# Patient Record
Sex: FEMALE | Race: WHITE | NOT HISPANIC OR LATINO | Employment: OTHER | URBAN - METROPOLITAN AREA
[De-identification: names, ages, dates, MRNs, and addresses within clinical notes are randomized per-mention and may not be internally consistent; named-entity substitution may affect disease eponyms.]

---

## 2017-02-17 ENCOUNTER — OFFICE VISIT (OUTPATIENT)
Dept: OBSTETRICS AND GYNECOLOGY | Facility: CLINIC | Age: 67
End: 2017-02-17
Payer: MEDICARE

## 2017-02-17 VITALS
SYSTOLIC BLOOD PRESSURE: 120 MMHG | BODY MASS INDEX: 23.2 KG/M2 | HEIGHT: 59 IN | DIASTOLIC BLOOD PRESSURE: 70 MMHG | WEIGHT: 115.06 LBS

## 2017-02-17 DIAGNOSIS — N76.0 RECURRENT VAGINITIS: ICD-10-CM

## 2017-02-17 DIAGNOSIS — N95.2 VAGINAL ATROPHY: ICD-10-CM

## 2017-02-17 DIAGNOSIS — Z12.39 BREAST CANCER SCREENING: ICD-10-CM

## 2017-02-17 DIAGNOSIS — Z01.419 ENCOUNTER FOR GYNECOLOGICAL EXAMINATION WITHOUT ABNORMAL FINDING: Primary | ICD-10-CM

## 2017-02-17 DIAGNOSIS — N39.0 RECURRENT URINARY TRACT INFECTION: ICD-10-CM

## 2017-02-17 LAB
BACTERIA #/AREA URNS AUTO: ABNORMAL /HPF
BILIRUB UR QL STRIP: NEGATIVE
CLARITY UR REFRACT.AUTO: CLEAR
COLOR UR AUTO: YELLOW
GLUCOSE UR QL STRIP: NEGATIVE
HGB UR QL STRIP: NEGATIVE
KETONES UR QL STRIP: NEGATIVE
LEUKOCYTE ESTERASE UR QL STRIP: ABNORMAL
MICROSCOPIC COMMENT: ABNORMAL
NITRITE UR QL STRIP: POSITIVE
PH UR STRIP: 6 [PH] (ref 5–8)
PROT UR QL STRIP: NEGATIVE
RBC #/AREA URNS AUTO: 1 /HPF (ref 0–4)
SP GR UR STRIP: 1.01 (ref 1–1.03)
SQUAMOUS #/AREA URNS AUTO: 1 /HPF
URN SPEC COLLECT METH UR: ABNORMAL
UROBILINOGEN UR STRIP-ACNC: NEGATIVE EU/DL
WBC #/AREA URNS AUTO: 6 /HPF (ref 0–5)

## 2017-02-17 PROCEDURE — 99397 PER PM REEVAL EST PAT 65+ YR: CPT | Mod: S$GLB,,, | Performed by: OBSTETRICS & GYNECOLOGY

## 2017-02-17 PROCEDURE — 99999 PR PBB SHADOW E&M-EST. PATIENT-LVL III: CPT | Mod: PBBFAC,,, | Performed by: OBSTETRICS & GYNECOLOGY

## 2017-02-17 PROCEDURE — 81001 URINALYSIS AUTO W/SCOPE: CPT

## 2017-02-17 PROCEDURE — 87086 URINE CULTURE/COLONY COUNT: CPT

## 2017-02-17 NOTE — MR AVS SNAPSHOT
[facility] - OB/GYN 5th Floor  [address]  [facility] [address]  Phone: [phone]                  Azucena Luciano   2017 10:45 AM   Office Visit    Description:  Female : 1950   Provider:  Tequila Galloway MD   Department:  [facility] - OB/GYN 5th Floor           Reason for Visit     Well Woman                To Do List           Goals (5 Years of Data)     None      [facility]     [facility] On Call Nurse Care Line -  Assistance  Registered nurses in the [facility] On Call Center provide clinical advisement, health education, appointment booking, and other advisory services.  Call for this free service at [phone].             Medications           Message regarding Medications     Verify the changes and/or additions to your medication regime listed below are the same as discussed with your clinician today.  If any of these changes or additions are incorrect, please notify your healthcare provider.        STOP taking these medications     methylPREDNISolone (MEDROL DOSEPACK) 4 mg tablet use as directed           Verify that the below list of medications is an accurate representation of the medications you are currently taking.  If none reported, the list may be blank. If incorrect, please contact your healthcare provider. Carry this list with you in case of emergency.           Current Medications     alendronate (FOSAMAX) 70 MG tablet 1 tab q weekly in the am with a full glass of water on an empty stomach. Do not consume food or lie down for at least 30 minutes afterwards.    calcium-vitamin D3 500 mg(1,250mg) -200 unit per tablet Take 1 tablet by mouth 2 (two) times daily with meals.    fluticasone (FLONASE) 50 mcg/actuation nasal spray 1 spray by Each Nare route once daily.    multivitamin with minerals tablet Take 1 tablet by mouth once daily.    oxymetazoline (AFRIN) 0.05 % nasal spray 1 spray by Nasal route every evening.           Clinical Reference Information     "       Your Vitals Were     BP Height Weight BMI       120/70 4' 11" (1.499 m) 52.2 kg (115 lb 1.3 oz) 23.24 kg/m2       Blood Pressure          Most Recent Value    BP  120/70      Allergies as of 2/17/2017     Cayenne Pepper      Immunizations Administered on Date of Encounter - 2/17/2017     None      Language Assistance Services     ATTENTION: Language assistance services are available, free of charge. Please call [phone].      ATENCIÓN: Si habla español, tiene a marti disposición servicios gratuitos de asistencia lingüística. Llame al [phone].     VELMA Ý: N?u b?n nói Ti?ng Vi?t, có các d?ch v? h? tr? ngôn ng? mi?n phí dành cho b?n. G?i s? [phone].         [facility] - OB/GYN 5th Floor complies with applicable Federal civil rights laws and does not discriminate on the basis of race, color, national origin, age, disability, or sex.        "

## 2017-02-17 NOTE — PROGRESS NOTES
"CC: Well woman exam    Azucena Luciano is a 66 y.o. female  presents for a well woman exam.  LMP: No LMP recorded. Patient is postmenopausal..   Vaginal dryness is problematic.  Using OTC lubricants. Does not want any vaginal estrogen or osphena.      Past Medical History   Diagnosis Date    Asthma     Carpal tunnel syndrome      Past Surgical History   Procedure Laterality Date    Ovarian cyst removal      Facial cosmetic surgery      Tummy tuck      Nasal septum surgery       Social History     Social History    Marital status:      Spouse name: N/A    Number of children: 3    Years of education: N/A     Occupational History    Retired insurance        Social History Main Topics    Smoking status: Never Smoker    Smokeless tobacco: None    Alcohol use 0.0 oz/week     0 Standard drinks or equivalent per week      Comment: rare    Drug use: No    Sexual activity: Yes     Partners: Male     Other Topics Concern    None     Social History Narrative     Family History   Problem Relation Age of Onset    Osteoporosis Mother     Cancer Father 66     Colon    Colon cancer Father     Cancer Brother      Brain tumor    Diabetes Maternal Grandmother     Diabetes Paternal Grandmother     Heart disease Paternal Grandmother     Hypertension Neg Hx     Stroke Neg Hx     Breast cancer Neg Hx     Ovarian cancer Neg Hx      OB History      Para Term  AB TAB SAB Ectopic Multiple Living    5 3                  Visit Vitals    /70    Ht 4' 11" (1.499 m)    Wt 52.2 kg (115 lb 1.3 oz)    BMI 23.24 kg/m2         ROS:    ROS:  GENERAL: Denies weight gain or weight loss. Feeling well overall.   SKIN: Denies rash or lesions.   HEAD: Denies head injury or headache.   NODES: Denies enlarged lymph nodes.   CHEST: Denies chest pain or shortness of breath.   CARDIOVASCULAR: Denies palpitations or left sided chest pain.   ABDOMEN: No abdominal pain, constipation, diarrhea, " nausea, vomiting or rectal bleeding.   URINARY: No frequency, dysuria, hematuria, or burning on urination.  REPRODUCTIVE: See HPI.   BREASTS: The patient performs breast self-examination and denies pain, lumps, or nipple discharge.   HEMATOLOGIC: No easy bruisability or excessive bleeding.   MUSCULOSKELETAL: Denies joint pain or swelling.   NEUROLOGIC: Denies syncope or weakness.   PSYCHIATRIC: Denies depression, anxiety or mood swings.    PHYSICAL EXAM:    APPEARANCE: Well nourished, well developed, in no acute distress.  AFFECT: WNL, alert and oriented x 3  SKIN: No acne or hirsutism  NECK: Neck symmetric without masses or thyromegaly  NODES: No inguinal, cervical, axillary, or femoral lymph node enlargement  CHEST: Good respiratory effect  ABDOMEN: Soft.  No tenderness or masses.  No hepatosplenomegaly.  No hernias.  BREASTS: Symmetrical, no skin changes or visible lesions.  No palpable masses, nipple discharge bilaterally.  PELVIC: atrophic  external genitalia without lesions.  Normal hair distribution.  Adequate perineal body, normal urethral meatus.  Vagina DRY  and well rugated without lesions or discharge.  Cervix PALE pink and atrophic, without lesions, discharge or tenderness.  No significant cystocele or rectocele.  Bimanual exam shows uterus to be normal size, regular, mobile and nontender.  Adnexa without masses or tenderness.    RECTAL: Rectovaginal exam confirms above with normal sphincter tone, no masses.  EXTREMITIES: No edema.      ICD-10-CM ICD-9-CM    1. Encounter for gynecological examination without abnormal finding Z01.419 V72.31    2. Vaginal atrophy N95.2 627.3    3. Recurrent vaginitis N76.0 616.10    4. Breast cancer screening Z12.39 V76.10 Mammo Digital Screening Bilat With CAD   5. Recurrent urinary tract infection N39.0 599.0 Urinalysis      Urine culture       replens OTC  Lubrication OTC  URINE CULTURE    Patient was counseled today on A.C.S. Pap guidelines and recommendations for  yearly pelvic exams, mammograms and monthly self breast exams; to see her PCP for other health maintenance.     F/U PRN

## 2017-02-18 LAB
BACTERIA UR CULT: NORMAL
BACTERIA UR CULT: NORMAL

## 2017-05-23 ENCOUNTER — OFFICE VISIT (OUTPATIENT)
Dept: INTERNAL MEDICINE | Facility: CLINIC | Age: 67
End: 2017-05-23
Payer: MEDICARE

## 2017-05-23 ENCOUNTER — INITIAL CONSULT (OUTPATIENT)
Dept: CARDIOLOGY | Facility: CLINIC | Age: 67
End: 2017-05-23
Payer: MEDICARE

## 2017-05-23 ENCOUNTER — CLINICAL SUPPORT (OUTPATIENT)
Dept: CARDIOLOGY | Facility: CLINIC | Age: 67
End: 2017-05-23
Payer: MEDICARE

## 2017-05-23 ENCOUNTER — HOSPITAL ENCOUNTER (OUTPATIENT)
Dept: RADIOLOGY | Facility: HOSPITAL | Age: 67
Discharge: HOME | End: 2017-05-23
Attending: INTERNAL MEDICINE
Payer: MEDICARE

## 2017-05-23 VITALS
SYSTOLIC BLOOD PRESSURE: 139 MMHG | HEART RATE: 67 BPM | HEIGHT: 59 IN | DIASTOLIC BLOOD PRESSURE: 77 MMHG | WEIGHT: 114.88 LBS | BODY MASS INDEX: 23.16 KG/M2 | TEMPERATURE: 99 F | RESPIRATION RATE: 16 BRPM

## 2017-05-23 VITALS
SYSTOLIC BLOOD PRESSURE: 134 MMHG | HEART RATE: 64 BPM | DIASTOLIC BLOOD PRESSURE: 80 MMHG | WEIGHT: 114 LBS | BODY MASS INDEX: 22.98 KG/M2 | HEIGHT: 59 IN

## 2017-05-23 DIAGNOSIS — G89.29 CHRONIC HIP PAIN, LEFT: Primary | ICD-10-CM

## 2017-05-23 DIAGNOSIS — M25.512 CHRONIC LEFT SHOULDER PAIN: ICD-10-CM

## 2017-05-23 DIAGNOSIS — M79.605 PAIN OF LEFT LOWER EXTREMITY: Primary | ICD-10-CM

## 2017-05-23 DIAGNOSIS — I82.4Y2 DVT, LOWER EXTREMITY, PROXIMAL, ACUTE, LEFT: ICD-10-CM

## 2017-05-23 DIAGNOSIS — G89.29 CHRONIC LEFT SHOULDER PAIN: ICD-10-CM

## 2017-05-23 DIAGNOSIS — M25.552 CHRONIC HIP PAIN, LEFT: ICD-10-CM

## 2017-05-23 DIAGNOSIS — M25.552 CHRONIC HIP PAIN, LEFT: Primary | ICD-10-CM

## 2017-05-23 DIAGNOSIS — M79.662 PAIN OF LEFT CALF: ICD-10-CM

## 2017-05-23 DIAGNOSIS — M54.12 CERVICAL RADICULOPATHY: ICD-10-CM

## 2017-05-23 DIAGNOSIS — G89.29 CHRONIC HIP PAIN, LEFT: ICD-10-CM

## 2017-05-23 PROCEDURE — 1160F RVW MEDS BY RX/DR IN RCRD: CPT | Mod: S$GLB,,, | Performed by: INTERNAL MEDICINE

## 2017-05-23 PROCEDURE — 73502 X-RAY EXAM HIP UNI 2-3 VIEWS: CPT | Mod: 26,LT,, | Performed by: RADIOLOGY

## 2017-05-23 PROCEDURE — 99999 PR PBB SHADOW E&M-EST. PATIENT-LVL III: CPT | Mod: PBBFAC,,, | Performed by: INTERNAL MEDICINE

## 2017-05-23 PROCEDURE — 99203 OFFICE O/P NEW LOW 30 MIN: CPT | Mod: S$GLB,,, | Performed by: INTERNAL MEDICINE

## 2017-05-23 PROCEDURE — 93971 EXTREMITY STUDY: CPT | Mod: S$GLB,,, | Performed by: INTERNAL MEDICINE

## 2017-05-23 PROCEDURE — 1159F MED LIST DOCD IN RCRD: CPT | Mod: S$GLB,,, | Performed by: INTERNAL MEDICINE

## 2017-05-23 PROCEDURE — 72050 X-RAY EXAM NECK SPINE 4/5VWS: CPT | Mod: 26,,, | Performed by: RADIOLOGY

## 2017-05-23 PROCEDURE — 1125F AMNT PAIN NOTED PAIN PRSNT: CPT | Mod: S$GLB,,, | Performed by: INTERNAL MEDICINE

## 2017-05-23 PROCEDURE — 99999 PR PBB SHADOW E&M-EST. PATIENT-LVL IV: CPT | Mod: PBBFAC,,, | Performed by: INTERNAL MEDICINE

## 2017-05-23 PROCEDURE — 99499 UNLISTED E&M SERVICE: CPT | Mod: S$GLB,,, | Performed by: INTERNAL MEDICINE

## 2017-05-23 PROCEDURE — 73030 X-RAY EXAM OF SHOULDER: CPT | Mod: 26,LT,, | Performed by: RADIOLOGY

## 2017-05-23 PROCEDURE — 99214 OFFICE O/P EST MOD 30 MIN: CPT | Mod: S$GLB,,, | Performed by: INTERNAL MEDICINE

## 2017-05-23 RX ORDER — MELOXICAM 15 MG/1
15 TABLET ORAL DAILY
Qty: 30 TABLET | Refills: 3 | Status: SHIPPED | OUTPATIENT
Start: 2017-05-23 | End: 2017-06-22

## 2017-05-23 NOTE — PROGRESS NOTES
Subjective:       Patient ID: Azucena Luciano is a 66 y.o. female.    Chief Complaint: Other (joint pain)    HPI   Pt with T12 compression fracture who is very active is here c/o 1 year of slowly worsening left anterior hip pain with is radiating to the back and down the leg. It is described as a dull ache which can be sharp with activity. Some relief with Advil. Pt is also c/o left shoulder pain which is radiating to the wrist described again as a dull ache. Improvement with NSAIDs. No neck pain.     She would like an US done of her left LE 2/2 mild swelling/pain. No hx of DVT.   Review of Systems   Constitutional: Negative for activity change, appetite change, chills, diaphoresis, fatigue, fever and unexpected weight change.   HENT: Negative for postnasal drip, rhinorrhea, sinus pressure, sneezing, sore throat, trouble swallowing and voice change.    Respiratory: Negative for cough, shortness of breath and wheezing.    Cardiovascular: Negative for chest pain, palpitations and leg swelling.   Gastrointestinal: Negative for abdominal pain, blood in stool, constipation, diarrhea, nausea and vomiting.   Genitourinary: Negative for dysuria.   Musculoskeletal: Positive for arthralgias and myalgias.   Skin: Negative for rash and wound.   Allergic/Immunologic: Negative for environmental allergies and food allergies.   Hematological: Negative for adenopathy. Does not bruise/bleed easily.       Objective:      Physical Exam   Constitutional: She is oriented to person, place, and time. She appears well-developed and well-nourished. No distress.   HENT:   Head: Normocephalic and atraumatic.   Eyes: Conjunctivae and EOM are normal. Pupils are equal, round, and reactive to light. Right eye exhibits no discharge. Left eye exhibits no discharge. No scleral icterus.   Neck: Neck supple. No JVD present.   Cardiovascular: Normal rate, regular rhythm, normal heart sounds and intact distal pulses.    Pulmonary/Chest: Effort normal  and breath sounds normal. No respiratory distress. She has no wheezes. She has no rales.   Abdominal: Soft. Bowel sounds are normal. There is no tenderness.   Musculoskeletal: She exhibits no edema.        Left shoulder: She exhibits tenderness and pain.        Left hip: She exhibits tenderness and bony tenderness.        Left lower leg: She exhibits tenderness.   Lymphadenopathy:     She has no cervical adenopathy.   Neurological: She is alert and oriented to person, place, and time.   Skin: Skin is warm and dry. No rash noted. She is not diaphoretic. No pallor.       Assessment:       1. Chronic hip pain, left    2. Cervical radiculopathy    3. Chronic left shoulder pain    4. Pain of left calf        Plan:    1/3. Check X-rays of cervical spine/hip/shoulder          Rx Mobic 15 mg daily    4. Check US to r/o DVT

## 2017-05-23 NOTE — LETTER
May 23, 2017      Navarro Limon DO  [address] [facility] [place] [address]           [place] - Vascular Diseases  [address] [facility] [address]  Phone: [phone]          Patient: Azucena Luciano   MR Number: [id number]   YOB: 1950   Date of Visit: 5/23/2017       Dear Dr. Navarro Limon:    Thank you for referring Azucena Luciano to me for evaluation. Attached you will find relevant portions of my assessment and plan of care.    If you have questions, please do not hesitate to call me. I look forward to following Azucena Luciano along with you.    Sincerely,    Abraham Alarcon MD    Enclosure  CC:  No Recipients    If you would like to receive this communication electronically, please contact [email or web address] or [phone] to request more information on [employer] Link access.    For providers and/or their staff who would like to refer a patient to [facility], please contact us through our one-stop-shop provider referral line, [facility] [place], at [phone].    If you feel you have received this communication in error or would no longer like to receive these types of communications, please e-mail [email or web address]

## 2017-05-23 NOTE — PROGRESS NOTES
Subjective:    Patient ID:  Azucena Luciano is a 66 y.o. Y.o.female who presents for evaluation of left groin pain    HPI:  66 year old female with PMH of recurrent DVT's. First DVT  (left leg) she was on OCP for a year ()  She was not started on any blood thinner. Second DVT was  when she was pregnant not sure if she was no any blood thinner. Today she reports chronic left groin and thigh pain that is 5/10 at rest and it get worse when she walk. No leg swelling. She had a venous doppler this morning shows evidence of age indeterminate left proximal DVT. No family history of DVT. She denies any chest pain or SOB. She doesn't have any venous ultrasound done in the past few years.         Past Medical History:   Diagnosis Date    Asthma     Carpal tunnel syndrome        indicated that her mother is alive. She indicated that her father is . She indicated that the status of her brother is unknown. She indicated that the status of her maternal grandmother is unknown. She indicated that the status of her paternal grandmother is unknown. She indicated that the status of her neg hx is unknown.       Social History     Social History    Marital status:      Spouse name: N/A    Number of children: 3    Years of education: N/A     Occupational History    Retired insurance        Social History Main Topics    Smoking status: Never Smoker    Smokeless tobacco: Not on file    Alcohol use 0.0 oz/week      Comment: rare    Drug use: No    Sexual activity: Yes     Partners: Male     Other Topics Concern    Not on file     Social History Narrative    No narrative on file       Current Outpatient Prescriptions   Medication Sig    alendronate (FOSAMAX) 70 MG tablet 1 tab q weekly in the am with a full glass of water on an empty stomach. Do not consume food or lie down for at least 30 minutes afterwards.    calcium-vitamin D3 500 mg(1,250mg) -200 unit per tablet Take 1 tablet by mouth 2  (two) times daily with meals.    fluticasone (FLONASE) 50 mcg/actuation nasal spray 1 spray by Each Nare route once daily.    multivitamin with minerals tablet Take 1 tablet by mouth once daily.    oxymetazoline (AFRIN) 0.05 % nasal spray 1 spray by Nasal route every evening.    apixaban 5 mg Tab Take 2 tablets (10 mg total) by mouth 2 (two) times daily.    apixaban 5 mg Tab Take 1 tablet (5 mg total) by mouth 2 (two) times daily.    meloxicam (MOBIC) 15 MG tablet Take 1 tablet (15 mg total) by mouth once daily.     No current facility-administered medications for this visit.        CMP  Sodium   Date Value Ref Range Status   11/02/2015 144 136 - 145 mmol/L Final     Potassium   Date Value Ref Range Status   11/02/2015 4.3 3.5 - 5.1 mmol/L Final     Chloride   Date Value Ref Range Status   11/02/2015 108 95 - 110 mmol/L Final     CO2   Date Value Ref Range Status   11/02/2015 28 23 - 29 mmol/L Final     Glucose   Date Value Ref Range Status   11/02/2015 90 70 - 110 mg/dL Final     BUN, Bld   Date Value Ref Range Status   11/02/2015 17 8 - 23 mg/dL Final     Creatinine   Date Value Ref Range Status   11/02/2015 0.8 0.5 - 1.4 mg/dL Final     Calcium   Date Value Ref Range Status   11/02/2015 9.6 8.7 - 10.5 mg/dL Final     Total Protein   Date Value Ref Range Status   11/02/2015 6.6 6.0 - 8.4 g/dL Final     Albumin   Date Value Ref Range Status   11/02/2015 3.8 3.5 - 5.2 g/dL Final     Total Bilirubin   Date Value Ref Range Status   11/02/2015 0.4 0.1 - 1.0 mg/dL Final     Comment:     For infants and newborns, interpretation of results should be based  on gestational age, weight and in agreement with clinical  observations.  Premature Infant recommended reference ranges:  Up to 24 hours.............<8.0 mg/dL  Up to 48 hours............<12.0 mg/dL  3-5 days..................<15.0 mg/dL  6-29 days.................<15.0 mg/dL       Alkaline Phosphatase   Date Value Ref Range Status   11/02/2015 132 55 - 135 U/L  "Final     AST   Date Value Ref Range Status   11/02/2015 23 10 - 40 U/L Final     ALT   Date Value Ref Range Status   11/02/2015 20 10 - 44 U/L Final     Anion Gap   Date Value Ref Range Status   11/02/2015 8 8 - 16 mmol/L Final     eGFR if    Date Value Ref Range Status   11/02/2015 >60.0 >60 mL/min/1.73 m^2 Final     eGFR if non    Date Value Ref Range Status   11/02/2015 >60.0 >60 mL/min/1.73 m^2 Final     Comment:     Calculation used to obtain the estimated glomerular filtration  rate (eGFR) is the CKD-EPI equation. Since race is unknown   in our information system, the eGFR values for   -American and Non--American patients are given   for each creatinine result.         Lab Results   Component Value Date    WBC 5.11 11/02/2015    HGB 13.7 11/02/2015    HCT 43.3 11/02/2015    MCV 84 11/02/2015     11/02/2015       Review of Systems   Constitution: Negative for decreased appetite, fever and weight gain.   HENT: Negative.    Cardiovascular: Negative for chest pain, claudication, cyanosis and leg swelling.   Respiratory: Negative for cough, shortness of breath and wheezing.    Skin: Negative for color change, dry skin, itching, rash and suspicious lesions.   Musculoskeletal: Positive for muscle cramps and myalgias. Negative for arthritis, back pain, joint swelling and muscle weakness.   Gastrointestinal: Negative.    Genitourinary: Negative.    Neurological: Negative.  Negative for loss of balance, numbness and paresthesias.        Objective:   /80 (BP Location: Right arm, Patient Position: Sitting, BP Method: Manual)   Pulse 64   Ht 4' 11" (1.499 m)   Wt 51.7 kg (114 lb)   BMI 23.03 kg/m²   Physical Exam   Constitutional: She is oriented to person, place, and time. She appears well-developed and well-nourished. No distress.   HENT:   Head: Normocephalic and atraumatic.   Eyes: Conjunctivae and EOM are normal. Pupils are equal, round, and reactive to " light.   Neck: Normal range of motion. Neck supple. No JVD present.   Cardiovascular: Normal rate, regular rhythm, normal heart sounds and intact distal pulses.  Exam reveals no gallop and no friction rub.    No murmur heard.  Pulmonary/Chest: Effort normal and breath sounds normal. No respiratory distress. She has no wheezes.   Abdominal: Soft. She exhibits no distension. There is no tenderness.   Musculoskeletal: Normal range of motion. She exhibits tenderness. She exhibits no edema.   Neurological: She is alert and oriented to person, place, and time.   Skin: Skin is warm. No rash noted. She is not diaphoretic. No erythema. No pallor.           Assessment:       1. Pain of left lower extremity  Cardiology Lab NOA Resting, Lower Extremities    apixaban 5 mg Tab    apixaban 5 mg Tab    CAR Ultrasound doppler venous leg left   2. DVT, lower extremity, proximal, acute, left  apixaban 5 mg Tab    apixaban 5 mg Tab    CAR Ultrasound doppler venous leg left        Plan:       Azucena was seen today for deep vein thrombosis.    Diagnoses and all orders for this visit:    Pain of left lower extremity  -     Cardiology Lab NOA Resting, Lower Extremities; Future  -     apixaban 5 mg Tab; Take 2 tablets (10 mg total) by mouth 2 (two) times daily.  -     apixaban 5 mg Tab; Take 1 tablet (5 mg total) by mouth 2 (two) times daily.  -     CAR Ultrasound doppler venous leg left; Future; Expected date: 08/23/2017    DVT, lower extremity, proximal, acute, left  -     apixaban 5 mg Tab; Take 2 tablets (10 mg total) by mouth 2 (two) times daily.  -     apixaban 5 mg Tab; Take 1 tablet (5 mg total) by mouth 2 (two) times daily.  -     CAR Ultrasound doppler venous leg left; Future; Expected date: 08/23/2017       Not sure how old this DVT, she has a  history of remote DVT of the left leg.  I will start her on apixaban for 3 months only. and we will repeat the venous doppler in 3 months.  I don't believe that her leg pain is vascular  in nature. She will stop fosamax and see. If the pain persists then CT abdomin and pelvis may be warranted.  Follow up in 3 months.    Abraham Alarcon      5/26/2017.  The patient reports a 5 hours flight 2 few weeks ago after which she developed the leg pain. So most probably this is either acute or subacute DVT. I advised the patient  To continue Blood thinner and have another venous doppler in 3 months. I defer the decision for continuation of treatment to the next provider.    Abraham Alarcon

## 2017-05-26 ENCOUNTER — CLINICAL SUPPORT (OUTPATIENT)
Dept: CARDIOLOGY | Facility: CLINIC | Age: 67
End: 2017-05-26
Payer: MEDICARE

## 2017-05-26 ENCOUNTER — TELEPHONE (OUTPATIENT)
Dept: CARDIOLOGY | Facility: CLINIC | Age: 67
End: 2017-05-26

## 2017-05-26 DIAGNOSIS — M79.605 PAIN OF LEFT LOWER EXTREMITY: ICD-10-CM

## 2017-05-26 LAB — VASCULAR ANKLE BRACHIAL INDEX (ABI) LEFT: 1.06 (ref 0.9–1.2)

## 2017-05-26 PROCEDURE — 93922 UPR/L XTREMITY ART 2 LEVELS: CPT | Mod: S$GLB,,, | Performed by: INTERNAL MEDICINE

## 2017-05-26 NOTE — TELEPHONE ENCOUNTER
----- Message from Abraham Alarcon MD sent at 5/26/2017  3:44 PM CDT -----  NOA is normal  Start compression stocking and continue the Blood thinner.

## 2017-07-05 ENCOUNTER — LAB VISIT (OUTPATIENT)
Dept: LAB | Facility: HOSPITAL | Age: 67
End: 2017-07-05
Attending: INTERNAL MEDICINE
Payer: MEDICARE

## 2017-07-05 ENCOUNTER — TELEPHONE (OUTPATIENT)
Dept: INTERNAL MEDICINE | Facility: CLINIC | Age: 67
End: 2017-07-05

## 2017-07-05 DIAGNOSIS — Z00.00 ANNUAL PHYSICAL EXAM: ICD-10-CM

## 2017-07-05 DIAGNOSIS — Z11.59 NEED FOR HEPATITIS C SCREENING TEST: ICD-10-CM

## 2017-07-05 DIAGNOSIS — Z00.00 ANNUAL PHYSICAL EXAM: Primary | ICD-10-CM

## 2017-07-05 LAB
ALBUMIN SERPL BCP-MCNC: 3.7 G/DL
ALP SERPL-CCNC: 102 U/L
ALT SERPL W/O P-5'-P-CCNC: 21 U/L
ANION GAP SERPL CALC-SCNC: 9 MMOL/L
AST SERPL-CCNC: 21 U/L
BASOPHILS # BLD AUTO: 0.04 K/UL
BASOPHILS NFR BLD: 0.7 %
BILIRUB SERPL-MCNC: 0.3 MG/DL
BUN SERPL-MCNC: 20 MG/DL
CALCIUM SERPL-MCNC: 9.1 MG/DL
CHLORIDE SERPL-SCNC: 108 MMOL/L
CHOLEST/HDLC SERPL: 5.2 {RATIO}
CO2 SERPL-SCNC: 24 MMOL/L
CREAT SERPL-MCNC: 0.8 MG/DL
DIFFERENTIAL METHOD: ABNORMAL
EOSINOPHIL # BLD AUTO: 0.1 K/UL
EOSINOPHIL NFR BLD: 1.7 %
ERYTHROCYTE [DISTWIDTH] IN BLOOD BY AUTOMATED COUNT: 15 %
EST. GFR  (AFRICAN AMERICAN): >60 ML/MIN/1.73 M^2
EST. GFR  (NON AFRICAN AMERICAN): >60 ML/MIN/1.73 M^2
GLUCOSE SERPL-MCNC: 88 MG/DL
HCT VFR BLD AUTO: 42.6 %
HDL/CHOLESTEROL RATIO: 19.4 %
HDLC SERPL-MCNC: 222 MG/DL
HDLC SERPL-MCNC: 43 MG/DL
HGB BLD-MCNC: 13.6 G/DL
LDLC SERPL CALC-MCNC: 146.4 MG/DL
LYMPHOCYTES # BLD AUTO: 3.4 K/UL
LYMPHOCYTES NFR BLD: 56.2 %
MCH RBC QN AUTO: 26.7 PG
MCHC RBC AUTO-ENTMCNC: 31.9 %
MCV RBC AUTO: 84 FL
MONOCYTES # BLD AUTO: 0.5 K/UL
MONOCYTES NFR BLD: 7.5 %
NEUTROPHILS # BLD AUTO: 2 K/UL
NEUTROPHILS NFR BLD: 33.7 %
NONHDLC SERPL-MCNC: 179 MG/DL
PLATELET # BLD AUTO: 233 K/UL
PMV BLD AUTO: 11.1 FL
POTASSIUM SERPL-SCNC: 4 MMOL/L
PROT SERPL-MCNC: 6.6 G/DL
RBC # BLD AUTO: 5.1 M/UL
SODIUM SERPL-SCNC: 141 MMOL/L
TRIGL SERPL-MCNC: 163 MG/DL
TSH SERPL DL<=0.005 MIU/L-ACNC: 3.09 UIU/ML
WBC # BLD AUTO: 5.98 K/UL

## 2017-07-05 PROCEDURE — 85025 COMPLETE CBC W/AUTO DIFF WBC: CPT

## 2017-07-05 PROCEDURE — 84443 ASSAY THYROID STIM HORMONE: CPT

## 2017-07-05 PROCEDURE — 86803 HEPATITIS C AB TEST: CPT

## 2017-07-05 PROCEDURE — 80061 LIPID PANEL: CPT

## 2017-07-05 PROCEDURE — 80053 COMPREHEN METABOLIC PANEL: CPT

## 2017-07-05 PROCEDURE — 36415 COLL VENOUS BLD VENIPUNCTURE: CPT | Mod: PO

## 2017-07-06 LAB — HCV AB SERPL QL IA: ABNORMAL

## 2017-07-12 ENCOUNTER — LAB VISIT (OUTPATIENT)
Dept: LAB | Facility: HOSPITAL | Age: 67
End: 2017-07-12
Attending: INTERNAL MEDICINE
Payer: MEDICARE

## 2017-07-12 ENCOUNTER — OFFICE VISIT (OUTPATIENT)
Dept: INTERNAL MEDICINE | Facility: CLINIC | Age: 67
End: 2017-07-12
Payer: MEDICARE

## 2017-07-12 VITALS
BODY MASS INDEX: 23.11 KG/M2 | TEMPERATURE: 98 F | DIASTOLIC BLOOD PRESSURE: 74 MMHG | SYSTOLIC BLOOD PRESSURE: 124 MMHG | HEART RATE: 64 BPM | WEIGHT: 114.63 LBS | HEIGHT: 59 IN | RESPIRATION RATE: 12 BRPM

## 2017-07-12 DIAGNOSIS — Z00.00 ANNUAL PHYSICAL EXAM: Primary | ICD-10-CM

## 2017-07-12 DIAGNOSIS — R76.8 HEPATITIS C ANTIBODY TEST POSITIVE: ICD-10-CM

## 2017-07-12 DIAGNOSIS — I82.4Y2 ACUTE DEEP VEIN THROMBOSIS (DVT) OF PROXIMAL END OF LEFT LOWER EXTREMITY: ICD-10-CM

## 2017-07-12 PROCEDURE — 87522 HEPATITIS C REVRS TRNSCRPJ: CPT

## 2017-07-12 PROCEDURE — 36415 COLL VENOUS BLD VENIPUNCTURE: CPT | Mod: PO

## 2017-07-12 PROCEDURE — 99397 PER PM REEVAL EST PAT 65+ YR: CPT | Mod: S$GLB,,, | Performed by: INTERNAL MEDICINE

## 2017-07-12 PROCEDURE — 99499 UNLISTED E&M SERVICE: CPT | Mod: S$GLB,,, | Performed by: INTERNAL MEDICINE

## 2017-07-12 PROCEDURE — 86803 HEPATITIS C AB TEST: CPT

## 2017-07-12 PROCEDURE — 99999 PR PBB SHADOW E&M-EST. PATIENT-LVL III: CPT | Mod: PBBFAC,,, | Performed by: INTERNAL MEDICINE

## 2017-07-12 NOTE — PROGRESS NOTES
Subjective:       Patient ID: Azucena Luciano is a 66 y.o. female.    Chief Complaint: Annual Exam    HPI   66 y.o. Female here for annual exam.      Cholesterol: (normal)  Vaccines: Influenza (deferred); Tetanus (2015); Pneumovax (2015); Zoster (2015)  Eye exam: 11/15  Mammogram: 12/15  Gyn exam: 1/16  Colonoscopy: needs  DEXA: 11/15    Mobility: normal  Falls: no     Exercise: cardio/stretching 3x/wk  Diet: regular     Past Medical History:    Carpal tunnel syndrome                                        Asthma               DVT                                           Past Surgical History:    OVARIAN CYST REMOVAL                                           FACIAL COSMETIC SURGERY                                        tummy tuck                                                   Social History    Marital Status:              Spouse Name:                       Years of Education:                 Number of children: 3              Occupational History  Occupation          Employer            Comment               Retired insurance *                          Social History Main Topics    Smoking Status: Never Smoker                      Smokeless Status: Not on file                       Alcohol Use: Yes           0.0 oz/week       0 Not specified per week       Comment: rare    Drug Use: No              Sexual Activity: Yes               Partners with: Male     No Known Allergies  Ms. Luciano does not currently have medications on file.  Review of Systems   Constitutional: Negative for activity change, appetite change, chills, diaphoresis, fatigue, fever and unexpected weight change.   HENT: Negative for congestion, mouth sores, postnasal drip, rhinorrhea, sinus pressure, sneezing, sore throat, trouble swallowing and voice change.    Eyes: Negative for pain, discharge and visual disturbance.   Respiratory: Negative for cough, shortness of breath and wheezing.    Cardiovascular: Negative for chest pain,  palpitations and leg swelling.   Gastrointestinal: Negative for abdominal pain, blood in stool, constipation, diarrhea, nausea and vomiting.   Endocrine: Negative for cold intolerance and heat intolerance.   Genitourinary: Negative for difficulty urinating, dysuria, frequency, hematuria and urgency.   Musculoskeletal: Negative for arthralgias and myalgias.   Skin: Negative for rash and wound.   Allergic/Immunologic: Negative for environmental allergies and food allergies.   Neurological: Negative for dizziness, tremors, seizures, syncope, weakness, light-headedness and headaches.   Hematological: Negative for adenopathy. Does not bruise/bleed easily.   Psychiatric/Behavioral: Negative for confusion and sleep disturbance. The patient is not nervous/anxious.        Objective:      Physical Exam   Constitutional: She is oriented to person, place, and time. She appears well-developed and well-nourished. No distress.   HENT:   Head: Normocephalic and atraumatic.   Right Ear: External ear normal.   Left Ear: External ear normal.   Nose: Nose normal.   Mouth/Throat: Oropharynx is clear and moist. No oropharyngeal exudate.   Eyes: Conjunctivae and EOM are normal. Pupils are equal, round, and reactive to light. Right eye exhibits no discharge. Left eye exhibits no discharge. No scleral icterus.   Neck: Neck supple. No JVD present. No thyromegaly present.   Cardiovascular: Normal rate, regular rhythm, normal heart sounds and intact distal pulses.    No murmur heard.  Pulmonary/Chest: Effort normal and breath sounds normal. No respiratory distress. She has no wheezes. She has no rales. She exhibits no tenderness.   Abdominal: Soft. Bowel sounds are normal. She exhibits no distension. There is no tenderness. There is no guarding.   Musculoskeletal: She exhibits no edema.   Lymphadenopathy:     She has no cervical adenopathy.   Neurological: She is alert and oriented to person, place, and time. She has normal reflexes. No  cranial nerve deficit.   Skin: Skin is warm and dry. No rash noted. She is not diaphoretic. No pallor.   Psychiatric: She has a normal mood and affect. Judgment normal.   Nursing note and vitals reviewed.      Assessment:       1. Annual physical exam    2. Acute deep vein thrombosis (DVT) of proximal end of left lower extremity    3. Hepatitis C antibody test positive        Plan:    Blood work reviewed with pt   Vaccines: Influenza (deferred); Tetanus (2015); Pneumovax (2015); Zoster (2015)   Eye exam: 11/15   Mammogram: 12/15   Gyn exam: 1/16   Colonoscopy: needs   DEXA: 11/15      1. LLE DVT- stable on Apixaban       Followed by Vascular Medicine    2. Osteoporosis- stable on Fosamax       Last DEXA(11/15)   3. Repeat Hep C Ab with RNA   4. F/u in 1 yr

## 2017-07-13 ENCOUNTER — TELEPHONE (OUTPATIENT)
Dept: INTERNAL MEDICINE | Facility: CLINIC | Age: 67
End: 2017-07-13

## 2017-07-13 NOTE — TELEPHONE ENCOUNTER
Spoke to patient, has appointment with vascular doctor in August to evaluate if she needs to continue Eliquis .    Instructed patient to wait for the verdict from vascular doctor, then wait a 1-2 weeks to proceed with the  mammogram .    See prior information

## 2017-07-13 NOTE — TELEPHONE ENCOUNTER
----- Message from Chelita Downs sent at 7/13/2017  9:16 AM CDT -----  Contact: [phone] or [phone] Azucena Galloway entered a referral for patient to have mammo. Patient states that she is taking Eliquis for a blood clot in left leg. Patient has been on med for 3 months.   Patient was told by her Vascular doctor not to do anything that would cause a bruise.  If she got a bruise go straight to ER.  Patient is past due for her mammo but has a concern about getting one.  The last time she had a mammo it bruised the side of her breast (patient has implants).  Patient is wanting to know if she should have a mammo or if she still needs them.  She's concerned she will bruise again.  Please advise patient and message me back on how to move forward.    Thanks, Stefani

## 2017-07-14 LAB — HCV AB SERPL QL IA: ABNORMAL

## 2017-07-17 LAB
HCV LOG: <1.08 LOG (10) IU/ML
HCV RNA QUANT PCR: <12 IU/ML
HCV, QUALITATIVE: NOT DETECTED IU/ML

## 2017-07-21 ENCOUNTER — TELEPHONE (OUTPATIENT)
Dept: ENDOSCOPY | Facility: HOSPITAL | Age: 67
End: 2017-07-21

## 2017-07-21 NOTE — TELEPHONE ENCOUNTER
Pt on Eliquis, should be stopping in August and wants to wait until then to schedule colonoscopy, # given to pt to call when ready to book.

## 2017-07-24 DIAGNOSIS — M81.0 OSTEOPOROSIS, UNSPECIFIED OSTEOPOROSIS TYPE, UNSPECIFIED PATHOLOGICAL FRACTURE PRESENCE: ICD-10-CM

## 2017-07-25 RX ORDER — ALENDRONATE SODIUM 70 MG/1
TABLET ORAL
Qty: 12 TABLET | Refills: 3 | Status: SHIPPED | OUTPATIENT
Start: 2017-07-25 | End: 2018-10-09 | Stop reason: SDUPTHER

## 2017-08-23 ENCOUNTER — CLINICAL SUPPORT (OUTPATIENT)
Dept: CARDIOLOGY | Facility: CLINIC | Age: 67
End: 2017-08-23
Payer: MEDICARE

## 2017-08-23 DIAGNOSIS — M79.605 PAIN OF LEFT LOWER EXTREMITY: ICD-10-CM

## 2017-08-23 DIAGNOSIS — I82.4Y2 DVT, LOWER EXTREMITY, PROXIMAL, ACUTE, LEFT: ICD-10-CM

## 2017-08-23 PROCEDURE — 93971 EXTREMITY STUDY: CPT | Mod: S$GLB,,, | Performed by: INTERNAL MEDICINE

## 2017-11-13 ENCOUNTER — OFFICE VISIT (OUTPATIENT)
Dept: CARDIOLOGY | Facility: CLINIC | Age: 67
End: 2017-11-13
Payer: MEDICARE

## 2017-11-13 VITALS
HEART RATE: 70 BPM | WEIGHT: 113 LBS | HEIGHT: 59 IN | DIASTOLIC BLOOD PRESSURE: 70 MMHG | SYSTOLIC BLOOD PRESSURE: 113 MMHG | BODY MASS INDEX: 22.78 KG/M2

## 2017-11-13 DIAGNOSIS — I82.4Y2 DVT, LOWER EXTREMITY, PROXIMAL, ACUTE, LEFT: ICD-10-CM

## 2017-11-13 DIAGNOSIS — I83.92 VARICOSE VEINS OF LEFT LOWER EXTREMITY: ICD-10-CM

## 2017-11-13 DIAGNOSIS — M79.605 PAIN OF LEFT LOWER EXTREMITY: ICD-10-CM

## 2017-11-13 DIAGNOSIS — I82.4Y2 ACUTE DEEP VEIN THROMBOSIS (DVT) OF PROXIMAL END OF LEFT LOWER EXTREMITY: Primary | ICD-10-CM

## 2017-11-13 DIAGNOSIS — I87.002 POST-THROMBOTIC SYNDROME OF LEFT LOWER EXTREMITY: ICD-10-CM

## 2017-11-13 PROCEDURE — 99499 UNLISTED E&M SERVICE: CPT | Mod: S$GLB,,, | Performed by: INTERNAL MEDICINE

## 2017-11-13 PROCEDURE — 99214 OFFICE O/P EST MOD 30 MIN: CPT | Mod: S$GLB,,, | Performed by: INTERNAL MEDICINE

## 2017-11-13 PROCEDURE — 99999 PR PBB SHADOW E&M-EST. PATIENT-LVL III: CPT | Mod: PBBFAC,,, | Performed by: INTERNAL MEDICINE

## 2017-11-13 NOTE — PROGRESS NOTES
Subjective:   Patient ID:  Azucena Luciano is a 67 y.o. 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 female is here follow up and feeling well without any new complaints.  She has a history of recurrent DVT's. First DVT  (left leg) she was on OCP for a year ()  She was not started on any blood thinner. Second DVT was  when she was pregnant and was not treated with blood thinners. In 2017 after a long trip she returned with worse L leg pain. A venous doppler revealed a L CFV DVT; partially compressible vein with age indeterminate DVT. She was started on Eliquis. Her symptoms improved. She stopped taking Eliquis on her own because the initial doctor Dr. Abraham Alarcon moved. Ultrasound in 2017 revealed a partially compressible L CFV DVT. She has varicose veins on the L calf. She has intermittent venous claudication that is worse after a busy long standing day or when she is constipated. She is very active. She does not wear stockings.         Patient Active Problem List    Diagnosis Date Noted    Post-thrombotic syndrome of left lower extremity 2017    Varicose veins of left lower extremity 2017    Acute deep vein thrombosis (DVT) of proximal end of left lower extremity 2017    Thoracic back pain 2016    Carpal tunnel syndrome, bilateral 10/30/2015           Right Arm BP - Sittin/70  Left Arm BP - Sittin/70        LABS    LAST HbA1c  No results found for: HGBA1C    Lipid panel  Lab Results   Component Value Date    CHOL 222 (H) 2017    CHOL 222 (H) 2015     Lab Results   Component Value Date    HDL 43 2017    HDL 40 2015     Lab Results   Component Value Date    LDLCALC 146.4 2017    LDLCALC 151.4 2015     Lab Results   Component Value Date    TRIG 163 (H) 2017    TRIG 153 (H) 2015     Lab  Results   Component Value Date    CHOLHDL 19.4 (L) 07/05/2017    CHOLHDL 18.0 (L) 11/02/2015            ROS    Objective:   Physical Exam    Assessment:     1. Acute deep vein thrombosis (DVT) of proximal end of left lower extremity    2. Pain of left lower extremity    3. DVT, lower extremity, proximal, acute, left    4. Post-thrombotic syndrome of left lower extremity    5. Varicose veins of left lower extremity        I suspect she has a chronically thrombosed L CFV/EIV/CIV with a large pelic vein collateral bed. In 5/2014 she probably developed a DVT in one the collaterals which improved with Eliquis. She is high at high risk of recurrent events.     Plan:         Resume Eliquis  Repeat venous ultrasound    If clinically stable she will continue with current plan   If venous insufficiency is worse she will consider recanalization of L CFV/EIV/CIV      Compression stockings 20-30 mmHg  Continue with exercise        She mentioned to me that she might have a UTI and asked for antibiotics. I advised her to call her PCP ASAP for a visit and further care       Continue with current medical plan and lifestyle changes.  Return sooner for concerns or questions. If symptoms persist go to the ED  I have reviewed all pertinent data on this patient       I have reviewed the patient's medical history in detail and updated the computerized patient record.    Orders Placed This Encounter   Procedures    COMPRESSION STOCKINGS     Knee high     Order Specific Question:   Pressure amount:     Answer:   20-30 mmHg    US Lower Extremity Veins Bilateral Insuf     Standing Status:   Future     Standing Expiration Date:   11/13/2018     Order Specific Question:   May the Radiologist modify the order per protocol to meet the clinical needs of the patient?     Answer:   Yes       Follow up as scheduled. Return sooner for concerns or questions  Follow up in 6 months            She expressed verbal understanding and agreed with the  plan        Greater than 50% of the visit of 45 minutes was spent counseling, educating, and coordinating the care of the patient.    -In today's visit, at least 4 established conditions that pose a risk to life or bodily function have been addressed and the conditions are severe.    -In today's visit, monitoring for drug toxicity was accomplished.            Patient's Medications   New Prescriptions    No medications on file   Previous Medications    ALENDRONATE (FOSAMAX) 70 MG TABLET    1 tab q weekly in the am with a full glass of water on an empty stomach. Do not consume food or lie down for at least 30 minutes afterwards.    CALCIUM-VITAMIN D3 500 MG(1,250MG) -200 UNIT PER TABLET    Take 1 tablet by mouth 2 (two) times daily with meals.    FLUTICASONE (FLONASE) 50 MCG/ACTUATION NASAL SPRAY    1 spray by Each Nare route once daily.    MULTIVITAMIN WITH MINERALS TABLET    Take 1 tablet by mouth once daily.    OXYMETAZOLINE (AFRIN) 0.05 % NASAL SPRAY    1 spray by Nasal route every evening.   Modified Medications    Modified Medication Previous Medication    APIXABAN 5 MG TAB apixaban 5 mg Tab       Take 1 tablet (5 mg total) by mouth 2 (two) times daily.    Take 1 tablet (5 mg total) by mouth 2 (two) times daily.   Discontinued Medications

## 2017-11-13 NOTE — PATIENT INSTRUCTIONS
Deep Vein Thrombosis (DVT)    Deep vein thrombosis (DVT) occurs when a blood clot (thrombus) forms in a deep vein. This happens most often in the leg. It can also happen in the arms or other parts of the body. A part of the clot called an embolus can break off and travel to the lungs. When this happens, its called a pulmonary embolism (PE). PE is a medical emergency. It can cut off blood flow and lead to death. Both DVT and PE are closely related. Together, they are often referred to by the term venous thromboembolism (VTE).  Risk factors for DVT  Anything that slows blood flow, injures the lining of a vein, or increases blood clotting can make you more prone to having DVT. This includes the following:  · Long periods without movement (such as when sitting for many hours at a time or when recovering from major surgery or illness)  · Estrogen (female hormone) therapy, such as hormone replacement therapy (HRT) or oral contraceptives  · Fractured hip or leg  · Major surgery or joint replacement  · Major trauma or spinal cord injury  · Cancer  · Family history  · Excess weight or obesity  · Smoking  · Older age  Symptoms  DVT does not always cause symptoms. When symptoms do occur, they may appear around the site of the DVT, such as in the leg. Possible symptoms include:  · Swelling  · Pain  · Warmth  · Redness  · Tenderness  Home care  · You were likely prescribed blood thinners (anticoagulants). They may be given as pills (oral) or shots (injections). Follow all instructions when using these medicines. Note: Do not take blood thinners with other medicines, herbal remedies, or supplements without talking to your provider first. Certain medicines or products can affect how blood thinners work.  · Follow your providers instructions about activity and rest.  · If support or compression stockings are prescribed, wear them as directed. These may help improve blood flow in the legs.  · When sitting or lying down, move  your ankles, toes and knees often. This may also help improve blood flow in the legs.  Follow-up care  Follow up with your healthcare provider, or as advised. If imaging tests were done, they may need further review by a doctor. You will be told of any new findings that may affect your care.  When to seek medical advice  Call your healthcare provider right away if any of these occur:  · New or increased swelling, pain, tenderness, warmth, or redness, in the leg, arm, or other area  · Blood in the urine  · Bleeding with bowel movements  Call 911  Call 911 right away if any of these occur:  · Bleeding from the nose, gums, a cut, or vagina  · Heavy or uncontrolled bleeding  · Trouble breathing  · Chest pain or discomfort that worsens with deep breathing or coughing  · Coughing (may cough up blood)  · Fast heartbeat  · Sweating  · Anxiety  · Lightheadedness, dizziness, or fainting  Date Last Reviewed: 9/21/2015  © 7840-6526 [employer]. [address] [place] [address], [place] [address]. All rights reserved. This information is not intended as a substitute for professional medical care. Always follow your healthcare professional's instructions.

## 2017-11-14 ENCOUNTER — OFFICE VISIT (OUTPATIENT)
Dept: UROLOGY | Facility: CLINIC | Age: 67
End: 2017-11-14
Payer: MEDICARE

## 2017-11-14 ENCOUNTER — LAB VISIT (OUTPATIENT)
Dept: LAB | Facility: HOSPITAL | Age: 67
End: 2017-11-14
Attending: UROLOGY
Payer: MEDICARE

## 2017-11-14 VITALS
BODY MASS INDEX: 22.78 KG/M2 | HEIGHT: 59 IN | SYSTOLIC BLOOD PRESSURE: 134 MMHG | DIASTOLIC BLOOD PRESSURE: 73 MMHG | WEIGHT: 113 LBS | HEART RATE: 78 BPM

## 2017-11-14 DIAGNOSIS — R31.0 GROSS HEMATURIA: ICD-10-CM

## 2017-11-14 DIAGNOSIS — R31.0 GROSS HEMATURIA: Primary | ICD-10-CM

## 2017-11-14 DIAGNOSIS — R30.0 DYSURIA: ICD-10-CM

## 2017-11-14 DIAGNOSIS — N81.11 MIDLINE CYSTOCELE: ICD-10-CM

## 2017-11-14 DIAGNOSIS — Z87.440 HISTORY OF UTI: ICD-10-CM

## 2017-11-14 LAB
ANION GAP SERPL CALC-SCNC: 5 MMOL/L
BUN SERPL-MCNC: 16 MG/DL
CALCIUM SERPL-MCNC: 9.6 MG/DL
CHLORIDE SERPL-SCNC: 110 MMOL/L
CO2 SERPL-SCNC: 29 MMOL/L
CREAT SERPL-MCNC: 0.8 MG/DL
EST. GFR  (AFRICAN AMERICAN): >60 ML/MIN/1.73 M^2
EST. GFR  (NON AFRICAN AMERICAN): >60 ML/MIN/1.73 M^2
GLUCOSE SERPL-MCNC: 84 MG/DL
POTASSIUM SERPL-SCNC: 4.3 MMOL/L
SODIUM SERPL-SCNC: 144 MMOL/L

## 2017-11-14 PROCEDURE — 87088 URINE BACTERIA CULTURE: CPT

## 2017-11-14 PROCEDURE — 80048 BASIC METABOLIC PNL TOTAL CA: CPT

## 2017-11-14 PROCEDURE — 36415 COLL VENOUS BLD VENIPUNCTURE: CPT

## 2017-11-14 PROCEDURE — 99204 OFFICE O/P NEW MOD 45 MIN: CPT | Mod: 25,S$GLB,, | Performed by: UROLOGY

## 2017-11-14 PROCEDURE — 87086 URINE CULTURE/COLONY COUNT: CPT

## 2017-11-14 PROCEDURE — 99999 PR PBB SHADOW E&M-EST. PATIENT-LVL III: CPT | Mod: PBBFAC,,, | Performed by: UROLOGY

## 2017-11-14 PROCEDURE — 81001 URINALYSIS AUTO W/SCOPE: CPT | Mod: S$GLB,,, | Performed by: UROLOGY

## 2017-11-14 NOTE — PROGRESS NOTES
"HPI:  Azucena Luciano is a 67 y.o. year old female that  presents with   Chief Complaint   Patient presents with    Urinary Tract Infection   .  This patient refers himself with a recent episode of gross hematuria ×1.  This was not associated with flank pain nausea vomiting and she gives no history kidney stones and she does not smoke cigarettes graft patient states that she gets urinary tract infections "all the time".  She currently has dysuria which she states is a chronic problem for which she uses over-the-counter operations.  She also complains of urinary frequency.  No fever associated.  Review the chart shows negative urine culture in February of this year and Escherichia coli infection in June of last year.  No other cultures are present    Patient is never had urologic surgery and there is no family history of kidney or bladder cancer    Review shows no from cardiology yesterday.  This shows follow-up for history of DVT.  Patient was restarted on Eliquis.  Patient states that she had been off it for couple months.  Note is made in the note that patient is high risk for recurrence of DVT.  The note for PCP from July of this year is a yearly exam for which follow-up is recommended 1 year.  This also shows history of DVT.  In February of this year GYN shows vaginal dryness did not want prescription for Premarin.  5 this year GFR is greater than 60.  There are no urological related x-rays      Past Medical History:   Diagnosis Date    Asthma     Carpal tunnel syndrome     DVT (deep venous thrombosis)     Hypertension     Urinary tract infection      Social History     Social History    Marital status:      Spouse name: N/A    Number of children: 3    Years of education: N/A     Occupational History    Retired insurance        Social History Main Topics    Smoking status: Never Smoker    Smokeless tobacco: Never Used    Alcohol use 0.0 oz/week      Comment: rare    Drug use: No " "   Sexual activity: Yes     Partners: Male     Other Topics Concern    Not on file     Social History Narrative    No narrative on file     Past Surgical History:   Procedure Laterality Date    FACIAL COSMETIC SURGERY      NASAL SEPTUM SURGERY      OVARIAN CYST REMOVAL      tummy tuck       Family History   Problem Relation Age of Onset    Osteoporosis Mother     Cancer Father 66     Colon    Colon cancer Father     Cancer Brother      Brain tumor    Diabetes Maternal Grandmother     Diabetes Paternal Grandmother     Heart disease Paternal Grandmother     Hypertension Neg Hx     Stroke Neg Hx     Breast cancer Neg Hx     Ovarian cancer Neg Hx     Prostate cancer Neg Hx     Kidney disease Neg Hx            Review of Systems  The patient has no chest pains.  The patient has no shortness of breath  Patient wears        glasses.  All other review of systems are negative.      Physical Exam:  /73   Pulse 78   Ht 4' 11" (1.499 m)   Wt 51.3 kg (113 lb)   BMI 22.82 kg/m²   General appearance: alert, cooperative, no distress  Constitutional:Oriented to person, place, and time.appears well-developed and well-nourished.   HEENT: Normocephalic, atraumatic, neck symmetrical, no nasal discharge   Eyes: conjunctivae/corneas clear, PERRL, EOM's intact  Lungs: clear to auscultation bilaterally, no dullness to percussion bilaterally  Heart: regular rate and rhythm without rub; no displacement of the PMI   Abdomen: soft, non-tender; bowel sounds normoactive; no organomegaly  :Urethral meatus without lesion, no urethral masses noted, bladder nontender /nondistended, vagina normal appearing,      mild      cystocele, anus and perineum without lesions, no adnexal or uterine masses noted.  Extremities: extremities symmetric; no clubbing, cyanosis, or edema  Integument: Skin color, texture, turgor normal; no rashes; hair distrubution normal  Neurologic: Alert and oriented X 3, normal strength, normal " coordination and gait  Psychiatric: no pressured speech; normal affect; no evidence of impaired cognition     LABS:    Complete Blood Count  Lab Results   Component Value Date    RBC 5.10 07/05/2017    HGB 13.6 07/05/2017    HCT 42.6 07/05/2017    MCV 84 07/05/2017    MCH 26.7 (L) 07/05/2017    MCHC 31.9 (L) 07/05/2017    RDW 15.0 (H) 07/05/2017     07/05/2017    MPV 11.1 07/05/2017    GRAN 2.0 07/05/2017    GRAN 33.7 (L) 07/05/2017    LYMPH 3.4 07/05/2017    LYMPH 56.2 (H) 07/05/2017    MONO 0.5 07/05/2017    MONO 7.5 07/05/2017    EOS 0.1 07/05/2017    BASO 0.04 07/05/2017    EOSINOPHIL 1.7 07/05/2017    BASOPHIL 0.7 07/05/2017    DIFFMETHOD Automated 07/05/2017       Comprehensive Metabolic Panel  Lab Results   Component Value Date    GLU 88 07/05/2017    BUN 20 07/05/2017    CREATININE 0.8 07/05/2017     07/05/2017    K 4.0 07/05/2017     07/05/2017    PROT 6.6 07/05/2017    ALBUMIN 3.7 07/05/2017    BILITOT 0.3 07/05/2017    AST 21 07/05/2017    ALKPHOS 102 07/05/2017    CO2 24 07/05/2017    ALT 21 07/05/2017    ANIONGAP 9 07/05/2017    EGFRNONAA >60.0 07/05/2017    ESTGFRAFRICA >60.0 07/05/2017       PSA  No results found for: PSA      Assessment:    ICD-10-CM ICD-9-CM    1. Gross hematuria R31.0 599.71 Urine culture      POCT urinalysis, dipstick or tablet reag      CT Urogram Abd Pelvis W WO      Cystoscopy      Basic metabolic panel   2. History of UTI Z87.440 V13.02    3. Dysuria R30.0 788.1 Urine culture   4. Midline cystocele N81.11 618.01      The primary encounter diagnosis was Gross hematuria. Diagnoses of History of UTI, Dysuria, and Midline cystocele were also pertinent to this visit.      Plan: 1.  Gross hematuria.Plan.  I discussed at length in detail with the patient the need to evaluate blood in the urine.  I discussed that this is being done to rule out the presence of urothelial cancer.  I discussed the need for both imaging of the upper tracts and also cystoscopy.  Patient  voices understanding and agrees.  We'll obtain CT urogram and subsequent cystoscopy    Numbers 2 and 3 history of urinary tract infection and chronic dysuria.  Plan.  .Patient's urine will be cultured and once results are available ,we will contact the patient if antibiotics are indicated.  Continue to use over-the-counter preparations such as Urispas    #4.  Cystocele.  Minimal and no therapy needed  Orders Placed This Encounter   Procedures    Cystoscopy    Urine culture    Urine culture    CT Urogram Abd Pelvis W WO    Basic metabolic panel    POCT urinalysis, dipstick or tablet reag           Joon Mariscal MD    PLEASE NOTE:  Please be advised that portions of this note were dictated using voice recognition software and may contain dictation related errors in spelling/grammar/appropriate pronouns/syntax or other errors that might have not been found and or corrected on text review.

## 2017-11-15 LAB
BILIRUB SERPL-MCNC: NORMAL MG/DL
BLOOD URINE, POC: NORMAL
COLOR, POC UA: YELLOW
GLUCOSE UR QL STRIP: NORMAL
KETONES UR QL STRIP: NORMAL
LEUKOCYTE ESTERASE URINE, POC: NORMAL
NITRITE, POC UA: NORMAL
PH, POC UA: 5
PROTEIN, POC: NORMAL
SPECIFIC GRAVITY, POC UA: 1.02
UROBILINOGEN, POC UA: NORMAL

## 2017-11-16 LAB — BACTERIA UR CULT: NORMAL

## 2017-11-17 ENCOUNTER — TELEPHONE (OUTPATIENT)
Dept: UROLOGY | Facility: CLINIC | Age: 67
End: 2017-11-17

## 2017-11-17 RX ORDER — CEPHALEXIN 500 MG/1
500 CAPSULE ORAL EVERY 8 HOURS
Qty: 30 CAPSULE | Refills: 0 | Status: SHIPPED | OUTPATIENT
Start: 2017-11-17 | End: 2017-11-27

## 2017-11-17 NOTE — TELEPHONE ENCOUNTER
----- Message from Joon Mariscal MD sent at 11/17/2017  2:13 PM CST -----  Please contact the patient and let her know urine culture showed growth of organisms which might explain her symptoms.  Prescription for antibiotics sent.  Keep scheduled follow-up appointment.

## 2018-04-18 ENCOUNTER — TELEPHONE (OUTPATIENT)
Dept: INTERNAL MEDICINE | Facility: CLINIC | Age: 68
End: 2018-04-18

## 2018-04-18 DIAGNOSIS — Z00.00 ANNUAL PHYSICAL EXAM: Primary | ICD-10-CM

## 2018-04-18 NOTE — TELEPHONE ENCOUNTER
----- Message from Lyn Lara sent at 4/18/2018 11:28 AM CDT -----  Doctor appointment and lab have been scheduled.  Please link lab orders to the lab appointment.  Date of doctor appointment: 7/13   Physical or EP:  Physical  Date of lab appointment:  7/6  Comments:

## 2018-05-11 DIAGNOSIS — Z12.39 BREAST CANCER SCREENING: ICD-10-CM

## 2018-07-06 ENCOUNTER — LAB VISIT (OUTPATIENT)
Dept: LAB | Facility: HOSPITAL | Age: 68
End: 2018-07-06
Attending: INTERNAL MEDICINE
Payer: MEDICARE

## 2018-07-06 DIAGNOSIS — Z00.00 ANNUAL PHYSICAL EXAM: ICD-10-CM

## 2018-07-06 LAB
ALBUMIN SERPL BCP-MCNC: 3.9 G/DL
ALP SERPL-CCNC: 127 U/L
ALT SERPL W/O P-5'-P-CCNC: 23 U/L
ANION GAP SERPL CALC-SCNC: 8 MMOL/L
AST SERPL-CCNC: 27 U/L
BASOPHILS # BLD AUTO: 0.06 K/UL
BASOPHILS NFR BLD: 0.9 %
BILIRUB SERPL-MCNC: 0.5 MG/DL
BUN SERPL-MCNC: 18 MG/DL
CALCIUM SERPL-MCNC: 9.4 MG/DL
CHLORIDE SERPL-SCNC: 108 MMOL/L
CHOLEST SERPL-MCNC: 246 MG/DL
CHOLEST/HDLC SERPL: 6.2 {RATIO}
CO2 SERPL-SCNC: 26 MMOL/L
CREAT SERPL-MCNC: 0.8 MG/DL
DIFFERENTIAL METHOD: ABNORMAL
EOSINOPHIL # BLD AUTO: 0.1 K/UL
EOSINOPHIL NFR BLD: 1.4 %
ERYTHROCYTE [DISTWIDTH] IN BLOOD BY AUTOMATED COUNT: 14.3 %
EST. GFR  (AFRICAN AMERICAN): >60 ML/MIN/1.73 M^2
EST. GFR  (NON AFRICAN AMERICAN): >60 ML/MIN/1.73 M^2
GLUCOSE SERPL-MCNC: 94 MG/DL
HCT VFR BLD AUTO: 44 %
HDLC SERPL-MCNC: 40 MG/DL
HDLC SERPL: 16.3 %
HGB BLD-MCNC: 13.8 G/DL
IMM GRANULOCYTES # BLD AUTO: 0.01 K/UL
IMM GRANULOCYTES NFR BLD AUTO: 0.2 %
LDLC SERPL CALC-MCNC: 158.6 MG/DL
LYMPHOCYTES # BLD AUTO: 3 K/UL
LYMPHOCYTES NFR BLD: 46 %
MCH RBC QN AUTO: 26.5 PG
MCHC RBC AUTO-ENTMCNC: 31.4 G/DL
MCV RBC AUTO: 85 FL
MONOCYTES # BLD AUTO: 0.5 K/UL
MONOCYTES NFR BLD: 7.8 %
NEUTROPHILS # BLD AUTO: 2.8 K/UL
NEUTROPHILS NFR BLD: 43.7 %
NONHDLC SERPL-MCNC: 206 MG/DL
NRBC BLD-RTO: 0 /100 WBC
PLATELET # BLD AUTO: 216 K/UL
PMV BLD AUTO: 11.4 FL
POTASSIUM SERPL-SCNC: 4.3 MMOL/L
PROT SERPL-MCNC: 6.9 G/DL
RBC # BLD AUTO: 5.2 M/UL
SODIUM SERPL-SCNC: 142 MMOL/L
TRIGL SERPL-MCNC: 237 MG/DL
TSH SERPL DL<=0.005 MIU/L-ACNC: 1.71 UIU/ML
WBC # BLD AUTO: 6.5 K/UL

## 2018-07-06 PROCEDURE — 80053 COMPREHEN METABOLIC PANEL: CPT

## 2018-07-06 PROCEDURE — 84443 ASSAY THYROID STIM HORMONE: CPT

## 2018-07-06 PROCEDURE — 80061 LIPID PANEL: CPT

## 2018-07-06 PROCEDURE — 36415 COLL VENOUS BLD VENIPUNCTURE: CPT | Mod: PO

## 2018-07-06 PROCEDURE — 85025 COMPLETE CBC W/AUTO DIFF WBC: CPT

## 2018-07-13 ENCOUNTER — OFFICE VISIT (OUTPATIENT)
Dept: INTERNAL MEDICINE | Facility: CLINIC | Age: 68
End: 2018-07-13
Payer: MEDICARE

## 2018-07-13 ENCOUNTER — LAB VISIT (OUTPATIENT)
Dept: LAB | Facility: HOSPITAL | Age: 68
End: 2018-07-13
Attending: INTERNAL MEDICINE
Payer: MEDICARE

## 2018-07-13 VITALS
BODY MASS INDEX: 23.51 KG/M2 | WEIGHT: 116.63 LBS | RESPIRATION RATE: 18 BRPM | DIASTOLIC BLOOD PRESSURE: 73 MMHG | SYSTOLIC BLOOD PRESSURE: 138 MMHG | OXYGEN SATURATION: 98 % | HEART RATE: 68 BPM | HEIGHT: 59 IN | TEMPERATURE: 99 F

## 2018-07-13 DIAGNOSIS — Z00.00 ANNUAL PHYSICAL EXAM: Primary | ICD-10-CM

## 2018-07-13 DIAGNOSIS — E78.00 HYPERCHOLESTEREMIA: ICD-10-CM

## 2018-07-13 DIAGNOSIS — I82.4Y2 ACUTE DEEP VEIN THROMBOSIS (DVT) OF PROXIMAL END OF LEFT LOWER EXTREMITY: ICD-10-CM

## 2018-07-13 DIAGNOSIS — R76.8 HEPATITIS C ANTIBODY POSITIVE IN BLOOD: ICD-10-CM

## 2018-07-13 DIAGNOSIS — Z12.31 ENCOUNTER FOR SCREENING MAMMOGRAM FOR BREAST CANCER: ICD-10-CM

## 2018-07-13 DIAGNOSIS — M81.0 OSTEOPOROSIS, UNSPECIFIED OSTEOPOROSIS TYPE, UNSPECIFIED PATHOLOGICAL FRACTURE PRESENCE: ICD-10-CM

## 2018-07-13 PROCEDURE — 99499 UNLISTED E&M SERVICE: CPT | Mod: S$GLB,,, | Performed by: INTERNAL MEDICINE

## 2018-07-13 PROCEDURE — 99214 OFFICE O/P EST MOD 30 MIN: CPT | Mod: S$GLB,,, | Performed by: INTERNAL MEDICINE

## 2018-07-13 PROCEDURE — 99999 PR PBB SHADOW E&M-EST. PATIENT-LVL III: CPT | Mod: PBBFAC,,, | Performed by: INTERNAL MEDICINE

## 2018-07-13 PROCEDURE — 36415 COLL VENOUS BLD VENIPUNCTURE: CPT | Mod: PO

## 2018-07-13 PROCEDURE — 87522 HEPATITIS C REVRS TRNSCRPJ: CPT

## 2018-07-13 RX ORDER — ATORVASTATIN CALCIUM 20 MG/1
20 TABLET, FILM COATED ORAL DAILY
Qty: 90 TABLET | Refills: 3 | Status: SHIPPED | OUTPATIENT
Start: 2018-07-13 | End: 2019-09-05 | Stop reason: SDUPTHER

## 2018-07-13 NOTE — PROGRESS NOTES
Subjective:       Patient ID: Azucena Luciano is a 67 y.o. female.    Chief Complaint: Annual Exam    HPI   67 y.o. Female here for annual exam.      Cholesterol: (elevated)  Vaccines: Influenza (deferred); Tetanus (2015); Pneumovax (2015); Zoster (2015)  Eye exam: 11/15  Mammogram: 12/15  Gyn exam: 1/16  Colonoscopy: needs  DEXA: 11/15     Mobility: normal  Falls: no     Exercise: cardio/stretching 3x/wk  Diet: regular     Past Medical History:    Carpal tunnel syndrome                                        Asthma               DVT                            HLD    Osteoporosis                    Past Surgical History:    OVARIAN CYST REMOVAL                                           FACIAL COSMETIC SURGERY                                        tummy tuck                                                   Social History    Marital Status:              Spouse Name:                       Years of Education:                 Number of children: 3              Occupational History  Occupation          Employer            Comment               Retired insurance *                          Social History Main Topics    Smoking Status: Never Smoker                      Smokeless Status: Not on file                       Alcohol Use: Yes           0.0 oz/week       0 Not specified per week       Comment: rare    Drug Use: No              Sexual Activity: Yes               Partners with: Male     No Known Allergies  Ms. Luciano does not currently have medications on file.  Review of Systems   Constitutional: Negative for activity change, appetite change, chills, diaphoresis, fatigue, fever and unexpected weight change.   HENT: Negative for congestion, mouth sores, postnasal drip, rhinorrhea, sinus pressure, sneezing, sore throat, trouble swallowing and voice change.    Eyes: Negative for pain, discharge and visual disturbance.   Respiratory: Negative for cough, shortness of breath and wheezing.    Cardiovascular:  Negative for chest pain, palpitations and leg swelling.   Gastrointestinal: Negative for abdominal pain, blood in stool, constipation, diarrhea, nausea and vomiting.   Endocrine: Negative for cold intolerance and heat intolerance.   Genitourinary: Negative for difficulty urinating, dysuria, frequency, hematuria and urgency.   Musculoskeletal: Negative for arthralgias and myalgias.   Skin: Negative for rash and wound.   Allergic/Immunologic: Negative for environmental allergies and food allergies.   Neurological: Negative for dizziness, tremors, seizures, syncope, weakness, light-headedness and headaches.   Hematological: Negative for adenopathy. Does not bruise/bleed easily.   Psychiatric/Behavioral: Negative for confusion and sleep disturbance. The patient is not nervous/anxious.        Objective:      Physical Exam   Constitutional: She is oriented to person, place, and time. She appears well-developed and well-nourished. No distress.   HENT:   Head: Normocephalic and atraumatic.   Right Ear: External ear normal.   Left Ear: External ear normal.   Nose: Nose normal.   Mouth/Throat: Oropharynx is clear and moist. No oropharyngeal exudate.   Eyes: Conjunctivae and EOM are normal. Pupils are equal, round, and reactive to light. Right eye exhibits no discharge. Left eye exhibits no discharge. No scleral icterus.   Neck: Neck supple. No JVD present. No thyromegaly present.   Cardiovascular: Normal rate, regular rhythm, normal heart sounds and intact distal pulses.    No murmur heard.  Pulmonary/Chest: Effort normal and breath sounds normal. No respiratory distress. She has no wheezes. She has no rales. She exhibits no tenderness.   Abdominal: Soft. Bowel sounds are normal. She exhibits no distension. There is no tenderness. There is no guarding.   Musculoskeletal: She exhibits no edema.   Lymphadenopathy:     She has no cervical adenopathy.   Neurological: She is alert and oriented to person, place, and time. No  cranial nerve deficit. Coordination normal.   Skin: Skin is warm and dry. No rash noted. She is not diaphoretic. No pallor.   Psychiatric: She has a normal mood and affect. Judgment normal.   Nursing note and vitals reviewed.      Assessment:       1. Annual physical exam    2. Acute deep vein thrombosis (DVT) of proximal end of left lower extremity    3. Osteoporosis, unspecified osteoporosis type, unspecified pathological fracture presence    4. Hypercholesteremia    5. Hepatitis C antibody positive in blood    6. Encounter for screening mammogram for breast cancer        Plan:    Blood work reviewed with pt   Vaccines: Influenza (deferred); Tetanus (2015); Pneumovax (2015); Zoster (2015)   Eye exam: 11/15   Mammogram: 12/15   Gyn exam: 1/16   Colonoscopy: needs   DEXA: 11/15      1. LLE DVT- stable on Apixaban       Followed by Vascular Medicine    2. Osteoporosis- stable on Fosamax       Last DEXA(11/15)   3. HLD- Rx Lipitor 20 mg daily       Lipids/AST/ALT in 3 months    4. Hep C Ab +- Repeat Hep C RNA   5. Mammo ordered    6. F/u in 1 yr

## 2018-07-16 LAB
HCV LOG: <1.08 LOG (10) IU/ML
HCV RNA QUANT PCR: <12 IU/ML
HCV, QUALITATIVE: NOT DETECTED IU/ML

## 2018-07-19 ENCOUNTER — TELEPHONE (OUTPATIENT)
Dept: ENDOSCOPY | Facility: HOSPITAL | Age: 68
End: 2018-07-19

## 2018-07-19 DIAGNOSIS — Z12.11 SPECIAL SCREENING FOR MALIGNANT NEOPLASMS, COLON: Primary | ICD-10-CM

## 2018-07-19 RX ORDER — POLYETHYLENE GLYCOL 3350, SODIUM SULFATE ANHYDROUS, SODIUM BICARBONATE, SODIUM CHLORIDE, POTASSIUM CHLORIDE 236; 22.74; 6.74; 5.86; 2.97 G/4L; G/4L; G/4L; G/4L; G/4L
4 POWDER, FOR SOLUTION ORAL ONCE
Qty: 4000 ML | Refills: 0 | Status: SHIPPED | OUTPATIENT
Start: 2018-07-19 | End: 2018-07-19

## 2018-07-19 NOTE — TELEPHONE ENCOUNTER
Ceasar Gee MD   to Me           7/19/18 10:25 AM    Yes it is ok     ZN              7/19/18 10:19 AM   You routed this conversation to Ceasar Gee MD    Me          7/19/18 10:18 AM   Note      Pt needs to be scheduled for a colonoscopy.   Pt is currently taking Eliquis. Per endoscopy protocol it should be held x 2 days prior.  Ok for pt to hold?  Please advise.  Thanks

## 2018-07-19 NOTE — TELEPHONE ENCOUNTER
Pt needs to be scheduled for a colonoscopy.   Pt is currently taking Eliquis. Per endoscopy protocol it should be held x 2 days prior.  Ok for pt to hold?  Please advise.  Thanks

## 2018-07-20 ENCOUNTER — HOSPITAL ENCOUNTER (OUTPATIENT)
Dept: RADIOLOGY | Facility: HOSPITAL | Age: 68
Discharge: HOME | End: 2018-07-20
Attending: INTERNAL MEDICINE
Payer: MEDICARE

## 2018-07-20 DIAGNOSIS — Z12.31 ENCOUNTER FOR SCREENING MAMMOGRAM FOR BREAST CANCER: ICD-10-CM

## 2018-07-20 PROCEDURE — 77067 SCR MAMMO BI INCL CAD: CPT | Mod: 26,,, | Performed by: RADIOLOGY

## 2018-07-20 PROCEDURE — 77067 SCR MAMMO BI INCL CAD: CPT | Mod: TC,PO

## 2018-07-20 PROCEDURE — 77063 BREAST TOMOSYNTHESIS BI: CPT | Mod: 26,,, | Performed by: RADIOLOGY

## 2018-07-27 ENCOUNTER — HOSPITAL ENCOUNTER (OUTPATIENT)
Dept: RADIOLOGY | Facility: HOSPITAL | Age: 68
Discharge: HOME | End: 2018-07-27
Attending: INTERNAL MEDICINE
Payer: MEDICARE

## 2018-07-27 VITALS — BODY MASS INDEX: 23.39 KG/M2 | WEIGHT: 116 LBS | HEIGHT: 59 IN

## 2018-07-27 DIAGNOSIS — R92.8 ABNORMAL MAMMOGRAM: ICD-10-CM

## 2018-07-27 PROCEDURE — 76642 ULTRASOUND BREAST LIMITED: CPT | Mod: 26,RT,, | Performed by: RADIOLOGY

## 2018-07-27 PROCEDURE — 76642 ULTRASOUND BREAST LIMITED: CPT | Mod: TC,PO,RT

## 2018-08-07 ENCOUNTER — HOSPITAL ENCOUNTER (OUTPATIENT)
Facility: HOSPITAL | Age: 68
Discharge: HOME | End: 2018-08-07
Attending: COLON & RECTAL SURGERY | Admitting: COLON & RECTAL SURGERY
Payer: MEDICARE

## 2018-08-07 ENCOUNTER — SURGERY (OUTPATIENT)
Age: 68
End: 2018-08-07

## 2018-08-07 ENCOUNTER — ANESTHESIA EVENT (OUTPATIENT)
Dept: ENDOSCOPY | Facility: HOSPITAL | Age: 68
End: 2018-08-07
Payer: MEDICARE

## 2018-08-07 ENCOUNTER — ANESTHESIA (OUTPATIENT)
Dept: ENDOSCOPY | Facility: HOSPITAL | Age: 68
End: 2018-08-07
Payer: MEDICARE

## 2018-08-07 VITALS
HEART RATE: 64 BPM | WEIGHT: 115 LBS | SYSTOLIC BLOOD PRESSURE: 144 MMHG | DIASTOLIC BLOOD PRESSURE: 70 MMHG | TEMPERATURE: 98 F | BODY MASS INDEX: 23.18 KG/M2 | OXYGEN SATURATION: 98 % | HEIGHT: 59 IN | RESPIRATION RATE: 16 BRPM

## 2018-08-07 DIAGNOSIS — Z12.11 SCREENING FOR COLON CANCER: Primary | ICD-10-CM

## 2018-08-07 PROCEDURE — 63600175 PHARM REV CODE 636 W HCPCS: Performed by: NURSE ANESTHETIST, CERTIFIED REGISTERED

## 2018-08-07 PROCEDURE — E9220 PRA ENDO ANESTHESIA: HCPCS | Mod: ,,, | Performed by: NURSE ANESTHETIST, CERTIFIED REGISTERED

## 2018-08-07 PROCEDURE — 37000009 HC ANESTHESIA EA ADD 15 MINS: Performed by: COLON & RECTAL SURGERY

## 2018-08-07 PROCEDURE — G0105 COLORECTAL SCRN; HI RISK IND: HCPCS | Mod: ,,, | Performed by: COLON & RECTAL SURGERY

## 2018-08-07 PROCEDURE — 37000008 HC ANESTHESIA 1ST 15 MINUTES: Performed by: COLON & RECTAL SURGERY

## 2018-08-07 PROCEDURE — G0105 COLORECTAL SCRN; HI RISK IND: HCPCS | Performed by: COLON & RECTAL SURGERY

## 2018-08-07 PROCEDURE — 25000003 PHARM REV CODE 250: Performed by: NURSE PRACTITIONER

## 2018-08-07 RX ORDER — LIDOCAINE HCL/PF 100 MG/5ML
SYRINGE (ML) INTRAVENOUS
Status: DISCONTINUED | OUTPATIENT
Start: 2018-08-07 | End: 2018-08-07

## 2018-08-07 RX ORDER — PROPOFOL 10 MG/ML
VIAL (ML) INTRAVENOUS CONTINUOUS PRN
Status: DISCONTINUED | OUTPATIENT
Start: 2018-08-07 | End: 2018-08-07

## 2018-08-07 RX ORDER — SODIUM CHLORIDE 9 MG/ML
INJECTION, SOLUTION INTRAVENOUS CONTINUOUS
Status: DISCONTINUED | OUTPATIENT
Start: 2018-08-07 | End: 2018-08-07 | Stop reason: HOSPADM

## 2018-08-07 RX ORDER — PROPOFOL 10 MG/ML
VIAL (ML) INTRAVENOUS
Status: DISCONTINUED | OUTPATIENT
Start: 2018-08-07 | End: 2018-08-07

## 2018-08-07 RX ADMIN — LIDOCAINE HYDROCHLORIDE 50 MG: 20 INJECTION, SOLUTION INTRAVENOUS at 12:08

## 2018-08-07 RX ADMIN — PROPOFOL 150 MCG/KG/MIN: 10 INJECTION, EMULSION INTRAVENOUS at 12:08

## 2018-08-07 RX ADMIN — PROPOFOL 70 MG: 10 INJECTION, EMULSION INTRAVENOUS at 12:08

## 2018-08-07 RX ADMIN — SODIUM CHLORIDE: 0.9 INJECTION, SOLUTION INTRAVENOUS at 10:08

## 2018-08-07 NOTE — TRANSFER OF CARE
"Anesthesia Transfer of Care Note    Patient: Azucena Luciano    Procedure(s) Performed: Procedure(s) (LRB):  COLONOSCOPY (N/A)    Patient location: PACU    Anesthesia Type: general    Transport from OR: Transported from OR on 6-10 L/min O2 by face mask with adequate spontaneous ventilation    Post pain: adequate analgesia    Post assessment: no apparent anesthetic complications and tolerated procedure well    Post vital signs: stable    Level of consciousness: sedated    Nausea/Vomiting: no nausea/vomiting    Complications: none    Transfer of care protocol was followed      Last vitals:   Visit Vitals  BP (!) 161/69 (BP Location: Left arm, Patient Position: Lying)   Pulse 70   Temp 36.7 °C (98 °F) (Temporal)   Resp 18   Ht 4' 11" (1.499 m)   Wt 52.2 kg (115 lb)   SpO2 96%   Breastfeeding? No   BMI 23.23 kg/m²     "

## 2018-08-07 NOTE — H&P
"  Colonoscopy History and Physical      Procedure : Colonoscopy    Indications:  indications:5117::"asymptomatic screening exam"    Family Hx of CRC: father with colon cancer     Last Colonoscopy:     Hx of sedation problems: none  FHX of sedation problems: none    Past Medical History:   Diagnosis Date    Asthma     Carpal tunnel syndrome     DVT (deep venous thrombosis)     Hypertension     Osteoporosis     Urinary tract infection        Past Surgical History:   Procedure Laterality Date    BREAST BIOPSY      BREAST CYST ASPIRATION      BREAST SURGERY      FACIAL COSMETIC SURGERY      NASAL SEPTUM SURGERY      OVARIAN CYST REMOVAL      tummy tuck         Review of patient's allergies indicates:   Allergen Reactions    Cayenne pepper Anaphylaxis       No current facility-administered medications on file prior to encounter.      Current Outpatient Prescriptions on File Prior to Encounter   Medication Sig Dispense Refill    alendronate (FOSAMAX) 70 MG tablet 1 tab q weekly in the am with a full glass of water on an empty stomach. Do not consume food or lie down for at least 30 minutes afterwards. 12 tablet 3    atorvastatin (LIPITOR) 20 MG tablet Take 1 tablet (20 mg total) by mouth once daily. 90 tablet 3    calcium-vitamin D3 500 mg(1,250mg) -200 unit per tablet Take 1 tablet by mouth 2 (two) times daily with meals.      fluticasone (FLONASE) 50 mcg/actuation nasal spray 1 spray by Each Nare route once daily.      multivitamin with minerals tablet Take 1 tablet by mouth once daily.      oxymetazoline (AFRIN) 0.05 % nasal spray 1 spray by Nasal route every evening.      apixaban 5 mg Tab Take 1 tablet (5 mg total) by mouth 2 (two) times daily. 60 tablet 11       Family History   Problem Relation Age of Onset    Osteoporosis Mother     Cancer Father 66        Colon    Colon cancer Father     Cancer Brother         Brain tumor    Diabetes Maternal Grandmother     Cancer Maternal Grandmother " 75        Colon    Diabetes Paternal Grandmother     Heart disease Paternal Grandmother     Hypertension Neg Hx     Stroke Neg Hx     Breast cancer Neg Hx     Ovarian cancer Neg Hx     Prostate cancer Neg Hx     Kidney disease Neg Hx        Social History     Social History    Marital status:      Spouse name: N/A    Number of children: 3    Years of education: N/A     Occupational History    Retired insurance        Social History Main Topics    Smoking status: Never Smoker    Smokeless tobacco: Never Used    Alcohol use 0.0 oz/week      Comment: rare    Drug use: No    Sexual activity: Yes     Partners: Male     Other Topics Concern    Not on file     Social History Narrative    No narrative on file       Review of Systems -   Respiratory ROS: negative  Cardiovascular ROS: negative  Gastrointestinal ROS: negative  Musculoskeletal ROS: negative  Neurological ROS: negative    Physical Exam:  General: no distress  Head: normocephalic  Oropharynx clear, Mallampati   Lungs:  normal respiratory effort  Heart: regular rate  Abdomen: soft,  Non-tender  Extremities: warm and well perfused  Neuro awake and alert    ASA: II    Patient cleared for Anesthesia:  MAC    Anesthesia/Surgery risks, benefits, and alternative options discussed and understood by patient/family.

## 2018-08-07 NOTE — ANESTHESIA POSTPROCEDURE EVALUATION
"Anesthesia Post Evaluation    Patient: Azucena Luciano    Procedure(s) Performed: Procedure(s) (LRB):  COLONOSCOPY (N/A)    Final Anesthesia Type: general  Patient location during evaluation: OPS  Patient participation: Yes- Able to Participate  Level of consciousness: awake and alert  Post-procedure vital signs: reviewed and stable  Pain management: adequate  Airway patency: patent  PONV status at discharge: No PONV  Anesthetic complications: no      Cardiovascular status: blood pressure returned to baseline  Respiratory status: unassisted  Hydration status: euvolemic  Follow-up not needed.        Visit Vitals  BP (!) 144/70   Pulse 64   Temp 36.5 °C (97.7 °F) (Temporal)   Resp 16   Ht 4' 11" (1.499 m)   Wt 52.2 kg (115 lb)   SpO2 98%   Breastfeeding? No   BMI 23.23 kg/m²       Pain/Speedy Score: Pain Assessment Performed: Yes (8/7/2018  1:16 PM)  Presence of Pain: denies (8/7/2018  1:16 PM)  Speedy Score: 9 (8/7/2018  1:00 PM)      "

## 2018-08-07 NOTE — DISCHARGE INSTRUCTIONS
Colonoscopy     A camera attached to a flexible tube with a viewing lens is used to take video pictures.     Colonoscopy is a test to view the inside of your lower digestive tract (colon and rectum). Sometimes it can show the last part of the small intestine (ileum). During the test, small pieces of tissue may be removed for testing. This is called a biopsy. Small growths, such as polyps, may also be removed.   Why is colonoscopy done?  The test is done to help look for colon cancer. And it can help find the source of abdominal pain, bleeding, and changes in bowel habits. It may be needed once a year, depending on factors such as your:  · Age  · Health history  · Family health history  · Symptoms  · Results from any prior colonoscopy  Risks and possible complications  These include:  · Bleeding               · A puncture or tear in the colon   · Risks of anesthesia  · A cancer lesion not being seen  Getting ready   To prepare for the test:  · Talk with your healthcare provider about the risks of the test (see below). Also ask your healthcare provider about alternatives to the test.  · Tell your healthcare provider about any medicines you take. Also tell him or her about any health conditions you may have.  · Make sure your rectum and colon are empty for the test. Follow the diet and bowel prep instructions exactly. If you dont, the test may need to be rescheduled.  · Plan for a friend or family member to drive you home after the test.     Colonoscopy provides an inside view of the entire colon.     You may discuss the results with your doctor right away or at a future visit.  During the test   The test is usually done in the hospital on an outpatient basis. This means you go home the same day. The procedure takes about 30 minutes. During that time:  · You are given relaxing (sedating) medicine through an IV line. You may be drowsy, or fully asleep.  · The healthcare provider will first give you a physical exam to  check for anal and rectal problems.  · Then the anus is lubricated and the scope inserted.  · If you are awake, you may have a feeling similar to needing to have a bowel movement. You may also feel pressure as air is pumped into the colon. Its OK to pass gas during the procedure.  · Biopsy, polyp removal, or other treatments may be done during the test.  After the test   You may have gas right after the test. It can help to try to pass it to help prevent later bloating. Your healthcare provider may discuss the results with you right away. Or you may need to schedule a follow-up visit to talk about the results. After the test, you can go back to your normal eating and other activities. You may be tired from the sedation and need to rest for a few hours.  Date Last Reviewed: 11/1/2016 © 2000-2017 [employer]. [address] [place] [address], [place] [address]. All rights reserved. This information is not intended as a substitute for professional medical care. Always follow your healthcare professional's instructions.

## 2018-08-07 NOTE — ANESTHESIA PREPROCEDURE EVALUATION
Past Medical History:   Diagnosis Date    Asthma     Carpal tunnel syndrome     DVT (deep venous thrombosis)     Hypertension     Osteoporosis     Urinary tract infection      Past Surgical History:   Procedure Laterality Date    BREAST BIOPSY      BREAST CYST ASPIRATION      BREAST SURGERY      FACIAL COSMETIC SURGERY      NASAL SEPTUM SURGERY      OVARIAN CYST REMOVAL      tummy tuck       Patient Active Problem List   Diagnosis    Carpal tunnel syndrome, bilateral    Thoracic back pain    Acute deep vein thrombosis (DVT) of proximal end of left lower extremity    Post-thrombotic syndrome of left lower extremity    Varicose veins of left lower extremity    Osteoporosis    Hypercholesteremia     There is no height or weight on file to calculate BMI.  2D Echo:  No results found for this or any previous visit.    Please See ROS/PMH and Active Problem List above                                                                                                              08/07/2018  Azucena Luciano is a 67 y.o., female.    Anesthesia Evaluation    I have reviewed the Patient Summary Reports.    I have reviewed the Nursing Notes.   I have reviewed the Medications.     Review of Systems  Cardiovascular:   Exercise tolerance: good Hypertension DVT lf leg sicne 70's   Neurological:   Neuromuscular Disease,        Physical Exam  General:  Well nourished    Airway/Jaw/Neck:  Airway Findings: Mouth Opening: Normal Tongue: Normal  General Airway Assessment: Adult  Mallampati: I  TM Distance: 4 - 6 cm  Jaw/Neck Findings:  Neck ROM: Normal ROM     Eyes/Ears/Nose:  Eyes/Ears/Nose Findings:    Dental:  Dental Findings: In tact   Chest/Lungs:  Chest/Lungs Findings: Clear to auscultation, Normal Respiratory Rate     Heart/Vascular:  Heart Findings: Rate: Normal  Rhythm: Regular Rhythm        Mental Status:  Mental Status Findings:  Cooperative, Alert and Oriented         Anesthesia Plan  Type of Anesthesia,  risks & benefits discussed:  Anesthesia Type:  general  Patient's Preference: gen  Intra-op Monitoring Plan: standard ASA monitors  Intra-op Monitoring Plan Comments:   Post Op Pain Control Plan:   Post Op Pain Control Plan Comments: Iv>po  Induction:   IV  Beta Blocker:  Patient is not currently on a Beta-Blocker (No further documentation required).       Informed Consent: Patient understands risks and agrees with Anesthesia plan.  Questions answered. Anesthesia consent signed with patient.  ASA Score: 2     Day of Surgery Review of History & Physical:    H&P update referred to the surgeon.     Anesthesia Plan Notes: Pt had small amt sweet tea at 930. Between 1 -2 tablespoons to wet dry mouth. Nothing else. Will delay her by one case.        Ready For Surgery From Anesthesia Perspective.

## 2018-08-14 ENCOUNTER — TELEPHONE (OUTPATIENT)
Dept: ENDOSCOPY | Facility: HOSPITAL | Age: 68
End: 2018-08-14

## 2018-09-07 ENCOUNTER — OFFICE VISIT (OUTPATIENT)
Dept: INTERNAL MEDICINE | Facility: CLINIC | Age: 68
End: 2018-09-07
Payer: MEDICARE

## 2018-09-07 ENCOUNTER — TELEPHONE (OUTPATIENT)
Dept: INTERNAL MEDICINE | Facility: CLINIC | Age: 68
End: 2018-09-07

## 2018-09-07 ENCOUNTER — LAB VISIT (OUTPATIENT)
Dept: LAB | Facility: HOSPITAL | Age: 68
End: 2018-09-07
Attending: INTERNAL MEDICINE
Payer: MEDICARE

## 2018-09-07 VITALS
HEIGHT: 59 IN | BODY MASS INDEX: 23.42 KG/M2 | RESPIRATION RATE: 18 BRPM | HEART RATE: 72 BPM | SYSTOLIC BLOOD PRESSURE: 138 MMHG | TEMPERATURE: 98 F | WEIGHT: 116.19 LBS | DIASTOLIC BLOOD PRESSURE: 85 MMHG

## 2018-09-07 DIAGNOSIS — Z01.818 PRE-OP EVALUATION: ICD-10-CM

## 2018-09-07 DIAGNOSIS — R30.0 DYSURIA: ICD-10-CM

## 2018-09-07 DIAGNOSIS — N30.00 ACUTE CYSTITIS WITHOUT HEMATURIA: ICD-10-CM

## 2018-09-07 DIAGNOSIS — N30.00 ACUTE CYSTITIS WITHOUT HEMATURIA: Primary | ICD-10-CM

## 2018-09-07 PROBLEM — Z12.11 SCREENING FOR COLON CANCER: Status: RESOLVED | Noted: 2018-08-07 | Resolved: 2018-09-07

## 2018-09-07 LAB
BACTERIA #/AREA URNS AUTO: ABNORMAL /HPF
BILIRUB UR QL STRIP: NEGATIVE
CLARITY UR REFRACT.AUTO: ABNORMAL
COLOR UR AUTO: ABNORMAL
GLUCOSE UR QL STRIP: NEGATIVE
HGB UR QL STRIP: NEGATIVE
KETONES UR QL STRIP: NEGATIVE
LEUKOCYTE ESTERASE UR QL STRIP: ABNORMAL
MICROSCOPIC COMMENT: ABNORMAL
NITRITE UR QL STRIP: POSITIVE
PH UR STRIP: 5 [PH] (ref 5–8)
PROT UR QL STRIP: NEGATIVE
RBC #/AREA URNS AUTO: 1 /HPF (ref 0–4)
SP GR UR STRIP: 1.02 (ref 1–1.03)
SQUAMOUS #/AREA URNS AUTO: 2 /HPF
URN SPEC COLLECT METH UR: ABNORMAL
UROBILINOGEN UR STRIP-ACNC: 4 EU/DL
WBC #/AREA URNS AUTO: >100 /HPF (ref 0–5)
WBC CLUMPS UR QL AUTO: ABNORMAL

## 2018-09-07 PROCEDURE — 87077 CULTURE AEROBIC IDENTIFY: CPT

## 2018-09-07 PROCEDURE — 87086 URINE CULTURE/COLONY COUNT: CPT

## 2018-09-07 PROCEDURE — 81001 URINALYSIS AUTO W/SCOPE: CPT

## 2018-09-07 PROCEDURE — 99213 OFFICE O/P EST LOW 20 MIN: CPT | Mod: PBBFAC,PO | Performed by: INTERNAL MEDICINE

## 2018-09-07 PROCEDURE — 87088 URINE BACTERIA CULTURE: CPT

## 2018-09-07 PROCEDURE — 99214 OFFICE O/P EST MOD 30 MIN: CPT | Mod: S$PBB,,, | Performed by: INTERNAL MEDICINE

## 2018-09-07 PROCEDURE — 87186 SC STD MICRODIL/AGAR DIL: CPT

## 2018-09-07 PROCEDURE — 1101F PT FALLS ASSESS-DOCD LE1/YR: CPT | Mod: CPTII,,, | Performed by: INTERNAL MEDICINE

## 2018-09-07 PROCEDURE — 99999 PR PBB SHADOW E&M-EST. PATIENT-LVL III: CPT | Mod: PBBFAC,,, | Performed by: INTERNAL MEDICINE

## 2018-09-07 RX ORDER — SULFAMETHOXAZOLE AND TRIMETHOPRIM 800; 160 MG/1; MG/1
1 TABLET ORAL 2 TIMES DAILY
Qty: 10 TABLET | Refills: 0 | Status: SHIPPED | OUTPATIENT
Start: 2018-09-07 | End: 2018-09-12

## 2018-09-07 NOTE — TELEPHONE ENCOUNTER
"----- Message from Elif Pierre sent at 9/7/2018  9:36 AM CDT -----  Contact: Self, Call her at [phone]  Patient would like to get medical advice.    Symptoms (please be specific):  Burning with urination, Possible UTI    How long has patient had these symptoms:  X 24 hrs    Pharmacy name and phone # (DON'T enter "on file" or "in chart"):  [facility] [phone]    Any drug allergies:  NKDA    Would you prefer a response via [employer]?:  Phone     Comments:    "

## 2018-09-07 NOTE — PROGRESS NOTES
Subjective:       Patient ID: Azucena Luciano is a 68 y.o. female.    Chief Complaint: Urinary Tract Infection (burning & pressure)    HPI   Pt here c/o 24 hrs of persistent dysuria, increased frequency/urgency. No fevers/chills, back pain, N/V. Some relief with OTC Azo.   Review of Systems   Constitutional: Negative for activity change, appetite change, chills, diaphoresis, fatigue, fever and unexpected weight change.   HENT: Negative for postnasal drip, rhinorrhea, sinus pressure, sneezing, sore throat, trouble swallowing and voice change.    Respiratory: Negative for cough, shortness of breath and wheezing.    Cardiovascular: Negative for chest pain, palpitations and leg swelling.   Gastrointestinal: Negative for abdominal pain, blood in stool, constipation, diarrhea, nausea and vomiting.   Genitourinary: Positive for dysuria, frequency and urgency. Negative for flank pain and hematuria.   Musculoskeletal: Negative for arthralgias and myalgias.   Skin: Negative for rash and wound.   Allergic/Immunologic: Negative for environmental allergies and food allergies.   Hematological: Negative for adenopathy. Does not bruise/bleed easily.       Objective:      Physical Exam   Constitutional: She is oriented to person, place, and time. She appears well-developed and well-nourished. No distress.   HENT:   Head: Normocephalic and atraumatic.   Right Ear: External ear normal.   Eyes: Conjunctivae and EOM are normal. Pupils are equal, round, and reactive to light. Right eye exhibits no discharge. Left eye exhibits no discharge. No scleral icterus.   Neck: Neck supple. No JVD present.   Cardiovascular: Normal rate, regular rhythm, normal heart sounds and intact distal pulses.   Pulmonary/Chest: Effort normal and breath sounds normal. No respiratory distress. She has no wheezes. She has no rales.   Musculoskeletal: She exhibits no edema.   Lymphadenopathy:     She has no cervical adenopathy.   Neurological: She is alert and  oriented to person, place, and time.   Skin: Skin is warm and dry. No rash noted. She is not diaphoretic. No pallor.       Assessment:       1. Acute cystitis without hematuria    2. Dysuria        Plan:    1/2. UA/Urine Cx          Rx Bactrim DS BID x 5 days           Push fluids

## 2018-09-10 LAB — BACTERIA UR CULT: NORMAL

## 2018-10-09 DIAGNOSIS — M81.0 OSTEOPOROSIS, UNSPECIFIED OSTEOPOROSIS TYPE, UNSPECIFIED PATHOLOGICAL FRACTURE PRESENCE: ICD-10-CM

## 2018-10-09 RX ORDER — ALENDRONATE SODIUM 70 MG/1
TABLET ORAL
Qty: 12 TABLET | Refills: 3 | Status: SHIPPED | OUTPATIENT
Start: 2018-10-09 | End: 2020-02-17

## 2018-10-15 ENCOUNTER — LAB VISIT (OUTPATIENT)
Dept: LAB | Facility: HOSPITAL | Age: 68
End: 2018-10-15
Attending: INTERNAL MEDICINE
Payer: MEDICARE

## 2018-10-15 DIAGNOSIS — E78.00 HYPERCHOLESTEREMIA: ICD-10-CM

## 2018-10-15 LAB
ALT SERPL W/O P-5'-P-CCNC: 25 U/L
AST SERPL-CCNC: 24 U/L
CHOLEST SERPL-MCNC: 158 MG/DL
CHOLEST/HDLC SERPL: 3.8 {RATIO}
HDLC SERPL-MCNC: 42 MG/DL
HDLC SERPL: 26.6 %
LDLC SERPL CALC-MCNC: 87 MG/DL
NONHDLC SERPL-MCNC: 116 MG/DL
TRIGL SERPL-MCNC: 145 MG/DL

## 2018-10-15 PROCEDURE — 84450 TRANSFERASE (AST) (SGOT): CPT

## 2018-10-15 PROCEDURE — 80061 LIPID PANEL: CPT

## 2018-10-15 PROCEDURE — 84460 ALANINE AMINO (ALT) (SGPT): CPT

## 2018-10-15 PROCEDURE — 36415 COLL VENOUS BLD VENIPUNCTURE: CPT | Mod: PO

## 2018-11-07 ENCOUNTER — LAB VISIT (OUTPATIENT)
Dept: LAB | Facility: HOSPITAL | Age: 68
End: 2018-11-07
Attending: INTERNAL MEDICINE
Payer: MEDICARE

## 2018-11-07 ENCOUNTER — OFFICE VISIT (OUTPATIENT)
Dept: INTERNAL MEDICINE | Facility: CLINIC | Age: 68
End: 2018-11-07
Payer: MEDICARE

## 2018-11-07 VITALS
RESPIRATION RATE: 18 BRPM | SYSTOLIC BLOOD PRESSURE: 110 MMHG | BODY MASS INDEX: 23.74 KG/M2 | WEIGHT: 117.75 LBS | DIASTOLIC BLOOD PRESSURE: 78 MMHG | TEMPERATURE: 98 F | HEIGHT: 59 IN | HEART RATE: 61 BPM

## 2018-11-07 DIAGNOSIS — M81.0 OSTEOPOROSIS, UNSPECIFIED OSTEOPOROSIS TYPE, UNSPECIFIED PATHOLOGICAL FRACTURE PRESENCE: ICD-10-CM

## 2018-11-07 DIAGNOSIS — H26.9 CATARACT OF LEFT EYE, UNSPECIFIED CATARACT TYPE: ICD-10-CM

## 2018-11-07 DIAGNOSIS — E78.00 HYPERCHOLESTEREMIA: ICD-10-CM

## 2018-11-07 DIAGNOSIS — Z01.818 PRE-OP EVALUATION: Primary | ICD-10-CM

## 2018-11-07 DIAGNOSIS — I82.4Y2 ACUTE DEEP VEIN THROMBOSIS (DVT) OF PROXIMAL END OF LEFT LOWER EXTREMITY: ICD-10-CM

## 2018-11-07 DIAGNOSIS — Z01.818 PRE-OP EVALUATION: ICD-10-CM

## 2018-11-07 LAB
ANION GAP SERPL CALC-SCNC: 6 MMOL/L
BASOPHILS # BLD AUTO: 0.06 K/UL
BASOPHILS NFR BLD: 0.8 %
BUN SERPL-MCNC: 19 MG/DL
CALCIUM SERPL-MCNC: 9.7 MG/DL
CHLORIDE SERPL-SCNC: 106 MMOL/L
CO2 SERPL-SCNC: 31 MMOL/L
CREAT SERPL-MCNC: 0.8 MG/DL
DIFFERENTIAL METHOD: ABNORMAL
EOSINOPHIL # BLD AUTO: 0.1 K/UL
EOSINOPHIL NFR BLD: 1.2 %
ERYTHROCYTE [DISTWIDTH] IN BLOOD BY AUTOMATED COUNT: 14.8 %
EST. GFR  (AFRICAN AMERICAN): >60 ML/MIN/1.73 M^2
EST. GFR  (NON AFRICAN AMERICAN): >60 ML/MIN/1.73 M^2
GLUCOSE SERPL-MCNC: 91 MG/DL
HCT VFR BLD AUTO: 42.9 %
HGB BLD-MCNC: 13.7 G/DL
IMM GRANULOCYTES # BLD AUTO: 0.01 K/UL
IMM GRANULOCYTES NFR BLD AUTO: 0.1 %
LYMPHOCYTES # BLD AUTO: 3.6 K/UL
LYMPHOCYTES NFR BLD: 46.9 %
MCH RBC QN AUTO: 26.9 PG
MCHC RBC AUTO-ENTMCNC: 31.9 G/DL
MCV RBC AUTO: 84 FL
MONOCYTES # BLD AUTO: 0.5 K/UL
MONOCYTES NFR BLD: 6.9 %
NEUTROPHILS # BLD AUTO: 3.3 K/UL
NEUTROPHILS NFR BLD: 44.1 %
NRBC BLD-RTO: 0 /100 WBC
PLATELET # BLD AUTO: 232 K/UL
PMV BLD AUTO: 11.5 FL
POTASSIUM SERPL-SCNC: 4.1 MMOL/L
RBC # BLD AUTO: 5.09 M/UL
SODIUM SERPL-SCNC: 143 MMOL/L
WBC # BLD AUTO: 7.57 K/UL

## 2018-11-07 PROCEDURE — 93010 ELECTROCARDIOGRAM REPORT: CPT | Mod: S$GLB,,, | Performed by: INTERNAL MEDICINE

## 2018-11-07 PROCEDURE — 85025 COMPLETE CBC W/AUTO DIFF WBC: CPT

## 2018-11-07 PROCEDURE — 99999 PR PBB SHADOW E&M-EST. PATIENT-LVL III: CPT | Mod: PBBFAC,,, | Performed by: INTERNAL MEDICINE

## 2018-11-07 PROCEDURE — 80048 BASIC METABOLIC PNL TOTAL CA: CPT

## 2018-11-07 PROCEDURE — 93005 ELECTROCARDIOGRAM TRACING: CPT | Mod: S$GLB,,, | Performed by: INTERNAL MEDICINE

## 2018-11-07 PROCEDURE — 1101F PT FALLS ASSESS-DOCD LE1/YR: CPT | Mod: CPTII,S$GLB,, | Performed by: INTERNAL MEDICINE

## 2018-11-07 PROCEDURE — 36415 COLL VENOUS BLD VENIPUNCTURE: CPT | Mod: PO

## 2018-11-07 PROCEDURE — 99214 OFFICE O/P EST MOD 30 MIN: CPT | Mod: S$GLB,,, | Performed by: INTERNAL MEDICINE

## 2018-11-07 NOTE — PROGRESS NOTES
Gent 180 mg IM given and UA with cath urine showed no bacteria or blood.    Subjective:       Patient ID: Azucena Luciano is a 68 y.o. female.    Chief Complaint: Pre-op Exam (cataract- left eye)    HPI   Pt with LLE DVT on Apixaban, Osteoporosis, HLD is here for pre-op evaluation for left sided cataract surgery which has not been scheduled yet by Dr. Carl.   Review of Systems   Constitutional: Negative for activity change, appetite change, chills, diaphoresis, fatigue, fever and unexpected weight change.   HENT: Negative for congestion, mouth sores, postnasal drip, rhinorrhea, sinus pressure, sneezing, sore throat, trouble swallowing and voice change.    Eyes: Negative for pain, discharge and visual disturbance.   Respiratory: Negative for cough, shortness of breath and wheezing.    Cardiovascular: Negative for chest pain, palpitations and leg swelling.   Gastrointestinal: Negative for abdominal pain, blood in stool, constipation, diarrhea, nausea and vomiting.   Endocrine: Negative for cold intolerance and heat intolerance.   Genitourinary: Negative for difficulty urinating, dysuria, frequency, hematuria and urgency.   Musculoskeletal: Negative for arthralgias and myalgias.   Skin: Negative for rash and wound.   Allergic/Immunologic: Negative for environmental allergies and food allergies.   Neurological: Negative for dizziness, tremors, seizures, syncope, weakness, light-headedness and headaches.   Hematological: Negative for adenopathy. Does not bruise/bleed easily.   Psychiatric/Behavioral: Negative for confusion and sleep disturbance. The patient is not nervous/anxious.        Objective:      Physical Exam   Constitutional: She is oriented to person, place, and time. She appears well-developed and well-nourished. No distress.   HENT:   Head: Normocephalic and atraumatic.   Right Ear: External ear normal.   Left Ear: External ear normal.   Nose: Nose normal.   Mouth/Throat: Oropharynx is clear and moist. No oropharyngeal exudate.   Eyes: Conjunctivae and EOM are normal. Pupils are  equal, round, and reactive to light. Right eye exhibits no discharge. Left eye exhibits no discharge. No scleral icterus.   Neck: Neck supple. No JVD present. No thyromegaly present.   Cardiovascular: Normal rate, regular rhythm, normal heart sounds and intact distal pulses.   No murmur heard.  Pulmonary/Chest: Effort normal and breath sounds normal. No respiratory distress. She has no wheezes. She has no rales. She exhibits no tenderness.   Abdominal: Soft. Bowel sounds are normal. She exhibits no distension. There is no tenderness. There is no guarding.   Musculoskeletal: She exhibits no edema.   Lymphadenopathy:     She has no cervical adenopathy.   Neurological: She is alert and oriented to person, place, and time. No cranial nerve deficit. Coordination normal.   Skin: Skin is warm and dry. No rash noted. She is not diaphoretic. No pallor.   Psychiatric: She has a normal mood and affect. Judgment normal.   Nursing note and vitals reviewed.      Assessment:       1. Pre-op evaluation    2. Cataract of left eye, unspecified cataract type    3. Acute deep vein thrombosis (DVT) of proximal end of left lower extremity    4. Osteoporosis, unspecified osteoporosis type, unspecified pathological fracture presence    5. Hypercholesteremia        Plan:    1. Check CBC/BMP/EKG   2. Hold Eliquis for 2 days before cataract surgery   3. LLE DVT- stable on Apixaban       Followed by Vascular Medicine    4. Osteoporosis- stable on Fosamax       Last DEXA(11/15)   5. HLD- stable on Lipitor 20 mg daily      Pt has no clinical predictors. She is going for a low risk procedure. Has good functional Capacity at >4 mets. At this time there are no contraindications to cataract surgery. May hold Eliquis for 2 days before procedure and then resume after.

## 2018-12-12 DIAGNOSIS — I82.4Y2 DVT, LOWER EXTREMITY, PROXIMAL, ACUTE, LEFT: ICD-10-CM

## 2018-12-12 DIAGNOSIS — M79.605 PAIN OF LEFT LOWER EXTREMITY: ICD-10-CM

## 2019-04-10 ENCOUNTER — TELEPHONE (OUTPATIENT)
Dept: INTERNAL MEDICINE | Facility: CLINIC | Age: 69
End: 2019-04-10

## 2019-04-10 DIAGNOSIS — Z00.00 ANNUAL PHYSICAL EXAM: Primary | ICD-10-CM

## 2019-04-10 DIAGNOSIS — R53.83 FATIGUE, UNSPECIFIED TYPE: ICD-10-CM

## 2019-04-10 DIAGNOSIS — E78.5 HYPERLIPIDEMIA, UNSPECIFIED HYPERLIPIDEMIA TYPE: ICD-10-CM

## 2019-04-10 NOTE — TELEPHONE ENCOUNTER
----- Message from Elif Pierre sent at 4/9/2019 10:42 AM CDT -----  Contact: Self   Doctor appointment and lab have been scheduled.  Please link lab orders to the lab appointment.  Date of doctor appointment:  7/15  Date of lab appointment:  7/8  Physical or EP: Epp  Comments:

## 2019-07-08 ENCOUNTER — LAB VISIT (OUTPATIENT)
Dept: LAB | Facility: HOSPITAL | Age: 69
End: 2019-07-08
Attending: INTERNAL MEDICINE
Payer: MEDICARE

## 2019-07-08 ENCOUNTER — PATIENT MESSAGE (OUTPATIENT)
Dept: INTERNAL MEDICINE | Facility: CLINIC | Age: 69
End: 2019-07-08

## 2019-07-08 DIAGNOSIS — E78.5 HYPERLIPIDEMIA, UNSPECIFIED HYPERLIPIDEMIA TYPE: ICD-10-CM

## 2019-07-08 DIAGNOSIS — R53.83 FATIGUE, UNSPECIFIED TYPE: ICD-10-CM

## 2019-07-08 LAB
ALBUMIN SERPL BCP-MCNC: 3.9 G/DL (ref 3.5–5.2)
ALP SERPL-CCNC: 135 U/L (ref 55–135)
ALT SERPL W/O P-5'-P-CCNC: 21 U/L (ref 10–44)
ANION GAP SERPL CALC-SCNC: 8 MMOL/L (ref 8–16)
AST SERPL-CCNC: 23 U/L (ref 10–40)
BASOPHILS # BLD AUTO: 0.06 K/UL (ref 0–0.2)
BASOPHILS NFR BLD: 0.8 % (ref 0–1.9)
BILIRUB SERPL-MCNC: 0.4 MG/DL (ref 0.1–1)
BUN SERPL-MCNC: 17 MG/DL (ref 8–23)
CALCIUM SERPL-MCNC: 9.2 MG/DL (ref 8.7–10.5)
CHLORIDE SERPL-SCNC: 108 MMOL/L (ref 95–110)
CHOLEST SERPL-MCNC: 152 MG/DL (ref 120–199)
CHOLEST/HDLC SERPL: 3.4 {RATIO} (ref 2–5)
CO2 SERPL-SCNC: 25 MMOL/L (ref 23–29)
CREAT SERPL-MCNC: 0.8 MG/DL (ref 0.5–1.4)
DIFFERENTIAL METHOD: ABNORMAL
EOSINOPHIL # BLD AUTO: 0.2 K/UL (ref 0–0.5)
EOSINOPHIL NFR BLD: 2.6 % (ref 0–8)
ERYTHROCYTE [DISTWIDTH] IN BLOOD BY AUTOMATED COUNT: 14.5 % (ref 11.5–14.5)
EST. GFR  (AFRICAN AMERICAN): >60 ML/MIN/1.73 M^2
EST. GFR  (NON AFRICAN AMERICAN): >60 ML/MIN/1.73 M^2
GLUCOSE SERPL-MCNC: 95 MG/DL (ref 70–110)
HCT VFR BLD AUTO: 43.5 % (ref 37–48.5)
HDLC SERPL-MCNC: 45 MG/DL (ref 40–75)
HDLC SERPL: 29.6 % (ref 20–50)
HGB BLD-MCNC: 13.5 G/DL (ref 12–16)
IMM GRANULOCYTES # BLD AUTO: 0.02 K/UL (ref 0–0.04)
IMM GRANULOCYTES NFR BLD AUTO: 0.3 % (ref 0–0.5)
LDLC SERPL CALC-MCNC: 78.8 MG/DL (ref 63–159)
LYMPHOCYTES # BLD AUTO: 3 K/UL (ref 1–4.8)
LYMPHOCYTES NFR BLD: 38.5 % (ref 18–48)
MCH RBC QN AUTO: 26.8 PG (ref 27–31)
MCHC RBC AUTO-ENTMCNC: 31 G/DL (ref 32–36)
MCV RBC AUTO: 87 FL (ref 82–98)
MONOCYTES # BLD AUTO: 0.6 K/UL (ref 0.3–1)
MONOCYTES NFR BLD: 8.1 % (ref 4–15)
NEUTROPHILS # BLD AUTO: 3.8 K/UL (ref 1.8–7.7)
NEUTROPHILS NFR BLD: 49.7 % (ref 38–73)
NONHDLC SERPL-MCNC: 107 MG/DL
NRBC BLD-RTO: 0 /100 WBC
PLATELET # BLD AUTO: 258 K/UL (ref 150–350)
PMV BLD AUTO: 11.3 FL (ref 9.2–12.9)
POTASSIUM SERPL-SCNC: 3.7 MMOL/L (ref 3.5–5.1)
PROT SERPL-MCNC: 6.7 G/DL (ref 6–8.4)
RBC # BLD AUTO: 5.03 M/UL (ref 4–5.4)
SODIUM SERPL-SCNC: 141 MMOL/L (ref 136–145)
TRIGL SERPL-MCNC: 141 MG/DL (ref 30–150)
TSH SERPL DL<=0.005 MIU/L-ACNC: 1.26 UIU/ML (ref 0.4–4)
WBC # BLD AUTO: 7.67 K/UL (ref 3.9–12.7)

## 2019-07-08 PROCEDURE — 84443 ASSAY THYROID STIM HORMONE: CPT

## 2019-07-08 PROCEDURE — 36415 COLL VENOUS BLD VENIPUNCTURE: CPT | Mod: PO

## 2019-07-08 PROCEDURE — 85025 COMPLETE CBC W/AUTO DIFF WBC: CPT

## 2019-07-08 PROCEDURE — 80053 COMPREHEN METABOLIC PANEL: CPT

## 2019-07-08 PROCEDURE — 80061 LIPID PANEL: CPT

## 2019-07-15 ENCOUNTER — OFFICE VISIT (OUTPATIENT)
Dept: INTERNAL MEDICINE | Facility: CLINIC | Age: 69
End: 2019-07-15
Payer: MEDICARE

## 2019-07-15 VITALS
RESPIRATION RATE: 18 BRPM | BODY MASS INDEX: 23.51 KG/M2 | HEART RATE: 65 BPM | HEIGHT: 59 IN | TEMPERATURE: 98 F | SYSTOLIC BLOOD PRESSURE: 136 MMHG | WEIGHT: 116.63 LBS | DIASTOLIC BLOOD PRESSURE: 70 MMHG

## 2019-07-15 DIAGNOSIS — Z12.31 ENCOUNTER FOR SCREENING MAMMOGRAM FOR BREAST CANCER: ICD-10-CM

## 2019-07-15 DIAGNOSIS — G47.00 INSOMNIA, UNSPECIFIED TYPE: ICD-10-CM

## 2019-07-15 DIAGNOSIS — I82.4Y2 ACUTE DEEP VEIN THROMBOSIS (DVT) OF PROXIMAL END OF LEFT LOWER EXTREMITY: Primary | ICD-10-CM

## 2019-07-15 DIAGNOSIS — E78.49 OTHER HYPERLIPIDEMIA: ICD-10-CM

## 2019-07-15 DIAGNOSIS — M81.0 OSTEOPOROSIS, UNSPECIFIED OSTEOPOROSIS TYPE, UNSPECIFIED PATHOLOGICAL FRACTURE PRESENCE: ICD-10-CM

## 2019-07-15 PROCEDURE — 99213 OFFICE O/P EST LOW 20 MIN: CPT | Mod: S$GLB,,, | Performed by: INTERNAL MEDICINE

## 2019-07-15 PROCEDURE — 99499 UNLISTED E&M SERVICE: CPT | Mod: S$GLB,,, | Performed by: INTERNAL MEDICINE

## 2019-07-15 PROCEDURE — 99999 PR PBB SHADOW E&M-EST. PATIENT-LVL III: ICD-10-PCS | Mod: PBBFAC,,, | Performed by: INTERNAL MEDICINE

## 2019-07-15 PROCEDURE — 99213 PR OFFICE/OUTPT VISIT, EST, LEVL III, 20-29 MIN: ICD-10-PCS | Mod: S$GLB,,, | Performed by: INTERNAL MEDICINE

## 2019-07-15 PROCEDURE — 99999 PR PBB SHADOW E&M-EST. PATIENT-LVL III: CPT | Mod: PBBFAC,,, | Performed by: INTERNAL MEDICINE

## 2019-07-15 PROCEDURE — 99499 RISK ADDL DX/OHS AUDIT: ICD-10-PCS | Mod: S$GLB,,, | Performed by: INTERNAL MEDICINE

## 2019-07-15 RX ORDER — ZOLPIDEM TARTRATE 5 MG/1
5 TABLET ORAL NIGHTLY PRN
Qty: 30 TABLET | Refills: 1 | Status: SHIPPED | OUTPATIENT
Start: 2019-07-15 | End: 2020-01-10

## 2019-07-15 NOTE — PROGRESS NOTES
Subjective:       Patient ID: Azucena Luciano is a 69 y.o. female.    Chief Complaint: Annual Exam    HPI   68 y.o. Female here for annual exam.      Vaccines: Influenza (deferred); Tetanus (2015); Pneumovax (2015); Shingrix (will get)  Eye exam: 11/15  Mammogram: 7/18  Gyn exam: 1/16  Colonoscopy: 8/18  DEXA: 7/18     Mobility: normal  Falls: no     Exercise: cardio/stretching 3x/wk  Diet: regular     Past Medical History:    Carpal tunnel syndrome                                        Asthma               DVT                            HLD    Osteoporosis                    Past Surgical History:    OVARIAN CYST REMOVAL                                           FACIAL COSMETIC SURGERY                                        tummy tuck                                                   Social History    Marital Status:              Spouse Name:                       Years of Education:                 Number of children: 3              Occupational History  Occupation          Employer            Comment               Retired insurance *                          Social History Main Topics    Smoking Status: Never Smoker                      Smokeless Status: Not on file                       Alcohol Use: Yes           0.0 oz/week       0 Not specified per week       Comment: rare    Drug Use: No              Sexual Activity: Yes               Partners with: Male     No Known Allergies  Review of Systems   Constitutional: Negative for activity change, appetite change, chills, diaphoresis, fatigue, fever and unexpected weight change.   HENT: Positive for rhinorrhea. Negative for congestion, hearing loss, mouth sores, postnasal drip, sinus pressure, sneezing, sore throat, trouble swallowing and voice change.    Eyes: Negative for pain, discharge and visual disturbance.   Respiratory: Positive for wheezing. Negative for cough, chest tightness and shortness of breath.    Cardiovascular: Negative for chest pain,  palpitations and leg swelling.   Gastrointestinal: Negative for abdominal pain, blood in stool, constipation, diarrhea, nausea and vomiting.   Endocrine: Negative for cold intolerance, heat intolerance, polydipsia and polyuria.   Genitourinary: Negative for difficulty urinating, dysuria, frequency, hematuria, menstrual problem and urgency.   Musculoskeletal: Negative for arthralgias, joint swelling, myalgias and neck pain.   Skin: Negative for rash and wound.   Allergic/Immunologic: Negative for environmental allergies and food allergies.   Neurological: Positive for weakness. Negative for dizziness, tremors, seizures, syncope, light-headedness and headaches.   Hematological: Negative for adenopathy. Does not bruise/bleed easily.   Psychiatric/Behavioral: Positive for sleep disturbance. Negative for confusion, dysphoric mood, self-injury and suicidal ideas. The patient is not nervous/anxious.        Objective:      Physical Exam   Constitutional: She is oriented to person, place, and time. She appears well-developed and well-nourished. No distress.   HENT:   Head: Normocephalic and atraumatic.   Right Ear: External ear normal.   Left Ear: External ear normal.   Nose: Nose normal.   Mouth/Throat: Oropharynx is clear and moist. No oropharyngeal exudate.   Eyes: Pupils are equal, round, and reactive to light. Conjunctivae and EOM are normal. Right eye exhibits no discharge. Left eye exhibits no discharge. No scleral icterus.   Neck: Neck supple. No JVD present. No thyromegaly present.   Cardiovascular: Normal rate, regular rhythm, normal heart sounds and intact distal pulses.   No murmur heard.  Pulmonary/Chest: Effort normal and breath sounds normal. No respiratory distress. She has no wheezes. She has no rales. She exhibits no tenderness.   Abdominal: Soft. Bowel sounds are normal. She exhibits no distension. There is no tenderness. There is no guarding.   Musculoskeletal: She exhibits no edema.   Lymphadenopathy:      She has no cervical adenopathy.   Neurological: She is alert and oriented to person, place, and time. No cranial nerve deficit. Coordination normal.   Skin: Skin is warm and dry. No rash noted. She is not diaphoretic. No pallor.   Psychiatric: She has a normal mood and affect. Judgment normal.   Nursing note and vitals reviewed.      Assessment:       1. Acute deep vein thrombosis (DVT) of proximal end of left lower extremity    2. Osteoporosis, unspecified osteoporosis type, unspecified pathological fracture presence    3. Insomnia, unspecified type    4. Encounter for screening mammogram for breast cancer    5. Other hyperlipidemia        Plan:    Blood work reviewed with pt   Vaccines: Influenza (deferred); Tetanus (2015); Pneumovax (2015); Shingrix (will get)   Eye exam: 11/15   Mammogram: 7/18   Gyn exam: 1/16   Colonoscopy: 8/18   DEXA: 7/18     1. LLE DVT- stable on Apixaban       Followed by Vascular Medicine    2. Osteoporosis- stable on Fosamax       Last DEXA(7/18)   3. HLD- controlled on Lipitor 20 mg daily   4. Insomnia- Rx Ambien 5 mg qHS PRN   5. F/u in 1 yr

## 2019-07-22 ENCOUNTER — HOSPITAL ENCOUNTER (OUTPATIENT)
Dept: RADIOLOGY | Facility: HOSPITAL | Age: 69
Discharge: HOME | End: 2019-07-22
Attending: INTERNAL MEDICINE
Payer: MEDICARE

## 2019-07-22 DIAGNOSIS — Z12.31 ENCOUNTER FOR SCREENING MAMMOGRAM FOR BREAST CANCER: ICD-10-CM

## 2019-07-22 PROCEDURE — 77067 SCR MAMMO BI INCL CAD: CPT | Mod: 26,,, | Performed by: RADIOLOGY

## 2019-07-22 PROCEDURE — 77063 MAMMO DIGITAL SCREENING BILAT WITH TOMOSYNTHESIS_CAD: ICD-10-PCS | Mod: 26,,, | Performed by: RADIOLOGY

## 2019-07-22 PROCEDURE — 77067 MAMMO DIGITAL SCREENING BILAT WITH TOMOSYNTHESIS_CAD: ICD-10-PCS | Mod: 26,,, | Performed by: RADIOLOGY

## 2019-07-22 PROCEDURE — 77063 BREAST TOMOSYNTHESIS BI: CPT | Mod: 26,,, | Performed by: RADIOLOGY

## 2019-07-22 PROCEDURE — 77067 SCR MAMMO BI INCL CAD: CPT | Mod: TC,PO

## 2019-09-03 PROBLEM — E78.49 OTHER HYPERLIPIDEMIA: Status: ACTIVE | Noted: 2019-09-03

## 2019-09-03 PROBLEM — E78.00 HYPERCHOLESTEREMIA: Status: RESOLVED | Noted: 2018-07-13 | Resolved: 2019-09-03

## 2019-09-05 DIAGNOSIS — E78.00 HYPERCHOLESTEREMIA: ICD-10-CM

## 2019-09-05 RX ORDER — ATORVASTATIN CALCIUM 20 MG/1
TABLET, FILM COATED ORAL
Qty: 90 TABLET | Refills: 3 | Status: SHIPPED | OUTPATIENT
Start: 2019-09-05 | End: 2020-10-24

## 2019-09-24 ENCOUNTER — PATIENT OUTREACH (OUTPATIENT)
Dept: ADMINISTRATIVE | Facility: OTHER | Age: 69
End: 2019-09-24

## 2019-09-26 ENCOUNTER — OFFICE VISIT (OUTPATIENT)
Dept: PODIATRY | Facility: CLINIC | Age: 69
End: 2019-09-26
Payer: MEDICARE

## 2019-09-26 VITALS
BODY MASS INDEX: 26.85 KG/M2 | HEART RATE: 58 BPM | DIASTOLIC BLOOD PRESSURE: 75 MMHG | HEIGHT: 55 IN | WEIGHT: 116 LBS | SYSTOLIC BLOOD PRESSURE: 150 MMHG

## 2019-09-26 DIAGNOSIS — M79.671 RIGHT FOOT PAIN: Primary | ICD-10-CM

## 2019-09-26 DIAGNOSIS — M20.42 HAMMER TOES OF BOTH FEET: ICD-10-CM

## 2019-09-26 DIAGNOSIS — M20.41 HAMMER TOES OF BOTH FEET: ICD-10-CM

## 2019-09-26 DIAGNOSIS — M79.672 LEFT FOOT PAIN: ICD-10-CM

## 2019-09-26 PROCEDURE — 1101F PT FALLS ASSESS-DOCD LE1/YR: CPT | Mod: CPTII,S$GLB,, | Performed by: PODIATRIST

## 2019-09-26 PROCEDURE — 1101F PR PT FALLS ASSESS DOC 0-1 FALLS W/OUT INJ PAST YR: ICD-10-PCS | Mod: CPTII,S$GLB,, | Performed by: PODIATRIST

## 2019-09-26 PROCEDURE — 99203 OFFICE O/P NEW LOW 30 MIN: CPT | Mod: S$GLB,,, | Performed by: PODIATRIST

## 2019-09-26 PROCEDURE — 99999 PR PBB SHADOW E&M-EST. PATIENT-LVL III: ICD-10-PCS | Mod: PBBFAC,,, | Performed by: PODIATRIST

## 2019-09-26 PROCEDURE — 99203 PR OFFICE/OUTPT VISIT, NEW, LEVL III, 30-44 MIN: ICD-10-PCS | Mod: S$GLB,,, | Performed by: PODIATRIST

## 2019-09-26 PROCEDURE — 99999 PR PBB SHADOW E&M-EST. PATIENT-LVL III: CPT | Mod: PBBFAC,,, | Performed by: PODIATRIST

## 2019-10-01 NOTE — PROGRESS NOTES
Subjective:      Patient ID: Azucena Luciano is a 69 y.o. female.    Chief Complaint: Foot Pain (rt ft/hurts to walk)    Azucena is a 69 y.o. female who presents to the podiatry clinic  with complaint of  right foot pain. Onset of the symptoms was several weeks ago. Precipitating event: none known. Current symptoms include: ability to bear weight, but with some pain. Aggravating factors: any weight bearing. Symptoms have been intermittent. Patient has had prior foot problems. Evaluation to date: none. Treatment to date: none. Patients rates pain 4/10 on pain scale.        Review of Systems   Constitution: Negative for chills, fever and malaise/fatigue.   HENT: Negative for hearing loss.    Cardiovascular: Negative for claudication.   Respiratory: Negative for shortness of breath.    Skin: Negative for flushing and rash.   Musculoskeletal: Negative for joint pain and myalgias.   Neurological: Negative for loss of balance, numbness, paresthesias and sensory change.   Psychiatric/Behavioral: Negative for altered mental status.           Objective:      Physical Exam   Cardiovascular:   Pulses:       Dorsalis pedis pulses are 2+ on the right side, and 2+ on the left side.        Posterior tibial pulses are 2+ on the right side, and 2+ on the left side.   No edema noted to b/L LEs   Musculoskeletal:   Hammertoes noted, digits 2-5 b/L    with adductovarus rotation of b/L fifth digit.    POP to plantar met heads 1-5, right   Feet:   Right Foot:   Protective Sensation: 5 sites tested. 5 sites sensed.   Left Foot:   Protective Sensation: 5 sites tested. 5 sites sensed.   Neurological:   Intact gross sensation noted to b/L LEs   Skin:   Normal skin tugor noted.   No open lesion noted b/L  Skin temp is warm to warm from proximal to distal b/L.  Webspaces clean, dry, and intact  Nails x10 short             Assessment:       Encounter Diagnoses   Name Primary?    Right foot pain Yes    Hammer toes of both feet     Left  foot pain          Plan:       Azucena was seen today for foot pain.    Diagnoses and all orders for this visit:    Right foot pain  -     ORTHOTIC DEVICE (DME)    Hammer toes of both feet  -     ORTHOTIC DEVICE (DME)    Left foot pain  -     ORTHOTIC DEVICE (DME)      I counseled the patient on her conditions, their implications and medical management.    Shoe modification advised for the pt. Pt was advised to obtain shoes will accommodate foot deformities.     Pt advised on RICE and OTC NSAIDs for associated pain.   Metatarsal pads dispensed to be worn in shoes.   Custom orthotics advised for foot pain.   Call or return to clinic prn if these symptoms worsen or fail to improve as anticipated.  met.

## 2019-10-21 ENCOUNTER — TELEPHONE (OUTPATIENT)
Dept: PODIATRY | Facility: CLINIC | Age: 69
End: 2019-10-21

## 2019-10-21 NOTE — TELEPHONE ENCOUNTER
----- Message from Alesia Davalos sent at 10/21/2019  2:10 PM CDT -----  Contact: [facility] called on above patient stating a request from the patient needing a orthotic shoe insert,they will need clinical notes faxed over also letter of necessity form fax information to [phone] if the office needs to speak with someone,call [phone] thanks.

## 2019-10-21 NOTE — TELEPHONE ENCOUNTER
Inserts orders and clinical note and necessity forms have all been faxed to [facility]

## 2019-11-01 ENCOUNTER — TELEPHONE (OUTPATIENT)
Dept: PODIATRY | Facility: CLINIC | Age: 69
End: 2019-11-01

## 2019-11-01 NOTE — TELEPHONE ENCOUNTER
----- Message from Coy Duncan sent at 10/31/2019 11:00 AM CDT -----  Contact: [employer] [employer]  Please call [employer]isi at [phone]    Fax# [phone]    Regarding the pending custom orthotics    Please send diagnosis codes, office notes and any other medical information needed for insurance approval    Thank you

## 2020-01-10 ENCOUNTER — TELEPHONE (OUTPATIENT)
Dept: INTERNAL MEDICINE | Facility: CLINIC | Age: 70
End: 2020-01-10

## 2020-01-10 ENCOUNTER — OFFICE VISIT (OUTPATIENT)
Dept: INTERNAL MEDICINE | Facility: CLINIC | Age: 70
End: 2020-01-10
Payer: MEDICARE

## 2020-01-10 VITALS
SYSTOLIC BLOOD PRESSURE: 130 MMHG | RESPIRATION RATE: 18 BRPM | WEIGHT: 118.19 LBS | TEMPERATURE: 98 F | DIASTOLIC BLOOD PRESSURE: 80 MMHG | HEART RATE: 61 BPM | HEIGHT: 59 IN | BODY MASS INDEX: 23.83 KG/M2

## 2020-01-10 DIAGNOSIS — N64.4 BREAST PAIN, LEFT: Primary | ICD-10-CM

## 2020-01-10 DIAGNOSIS — Z98.82 H/O BREAST AUGMENTATION: ICD-10-CM

## 2020-01-10 DIAGNOSIS — G47.00 INSOMNIA, UNSPECIFIED TYPE: ICD-10-CM

## 2020-01-10 PROCEDURE — 1125F AMNT PAIN NOTED PAIN PRSNT: CPT | Mod: S$GLB,,, | Performed by: INTERNAL MEDICINE

## 2020-01-10 PROCEDURE — 1101F PT FALLS ASSESS-DOCD LE1/YR: CPT | Mod: CPTII,S$GLB,, | Performed by: INTERNAL MEDICINE

## 2020-01-10 PROCEDURE — 99214 PR OFFICE/OUTPT VISIT, EST, LEVL IV, 30-39 MIN: ICD-10-PCS | Mod: S$GLB,,, | Performed by: INTERNAL MEDICINE

## 2020-01-10 PROCEDURE — 99214 OFFICE O/P EST MOD 30 MIN: CPT | Mod: S$GLB,,, | Performed by: INTERNAL MEDICINE

## 2020-01-10 PROCEDURE — 1159F PR MEDICATION LIST DOCUMENTED IN MEDICAL RECORD: ICD-10-PCS | Mod: S$GLB,,, | Performed by: INTERNAL MEDICINE

## 2020-01-10 PROCEDURE — 99999 PR PBB SHADOW E&M-EST. PATIENT-LVL III: CPT | Mod: PBBFAC,,, | Performed by: INTERNAL MEDICINE

## 2020-01-10 PROCEDURE — 1101F PR PT FALLS ASSESS DOC 0-1 FALLS W/OUT INJ PAST YR: ICD-10-PCS | Mod: CPTII,S$GLB,, | Performed by: INTERNAL MEDICINE

## 2020-01-10 PROCEDURE — 99999 PR PBB SHADOW E&M-EST. PATIENT-LVL III: ICD-10-PCS | Mod: PBBFAC,,, | Performed by: INTERNAL MEDICINE

## 2020-01-10 PROCEDURE — 1125F PR PAIN SEVERITY QUANTIFIED, PAIN PRESENT: ICD-10-PCS | Mod: S$GLB,,, | Performed by: INTERNAL MEDICINE

## 2020-01-10 PROCEDURE — 1159F MED LIST DOCD IN RCRD: CPT | Mod: S$GLB,,, | Performed by: INTERNAL MEDICINE

## 2020-01-10 RX ORDER — ZOLPIDEM TARTRATE 5 MG/1
TABLET ORAL
Qty: 30 TABLET | Refills: 2 | Status: SHIPPED | OUTPATIENT
Start: 2020-01-10 | End: 2020-07-15 | Stop reason: SDUPTHER

## 2020-01-10 RX ORDER — ADHESIVE BANDAGE
5 BANDAGE TOPICAL DAILY
COMMUNITY

## 2020-01-10 NOTE — PROGRESS NOTES
Subjective:       Patient ID: Azucena Luciano is a 69 y.o. female.    Chief Complaint: Breast Pain (burning sensation in breast)    HPI     69-year-old female here for a burning sensation left breast.  Four days ago, she had pain in her left breast.  She thinks this is a milk duct.  It feels like a sore milk duct.  It hurts to touch.  It is not swollen.  This is close to the nipple.  She has no pain behind her back.  She has no discharge from her breast.  She has had fluid filled cysts removed, but never near the nipple.    Review of Systems    Objective:      Physical Exam   Constitutional: She is oriented to person, place, and time. She appears well-developed and well-nourished.   HENT:   Head: Normocephalic and atraumatic.   Mouth/Throat: No oropharyngeal exudate.   Eyes: Pupils are equal, round, and reactive to light. EOM are normal. Right eye exhibits no discharge. Left eye exhibits no discharge. No scleral icterus.   Neck: Normal range of motion. Neck supple. No tracheal deviation present. No thyromegaly present.   Cardiovascular: Normal rate, regular rhythm and normal heart sounds. Exam reveals no gallop and no friction rub.   No murmur heard.  Pulmonary/Chest: Effort normal and breath sounds normal. No respiratory distress. She has no wheezes. She has no rales. She exhibits no tenderness.   Abdominal: Soft. Bowel sounds are normal. She exhibits no distension and no mass. There is no tenderness. There is no rebound and no guarding.   Musculoskeletal: Normal range of motion. She exhibits no edema or tenderness.   Neurological: She is alert and oriented to person, place, and time.   Skin: Skin is warm and dry. No rash noted. No erythema. No pallor.   Psychiatric: She has a normal mood and affect. Her behavior is normal.   Vitals reviewed.      Assessment:       1. Breast pain, left    2. H/O breast augmentation        Plan:       1.  Diagnostic mammogram ordered.  2.  Refer to breast surgery, because breast  implants have been in place for 35 years.

## 2020-01-10 NOTE — TELEPHONE ENCOUNTER
----- Message from Lety Spain sent at 1/10/2020  9:36 AM CST -----  Contact: self  (h) [phone] (c)  Patient state she have a burning feeling in her left breast. Patient is asking for an order for an  X ray. Patient would like a call back. Please advise.

## 2020-01-13 ENCOUNTER — HOSPITAL ENCOUNTER (OUTPATIENT)
Dept: RADIOLOGY | Facility: HOSPITAL | Age: 70
Discharge: HOME | End: 2020-01-13
Attending: INTERNAL MEDICINE
Payer: MEDICARE

## 2020-01-13 DIAGNOSIS — N64.4 BREAST PAIN, LEFT: ICD-10-CM

## 2020-01-13 PROCEDURE — 77061 BREAST TOMOSYNTHESIS UNI: CPT | Mod: TC,PO,LT

## 2020-01-13 PROCEDURE — 77065 DX MAMMO INCL CAD UNI: CPT | Mod: 26,LT,, | Performed by: RADIOLOGY

## 2020-01-13 PROCEDURE — 77065 MAMMO DIGITAL DIAGNOSTIC LEFT WITH TOMOSYNTHESIS_CAD: ICD-10-PCS | Mod: 26,LT,, | Performed by: RADIOLOGY

## 2020-01-13 PROCEDURE — 77065 DX MAMMO INCL CAD UNI: CPT | Mod: TC,PO,LT

## 2020-01-13 PROCEDURE — 77061 MAMMO DIGITAL DIAGNOSTIC LEFT WITH TOMOSYNTHESIS_CAD: ICD-10-PCS | Mod: 26,LT,, | Performed by: RADIOLOGY

## 2020-01-13 PROCEDURE — 77061 BREAST TOMOSYNTHESIS UNI: CPT | Mod: 26,LT,, | Performed by: RADIOLOGY

## 2020-02-17 DIAGNOSIS — M81.0 OSTEOPOROSIS, UNSPECIFIED OSTEOPOROSIS TYPE, UNSPECIFIED PATHOLOGICAL FRACTURE PRESENCE: ICD-10-CM

## 2020-02-17 RX ORDER — ALENDRONATE SODIUM 70 MG/1
TABLET ORAL
Qty: 12 TABLET | Refills: 3 | Status: SHIPPED | OUTPATIENT
Start: 2020-02-17 | End: 2021-04-08

## 2020-02-19 DIAGNOSIS — I82.4Y2 DVT, LOWER EXTREMITY, PROXIMAL, ACUTE, LEFT: ICD-10-CM

## 2020-02-19 DIAGNOSIS — M79.605 PAIN OF LEFT LOWER EXTREMITY: ICD-10-CM

## 2020-05-11 ENCOUNTER — TELEPHONE (OUTPATIENT)
Dept: INTERNAL MEDICINE | Facility: CLINIC | Age: 70
End: 2020-05-11

## 2020-05-11 DIAGNOSIS — E78.49 OTHER HYPERLIPIDEMIA: Primary | ICD-10-CM

## 2020-05-11 NOTE — TELEPHONE ENCOUNTER
----- Message from Olga Harry sent at 5/11/2020  2:43 PM CDT -----  Patient Requesting Order    Order Needed:--Labs--    Communication Preference:--Azucena--[phone]--    Additional Information:Please place orders for annual labs on 07/06/20. Please attach to appointment on the chart.

## 2020-07-06 ENCOUNTER — LAB VISIT (OUTPATIENT)
Dept: LAB | Facility: HOSPITAL | Age: 70
End: 2020-07-06
Attending: INTERNAL MEDICINE
Payer: MEDICARE

## 2020-07-06 DIAGNOSIS — E78.49 OTHER HYPERLIPIDEMIA: ICD-10-CM

## 2020-07-06 LAB
ALBUMIN SERPL BCP-MCNC: 3.9 G/DL (ref 3.5–5.2)
ALP SERPL-CCNC: 117 U/L (ref 55–135)
ALT SERPL W/O P-5'-P-CCNC: 31 U/L (ref 10–44)
ANION GAP SERPL CALC-SCNC: 8 MMOL/L (ref 8–16)
AST SERPL-CCNC: 25 U/L (ref 10–40)
BASOPHILS # BLD AUTO: 0.05 K/UL (ref 0–0.2)
BASOPHILS NFR BLD: 0.8 % (ref 0–1.9)
BILIRUB SERPL-MCNC: 0.5 MG/DL (ref 0.1–1)
BUN SERPL-MCNC: 21 MG/DL (ref 8–23)
CALCIUM SERPL-MCNC: 9.1 MG/DL (ref 8.7–10.5)
CHLORIDE SERPL-SCNC: 108 MMOL/L (ref 95–110)
CHOLEST SERPL-MCNC: 170 MG/DL (ref 120–199)
CHOLEST/HDLC SERPL: 3.9 {RATIO} (ref 2–5)
CO2 SERPL-SCNC: 28 MMOL/L (ref 23–29)
CREAT SERPL-MCNC: 0.8 MG/DL (ref 0.5–1.4)
DIFFERENTIAL METHOD: ABNORMAL
EOSINOPHIL # BLD AUTO: 0.2 K/UL (ref 0–0.5)
EOSINOPHIL NFR BLD: 3.5 % (ref 0–8)
ERYTHROCYTE [DISTWIDTH] IN BLOOD BY AUTOMATED COUNT: 14.7 % (ref 11.5–14.5)
EST. GFR  (AFRICAN AMERICAN): >60 ML/MIN/1.73 M^2
EST. GFR  (NON AFRICAN AMERICAN): >60 ML/MIN/1.73 M^2
GLUCOSE SERPL-MCNC: 92 MG/DL (ref 70–110)
HCT VFR BLD AUTO: 45.8 % (ref 37–48.5)
HDLC SERPL-MCNC: 44 MG/DL (ref 40–75)
HDLC SERPL: 25.9 % (ref 20–50)
HGB BLD-MCNC: 13.7 G/DL (ref 12–16)
IMM GRANULOCYTES # BLD AUTO: 0.01 K/UL (ref 0–0.04)
IMM GRANULOCYTES NFR BLD AUTO: 0.2 % (ref 0–0.5)
LDLC SERPL CALC-MCNC: 103.2 MG/DL (ref 63–159)
LYMPHOCYTES # BLD AUTO: 2.9 K/UL (ref 1–4.8)
LYMPHOCYTES NFR BLD: 47.2 % (ref 18–48)
MCH RBC QN AUTO: 27 PG (ref 27–31)
MCHC RBC AUTO-ENTMCNC: 29.9 G/DL (ref 32–36)
MCV RBC AUTO: 90 FL (ref 82–98)
MONOCYTES # BLD AUTO: 0.6 K/UL (ref 0.3–1)
MONOCYTES NFR BLD: 9.4 % (ref 4–15)
NEUTROPHILS # BLD AUTO: 2.4 K/UL (ref 1.8–7.7)
NEUTROPHILS NFR BLD: 38.9 % (ref 38–73)
NONHDLC SERPL-MCNC: 126 MG/DL
NRBC BLD-RTO: 0 /100 WBC
PLATELET # BLD AUTO: 231 K/UL (ref 150–350)
PMV BLD AUTO: 11.7 FL (ref 9.2–12.9)
POTASSIUM SERPL-SCNC: 4 MMOL/L (ref 3.5–5.1)
PROT SERPL-MCNC: 6.7 G/DL (ref 6–8.4)
RBC # BLD AUTO: 5.07 M/UL (ref 4–5.4)
SODIUM SERPL-SCNC: 144 MMOL/L (ref 136–145)
TRIGL SERPL-MCNC: 114 MG/DL (ref 30–150)
TSH SERPL DL<=0.005 MIU/L-ACNC: 2.5 UIU/ML (ref 0.4–4)
WBC # BLD AUTO: 6.04 K/UL (ref 3.9–12.7)

## 2020-07-06 PROCEDURE — 80061 LIPID PANEL: CPT

## 2020-07-06 PROCEDURE — 84443 ASSAY THYROID STIM HORMONE: CPT

## 2020-07-06 PROCEDURE — 85025 COMPLETE CBC W/AUTO DIFF WBC: CPT

## 2020-07-06 PROCEDURE — 36415 COLL VENOUS BLD VENIPUNCTURE: CPT | Mod: PO

## 2020-07-06 PROCEDURE — 80053 COMPREHEN METABOLIC PANEL: CPT

## 2020-07-07 ENCOUNTER — TELEPHONE (OUTPATIENT)
Dept: INTERNAL MEDICINE | Facility: CLINIC | Age: 70
End: 2020-07-07

## 2020-07-07 NOTE — TELEPHONE ENCOUNTER
----- Message from Liya Mcmanus MD sent at 7/6/2020  8:34 PM CDT -----  Please review your lab work and Dr. Limon will discuss at your pending office visit.

## 2020-07-15 ENCOUNTER — OFFICE VISIT (OUTPATIENT)
Dept: INTERNAL MEDICINE | Facility: CLINIC | Age: 70
End: 2020-07-15
Payer: MEDICARE

## 2020-07-15 VITALS
WEIGHT: 112.44 LBS | HEIGHT: 59 IN | BODY MASS INDEX: 22.67 KG/M2 | DIASTOLIC BLOOD PRESSURE: 79 MMHG | RESPIRATION RATE: 16 BRPM | SYSTOLIC BLOOD PRESSURE: 122 MMHG | HEART RATE: 97 BPM

## 2020-07-15 DIAGNOSIS — M81.0 OSTEOPOROSIS, UNSPECIFIED OSTEOPOROSIS TYPE, UNSPECIFIED PATHOLOGICAL FRACTURE PRESENCE: ICD-10-CM

## 2020-07-15 DIAGNOSIS — Z00.00 ANNUAL PHYSICAL EXAM: Primary | ICD-10-CM

## 2020-07-15 DIAGNOSIS — G47.00 INSOMNIA, UNSPECIFIED TYPE: ICD-10-CM

## 2020-07-15 DIAGNOSIS — M79.671 PAIN IN BOTH FEET: ICD-10-CM

## 2020-07-15 DIAGNOSIS — E78.49 OTHER HYPERLIPIDEMIA: ICD-10-CM

## 2020-07-15 DIAGNOSIS — I82.4Y2 ACUTE DEEP VEIN THROMBOSIS (DVT) OF PROXIMAL END OF LEFT LOWER EXTREMITY: ICD-10-CM

## 2020-07-15 DIAGNOSIS — M79.672 PAIN IN BOTH FEET: ICD-10-CM

## 2020-07-15 PROCEDURE — 99999 PR PBB SHADOW E&M-EST. PATIENT-LVL III: ICD-10-PCS | Mod: PBBFAC,,, | Performed by: INTERNAL MEDICINE

## 2020-07-15 PROCEDURE — 99999 PR PBB SHADOW E&M-EST. PATIENT-LVL III: CPT | Mod: PBBFAC,,, | Performed by: INTERNAL MEDICINE

## 2020-07-15 PROCEDURE — 99214 OFFICE O/P EST MOD 30 MIN: CPT | Mod: S$GLB,,, | Performed by: INTERNAL MEDICINE

## 2020-07-15 PROCEDURE — 99499 UNLISTED E&M SERVICE: CPT | Mod: S$GLB,,, | Performed by: INTERNAL MEDICINE

## 2020-07-15 PROCEDURE — 99499 RISK ADDL DX/OHS AUDIT: ICD-10-PCS | Mod: S$GLB,,, | Performed by: INTERNAL MEDICINE

## 2020-07-15 PROCEDURE — 99214 PR OFFICE/OUTPT VISIT, EST, LEVL IV, 30-39 MIN: ICD-10-PCS | Mod: S$GLB,,, | Performed by: INTERNAL MEDICINE

## 2020-07-15 RX ORDER — ZOLPIDEM TARTRATE 5 MG/1
5 TABLET ORAL NIGHTLY
Qty: 30 TABLET | Refills: 3 | Status: SHIPPED | OUTPATIENT
Start: 2020-07-15 | End: 2021-01-19

## 2020-07-15 NOTE — PROGRESS NOTES
Subjective:       Patient ID: Azucena Luciano is a 69 y.o. female.    Chief Complaint: No chief complaint on file.    HPI   67 y.o. Female here for annual exam.      Vaccines: Influenza (deferred); Tetanus (2015); Pneumovax (2015); Zoster (2015)  Eye exam: 11/15  Mammogram: 1/20  Gyn exam: 1/16  Colonoscopy: 8/18  DEXA: 7/18     Mobility: normal  Falls: no     Exercise: cardio/stretching 3x/wk  Diet: regular     Past Medical History:    Carpal tunnel syndrome                                        Asthma               DVT                            HLD    Osteoporosis                    Past Surgical History:    Cataract surgery     OVARIAN CYST REMOVAL                                           FACIAL COSMETIC SURGERY                                        tummy tuck                                                   Social History    Marital Status:              Spouse Name:                       Years of Education:                 Number of children: 3              Occupational History  Occupation          Employer            Comment               Retired insurance *                          Social History Main Topics    Smoking Status: Never Smoker                      Smokeless Status: Not on file                       Alcohol Use: Yes           0.0 oz/week       0 Not specified per week       Comment: rare    Drug Use: No              Sexual Activity: Yes               Partners with: Male     No Known Allergies  Review of Systems   Constitutional: Negative for activity change, appetite change, chills, diaphoresis, fatigue, fever and unexpected weight change.   HENT: Negative for nasal congestion, mouth sores, postnasal drip, rhinorrhea, sinus pressure/congestion, sneezing, sore throat, trouble swallowing and voice change.    Eyes: Negative for pain, discharge and visual disturbance.   Respiratory: Negative for cough, shortness of breath and wheezing.    Cardiovascular: Negative for chest pain,  palpitations and leg swelling.   Gastrointestinal: Negative for abdominal pain, blood in stool, constipation, diarrhea, nausea and vomiting.   Endocrine: Negative for cold intolerance and heat intolerance.   Genitourinary: Negative for difficulty urinating, dysuria, frequency, hematuria and urgency.   Musculoskeletal: Negative for arthralgias and myalgias.   Integumentary:  Negative for rash and wound.   Allergic/Immunologic: Negative for environmental allergies and food allergies.   Neurological: Negative for dizziness, tremors, seizures, syncope, weakness, light-headedness and headaches.   Hematological: Negative for adenopathy. Does not bruise/bleed easily.   Psychiatric/Behavioral: Positive for sleep disturbance. Negative for confusion, self-injury and suicidal ideas. The patient is not nervous/anxious.          Objective:      Physical Exam  Vitals signs and nursing note reviewed.   Constitutional:       General: She is not in acute distress.     Appearance: She is well-developed. She is not diaphoretic.   HENT:      Head: Normocephalic and atraumatic.      Right Ear: External ear normal.      Left Ear: External ear normal.      Nose: Nose normal.      Mouth/Throat:      Pharynx: No oropharyngeal exudate.   Eyes:      General: No scleral icterus.        Right eye: No discharge.         Left eye: No discharge.      Conjunctiva/sclera: Conjunctivae normal.      Pupils: Pupils are equal, round, and reactive to light.   Neck:      Musculoskeletal: Neck supple.      Thyroid: No thyromegaly.      Vascular: No JVD.   Cardiovascular:      Rate and Rhythm: Normal rate and regular rhythm.      Heart sounds: Normal heart sounds. No murmur.   Pulmonary:      Effort: Pulmonary effort is normal. No respiratory distress.      Breath sounds: Normal breath sounds. No wheezing or rales.   Chest:      Chest wall: No tenderness.   Abdominal:      General: Bowel sounds are normal. There is no distension.      Palpations: Abdomen is  soft.      Tenderness: There is no abdominal tenderness. There is no guarding.   Lymphadenopathy:      Cervical: No cervical adenopathy.   Skin:     General: Skin is warm and dry.      Coloration: Skin is not pale.      Findings: No rash.   Neurological:      Mental Status: She is alert and oriented to person, place, and time.   Psychiatric:         Judgment: Judgment normal.         Assessment:       1. Annual physical exam    2. Acute deep vein thrombosis (DVT) of proximal end of left lower extremity    3. Osteoporosis, unspecified osteoporosis type, unspecified pathological fracture presence    4. Other hyperlipidemia    5. Insomnia, unspecified type    6. Pain in both feet        Plan:    1. Blood work reviewed with pt       Vaccines: Influenza (deferred); Tetanus (2015); Pneumovax (2015); Zoster (2015)       Eye exam: 11/15       Mammogram: 1/20       Gyn exam: 1/16       Colonoscopy: 8/18        DEXA: 7/18   2. LLE DVT- stable on Apixaban       Followed by Vascular Medicine    3. Osteoporosis- stable on Fosamax/Calcium/Vitamin D       Last DEXA(7/18)   4. HLD- controlled on Lipitor 20 mg daily   5. Insomnia- stable on Ambien 5 mg qHS PRN   6. Foot pain- referral back to Podiatry    7. F/u in 1 yr

## 2020-07-16 ENCOUNTER — HOSPITAL ENCOUNTER (OUTPATIENT)
Dept: RADIOLOGY | Facility: HOSPITAL | Age: 70
Discharge: HOME | End: 2020-07-16
Attending: PODIATRIST
Payer: MEDICARE

## 2020-07-16 ENCOUNTER — OFFICE VISIT (OUTPATIENT)
Dept: PODIATRY | Facility: CLINIC | Age: 70
End: 2020-07-16
Payer: MEDICARE

## 2020-07-16 ENCOUNTER — PATIENT OUTREACH (OUTPATIENT)
Dept: ADMINISTRATIVE | Facility: OTHER | Age: 70
End: 2020-07-16

## 2020-07-16 VITALS
SYSTOLIC BLOOD PRESSURE: 149 MMHG | BODY MASS INDEX: 22.71 KG/M2 | HEART RATE: 60 BPM | HEIGHT: 59 IN | DIASTOLIC BLOOD PRESSURE: 78 MMHG

## 2020-07-16 DIAGNOSIS — M76.821 POSTERIOR TIBIAL TENDON DYSFUNCTION (PTTD) OF RIGHT LOWER EXTREMITY: ICD-10-CM

## 2020-07-16 DIAGNOSIS — I82.502 CHRONIC DEEP VEIN THROMBOSIS (DVT) OF LEFT LOWER EXTREMITY, UNSPECIFIED VEIN: Primary | ICD-10-CM

## 2020-07-16 DIAGNOSIS — M79.671 PAIN IN BOTH FEET: ICD-10-CM

## 2020-07-16 DIAGNOSIS — I87.2 VENOUS INSUFFICIENCY OF BOTH LOWER EXTREMITIES: Primary | ICD-10-CM

## 2020-07-16 DIAGNOSIS — M21.41 PES PLANUS OF BOTH FEET: ICD-10-CM

## 2020-07-16 DIAGNOSIS — M79.672 PAIN IN BOTH FEET: ICD-10-CM

## 2020-07-16 DIAGNOSIS — M24.20 LIGAMENT LAXITY: ICD-10-CM

## 2020-07-16 DIAGNOSIS — M21.42 PES PLANUS OF BOTH FEET: ICD-10-CM

## 2020-07-16 PROCEDURE — 3008F BODY MASS INDEX DOCD: CPT | Mod: CPTII,S$GLB,, | Performed by: PODIATRIST

## 2020-07-16 PROCEDURE — 73630 XR FOOT COMPLETE 3 VIEW BILATERAL: ICD-10-PCS | Mod: 26,50,, | Performed by: RADIOLOGY

## 2020-07-16 PROCEDURE — 99213 PR OFFICE/OUTPT VISIT, EST, LEVL III, 20-29 MIN: ICD-10-PCS | Mod: S$GLB,,, | Performed by: PODIATRIST

## 2020-07-16 PROCEDURE — 1101F PT FALLS ASSESS-DOCD LE1/YR: CPT | Mod: CPTII,S$GLB,, | Performed by: PODIATRIST

## 2020-07-16 PROCEDURE — 1159F MED LIST DOCD IN RCRD: CPT | Mod: S$GLB,,, | Performed by: PODIATRIST

## 2020-07-16 PROCEDURE — 3008F PR BODY MASS INDEX (BMI) DOCUMENTED: ICD-10-PCS | Mod: CPTII,S$GLB,, | Performed by: PODIATRIST

## 2020-07-16 PROCEDURE — 73630 X-RAY EXAM OF FOOT: CPT | Mod: 26,50,, | Performed by: RADIOLOGY

## 2020-07-16 PROCEDURE — 99213 OFFICE O/P EST LOW 20 MIN: CPT | Mod: S$GLB,,, | Performed by: PODIATRIST

## 2020-07-16 PROCEDURE — 1159F PR MEDICATION LIST DOCUMENTED IN MEDICAL RECORD: ICD-10-PCS | Mod: S$GLB,,, | Performed by: PODIATRIST

## 2020-07-16 PROCEDURE — 1101F PR PT FALLS ASSESS DOC 0-1 FALLS W/OUT INJ PAST YR: ICD-10-PCS | Mod: CPTII,S$GLB,, | Performed by: PODIATRIST

## 2020-07-16 PROCEDURE — 73630 X-RAY EXAM OF FOOT: CPT | Mod: TC,50,PO

## 2020-07-16 PROCEDURE — 99999 PR PBB SHADOW E&M-EST. PATIENT-LVL V: CPT | Mod: PBBFAC,,, | Performed by: PODIATRIST

## 2020-07-16 PROCEDURE — 1125F PR PAIN SEVERITY QUANTIFIED, PAIN PRESENT: ICD-10-PCS | Mod: S$GLB,,, | Performed by: PODIATRIST

## 2020-07-16 PROCEDURE — 1125F AMNT PAIN NOTED PAIN PRSNT: CPT | Mod: S$GLB,,, | Performed by: PODIATRIST

## 2020-07-16 PROCEDURE — 99999 PR PBB SHADOW E&M-EST. PATIENT-LVL V: ICD-10-PCS | Mod: PBBFAC,,, | Performed by: PODIATRIST

## 2020-07-16 NOTE — LETTER
July 16, 2020      Navarro Limon, DO  [address] [facility]  [place] [address]           [facility]  [address] [facility] [address]  [facility] [address]  Phone: [phone]  Fax: [phone]          Patient: Azucena Luciano   MR Number: [id number]   YOB: 1950   Date of Visit: 7/16/2020       Dear Dr. Navarro Limon:    Thank you for referring Azucena Luciano to me for evaluation. Attached you will find relevant portions of my assessment and plan of care.    If you have questions, please do not hesitate to call me. I look forward to following Azucena Luciano along with you.    Sincerely,    Lena Brown, PK    Enclosure  CC:  No Recipients    If you would like to receive this communication electronically, please contact [email or web address] or [phone] to request more information on [employer] access.    For providers and/or their staff who would like to refer a patient to [facility], please contact us through our one-stop-shop provider referral line, [facility], at [phone].    If you feel you have received this communication in error or would no longer like to receive these types of communications, please e-mail [email or web address]

## 2020-07-16 NOTE — PATIENT INSTRUCTIONS
Posterior Tibial Tendon Dysfunction  Posterior tibial tendon dysfunction is one of the most common problems of the foot and ankle. It occurs when the posterior tibial tendon becomes inflamed or torn. As a result, the tendon may not be able to provide stability and support for the arch of the foot, resulting in flatfoot.  Most patients can be treated without surgery, using orthotics and braces. If orthotics and braces do not provide relief, surgery can be an effective way to help with the pain. Surgery might be as simple as removing the inflamed tissue or repairing a simple tear. However, more often than not, surgery is very involved, and many patients will notice some limitation in activity after surgery.  Anatomy  The posterior tibial tendon is one of the most important tendons of the leg. A tendon attaches muscles to bones, and the posterior tibial tendon attaches the calf muscle to the bones on the inside of the foot. The main function of the tendon is to hold up the arch and support the foot when walking.    The posterior tibial tendon attaches the calf muscle to the bones on the inside of the foot.  Top of page  Cause  An acute injury, such as from a fall, can tear the posterior tibial tendon or cause it to become inflamed. The tendon can also tear due to overuse. For example, people who do high-impact sports, such as basketball, tennis, or soccer, may have tears of the tendon from repetitive use. Once the tendon becomes inflamed or torn, the arch will slowly fall (collapse) over time.  Posterior tibial tendon dysfunction is more common in women and in people older than 40 years of age. Additional risk factors include obesity, diabetes, and hypertension.  Top of page  Symptoms   Pain along the inside of the foot and ankle, where the tendon lies. This may or may not be associated with swelling in the area.   Pain that is worse with activity. High-intensity or high-impact activities, such as running, can be  "very difficult. Some patients can have trouble walking or standing for a long time.   Pain on the outside of the ankle. When the foot collapses, the heel bone may shift to a new position outwards. This can put pressure on the outside ankle bone. The same type of pain is found in arthritis in the back of the foot.    The most common location of pain is along the course of the posterior tibial tendon (yellow line), which travels along the back and inside of the foot and ankle.  Top of page  Doctor Examination  Medical History and Physical Examination    This patient has posterior tibial tendon dysfunction with a flatfoot deformity.(Left) The front of her foot points outward. (Right) The "too many toes" sign. Even the big toe can be seen from the back of this patient's foot.  Your doctor will take a complete medical history and ask about your symptoms. During the foot and ankle examination, your doctor will check whether these signs are present.   Swelling along the posterior tibial tendon. This swelling is from the lower leg to the inside of the foot and ankle.   A change in the shape of the foot. The heel may be tilted outward and the arch will have collapsed.   "Too many toes" sign. When looking at the heel from the back of the patient, usually only the fifth toe and half of the fourth toe are seen. In a flatfoot deformity, more of the little toe can be seen.    This patient is able to perform a single limb heel rise on the right leg.   "Single limb heel rise" test. Being able to stand on one leg and come up on "tiptoes" requires a healthy posterior tibial tendon. When a patient cannot stand on one leg and raise the heel, it suggests a problem with the posterior tibial tendon.   Limited flexibility. The doctor may try to move the foot from side to side. The treatment plan for posterior tibial tendon tears varies depending on the flexibility of the foot. If there is no motion or if it is limited, there will " need to be a different treatment than with a flexible foot.   The range of motion of the ankle is affected. Upward motion of the ankle (dorsiflexion) can be limited in flatfoot. The limited motion is tied to tightness of the calf muscles.  Imaging Tests  Other tests which may help your doctor confirm your diagnosis include:  X-rays. These imaging tests provide detailed pictures of dense structures, like bone. They are useful to detect arthritis. If surgery is needed, they help the doctor make measurements to determine what surgery would most helpful.    (Top) An x-ray of a normal foot. Note that the lines are parallel, indicating a normal arch.(Bottom) In this x-ray the lines diverge, which is consistent with flatfoot deformity.  Magnetic resonance imaging (MRI). These studies can create images of soft tissues like the tendons and muscles. An MRI may be ordered if the diagnosis is in doubt.  Computerized tomography scan (CT Scan). These scans are more detailed than x-rays. They create cross-section images of the foot and ankle. Because arthritis of the back of the foot has similar symptoms to posterior tibial tendon dysfunction, a CT scan may be ordered to look for arthritis.  Ultrasound. An ultrasound uses high-frequency sound waves that echo off the body. This creates a picture of the bone and tissue. Sometimes more information is needed to make a diagnosis. An ultrasound can be ordered to show the posterior tibial tendon.  Top of page  Nonsurgical Treatment  Symptoms will be relieved in most patients with appropriate nonsurgical treatment. Pain may last longer than 3 months even with early treatment. For patients who have had pain for many months, it is not uncommon for the pain to last another 6 months after treatment starts.  Rest  Decreasing or even stopping activities that worsen the pain is the first step. Switching to low-impact exercise is helpful. Biking, elliptical machines, or swimming do not put a  large impact load on the foot, and are generally tolerated by most patients.  Ice  Apply cold packs on the most painful area of the posterior tibial tendon for 20 minutes at a time, 3 or 4 times a day to keep down swelling. Do not apply ice directly to the skin. Placing ice over the tendon immediately after completing an exercise helps to decrease the inflammation around the tendon.  Nonsteroidal Anti-inflammatory Medication  Drugs, such as ibuprofen or naproxen, reduce pain and inflammation. Taking such medications about a half of an hour before an exercise activity helps to limit inflammation around the tendon. The thickening of the tendon that is present is degenerated tendon. It will not go away with medication. Talk with your primary care doctor if the medication is used for more than 1 month.  Immobilization  A short leg cast or walking boot may be used for 6 to 8 weeks. This allows the tendon to rest and the swelling to go down. However, a cast causes the other muscles of the leg to atrophy (decrease in strength) and thus is only used if no other conservative treatment works.  Orthotics  Most people can be helped with orthotics and braces. An orthotic is a shoe insert. It is the most common nonsurgical treatment for a flatfoot. An over-the-counter orthotic may be enough for patients with a mild change in the shape of the foot. A custom orthotic is required in patients who have moderate to severe changes in the shape of the foot. The custom orthotic is more costly, but it allows the doctor to better control the position the foot.  Braces  A lace-up ankle brace may help mild to moderate flatfoot. The brace would support the joints of the back of the foot and take tension off of the tendon. A custom-molded leather brace is needed in severe flatfoot that is stiff or arthritic. The brace can help some patients avoid surgery.  Physical Therapy  Physical therapy that strengthens the tendon can help patients with  mild to moderate disease of the posterior tibial tendon.  Steroid Injection  Cortisone is a very powerful anti-inflammatory medicine that your doctor may consider injecting around the tendon. A cortisone injection into the posterior tibial tendon is not normally done. It carries a risk of tendon rupture. Discuss this risk with your doctor before getting an injection.  Top of page  Surgical Treatment  Surgery should only be done if the pain does not get better after 6 months of appropriate treatment. The type of surgery depends on where tendonitis is located and how much the tendon is damaged. Surgical reconstruction can be extremely complex. The following is a list of the more commonly used operations. Additional procedures may also be required.  Gastrocnemius Recession or Lengthening of the Achilles Tendon  This is a surgical lengthening of the calf muscles. It is useful in patients who have limited ability to move the ankle up. This surgery can help prevent flatfoot from returning, but does create some weakness with pushing off and climbing stairs. Complication rates are low but can include nerve damage and weakness. This surgery is typically performed together with other techniques for treating flatfoot.  Tenosynovectomy (Cleaning the Tendon)  This surgery is used when there is very mild disease, the shape of the foot has not changed, and there is pain and swelling over the tendon. The surgeon will clean away and remove the inflamed tissue (synovium) surrounding the tendon. This can be performed alone or in addition to other procedures. The main risk of this surgery is that the tendon may continue to degenerate and the pain may return.  Tendon Transfer  Tendon transfer can be done in flexible flatfoot to recreate the function of the damaged posterior tibial tendon. In this procedure, the diseased posterior tibial tendon is removed and replaced with another tendon from the foot, or, if the disease is not too  "significant in the posterior tibial tendon, the transferred tendon is attached to the preserved (not removed) posterior tibial tendon.  One of two possible tendons are commonly used to replace the posterior tibial tendon. One tendon helps the big toe point down and the other one helps the little toes move down. After the transfer, the toes will still be able to move and most patients will not notice a change in how they walk.  Although the transferred tendon can substitute for the posterior tibial tendon, the foot still is not normal. Some people may not be able to run or return to competitive sports after surgery. Patients who need tendon transfer surgery are typically not able to participate in many sports activities before surgery because of pain and tendon disease.  Osteotomy (Cutting and Shifting Bones)  An osteotomy can change the shape of a flexible flatfoot to recreate a more "normal" arch shape. One or two bone cuts may be required, typically of the heel bone (calcaneus).  If flatfoot is severe, a bone graft may be needed. The bone graft will lengthen the outside of the foot. Other bones in the middle of the foot also may be involved. They may be cut or fused to help support the arch and prevent the flatfoot from returning. Screws or plates hold the bones in places while they heal.    X-ray of a foot as viewed from the side in a patient with a more severe deformity. This patient required fusion of the middle of the foot in addition to a tendon transfer and cut in the heel bone.  Fusion  Sometimes flatfoot is stiff or there is also arthritis in the back of the foot. In these cases, the foot will not be flexible enough to be treated successfully with bone cuts and tendon transfers. Fusion (arthrodesis) of a joint or joints in the back of the foot is used to realign the foot and make it more "normal" shaped and remove any arthritis. Fusion involves removing any remaining cartilage in the joint. Over time, this " "lets the body "glue" the joints together so that they become one large bone without a joint, which eliminates joint pain. Screws or plates hold the bones in places while they heal.    This x-ray shows a very stiff flatfoot deformity. A fusion of the three joints in the back of the foot is required and can successfully recreate the arch and allow restoration of function.  Side-to-side motion is lost after this operation. Patients who typically need this surgery do not have a lot of motion and will see an improvement in the way they walk. The pain they may experience on the outside of the ankle joint will be gone due to permanent realignment of the foot. The up and down motion of the ankle is not greatly affected. With any fusion, the body may fail to "glue" the bones together. This may require another operation.  Complications  The most common complication is that pain is not completely relieved. Nonunion (failure of the body to "glue" the bones together) can be a complication with both osteotomies and fusions. Wound infection is a possible complication, as well.  Surgical Outcome  Most patients have good results from surgery. The main factors that determine surgical outcome are the amount of motion possible before surgery and the severity of the flatfoot. The more severe the problem, the longer the recovery time and the less likely a patient will be able to return to sports. In many patients, it may be 12 months before there is any great improvement in pain.    "

## 2020-07-16 NOTE — PROGRESS NOTES
Subjective:      Patient ID: Azucena Luciano is a 69 y.o. female.    Chief Complaint:   Foot Problem (plantar fascitis bilateral/mostly right ft.) and Foot Pain    Azucena is a 69 y.o. female who presents to the podiatry clinic  with complaint of  bilateral foot pain. Onset of the symptoms was several years ago. Precipitating event: none known. Current symptoms include: ability to bear weight, but with some pain, stiffness, swelling and worse with first step . Aggravating factors: any weight bearing, rising after sitting, standing and walking. Symptoms have waxed and waned but are worse overall. Patient has had prior foot problems. Evaluation to date: podiatry visit last year. . Treatment to date: avoidance of offending activity. Patients rates pain 6/10 on pain scale.    Pt relates she came today for a 2nd opnion of her heel pain. She is inquiring about a possible injection to help with the pain. She was dxed with caridad last year by Dr. Zuleta... she relates that she was rx custom inserts but they were too expensive to purchase. She did go and get otc inserts and supportive shoes. She relates she stopped her aerobic exercises and walking/runing for exercise because of the pain. She has been doing bike riding instead.    She is busy taking care of her grandkids. She would like to stay active. Denies any back hip or knee pain.    She is being managed by her pcp for her dvt that is chronic. Pt relates she last saw a cardioloigst for it in 2017 and tried to make a f/u but he had moved. She relates she has to be on blood thinners for life.    Pt does not wear compression stockings.      Past Medical History:   Diagnosis Date    Asthma     Carpal tunnel syndrome     DVT (deep venous thrombosis)     Hypertension     Osteoporosis     Urinary tract infection      Past Surgical History:   Procedure Laterality Date    AUGMENTATION OF BREAST      BREAST BIOPSY      BREAST CYST ASPIRATION      BREAST SURGERY       CATARACT EXTRACTION      COLONOSCOPY N/A 8/7/2018    Procedure: COLONOSCOPY;  Surgeon: KATE Vernon MD;  Location: [facility] ([address] [place] [address]);  Service: Endoscopy;  Laterality: N/A;  Ok to hold Eliquis x 2 days per Dr. Gee    FACIAL COSMETIC SURGERY      NASAL SEPTUM SURGERY      OVARIAN CYST REMOVAL      tummy tuck       Current Outpatient Medications on File Prior to Visit   Medication Sig Dispense Refill    alendronate (FOSAMAX) 70 MG tablet TAKE 1 TABLET BY MOUTH WEEKLY 12 tablet 3    apixaban (ELIQUIS) 5 mg Tab Take 1 tablet (5 mg total) by mouth 2 (two) times daily. 60 tablet 11    atorvastatin (LIPITOR) 20 MG tablet TAKE ONE TABLET BY MOUTH EVERY DAY 90 tablet 3    calcium-vitamin D3 500 mg(1,250mg) -200 unit per tablet Take 1 tablet by mouth 2 (two) times daily with meals.      fluticasone (FLONASE) 50 mcg/actuation nasal spray 1 spray by Each Nare route once daily.      magnesium hydroxide 400 mg/5 ml (MILK OF MAGNESIA) 400 mg/5 mL Susp Take 30 mLs by mouth once daily.      multivitamin with minerals tablet Take 1 tablet by mouth once daily.      oxymetazoline (AFRIN) 0.05 % nasal spray 1 spray by Nasal route every evening.      zolpidem (AMBIEN) 5 MG Tab Take 1 tablet (5 mg total) by mouth nightly. 30 tablet 3     No current facility-administered medications on file prior to visit.      Review of patient's allergies indicates:   Allergen Reactions    Cayenne pepper Anaphylaxis       Review of Systems   Constitution: Negative for chills, decreased appetite, fever, malaise/fatigue, night sweats, weight gain and weight loss.   Cardiovascular: Positive for leg swelling. Negative for chest pain, claudication, dyspnea on exertion, palpitations and syncope.   Respiratory: Negative for cough and shortness of breath.    Endocrine: Negative for cold intolerance and heat intolerance.   Hematologic/Lymphatic: Negative for bleeding problem. Bruises/bleeds easily.   Skin: Negative for color change,  "dry skin, flushing, itching, nail changes, poor wound healing, rash, skin cancer, suspicious lesions and unusual hair distribution.   Musculoskeletal: Positive for stiffness. Negative for arthritis, back pain, falls, gout, joint pain, joint swelling, muscle cramps, muscle weakness, myalgias and neck pain.   Gastrointestinal: Negative for diarrhea, nausea and vomiting.   Neurological: Negative for dizziness, focal weakness, light-headedness, numbness, paresthesias, tremors, vertigo and weakness.   Psychiatric/Behavioral: Negative for altered mental status and depression. The patient does not have insomnia.    Allergic/Immunologic: Negative.            Objective:       Vitals:    20 1115   BP: (!) 149/78   Pulse: 60   Height: 4' 11" (1.499 m)   PainSc:   6   PainLoc: Foot         Physical Exam  Constitutional:       General: She is not in acute distress.     Appearance: She is well-developed. She is not ill-appearing, toxic-appearing or diaphoretic.      Comments: Proper sandels   Cardiovascular:      Pulses:           Dorsalis pedis pulses are 2+ on the right side and 2+ on the left side.        Posterior tibial pulses are 2+ on the right side and 2+ on the left side.   Musculoskeletal: Normal range of motion.         General: No tenderness.      Right lower le+ Edema present.      Left lower le+ Edema present.      Right foot: Normal range of motion. Deformity, bunion and prominent metatarsal heads present.      Left foot: Normal range of motion. Deformity, bunion and prominent metatarsal heads present.      Comments: Increased laxity/flexibility b/l feet and ankles    Fat pad atrophy    rcsp everted bilateral, single leg toe raise b/l painful with mild weakness right.    Pop  To medial arch and proximal fascia right > left  No pop to posterior achilles tendons or with side to side compression of calcaneaus.    Flexible pes planus foot type w/ medial arch collapse and mild gastroc equinus Reducible " extensor and flexor contractures at the MTPJ and PIPJ of toes 2-5, bilat.     No calf pain          Feet:      Right foot:      Protective Sensation: 10 sites tested. 10 sites sensed.      Skin integrity: No ulcer, blister, skin breakdown, erythema, warmth, callus or dry skin.      Toenail Condition: Right toenails are normal.      Left foot:      Protective Sensation: 10 sites tested. 10 sites sensed.      Skin integrity: No ulcer, blister, skin breakdown, erythema, warmth, callus or dry skin.      Toenail Condition: Left toenails are normal.   Skin:     General: Skin is warm.      Capillary Refill: Capillary refill takes 2 to 3 seconds.      Coloration: Skin is not pale.      Findings: No erythema or rash.      Nails: There is no clubbing.        Comments: arophy   Neurological:      Mental Status: She is alert and oriented to person, place, and time.      Sensory: Sensation is intact.      Motor: Motor function is intact.      Gait: Gait abnormal.   Psychiatric:         Behavior: Behavior normal.         Thought Content: Thought content normal.         Judgment: Judgment normal.               Assessment:       Encounter Diagnoses   Name Primary?    Pain in both feet     Chronic deep vein thrombosis (DVT) of left lower extremity, unspecified vein Yes    Posterior tibial tendon dysfunction (PTTD) of right lower extremity     Pes planus of both feet     Ligament laxity          Plan:       Azucena was seen today for foot problem and foot pain.    Diagnoses and all orders for this visit:    Chronic deep vein thrombosis (DVT) of left lower extremity, unspecified vein  -     Ambulatory referral/consult to [facility]; Future    Pain in both feet  -     Ambulatory referral/consult to Podiatry    Posterior tibial tendon dysfunction (PTTD) of right lower extremity  -     X-Ray Foot Complete Right; Future  -     X-Ray Foot Complete Left; Future  -     Ambulatory referral/consult to Physical/Occupational  Therapy; Future    Pes planus of both feet    Ligament laxity      I counseled the patient on her conditions, their implications and medical management.    - recommend p.therapy to see if pain decreases. If not consider MRI/Immoblization to r/o chronic tear    - d/w pt referral to [facility] vein clinic. Pt appears lost to f/u from cardiology request for a 6 month f/u in 2017,    - continue proper shoes. Pt relates custom inserts were too expensive for purchase.    - get xrays before next visit    - f/u 5 weeks after p.therapy        .

## 2020-07-16 NOTE — PROGRESS NOTES
Care Everywhere:   Immunization: updated  Health Maintenance: updated  Media Review:   Legacy Review:   Order placed:   Upcoming appts:

## 2020-07-18 ENCOUNTER — HOSPITAL ENCOUNTER (OUTPATIENT)
Dept: RADIOLOGY | Facility: OTHER | Age: 70
Discharge: HOME | End: 2020-07-18
Attending: SURGERY
Payer: MEDICARE

## 2020-07-18 DIAGNOSIS — I87.2 VENOUS INSUFFICIENCY OF BOTH LOWER EXTREMITIES: ICD-10-CM

## 2020-07-18 PROCEDURE — 93970 US LOWER EXTREMITY VEINS BILATERAL INSUFFICIENCY: ICD-10-PCS | Mod: 26,,, | Performed by: RADIOLOGY

## 2020-07-18 PROCEDURE — 93970 EXTREMITY STUDY: CPT | Mod: TC

## 2020-07-18 PROCEDURE — 93970 EXTREMITY STUDY: CPT | Mod: 26,,, | Performed by: RADIOLOGY

## 2020-07-22 ENCOUNTER — CLINICAL SUPPORT (OUTPATIENT)
Dept: REHABILITATION | Facility: HOSPITAL | Age: 70
End: 2020-07-22
Attending: PODIATRIST
Payer: MEDICARE

## 2020-07-22 DIAGNOSIS — M72.2 PLANTAR FASCIITIS: ICD-10-CM

## 2020-07-22 DIAGNOSIS — E65 FAT PAD SYNDROME: Primary | ICD-10-CM

## 2020-07-22 DIAGNOSIS — M76.821 POSTERIOR TIBIAL TENDON DYSFUNCTION (PTTD) OF RIGHT LOWER EXTREMITY: ICD-10-CM

## 2020-07-22 DIAGNOSIS — M24.273 ANKLE LIGAMENT LAXITY, UNSPECIFIED LATERALITY: ICD-10-CM

## 2020-07-22 PROCEDURE — 97161 PT EVAL LOW COMPLEX 20 MIN: CPT | Mod: PO

## 2020-07-22 PROCEDURE — 97110 THERAPEUTIC EXERCISES: CPT | Mod: PO

## 2020-07-22 NOTE — PROGRESS NOTES
"[facility]  Physical Therapy Initial Evaluation    Date: 7/22/2020   Name: Azucena Luciano  Clinic Number: [id number]    Therapy Diagnosis: No diagnosis found.  Physician: Lena Brown DPM    Physician Orders: PT Eval and Treat   Medical Diagnosis from Referral: M76.821 (ICD-10-CM) - Posterior tibial tendon dysfunction (PTTD) of right lower extremity  Evaluation Date: 7/22/2020  Authorization Period Expiration: 07/22/2021  Plan of Care Expiration: 10/22/20  Visit # / Visits authorized: 1/1    Time In: 4:26pm  Time Out: 5:15pm  Total Appointment Time (timed & untimed codes): 49 minutes    Precautions: Standard    Subjective        Medical History:   Past Medical History:   Diagnosis Date    Asthma     Carpal tunnel syndrome     DVT (deep venous thrombosis)     Hypertension     Osteoporosis     Urinary tract infection        Surgical History:   Azucena Luciano  has a past surgical history that includes Ovarian cyst removal; Facial cosmetic surgery; tummy tuck; Nasal septum surgery; Breast biopsy; Breast surgery; Breast cyst aspiration; Colonoscopy (N/A, 8/7/2018); Augmentation of breast; and Cataract extraction.    Medications:   Azucena has a current medication list which includes the following prescription(s): alendronate, apixaban, atorvastatin, calcium-vitamin d3, fluticasone propionate, magnesium hydroxide 400 mg/5 ml, multivitamin with minerals, oxymetazoline, and zolpidem.    Allergies:   Review of patient's allergies indicates:   Allergen Reactions    Cayenne pepper Anaphylaxis        Imaging, x-ray and ultrasound- no blood clot or fractures    Date of onset: one year, mostly R foot but left foot as well.  History of current condition - Azucena reports: Bothersome pain in both feet. 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  Prior Level of Function: Independent. Works out " frequently.  Current Level of Function: [facility] to [facility], can't walk. Does bicycle. Does not want to go to [facility] because of the walking. Has vein problems in the L leg. Wakes up in the morning and it's worst.  Has to work out feet prior to getting day started.    Pain:  Current 4/10, worst 9/10, best 4/10   Location: right feet   Description: Aching and Deep- heel but sometimes whole foot  Aggravating Factors: Standing, Walking and Morning  Easing Factors: rest- has tried overworking the feet, massage. Has not tried ice.    Patients goals: Pain-free.    Objective     Observation: (standing) Small medial arch bilaterally    Posture: WNL     Gait: Slightly antalgic gait with decreased swing-phase and/or foot clearance due to pain in heels upon initial contact (wearing slide-on shoes), no assistive device needed.    Fat pad test:   Positive on R foot.  Negative on L foot.        Ankle Active/Passive ROM: Grossly hypermobile into all directions (dorsiflexion, plantarflexion, eversion, inversion) bilaterally.      Strength: (graded 1-5 out of 5)    RLE LLE   Hip flexion:  5/5 5/5   Hip ER: 5/5 5/5   Hip IR: 5/5 5/5   Knee extension: 5/5 5/5   Ankle DF: 5/5 5/5   Great Toe extension: 4/5 5/5   Posterior fibers of Gluteus medius: 5/5 5/5   Ankle IV: 5/5 5/5   Ankle EV: 5/5 5/5   Knee flexion:  5/5 5/5   Ankle PF 5/5 5/5   Hip extension 5/5 5/5         Test Sensitivity/specificity (%) Right Left   Bump test (stress fx, distal calaneus)  + with pain + with pain     Metatarsal load test, longitudinal load)  - -   Anterior drawer, ATF laxity, translate calcaneus/talus anterior on tibia/fib 86/88 - -   Talar tilt- laxity of ATF (PF), CF (neutral), PTF (DF)- varus stress  + ATF -   Syndesmotic squeeze test (prox to dist)  - -   Cotton (shunk) test- Lat/med movement of calc.-- (High ankle sprain)  - -   ER Stress test (ER in PF, deltoid + ER in DF, syndesmosis) Spec: 95 - -   Impingement sign (talocrural, pressure  into DF from tibia as foot is brought into DF) 95/88 - -   Cooper test- Achilles (passively flex knee, squeeze middle 1/3 of calf 96/93 - -   Matles test- prone, flexed 90, angle of dangle 88/85 - -   Peroneal Tendon dislocation- prone, DF/PF against resist, look for dislocation behind lat mall.  - -   Windlass test- plantar fasciitis, NWB- DF big toe, WB- DF big toe (pain along medial long. Arch)    Spec: 100 + -   Parker Block test- stand on edge of block, assess posterior tib tightness  - -   Evans test- squeeze tranverse arch  - -   Tinel test- Tap over posterior tibial nerve (inf/post to med.mall) 58 - -   Homans sign (thrombophlebiti)s 48/41 - -     Sensation: Intact    Joint Mobility: hypermobile    Palpation: as above, fat pad.      Edema: none noted      TREATMENT   Treatment Time In: 5:05pm  Treatment Time Out: 5:15pm  Total Treatment time (time-based codes) separate from Evaluation: 10 minutes    Azucena received therapeutic exercises to develop strength, ROM and flexibility for 10 minutes including:  Toe curls x 20 reps  Arch lifts in standing (10 sec hold) x3  Eversion (open chain) x 20 reps  Heel raises x 10 reps    Kinesiotape applied to heel in order to encapsulate/contain fat pad (due to positive response to fat pad testing; I-strip cut; 100% tension. Over kinesiotape, rock tape applied for stability.    Next session:  Use of balance bad to train stability around ankle  Hip abduction/adduction  Hip extension      Home Exercises and Patient Education Provided    Education provided:   - Responsibility of posterior tibialis muscle (plantarflexion, inversion, arch support)  -Purpose of each exercise given via home exercise program.  -Plan of care (1x/wk)  - Purpose of heel cups (to keep fat pad in R foot contained for pain relief), purpose of night splint for plantar fasciitis  - relief of pain/small adhesions via rolling frozen water bottle under foot    Written Home Exercises Provided: Patient  instructed to cont prior HEP.  Exercises were reviewed and Azucena was able to demonstrate them prior to the end of the session.  Azucena demonstrated good  understanding of the education provided.     See EMR under Patient Instructions for exercises provided prior visit.    Assessment   Azucena is a 69 y.o. female referred to outpatient Physical Therapy with a medical diagnosis of Posterior tibial tendon dysfunction (PTTD) of right lower extremity. Patient presents with laxity in the ligaments of her lateral right ankle (namely anterior talofibular ligament upon testing), plantar fasciitis and fat pad syndrome in her right foot. She will benefit from skilled PT services to address the following: dynamic stability in standing, strengthening of the muscles surrounding her ankle, plantar foot intrinsics to support her arch, strengthening of her core and gluteal muscles and manual therapy to alleviate symptoms of plantar fasciitis.     Patient prognosis is Good.   Patientt will benefit from skilled outpatient Physical Therapy to address the deficits stated above and in the chart below, provide patient /family education, and to maximize patientt's level of independence.     Plan of care discussed with patient: Yes  Patient's spiritual, cultural and educational needs considered and patient is agreeable to the plan of care and goals as stated below:     Anticipated Barriers for therapy: none    Medical Necessity is demonstrated by the following  History  Co-morbidities and personal factors that may impact the plan of care Co-morbidities:   advanced age    Personal Factors:   age     low   Examination  Body Structures and Functions, activity limitations and participation restrictions that may impact the plan of care Body Regions:   lower extremities    Body Systems:    strength    Participation Restrictions:   Limited walking due to pain in feet    Activity limitations:   Learning and applying knowledge  no  deficits    General Tasks and Commands  no deficits    Communication  no deficits    Mobility  no deficits    Self care  no deficits    Domestic Life  no deficits    Interactions/Relationships  no deficits    Life Areas  no deficits    Community and Social Life  limited ambulation in community due to pain         moderate   Clinical Presentation stable and uncomplicated low   Decision Making/ Complexity Score: low     Goals:  Short Term Goals: 4 weeks  8/19  · Pt will denote a 2/10 pain level at its worse in her heel to allow her to ambulate on the track at her gym.  · Pt will demonstrate 100% independence with her home exercise program, no verbal cues needed, allowing for independence in her self-care.  · Pt will report 100% in wearing her heel cups to allow for containment of her R foot's fat pad, promoting better gait mechanics and pain relief.      Long Term Goals: 8 weeks 9/16  · Pt will denote no pain, allowing her to return to working out at her full capacity.  · Pt will increase her right great toe muscle test to 5/5, denoting full recovery of her plantar fasciitis.    Plan   Plan of care Certification: 7/22/2020 to 10/22/20.    Outpatient Physical Therapy 1 times weekly for 4 weeks to include the following interventions: Gait Training, Manual Therapy, Moist Heat/ Ice, Neuromuscular Re-ed, Patient Education, Therapeutic Activites and Therapeutic Exercise.     Ailyn Virgen, PT

## 2020-07-27 NOTE — PROGRESS NOTES
"  Physical Therapy Treatment Note     Name: Azucena Luciano  Clinic Number: [id number]    Therapy Diagnosis:   Encounter Diagnoses   Name Primary?    Fat pad syndrome     Ankle ligament laxity, unspecified laterality      Physician: Lena Brown DPM    Visit Date: 7/29/2020  Physician Orders: PT Eval and Treat   Medical Diagnosis from Referral: M76.821 (ICD-10-CM) - Posterior tibial tendon dysfunction (PTTD) of right lower extremity  Evaluation Date: 7/22/2020  Authorization Period Expiration: 07/22/2021  Plan of Care Expiration: 10/22/20  Visit # / Visits authorized: 1/1      Time In: 12:45pm  Time Out: 1:30pm  Total Billable Time: 45 minutes    Precautions: Standard    Subjective     Pt reports: "I took a lap in the mall and went to the gym yesterday. It got flared up."  She was compliant with home exercise program.  Response to previous treatment: "I thought it was all better, but it got worse yesterday."  Functional change: As above, pt reports less pain until more activity yesterday.    Pain: 4/10 (before and after session)  Location: right feet      Objective     Azucena received therapeutic exercises to develop strength, ROM and flexibility for 30 minutes including:  Bolded for new or progressions:    Seated:   Toe curls x 20 reps bilaterally  Hip external rotation with orange band: 20 reps bilaterally  Ankle Dorsiflexion/plantarflexion with orange band: 20 reps bilaterally  Inversion/Eversion with orange band: 20 reps bilaterally    Supine/Side-lying:  Hip abduction in side-lying with orange band (progression: side-steps with orange band): 20 reps bilaterally  Hip flexor stretch in supine: 3 trials, 30 second hold bilaterally    Standing:  Tibialis posterior strengthening via standing plantarflexion, performed slowly: 20 reps  Sit<>stand exercise to improve quadriceps eccentric strength: 10 trials, slowly  Gastroc/soleus stretch in standing via lunge: 2 trials, 30 sec hold       Azucena received the " following manual therapy techniques: Joint mobilizations, Myofacial release and Soft tissue Mobilization were applied to both feet, gastrocnemius muscles for 15 minutes, including:  Joint mobilization to improve talocrural dorsi?exion bilaterally  Soft tissue mobilization of the plantar fascia, break up adhesions  Soft tissue mobilization of gastrocnemius and soleus myofascia, speci?aryan targeting trigger points and areas of soft tissue restriction (had trigger point, muscle knot along medial gastroc- was able to successfully loosen but did not completely dissipate knot).  Taping- Application of antipronation taping, alleviation of tightness in gastroc via taping.    · Kinesiotape applied to R gastrocnemius muscle/Achilles to promote stretch/relaxation, plantarfascia for support, antipronation taping for medial transverse arch support in order to support and alleviate pain; istrip cut; 25% tension (at gastroc), 75% for medial longitudinal arch, 0% tension on plantar fascia.  · Can leave on for five days, can shower with tape. Remove with cold water and/or lotion/oil (8/3).      Home Exercises Provided and Patient Education Provided     Education provided:   Arch support- support the medial arch and/or provide cushion to the heel region (pt to purchase this week- reminded today)  Night splints - use for 1-3 months      Written Home Exercises Provided: Patient instructed to cont prior HEP. Currently Toe curls,  Arch lifts in standing, Eversion (open chain), Heel raises.    Exercises were reviewed and Azucena was able to demonstrate them prior to the end of the session.  Azucena demonstrated good  understanding of the education provided.     See EMR under Patient Instructions for exercises provided prior visit.    Assessment     Azucena is progressing well towards her goals.   Pt prognosis is Good.     Pt will continue to benefit from skilled outpatient physical therapy to address the deficits listed in the  problem list box on initial evaluation, provide pt/family education and to maximize pt's level of independence in the home and community environment.     Pt's spiritual, cultural and educational needs considered and pt agreeable to plan of care and goals.     Anticipated barriers to physical therapy: none    Goals:  Short Term Goals: 4 weeks  8/19  · Pt will denote a 2/10 pain level at its worse in her heel to allow her to ambulate on the track at her gym.  · Pt will demonstrate 100% independence with her home exercise program, no verbal cues needed, allowing for independence in her self-care. Met (7/29/2020)  · Pt will report 100% in wearing her heel cups to allow for containment of her R foot's fat pad, promoting better gait mechanics and pain relief.        Long Term Goals: 8 weeks 9/16  · Pt will denote no pain, allowing her to return to working out at her full capacity.  · Pt will increase her right great toe muscle test to 5/5, denoting full recovery of her plantar fasciitis.      Plan     Outpatient Physical Therapy 1 times weekly for 4 weeks to include the following interventions: Gait Training, Manual Therapy, Moist Heat/ Ice, Neuromuscular Re-ed, Patient Education, Therapeutic Activites and Therapeutic Exercise.        Ailyn Virgen, PT

## 2020-07-29 ENCOUNTER — CLINICAL SUPPORT (OUTPATIENT)
Dept: REHABILITATION | Facility: HOSPITAL | Age: 70
End: 2020-07-29
Attending: PODIATRIST
Payer: MEDICARE

## 2020-07-29 DIAGNOSIS — E65 FAT PAD SYNDROME: ICD-10-CM

## 2020-07-29 DIAGNOSIS — M24.273 ANKLE LIGAMENT LAXITY, UNSPECIFIED LATERALITY: ICD-10-CM

## 2020-07-29 PROCEDURE — 97110 THERAPEUTIC EXERCISES: CPT | Mod: PO

## 2020-07-29 PROCEDURE — 97140 MANUAL THERAPY 1/> REGIONS: CPT | Mod: PO

## 2020-07-30 NOTE — PROGRESS NOTES
"  Physical Therapy Treatment Note     Name: Azucena Luciano  Clinic Number: [id number]    Therapy Diagnosis:   Encounter Diagnoses   Name Primary?    Fat pad syndrome     Ankle ligament laxity, unspecified laterality      Physician: Lena Brown DPM    Visit Date: 8/4/2020  Physician Orders: PT Eval and Treat   Medical Diagnosis from Referral: M76.821 (ICD-10-CM) - Posterior tibial tendon dysfunction (PTTD) of right lower extremity  Evaluation Date: 7/22/2020  Authorization Period Expiration: 07/22/2021  Plan of Care Expiration: 10/22/20  Visit # / Visits authorized: 3/12      Time In: 12:18pm  Time Out: 1:00 pm  Total Billable Time: 43 minutes    Precautions: Standard    Subjective     Pt reports: "Last time I left, it went up to a 7/10. It's just not getting better!" Pt appeared flustered at start of session due to trouble finding a parking spot. " I went walking around BIG LOTS after our last session".   She was compliant with home exercise program.  Response to previous treatment: 4/10 pain at end of session to 7/10 pain in evening. Likely overworking it.  Functional change: None thus far.    Pain: 5/10 , after tx: 2/10 pain.  Location: right feet      Objective     Azucena received therapeutic exercises to develop strength, ROM and flexibility for 45 minutes including:  Bolded for new or progressions:    Seated:   Hip external and internal rotation with orange band: 30 reps bilaterally  Ankle Dorsiflexion/plantarflexion with orange band: 30 reps bilaterally  Inversion/Eversion with orange band: 30 reps bilaterally    Supine/Side-lying:  Hip abduction in side-lying with green band (progression: side-steps with orange band): 20 reps bilaterally  Hip flexor stretch in supine: 3 trials, 30 second hold bilaterally    Standing:  Tibialis posterior strengthening via standing plantarflexion on balance pad, performed slowly: 30 reps  Sit<>stand exercise to improve quadriceps eccentric strength: 20 trials, " slowly  Gastroc/soleus stretch in standing via lunge: 2 trials, 30 sec hold       Home Exercises Provided and Patient Education Provided     Education provided:   -To give her R foot a break more often, as pt seems to be overworking her foot. Rest is essential for healing.      Written Home Exercises Provided: Patient instructed to cont prior HEP.  Add: hip abduction with green band, hip flexor stretch in supine, gastroc/soleus stretch in standing, heel raises (to replace old HEP), ankle 4-ways with band.    Exercises were reviewed and Azucena was able to demonstrate them prior to the end of the session.  Azucena demonstrated good  understanding of the education provided.     See EMR under Patient Instructions for exercises provided prior visit.    Assessment   Ms. Zeng will benefit from continued PT services, addressing various strength deficits with the exercises listed above as well as resting in between sessions to promote healing of her plantarfasciitis, fat pad syndrome and posterior tibial inflammation.     Azucena is progressing well towards her goals.   Pt prognosis is Good.     Pt will continue to benefit from skilled outpatient physical therapy to address the deficits listed in the problem list box on initial evaluation, provide pt/family education and to maximize pt's level of independence in the home and community environment.     Pt's spiritual, cultural and educational needs considered and pt agreeable to plan of care and goals.     Anticipated barriers to physical therapy: none    Goals:  Short Term Goals: 4 weeks  8/19  · Pt will denote a 2/10 pain level at its worse in her heel to allow her to ambulate on the track at her gym.  · Pt will demonstrate 100% independence with her home exercise program, no verbal cues needed, allowing for independence in her self-care. Met (8/4/2020)  · Pt will report 100% in wearing her heel cups to allow for containment of her R foot's fat pad, promoting  better gait mechanics and pain relief.        Long Term Goals: 8 weeks 9/16  · Pt will denote no pain, allowing her to return to working out at her full capacity.  · Pt will increase her right great toe muscle test to 5/5, denoting full recovery of her plantar fasciitis.      Plan     Outpatient Physical Therapy 1 times weekly for 4 weeks to include the following interventions: Gait Training, Manual Therapy, Moist Heat/ Ice, Neuromuscular Re-ed, Patient Education, Therapeutic Activites and Therapeutic Exercise.        Ailyn Virgen, PT

## 2020-08-04 ENCOUNTER — PATIENT OUTREACH (OUTPATIENT)
Dept: ADMINISTRATIVE | Facility: OTHER | Age: 70
End: 2020-08-04

## 2020-08-04 ENCOUNTER — CLINICAL SUPPORT (OUTPATIENT)
Dept: REHABILITATION | Facility: HOSPITAL | Age: 70
End: 2020-08-04
Attending: PODIATRIST
Payer: MEDICARE

## 2020-08-04 DIAGNOSIS — M24.273 ANKLE LIGAMENT LAXITY, UNSPECIFIED LATERALITY: ICD-10-CM

## 2020-08-04 DIAGNOSIS — E65 FAT PAD SYNDROME: ICD-10-CM

## 2020-08-04 PROCEDURE — 97110 THERAPEUTIC EXERCISES: CPT | Mod: PO

## 2020-08-04 NOTE — PROGRESS NOTES
Chart reviewed.   Immunizations: Triggered Imm Registry     Orders placed: n/a  Upcoming appts to satisfy ALICE topics: n/a

## 2020-08-05 ENCOUNTER — INITIAL CONSULT (OUTPATIENT)
Dept: VASCULAR SURGERY | Facility: CLINIC | Age: 70
End: 2020-08-05
Payer: MEDICARE

## 2020-08-05 VITALS
SYSTOLIC BLOOD PRESSURE: 128 MMHG | WEIGHT: 112.44 LBS | HEART RATE: 62 BPM | HEIGHT: 59 IN | OXYGEN SATURATION: 100 % | TEMPERATURE: 97 F | DIASTOLIC BLOOD PRESSURE: 74 MMHG | BODY MASS INDEX: 22.67 KG/M2 | RESPIRATION RATE: 16 BRPM

## 2020-08-05 DIAGNOSIS — I82.502 CHRONIC DEEP VEIN THROMBOSIS (DVT) OF LEFT LOWER EXTREMITY, UNSPECIFIED VEIN: Primary | ICD-10-CM

## 2020-08-05 DIAGNOSIS — I87.2 VENOUS INSUFFICIENCY OF LEFT LEG: ICD-10-CM

## 2020-08-05 PROCEDURE — 3008F BODY MASS INDEX DOCD: CPT | Mod: CPTII,S$GLB,, | Performed by: SURGERY

## 2020-08-05 PROCEDURE — 1101F PR PT FALLS ASSESS DOC 0-1 FALLS W/OUT INJ PAST YR: ICD-10-PCS | Mod: CPTII,S$GLB,, | Performed by: SURGERY

## 2020-08-05 PROCEDURE — 1125F AMNT PAIN NOTED PAIN PRSNT: CPT | Mod: S$GLB,,, | Performed by: SURGERY

## 2020-08-05 PROCEDURE — 1159F MED LIST DOCD IN RCRD: CPT | Mod: S$GLB,,, | Performed by: SURGERY

## 2020-08-05 PROCEDURE — 99202 PR OFFICE/OUTPT VISIT, NEW, LEVL II, 15-29 MIN: ICD-10-PCS | Mod: S$GLB,,, | Performed by: SURGERY

## 2020-08-05 PROCEDURE — 1125F PR PAIN SEVERITY QUANTIFIED, PAIN PRESENT: ICD-10-PCS | Mod: S$GLB,,, | Performed by: SURGERY

## 2020-08-05 PROCEDURE — 1101F PT FALLS ASSESS-DOCD LE1/YR: CPT | Mod: CPTII,S$GLB,, | Performed by: SURGERY

## 2020-08-05 PROCEDURE — 1159F PR MEDICATION LIST DOCUMENTED IN MEDICAL RECORD: ICD-10-PCS | Mod: S$GLB,,, | Performed by: SURGERY

## 2020-08-05 PROCEDURE — 3008F PR BODY MASS INDEX (BMI) DOCUMENTED: ICD-10-PCS | Mod: CPTII,S$GLB,, | Performed by: SURGERY

## 2020-08-05 PROCEDURE — 99202 OFFICE O/P NEW SF 15 MIN: CPT | Mod: S$GLB,,, | Performed by: SURGERY

## 2020-08-05 NOTE — LETTER
August 6, 2020      Lena Brown DPM  [id number]  [facility] [address]           [facility] [address]  [address] [address]  Phone: [phone]  Fax: [phone]          Patient: Azucena Luciano   MR Number: [id number]   YOB: 1950   Date of Visit: 8/5/2020       Dear Dr. Lena Brown:    Thank you for referring Azucena Luciano to me for evaluation. Attached you will find relevant portions of my assessment and plan of care.    If you have questions, please do not hesitate to call me. I look forward to following Azucena Luciano along with you.    Sincerely,    CINDY Diaz II, MD    Enclosure  CC:  No Recipients    If you would like to receive this communication electronically, please contact [email or web address] or [phone] to request more information on [email or web address] Link access.    For providers and/or their staff who would like to refer a patient to [facility], please contact us through our one-stop-shop provider referral line, [facility] , at [phone].    If you feel you have received this communication in error or would no longer like to receive these types of communications, please e-mail [email or web address]

## 2020-08-07 NOTE — PROGRESS NOTES
"  Physical Therapy Treatment Note     Name: Azucena Luciano  Clinic Number: [id number]    Therapy Diagnosis:   Encounter Diagnoses   Name Primary?    Fat pad syndrome     Ankle ligament laxity, unspecified laterality      Physician: Lena Brown DPM    Visit Date: 8/11/2020  Physician Orders: PT Eval and Treat   Medical Diagnosis from Referral: M76.821 (ICD-10-CM) - Posterior tibial tendon dysfunction (PTTD) of right lower extremity  Evaluation Date: 7/22/2020  Authorization Period Expiration: 07/22/2021  Plan of Care Expiration: 10/22/20  Visit # / Visits authorized: 5/12      Time In: 12:15pmTime Out: 1pm  Total Billable Time: 45 minutes  Charges: 2 TE, 1 MT      Precautions: Standard    Subjective     Pt reports: "I'm a little better. Thank you. It doesn't hurt as much when I get up in the morning. The heel cups help and wearing my tennis shoes helps."  She was compliant with home exercise program.  Response to previous treatment: Good- less pain  Functional change: Ability to wake up with less pain.    Pain: 5/10- "I've been walking. When I don't walk, it doesn't hurt as much."  After therapy: 4/10  Location: R foot    Objective     Azucena received therapeutic exercises to develop strength, ROM and flexibility for 45 minutes including:  Bolded for new or progressions:    Seated:   Hip external and internal rotation with green band: 15 reps bilaterally  Ankle Dorsiflexion/plantarflexion, Inv/Eversion with green band: 15 reps bilaterally    Supine/Side-lying:  Hip abduction in side-lying with green band (progression: side-steps with orange band): 30 reps bilaterally  Hip flexor stretch in supine: 3 trials, 30 second hold bilaterally    Standing:  Tibialis posterior strengthening- standing plantarflexion, performed slowly: 10 reps at edge of step with gastroc stretch, bilaterally  Standing plantarflexion holding two 3# weights x20 reps  Gastroc/soleus stretch in standing via lunge: 2 trials, 30 sec " hold  Single leg stance x 10 seconds x 2 trials bilaterally (promote proprioception)      Manual therapy x 10 minutes for the R foot:   STM to gastroc, Achilles (mobilization), plantar fascia with stretching into dorsiflexion. Trigger point release to R gastroc, everters.       Home Exercises Provided and Patient Education Provided     Education provided:   -To give her R foot a break more often, as pt seems to be overworking her foot. Rest is essential for healing.      Written Home Exercises Provided: Patient instructed to cont prior HEP.  Add: hip abduction with green band, hip flexor stretch in supine, gastroc/soleus stretch in standing, heel raises (to replace old HEP), ankle 4-ways with band.    Exercises were reviewed and Azucena was able to demonstrate them prior to the end of the session.  Azucena demonstrated good  understanding of the education provided.     See EMR under Patient Instructions for exercises provided prior visit.    Assessment   Ms. Zeng is improving steadily in that she reports less pain when waking up in the morning and is tolerating progressions well with no c/o pain or adverse effects. Will benefit from continued PT services to heal her fat pad syndrome, plantar fasciitis and any other weakness or laxity in the R foot/ankle joint.    Azucena is progressing well towards her goals.   Pt prognosis is Good.     Pt will continue to benefit from skilled outpatient physical therapy to address the deficits listed in the problem list box on initial evaluation, provide pt/family education and to maximize pt's level of independence in the home and community environment.     Pt's spiritual, cultural and educational needs considered and pt agreeable to plan of care and goals.     Anticipated barriers to physical therapy: none    Goals:  Short Term Goals: 4 weeks  8/19  · Pt will denote a 2/10 pain level at its worse in her heel to allow her to ambulate on the track at her gym.  · Pt will  demonstrate 100% independence with her home exercise program, no verbal cues needed, allowing for independence in her self-care. Met (8/11/2020)  · Pt will report 100% in wearing her heel cups to allow for containment of her R foot's fat pad, promoting better gait mechanics and pain relief. Met (8/11/2020)        Long Term Goals: 8 weeks 9/16  · Pt will denote no pain, allowing her to return to working out at her full capacity. Progressing, not met  · Pt will increase her right great toe muscle test to 5/5, denoting full recovery of her plantar fasciitis. Progressing, not met  · Pt will demonstrate a negative Windlass test to denote healing of R plantar fasciitis. Not met      Plan     Outpatient Physical Therapy 1 times weekly for 4 weeks to include the following interventions: Gait Training, Manual Therapy, Moist Heat/ Ice, Neuromuscular Re-ed, Patient Education, Therapeutic Activites and Therapeutic Exercise.        Ailyn Virgen, PT

## 2020-08-07 NOTE — PROGRESS NOTES
"VASCULAR SURGERY CLINIC NOTE    Patient ID: Azucena Luciano is a 69 y.o. female.    I. HISTORY     Chief Complaint: previous DVT    HPI: Azucena Luciano is a 69 y.o. female who is referred here today for evaluation of previous LLE DVT. She was initially diagnosed with DVT based on physical exam many decades ago and was treated for s short period of time with anticoagulation. She was traveling in [place] in 2017 when she experienced pain in her L groin with no swelling or edema. She saw a doctor about it when she returned home and had LLE ultrasound which revealed "DVT of undetermined chronicity". She was placed on Eliquis with plan to continue for 3 months in 2017 by the cardiologist who evaluated her and has been taking it ever since. She has not followed up with that cardiologist because he left the practice. She had ultrasound recently on 7/18/20 which showed no evidence of bilateral lower ext. DVT. She denies any clotting disorder. She had not taken any form of anticoagulation for the decades between her initial diagnosis of DVT and her diagnosis in 2017. She denies any leg swelling bilaterally. No leg pain with walking. She denies any known hypercoagulable disorder or hypercoagulable state. She wants to know if she can stop taking her Eliquis.      Past Medical History:   Diagnosis Date    Asthma     Carpal tunnel syndrome     DVT (deep venous thrombosis)     Hypertension     Osteoporosis     Urinary tract infection         Past Surgical History:   Procedure Laterality Date    AUGMENTATION OF BREAST      BREAST BIOPSY      BREAST CYST ASPIRATION      BREAST SURGERY      CATARACT EXTRACTION      COLONOSCOPY N/A 8/7/2018    Procedure: COLONOSCOPY;  Surgeon: KATE Vernon MD;  Location: [facility] ([address] [place] [address]);  Service: Endoscopy;  Laterality: N/A;  Ok to hold Eliquis x 2 days per Dr. Gee    FACIAL COSMETIC SURGERY      NASAL SEPTUM SURGERY      OVARIAN CYST REMOVAL      tummy tuck   "       Social History     Tobacco Use   Smoking Status Never Smoker   Smokeless Tobacco Never Used         Current Outpatient Medications:     alendronate (FOSAMAX) 70 MG tablet, TAKE 1 TABLET BY MOUTH WEEKLY, Disp: 12 tablet, Rfl: 3    apixaban (ELIQUIS) 5 mg Tab, Take 1 tablet (5 mg total) by mouth 2 (two) times daily., Disp: 60 tablet, Rfl: 11    atorvastatin (LIPITOR) 20 MG tablet, TAKE ONE TABLET BY MOUTH EVERY DAY, Disp: 90 tablet, Rfl: 3    calcium-vitamin D3 500 mg(1,250mg) -200 unit per tablet, Take 1 tablet by mouth 2 (two) times daily with meals., Disp: , Rfl:     magnesium hydroxide 400 mg/5 ml (MILK OF MAGNESIA) 400 mg/5 mL Susp, Take 30 mLs by mouth once daily., Disp: , Rfl:     multivitamin with minerals tablet, Take 1 tablet by mouth once daily., Disp: , Rfl:     oxymetazoline (AFRIN) 0.05 % nasal spray, 1 spray by Nasal route every evening., Disp: , Rfl:     zolpidem (AMBIEN) 5 MG Tab, Take 1 tablet (5 mg total) by mouth nightly., Disp: 30 tablet, Rfl: 3    fluticasone (FLONASE) 50 mcg/actuation nasal spray, 1 spray by Each Nare route once daily., Disp: , Rfl:     Review of Systems   Constitution: Negative for chills and fever.   HENT: Negative for ear pain and hoarse voice.    Eyes: Negative for discharge and pain.   Cardiovascular: Negative for chest pain and palpitations.   Respiratory: Negative for cough, hemoptysis and shortness of breath.    Endocrine: Negative for polydipsia and polyuria.   Skin: Negative for dry skin and rash.   Musculoskeletal: Negative for back pain and neck pain.   Gastrointestinal: Negative for abdominal pain, diarrhea, nausea and vomiting.   Genitourinary: Negative for dysuria and hematuria.   Neurological: Negative for dizziness and paresthesias.         II. PHYSICAL EXAM     Physical Exam  GEN: Alert/oriented, normal affect, normal speech  HEENT: atraumatic, normocephalic  CHEST: normal respiratory effort, symmetrical chest rise  CARD: Regular rate, regular  rhythm, 2+ radial pulses bilaterally  SKIN: no rashes, no lesions, no skin discoloration  EXT: Warm, no cyanosis/edema, no venous stasis pigmentation      Imaging Results:  BLE Venous Duplex ultrasound 7/18/20 Impression:   Right Leg: Deep Venous system: No DVT, No reflux. GSV: No reflux. LSV: No reflux  Left Leg: Deep Venous system:  No DVT, No reflux. GSV: + reflux. LSV: + reflux    III. ASSESSMENT & PLAN (MEDICAL DECISION MAKING)     1. Venous insufficiency of left leg    2. Chronic deep vein thrombosis (DVT) of left lower extremity, unspecified vein        Assessment/Diagnosis and Plan:  69 y.o. female referred for evaluation of LLE DVT. Her last DVT was likely chronic although images are not available. Recent ultrasound of lower extremities reveals no evidence of DVT. She has mild venous reflux in the left GSV and LSV but is asymptomatic.  Plan for conservative medical treatment at this time. No need to continue eliquis (or ever start it) for chronic DVT.    -Recommend compression with 20-30mmHg Rx stockings, elevation to slow progression of venous insufficiency  -D/c Eliquis  -RTC JODY Diaz II, MD, RPVI  Vascular Surgeon

## 2020-08-11 ENCOUNTER — CLINICAL SUPPORT (OUTPATIENT)
Dept: REHABILITATION | Facility: HOSPITAL | Age: 70
End: 2020-08-11
Attending: PODIATRIST
Payer: MEDICARE

## 2020-08-11 DIAGNOSIS — E65 FAT PAD SYNDROME: ICD-10-CM

## 2020-08-11 DIAGNOSIS — M24.273 ANKLE LIGAMENT LAXITY, UNSPECIFIED LATERALITY: ICD-10-CM

## 2020-08-11 PROCEDURE — 97140 MANUAL THERAPY 1/> REGIONS: CPT | Mod: PO

## 2020-08-11 PROCEDURE — 97110 THERAPEUTIC EXERCISES: CPT | Mod: PO

## 2020-08-14 NOTE — PROGRESS NOTES
"  Physical Therapy Treatment/PROGRESS Note     Name: Azucena Luciano  Clinic Number: [id number]    Therapy Diagnosis:   Encounter Diagnoses   Name Primary?    Fat pad syndrome     Ankle ligament laxity, unspecified laterality      Physician: Lena Brown DPM    Visit Date: 8/18/2020  Physician Orders: PT Eval and Treat   Medical Diagnosis from Referral: M76.821 (ICD-10-CM) - Posterior tibial tendon dysfunction (PTTD) of right lower extremity  Evaluation Date: 7/22/2020  Authorization Period Expiration: 07/22/2021  Plan of Care Expiration: 10/22/20  Visit # / Visits authorized: 6/12      Time In: 12:05pm Time Out: 12:50 pm  Total Billable Time: 45 minutes  Charges: 2 TE, 1 MT      Precautions: Standard    Subjective     Pt reports: "I am experiencing great progress. It's so wonderful!"  She was compliant with home exercise program.  Response to previous treatment: Good- less pain  Functional change: Ability to wake up with less pain.    Pain:3/10  After therapy: 2/10  Location: R foot    Objective     Azucena received therapeutic exercises to develop strength, ROM and flexibility for 45 minutes including:  Bolded for new or progressions:    Recumbent bike for 5 mins, L2 to promote blood flow, joint lubrication.    Seated:   Hip external and internal rotation with green band: 15 reps bilaterally  Ankle Dorsiflexion/plantarflexion, Inv/Eversion with green band: 15 reps bilaterally    Supine/Side-lying:  Hip flexor stretch in supine: 3 trials, 30 second hold bilaterally    Standing:  Standing plantarflexion holding two 5# weights x20 reps  Gastroc/soleus stretch on incline: 2 trials, 30 sec hold  Single leg stance x 15, 30 seconds x 2 trials bilaterally (promote proprioception).  Side-steps with green band: 30 reps bilaterally    Testing:  -R big toe strength test: 4+/5  +Bump test in RLE (not as great of pain)  +ATF laxity in RLE    Manual therapy x 10 minutes for the R foot:   STM to gastroc, Achilles " (mobilization), plantar fascia with stretching into dorsiflexion. Trigger point release to R gastroc.     Home Exercises Provided and Patient Education Provided     Education provided:   -To give her R foot a break more often, as pt seems to be overworking her foot. Rest is essential for healing.      Written Home Exercises Provided: Patient instructed to cont prior HEP.  Add: hip abduction with green band, hip flexor stretch in supine, gastroc/soleus stretch in standing, heel raises (to replace old HEP), ankle 4-ways with band.    Exercises were reviewed and Azucena was able to demonstrate them prior to the end of the session.  Azucena demonstrated good  understanding of the education provided.     See EMR under Patient Instructions for exercises provided prior visit.    Assessment   Ms. Zeng 2 out of 3 goals set for 8/19 and was able to increase her time in single leg stance bilaterally (from 10 to 30 seconds from last visit). She also improved her strength in the R big toe from 4/5 to 4+/5, denoting improved strength.  She is showing great progress and will benefit from continued PT services to heal her fat pad syndrome, plantar fasciitis and any other weakness or laxity in the R foot/ankle joint.    Azucena is progressing well towards her goals.   Pt prognosis is Good.     Pt will continue to benefit from skilled outpatient physical therapy to address the deficits listed in the problem list box on initial evaluation, provide pt/family education and to maximize pt's level of independence in the home and community environment.     Pt's spiritual, cultural and educational needs considered and pt agreeable to plan of care and goals.     Anticipated barriers to physical therapy: none    Goals:  Short Term Goals: 4 weeks  8/19  · Pt will denote a 2/10 pain level at its worse in her heel to allow her to ambulate on the track at her gym. Progressing, not met  · Pt will demonstrate 100% independence with her  home exercise program, no verbal cues needed, allowing for independence in her self-care. Met (8/18/2020)  · Pt will report 100% in wearing her heel cups to allow for containment of her R foot's fat pad, promoting better gait mechanics and pain relief. Met (8/18/2020)      Long Term Goals: 8 weeks 9/16  · Pt will denote no pain, allowing her to return to working out at her full capacity. Progressing, not met  · Pt will increase her right great toe muscle test to 5/5, denoting full recovery of her plantar fasciitis. Progressing, not met  · Pt will demonstrate a negative Windlass test to denote healing of R plantar fasciitis. Not met      Plan     Outpatient Physical Therapy 1 times weekly for 4 weeks to include the following interventions: Gait Training, Manual Therapy, Moist Heat/ Ice, Neuromuscular Re-ed, Patient Education, Therapeutic Activites and Therapeutic Exercise.        Ailyn Virgen, PT

## 2020-08-18 ENCOUNTER — CLINICAL SUPPORT (OUTPATIENT)
Dept: REHABILITATION | Facility: HOSPITAL | Age: 70
End: 2020-08-18
Attending: PODIATRIST
Payer: MEDICARE

## 2020-08-18 DIAGNOSIS — E65 FAT PAD SYNDROME: ICD-10-CM

## 2020-08-18 DIAGNOSIS — M24.273 ANKLE LIGAMENT LAXITY, UNSPECIFIED LATERALITY: ICD-10-CM

## 2020-08-18 PROCEDURE — 97140 MANUAL THERAPY 1/> REGIONS: CPT | Mod: PO

## 2020-08-18 PROCEDURE — 97110 THERAPEUTIC EXERCISES: CPT | Mod: PO

## 2020-08-20 ENCOUNTER — OFFICE VISIT (OUTPATIENT)
Dept: PODIATRY | Facility: CLINIC | Age: 70
End: 2020-08-20
Payer: MEDICARE

## 2020-08-20 VITALS
HEIGHT: 59 IN | HEART RATE: 61 BPM | BODY MASS INDEX: 22.71 KG/M2 | DIASTOLIC BLOOD PRESSURE: 83 MMHG | SYSTOLIC BLOOD PRESSURE: 139 MMHG

## 2020-08-20 DIAGNOSIS — M76.821 POSTERIOR TIBIAL TENDON DYSFUNCTION (PTTD) OF RIGHT LOWER EXTREMITY: Primary | ICD-10-CM

## 2020-08-20 PROCEDURE — 99212 OFFICE O/P EST SF 10 MIN: CPT | Mod: S$GLB,,, | Performed by: PODIATRIST

## 2020-08-20 PROCEDURE — 1159F MED LIST DOCD IN RCRD: CPT | Mod: S$GLB,,, | Performed by: PODIATRIST

## 2020-08-20 PROCEDURE — 99999 PR PBB SHADOW E&M-EST. PATIENT-LVL III: ICD-10-PCS | Mod: PBBFAC,,, | Performed by: PODIATRIST

## 2020-08-20 PROCEDURE — 1125F PR PAIN SEVERITY QUANTIFIED, PAIN PRESENT: ICD-10-PCS | Mod: S$GLB,,, | Performed by: PODIATRIST

## 2020-08-20 PROCEDURE — 3008F BODY MASS INDEX DOCD: CPT | Mod: CPTII,S$GLB,, | Performed by: PODIATRIST

## 2020-08-20 PROCEDURE — 3008F PR BODY MASS INDEX (BMI) DOCUMENTED: ICD-10-PCS | Mod: CPTII,S$GLB,, | Performed by: PODIATRIST

## 2020-08-20 PROCEDURE — 99212 PR OFFICE/OUTPT VISIT, EST, LEVL II, 10-19 MIN: ICD-10-PCS | Mod: S$GLB,,, | Performed by: PODIATRIST

## 2020-08-20 PROCEDURE — 1101F PT FALLS ASSESS-DOCD LE1/YR: CPT | Mod: CPTII,S$GLB,, | Performed by: PODIATRIST

## 2020-08-20 PROCEDURE — 1159F PR MEDICATION LIST DOCUMENTED IN MEDICAL RECORD: ICD-10-PCS | Mod: S$GLB,,, | Performed by: PODIATRIST

## 2020-08-20 PROCEDURE — 1125F AMNT PAIN NOTED PAIN PRSNT: CPT | Mod: S$GLB,,, | Performed by: PODIATRIST

## 2020-08-20 PROCEDURE — 99999 PR PBB SHADOW E&M-EST. PATIENT-LVL III: CPT | Mod: PBBFAC,,, | Performed by: PODIATRIST

## 2020-08-20 PROCEDURE — 1101F PR PT FALLS ASSESS DOC 0-1 FALLS W/OUT INJ PAST YR: ICD-10-PCS | Mod: CPTII,S$GLB,, | Performed by: PODIATRIST

## 2020-08-20 NOTE — PROGRESS NOTES
Subjective:      Patient ID: Azucena Luciano is a 69 y.o. female.    Chief Complaint:   Foot Problem (bilateral,mostly the right, PT helps a lot), Foot Pain, and Follow-up    Azucena is a 69 y.o. female who rtc f/u foot pain . Pt relates overall she is so much better. She really worked hard at p.therapy. She is 30-50%% better. Now the pain is only a 2-4 out of 10.    Pt is happy that she was referred to vascular and there was no dvt and she was able to stop the eliquis. Pt relates that could also be why her feet feel better.     Pt relates she is going to [place] for her bday to her daughters house who has twins.      Past Medical History:   Diagnosis Date    Asthma     Carpal tunnel syndrome     DVT (deep venous thrombosis)     Hypertension     Osteoporosis     Urinary tract infection      Past Surgical History:   Procedure Laterality Date    AUGMENTATION OF BREAST      BREAST BIOPSY      BREAST CYST ASPIRATION      BREAST SURGERY      CATARACT EXTRACTION      COLONOSCOPY N/A 8/7/2018    Procedure: COLONOSCOPY;  Surgeon: KATE Vernon MD;  Location: [address]);  Service: Endoscopy;  Laterality: N/A;  Ok to hold Eliquis x 2 days per Dr. Gee    FACIAL COSMETIC SURGERY      NASAL SEPTUM SURGERY      OVARIAN CYST REMOVAL      tummy tuck       Current Outpatient Medications on File Prior to Visit   Medication Sig Dispense Refill    alendronate (FOSAMAX) 70 MG tablet TAKE 1 TABLET BY MOUTH WEEKLY 12 tablet 3    atorvastatin (LIPITOR) 20 MG tablet TAKE ONE TABLET BY MOUTH EVERY DAY 90 tablet 3    calcium-vitamin D3 500 mg(1,250mg) -200 unit per tablet Take 1 tablet by mouth 2 (two) times daily with meals.      fluticasone (FLONASE) 50 mcg/actuation nasal spray 1 spray by Each Nare route once daily.      magnesium hydroxide 400 mg/5 ml (MILK OF MAGNESIA) 400 mg/5 mL Susp Take 30 mLs by mouth once daily.      multivitamin with minerals tablet Take 1 tablet by mouth once daily.   "    oxymetazoline (AFRIN) 0.05 % nasal spray 1 spray by Nasal route every evening.      zolpidem (AMBIEN) 5 MG Tab Take 1 tablet (5 mg total) by mouth nightly. 30 tablet 3     No current facility-administered medications on file prior to visit.      Review of patient's allergies indicates:   Allergen Reactions    Cayenne pepper Anaphylaxis       Review of Systems   Constitution: Negative for chills, decreased appetite, fever, malaise/fatigue, night sweats, weight gain and weight loss.   Cardiovascular: Positive for leg swelling. Negative for chest pain, claudication, dyspnea on exertion, palpitations and syncope.   Respiratory: Negative for cough and shortness of breath.    Endocrine: Negative for cold intolerance and heat intolerance.   Hematologic/Lymphatic: Negative for bleeding problem. Bruises/bleeds easily.   Skin: Negative for color change, dry skin, flushing, itching, nail changes, poor wound healing, rash, skin cancer, suspicious lesions and unusual hair distribution.   Musculoskeletal: Positive for stiffness. Negative for arthritis, back pain, falls, gout, joint pain, joint swelling, muscle cramps, muscle weakness, myalgias and neck pain.   Gastrointestinal: Negative for diarrhea, nausea and vomiting.   Neurological: Negative for dizziness, focal weakness, light-headedness, numbness, paresthesias, tremors, vertigo and weakness.   Psychiatric/Behavioral: Negative for altered mental status and depression. The patient does not have insomnia.    Allergic/Immunologic: Negative.            Objective:       Vitals:    08/20/20 1107   BP: 139/83   Pulse: 61   Height: 4' 11" (1.499 m)   PainSc:   4   PainLoc: Foot         Physical Exam  Constitutional:       General: She is not in acute distress.     Appearance: She is well-developed. She is not ill-appearing, toxic-appearing or diaphoretic.      Comments: Proper sandels   Cardiovascular:      Pulses:           Dorsalis pedis pulses are 2+ on the right side and " 2+ on the left side.        Posterior tibial pulses are 2+ on the right side and 2+ on the left side.   Musculoskeletal: Normal range of motion.         General: No tenderness.      Right lower le+ Edema present.      Left lower le+ Edema present.      Right foot: Normal range of motion. Deformity, bunion and prominent metatarsal heads present.      Left foot: Normal range of motion. Deformity, bunion and prominent metatarsal heads present.      Comments: Increased laxity/flexibility b/l feet and ankles    Fat pad atrophy      Mild Pop to medial arch and proximal fascia right. none left    Flexible pes planus foot type w/ medial arch collapse and mild gastroc equinus Reducible extensor and flexor contractures at the MTPJ and PIPJ of toes 2-5, bilat.     No calf pain          Feet:      Right foot:      Protective Sensation: 10 sites tested. 10 sites sensed.      Skin integrity: No ulcer, blister, skin breakdown, erythema, warmth, callus or dry skin.      Toenail Condition: Right toenails are normal.      Left foot:      Protective Sensation: 10 sites tested. 10 sites sensed.      Skin integrity: No ulcer, blister, skin breakdown, erythema, warmth, callus or dry skin.      Toenail Condition: Left toenails are normal.   Skin:     General: Skin is warm.      Capillary Refill: Capillary refill takes 2 to 3 seconds.      Coloration: Skin is not pale.      Findings: No erythema or rash.      Nails: There is no clubbing.        Comments: arophy   Neurological:      Mental Status: She is alert and oriented to person, place, and time.      Sensory: Sensation is intact.      Motor: Motor function is intact.      Gait: Gait abnormal.   Psychiatric:         Attention and Perception: Attention normal.         Mood and Affect: Mood normal.         Speech: Speech normal.         Behavior: Behavior normal.         Thought Content: Thought content normal.         Judgment: Judgment normal.               Assessment:        Encounter Diagnosis   Name Primary?    Posterior tibial tendon dysfunction (PTTD) of right lower extremity Yes         Plan:       Azucena was seen today for foot problem, foot pain and follow-up.    Diagnoses and all orders for this visit:    Posterior tibial tendon dysfunction (PTTD) of right lower extremity      I counseled the patient on her conditions, their implications and medical management.    - overall pt is improved. No indication for MRI at this time    - pt relates she finished p.therapy and wearing good shoes     - recommend mobic to reduce inflammation now that off eliquis... pt relates not interested in medication at this time    - pt has a night splint at home from years ago    - return to clinic as desired... consider cortisone injections/immobliization/etc      .

## 2020-11-05 ENCOUNTER — OFFICE VISIT (OUTPATIENT)
Dept: INTERNAL MEDICINE | Facility: CLINIC | Age: 70
End: 2020-11-05
Payer: MEDICARE

## 2020-11-05 VITALS
TEMPERATURE: 97 F | DIASTOLIC BLOOD PRESSURE: 80 MMHG | WEIGHT: 119.5 LBS | SYSTOLIC BLOOD PRESSURE: 124 MMHG | HEART RATE: 64 BPM | HEIGHT: 59 IN | RESPIRATION RATE: 15 BRPM | OXYGEN SATURATION: 98 % | BODY MASS INDEX: 24.09 KG/M2

## 2020-11-05 DIAGNOSIS — M62.830 BACK SPASM: ICD-10-CM

## 2020-11-05 DIAGNOSIS — M54.6 ACUTE LEFT-SIDED THORACIC BACK PAIN: Primary | ICD-10-CM

## 2020-11-05 PROCEDURE — 1159F MED LIST DOCD IN RCRD: CPT | Mod: S$GLB,,, | Performed by: INTERNAL MEDICINE

## 2020-11-05 PROCEDURE — 1101F PT FALLS ASSESS-DOCD LE1/YR: CPT | Mod: CPTII,S$GLB,, | Performed by: INTERNAL MEDICINE

## 2020-11-05 PROCEDURE — 1125F PR PAIN SEVERITY QUANTIFIED, PAIN PRESENT: ICD-10-PCS | Mod: S$GLB,,, | Performed by: INTERNAL MEDICINE

## 2020-11-05 PROCEDURE — 99999 PR PBB SHADOW E&M-EST. PATIENT-LVL III: ICD-10-PCS | Mod: PBBFAC,,, | Performed by: INTERNAL MEDICINE

## 2020-11-05 PROCEDURE — 99214 PR OFFICE/OUTPT VISIT, EST, LEVL IV, 30-39 MIN: ICD-10-PCS | Mod: S$GLB,,, | Performed by: INTERNAL MEDICINE

## 2020-11-05 PROCEDURE — 3008F PR BODY MASS INDEX (BMI) DOCUMENTED: ICD-10-PCS | Mod: CPTII,S$GLB,, | Performed by: INTERNAL MEDICINE

## 2020-11-05 PROCEDURE — 99999 PR PBB SHADOW E&M-EST. PATIENT-LVL III: CPT | Mod: PBBFAC,,, | Performed by: INTERNAL MEDICINE

## 2020-11-05 PROCEDURE — 3008F BODY MASS INDEX DOCD: CPT | Mod: CPTII,S$GLB,, | Performed by: INTERNAL MEDICINE

## 2020-11-05 PROCEDURE — 1125F AMNT PAIN NOTED PAIN PRSNT: CPT | Mod: S$GLB,,, | Performed by: INTERNAL MEDICINE

## 2020-11-05 PROCEDURE — 1159F PR MEDICATION LIST DOCUMENTED IN MEDICAL RECORD: ICD-10-PCS | Mod: S$GLB,,, | Performed by: INTERNAL MEDICINE

## 2020-11-05 PROCEDURE — 1101F PR PT FALLS ASSESS DOC 0-1 FALLS W/OUT INJ PAST YR: ICD-10-PCS | Mod: CPTII,S$GLB,, | Performed by: INTERNAL MEDICINE

## 2020-11-05 PROCEDURE — 99214 OFFICE O/P EST MOD 30 MIN: CPT | Mod: S$GLB,,, | Performed by: INTERNAL MEDICINE

## 2020-11-05 RX ORDER — CYCLOBENZAPRINE HCL 10 MG
10 TABLET ORAL 3 TIMES DAILY PRN
Qty: 60 TABLET | Refills: 1 | Status: SHIPPED | OUTPATIENT
Start: 2020-11-05 | End: 2020-12-05

## 2020-11-05 RX ORDER — METHYLPREDNISOLONE 4 MG/1
TABLET ORAL
Qty: 1 PACKAGE | Refills: 0 | Status: SHIPPED | OUTPATIENT
Start: 2020-11-05 | End: 2021-04-23

## 2020-11-05 NOTE — PROGRESS NOTES
Subjective:       Patient ID: Azucena Luciano is a 70 y.o. female.    Chief Complaint: Back Pain    HPI   Pt c/o 2 wks of intermittent left sided thoracic back pain which can radiate to her flank area and under her breast. It is described as sharp/stabbing in nature. She does admit to moderate sneezing and some coughing 2 wks ago before onset of symptoms.   Review of Systems   Constitutional: Negative for activity change, appetite change, chills, diaphoresis, fatigue, fever and unexpected weight change.   HENT: Negative for postnasal drip, rhinorrhea, sinus pressure/congestion, sneezing, sore throat, trouble swallowing and voice change.    Respiratory: Negative for cough, shortness of breath and wheezing.    Cardiovascular: Negative for chest pain, palpitations and leg swelling.   Gastrointestinal: Negative for abdominal pain, blood in stool, constipation, diarrhea, nausea and vomiting.   Genitourinary: Negative for dysuria.   Musculoskeletal: Positive for back pain. Negative for arthralgias and myalgias.   Integumentary:  Negative for rash and wound.   Allergic/Immunologic: Negative for environmental allergies and food allergies.   Hematological: Negative for adenopathy. Does not bruise/bleed easily.         Objective:      Physical Exam  Constitutional:       General: She is not in acute distress.     Appearance: She is well-developed. She is not diaphoretic.   HENT:      Head: Normocephalic and atraumatic.      Right Ear: External ear normal.      Left Ear: External ear normal.      Nose: Nose normal.      Mouth/Throat:      Pharynx: No oropharyngeal exudate.   Eyes:      General: No scleral icterus.        Right eye: No discharge.         Left eye: No discharge.      Conjunctiva/sclera: Conjunctivae normal.      Pupils: Pupils are equal, round, and reactive to light.   Neck:      Musculoskeletal: Neck supple.      Vascular: No JVD.   Cardiovascular:      Rate and Rhythm: Normal rate and regular rhythm.       Heart sounds: Normal heart sounds.   Pulmonary:      Effort: Pulmonary effort is normal. No respiratory distress.      Breath sounds: Normal breath sounds. No wheezing or rales.   Musculoskeletal:      Thoracic back: She exhibits tenderness, pain and spasm.        Back:    Lymphadenopathy:      Cervical: No cervical adenopathy.   Skin:     General: Skin is warm and dry.      Coloration: Skin is not pale.      Findings: No rash.   Neurological:      Mental Status: She is alert and oriented to person, place, and time.         Assessment:       1. Acute left-sided thoracic back pain    2. Back spasm        Plan:    1. Rx Medrol pack and Flexeril PRN       Heat PRN

## 2021-01-17 DIAGNOSIS — G47.00 INSOMNIA, UNSPECIFIED TYPE: ICD-10-CM

## 2021-01-19 RX ORDER — ZOLPIDEM TARTRATE 5 MG/1
TABLET ORAL
Qty: 30 TABLET | Refills: 3 | Status: SHIPPED | OUTPATIENT
Start: 2021-01-19 | End: 2022-07-25

## 2021-04-07 DIAGNOSIS — M81.0 OSTEOPOROSIS, UNSPECIFIED OSTEOPOROSIS TYPE, UNSPECIFIED PATHOLOGICAL FRACTURE PRESENCE: ICD-10-CM

## 2021-04-08 RX ORDER — ALENDRONATE SODIUM 70 MG/1
TABLET ORAL
Qty: 12 TABLET | Refills: 3 | Status: SHIPPED | OUTPATIENT
Start: 2021-04-08 | End: 2022-05-12

## 2021-04-15 DIAGNOSIS — E78.00 HYPERCHOLESTEREMIA: ICD-10-CM

## 2021-04-19 RX ORDER — ATORVASTATIN CALCIUM 20 MG/1
TABLET, FILM COATED ORAL
Qty: 90 TABLET | Refills: 0 | Status: SHIPPED | OUTPATIENT
Start: 2021-04-19 | End: 2021-11-03

## 2021-04-23 ENCOUNTER — OFFICE VISIT (OUTPATIENT)
Dept: INTERNAL MEDICINE | Facility: CLINIC | Age: 71
End: 2021-04-23
Payer: MEDICARE

## 2021-04-23 VITALS
HEIGHT: 59 IN | DIASTOLIC BLOOD PRESSURE: 68 MMHG | WEIGHT: 118.63 LBS | BODY MASS INDEX: 23.92 KG/M2 | TEMPERATURE: 97 F | SYSTOLIC BLOOD PRESSURE: 128 MMHG | RESPIRATION RATE: 16 BRPM | HEART RATE: 68 BPM

## 2021-04-23 DIAGNOSIS — S83.242A ACUTE MEDIAL MENISCUS TEAR OF LEFT KNEE, INITIAL ENCOUNTER: Primary | ICD-10-CM

## 2021-04-23 PROCEDURE — 1159F MED LIST DOCD IN RCRD: CPT | Mod: S$GLB,,, | Performed by: INTERNAL MEDICINE

## 2021-04-23 PROCEDURE — 1125F AMNT PAIN NOTED PAIN PRSNT: CPT | Mod: S$GLB,,, | Performed by: INTERNAL MEDICINE

## 2021-04-23 PROCEDURE — 3008F PR BODY MASS INDEX (BMI) DOCUMENTED: ICD-10-PCS | Mod: CPTII,S$GLB,, | Performed by: INTERNAL MEDICINE

## 2021-04-23 PROCEDURE — 1101F PR PT FALLS ASSESS DOC 0-1 FALLS W/OUT INJ PAST YR: ICD-10-PCS | Mod: CPTII,S$GLB,, | Performed by: INTERNAL MEDICINE

## 2021-04-23 PROCEDURE — 1125F PR PAIN SEVERITY QUANTIFIED, PAIN PRESENT: ICD-10-PCS | Mod: S$GLB,,, | Performed by: INTERNAL MEDICINE

## 2021-04-23 PROCEDURE — 99999 PR PBB SHADOW E&M-EST. PATIENT-LVL IV: ICD-10-PCS | Mod: PBBFAC,,, | Performed by: INTERNAL MEDICINE

## 2021-04-23 PROCEDURE — 99214 PR OFFICE/OUTPT VISIT, EST, LEVL IV, 30-39 MIN: ICD-10-PCS | Mod: S$GLB,,, | Performed by: INTERNAL MEDICINE

## 2021-04-23 PROCEDURE — 99999 PR PBB SHADOW E&M-EST. PATIENT-LVL IV: CPT | Mod: PBBFAC,,, | Performed by: INTERNAL MEDICINE

## 2021-04-23 PROCEDURE — 3288F PR FALLS RISK ASSESSMENT DOCUMENTED: ICD-10-PCS | Mod: CPTII,S$GLB,, | Performed by: INTERNAL MEDICINE

## 2021-04-23 PROCEDURE — 3008F BODY MASS INDEX DOCD: CPT | Mod: CPTII,S$GLB,, | Performed by: INTERNAL MEDICINE

## 2021-04-23 PROCEDURE — 99214 OFFICE O/P EST MOD 30 MIN: CPT | Mod: S$GLB,,, | Performed by: INTERNAL MEDICINE

## 2021-04-23 PROCEDURE — 3288F FALL RISK ASSESSMENT DOCD: CPT | Mod: CPTII,S$GLB,, | Performed by: INTERNAL MEDICINE

## 2021-04-23 PROCEDURE — 1159F PR MEDICATION LIST DOCUMENTED IN MEDICAL RECORD: ICD-10-PCS | Mod: S$GLB,,, | Performed by: INTERNAL MEDICINE

## 2021-04-23 PROCEDURE — 1101F PT FALLS ASSESS-DOCD LE1/YR: CPT | Mod: CPTII,S$GLB,, | Performed by: INTERNAL MEDICINE

## 2021-04-27 ENCOUNTER — PATIENT OUTREACH (OUTPATIENT)
Dept: ADMINISTRATIVE | Facility: OTHER | Age: 71
End: 2021-04-27

## 2021-04-27 ENCOUNTER — OFFICE VISIT (OUTPATIENT)
Dept: INTERNAL MEDICINE | Facility: CLINIC | Age: 71
End: 2021-04-27
Payer: MEDICARE

## 2021-04-27 VITALS
TEMPERATURE: 98 F | OXYGEN SATURATION: 93 % | HEIGHT: 59 IN | DIASTOLIC BLOOD PRESSURE: 80 MMHG | HEART RATE: 63 BPM | SYSTOLIC BLOOD PRESSURE: 148 MMHG | WEIGHT: 120.38 LBS | BODY MASS INDEX: 24.27 KG/M2

## 2021-04-27 DIAGNOSIS — Z12.31 ENCOUNTER FOR SCREENING MAMMOGRAM FOR MALIGNANT NEOPLASM OF BREAST: Primary | ICD-10-CM

## 2021-04-27 DIAGNOSIS — N30.00 ACUTE CYSTITIS WITHOUT HEMATURIA: Primary | ICD-10-CM

## 2021-04-27 PROCEDURE — 99999 PR PBB SHADOW E&M-EST. PATIENT-LVL III: ICD-10-PCS | Mod: PBBFAC,,, | Performed by: INTERNAL MEDICINE

## 2021-04-27 PROCEDURE — 99214 OFFICE O/P EST MOD 30 MIN: CPT | Mod: S$GLB,,, | Performed by: INTERNAL MEDICINE

## 2021-04-27 PROCEDURE — 1101F PR PT FALLS ASSESS DOC 0-1 FALLS W/OUT INJ PAST YR: ICD-10-PCS | Mod: CPTII,S$GLB,, | Performed by: INTERNAL MEDICINE

## 2021-04-27 PROCEDURE — 1159F MED LIST DOCD IN RCRD: CPT | Mod: S$GLB,,, | Performed by: INTERNAL MEDICINE

## 2021-04-27 PROCEDURE — 1159F PR MEDICATION LIST DOCUMENTED IN MEDICAL RECORD: ICD-10-PCS | Mod: S$GLB,,, | Performed by: INTERNAL MEDICINE

## 2021-04-27 PROCEDURE — 1125F AMNT PAIN NOTED PAIN PRSNT: CPT | Mod: S$GLB,,, | Performed by: INTERNAL MEDICINE

## 2021-04-27 PROCEDURE — 99214 PR OFFICE/OUTPT VISIT, EST, LEVL IV, 30-39 MIN: ICD-10-PCS | Mod: S$GLB,,, | Performed by: INTERNAL MEDICINE

## 2021-04-27 PROCEDURE — 3288F FALL RISK ASSESSMENT DOCD: CPT | Mod: CPTII,S$GLB,, | Performed by: INTERNAL MEDICINE

## 2021-04-27 PROCEDURE — 3288F PR FALLS RISK ASSESSMENT DOCUMENTED: ICD-10-PCS | Mod: CPTII,S$GLB,, | Performed by: INTERNAL MEDICINE

## 2021-04-27 PROCEDURE — 3008F BODY MASS INDEX DOCD: CPT | Mod: CPTII,S$GLB,, | Performed by: INTERNAL MEDICINE

## 2021-04-27 PROCEDURE — 3008F PR BODY MASS INDEX (BMI) DOCUMENTED: ICD-10-PCS | Mod: CPTII,S$GLB,, | Performed by: INTERNAL MEDICINE

## 2021-04-27 PROCEDURE — 1101F PT FALLS ASSESS-DOCD LE1/YR: CPT | Mod: CPTII,S$GLB,, | Performed by: INTERNAL MEDICINE

## 2021-04-27 PROCEDURE — 1125F PR PAIN SEVERITY QUANTIFIED, PAIN PRESENT: ICD-10-PCS | Mod: S$GLB,,, | Performed by: INTERNAL MEDICINE

## 2021-04-27 PROCEDURE — 99999 PR PBB SHADOW E&M-EST. PATIENT-LVL III: CPT | Mod: PBBFAC,,, | Performed by: INTERNAL MEDICINE

## 2021-04-27 RX ORDER — SULFAMETHOXAZOLE AND TRIMETHOPRIM 800; 160 MG/1; MG/1
1 TABLET ORAL 2 TIMES DAILY
Qty: 14 TABLET | Refills: 0 | Status: SHIPPED | OUTPATIENT
Start: 2021-04-27 | End: 2021-05-04

## 2021-04-29 ENCOUNTER — TELEPHONE (OUTPATIENT)
Dept: INTERNAL MEDICINE | Facility: CLINIC | Age: 71
End: 2021-04-29

## 2021-04-29 ENCOUNTER — HOSPITAL ENCOUNTER (OUTPATIENT)
Dept: RADIOLOGY | Facility: HOSPITAL | Age: 71
Discharge: HOME | End: 2021-04-29
Attending: ORTHOPAEDIC SURGERY
Payer: MEDICARE

## 2021-04-29 ENCOUNTER — OFFICE VISIT (OUTPATIENT)
Dept: SPORTS MEDICINE | Facility: CLINIC | Age: 71
End: 2021-04-29
Payer: MEDICARE

## 2021-04-29 VITALS
BODY MASS INDEX: 23.99 KG/M2 | HEART RATE: 81 BPM | WEIGHT: 119 LBS | HEIGHT: 59 IN | DIASTOLIC BLOOD PRESSURE: 73 MMHG | SYSTOLIC BLOOD PRESSURE: 130 MMHG

## 2021-04-29 DIAGNOSIS — M25.562 LEFT KNEE PAIN, UNSPECIFIED CHRONICITY: ICD-10-CM

## 2021-04-29 DIAGNOSIS — M25.562 CHRONIC PAIN OF LEFT KNEE: Primary | ICD-10-CM

## 2021-04-29 DIAGNOSIS — G89.29 CHRONIC PAIN OF LEFT KNEE: Primary | ICD-10-CM

## 2021-04-29 PROCEDURE — 73564 X-RAY EXAM KNEE 4 OR MORE: CPT | Mod: TC,LT

## 2021-04-29 PROCEDURE — 20610 LARGE JOINT ASPIRATION/INJECTION: L KNEE: ICD-10-PCS | Mod: LT,S$GLB,, | Performed by: ORTHOPAEDIC SURGERY

## 2021-04-29 PROCEDURE — 1125F PR PAIN SEVERITY QUANTIFIED, PAIN PRESENT: ICD-10-PCS | Mod: S$GLB,,, | Performed by: ORTHOPAEDIC SURGERY

## 2021-04-29 PROCEDURE — 1100F PTFALLS ASSESS-DOCD GE2>/YR: CPT | Mod: CPTII,S$GLB,, | Performed by: ORTHOPAEDIC SURGERY

## 2021-04-29 PROCEDURE — 99999 PR PBB SHADOW E&M-EST. PATIENT-LVL III: CPT | Mod: PBBFAC,,, | Performed by: ORTHOPAEDIC SURGERY

## 2021-04-29 PROCEDURE — 99204 OFFICE O/P NEW MOD 45 MIN: CPT | Mod: 25,S$GLB,, | Performed by: ORTHOPAEDIC SURGERY

## 2021-04-29 PROCEDURE — 73562 XR KNEE ORTHO LEFT WITH FLEXION: ICD-10-PCS | Mod: 26,RT,, | Performed by: RADIOLOGY

## 2021-04-29 PROCEDURE — 1100F PR PT FALLS ASSESS DOC 2+ FALLS/FALL W/INJURY/YR: ICD-10-PCS | Mod: CPTII,S$GLB,, | Performed by: ORTHOPAEDIC SURGERY

## 2021-04-29 PROCEDURE — 3008F PR BODY MASS INDEX (BMI) DOCUMENTED: ICD-10-PCS | Mod: CPTII,S$GLB,, | Performed by: ORTHOPAEDIC SURGERY

## 2021-04-29 PROCEDURE — 73564 XR KNEE ORTHO LEFT WITH FLEXION: ICD-10-PCS | Mod: 26,LT,, | Performed by: RADIOLOGY

## 2021-04-29 PROCEDURE — 3008F BODY MASS INDEX DOCD: CPT | Mod: CPTII,S$GLB,, | Performed by: ORTHOPAEDIC SURGERY

## 2021-04-29 PROCEDURE — 1159F MED LIST DOCD IN RCRD: CPT | Mod: S$GLB,,, | Performed by: ORTHOPAEDIC SURGERY

## 2021-04-29 PROCEDURE — 99204 PR OFFICE/OUTPT VISIT, NEW, LEVL IV, 45-59 MIN: ICD-10-PCS | Mod: 25,S$GLB,, | Performed by: ORTHOPAEDIC SURGERY

## 2021-04-29 PROCEDURE — 99999 PR PBB SHADOW E&M-EST. PATIENT-LVL III: ICD-10-PCS | Mod: PBBFAC,,, | Performed by: ORTHOPAEDIC SURGERY

## 2021-04-29 PROCEDURE — 3288F PR FALLS RISK ASSESSMENT DOCUMENTED: ICD-10-PCS | Mod: CPTII,S$GLB,, | Performed by: ORTHOPAEDIC SURGERY

## 2021-04-29 PROCEDURE — 73564 X-RAY EXAM KNEE 4 OR MORE: CPT | Mod: 26,LT,, | Performed by: RADIOLOGY

## 2021-04-29 PROCEDURE — 73562 X-RAY EXAM OF KNEE 3: CPT | Mod: 26,RT,, | Performed by: RADIOLOGY

## 2021-04-29 PROCEDURE — 20610 DRAIN/INJ JOINT/BURSA W/O US: CPT | Mod: LT,S$GLB,, | Performed by: ORTHOPAEDIC SURGERY

## 2021-04-29 PROCEDURE — 3288F FALL RISK ASSESSMENT DOCD: CPT | Mod: CPTII,S$GLB,, | Performed by: ORTHOPAEDIC SURGERY

## 2021-04-29 PROCEDURE — 1125F AMNT PAIN NOTED PAIN PRSNT: CPT | Mod: S$GLB,,, | Performed by: ORTHOPAEDIC SURGERY

## 2021-04-29 PROCEDURE — 1159F PR MEDICATION LIST DOCUMENTED IN MEDICAL RECORD: ICD-10-PCS | Mod: S$GLB,,, | Performed by: ORTHOPAEDIC SURGERY

## 2021-04-29 RX ADMIN — TRIAMCINOLONE ACETONIDE 40 MG: 40 INJECTION, SUSPENSION INTRA-ARTICULAR; INTRAMUSCULAR at 02:04

## 2021-05-04 ENCOUNTER — TELEPHONE (OUTPATIENT)
Dept: SPORTS MEDICINE | Facility: CLINIC | Age: 71
End: 2021-05-04

## 2021-05-04 DIAGNOSIS — G89.29 CHRONIC PAIN OF LEFT KNEE: Primary | ICD-10-CM

## 2021-05-04 DIAGNOSIS — M25.562 CHRONIC PAIN OF LEFT KNEE: Primary | ICD-10-CM

## 2021-05-06 ENCOUNTER — HOSPITAL ENCOUNTER (OUTPATIENT)
Dept: RADIOLOGY | Facility: OTHER | Age: 71
Discharge: HOME | End: 2021-05-06
Attending: ORTHOPAEDIC SURGERY
Payer: MEDICARE

## 2021-05-06 DIAGNOSIS — M25.562 CHRONIC PAIN OF LEFT KNEE: ICD-10-CM

## 2021-05-06 DIAGNOSIS — G89.29 CHRONIC PAIN OF LEFT KNEE: ICD-10-CM

## 2021-05-06 PROCEDURE — 73721 MRI JNT OF LWR EXTRE W/O DYE: CPT | Mod: TC,LT

## 2021-05-06 PROCEDURE — 73721 MRI JNT OF LWR EXTRE W/O DYE: CPT | Mod: 26,LT,, | Performed by: RADIOLOGY

## 2021-05-06 PROCEDURE — 73721 MRI KNEE WITHOUT CONTRAST LEFT: ICD-10-PCS | Mod: 26,LT,, | Performed by: RADIOLOGY

## 2021-05-11 ENCOUNTER — OFFICE VISIT (OUTPATIENT)
Dept: SPORTS MEDICINE | Facility: CLINIC | Age: 71
End: 2021-05-11
Payer: MEDICARE

## 2021-05-11 VITALS
WEIGHT: 119 LBS | HEART RATE: 62 BPM | DIASTOLIC BLOOD PRESSURE: 79 MMHG | BODY MASS INDEX: 23.99 KG/M2 | SYSTOLIC BLOOD PRESSURE: 140 MMHG | HEIGHT: 59 IN

## 2021-05-11 DIAGNOSIS — S83.242A ACUTE MEDIAL MENISCUS TEAR, LEFT, INITIAL ENCOUNTER: Primary | ICD-10-CM

## 2021-05-11 PROCEDURE — 1126F AMNT PAIN NOTED NONE PRSNT: CPT | Mod: S$GLB,,, | Performed by: ORTHOPAEDIC SURGERY

## 2021-05-11 PROCEDURE — 99999 PR PBB SHADOW E&M-EST. PATIENT-LVL III: ICD-10-PCS | Mod: PBBFAC,,, | Performed by: ORTHOPAEDIC SURGERY

## 2021-05-11 PROCEDURE — 99213 OFFICE O/P EST LOW 20 MIN: CPT | Mod: S$GLB,,, | Performed by: ORTHOPAEDIC SURGERY

## 2021-05-11 PROCEDURE — 3288F FALL RISK ASSESSMENT DOCD: CPT | Mod: CPTII,S$GLB,, | Performed by: ORTHOPAEDIC SURGERY

## 2021-05-11 PROCEDURE — 1159F PR MEDICATION LIST DOCUMENTED IN MEDICAL RECORD: ICD-10-PCS | Mod: S$GLB,,, | Performed by: ORTHOPAEDIC SURGERY

## 2021-05-11 PROCEDURE — 1126F PR PAIN SEVERITY QUANTIFIED, NO PAIN PRESENT: ICD-10-PCS | Mod: S$GLB,,, | Performed by: ORTHOPAEDIC SURGERY

## 2021-05-11 PROCEDURE — 3008F BODY MASS INDEX DOCD: CPT | Mod: CPTII,S$GLB,, | Performed by: ORTHOPAEDIC SURGERY

## 2021-05-11 PROCEDURE — 1101F PT FALLS ASSESS-DOCD LE1/YR: CPT | Mod: CPTII,S$GLB,, | Performed by: ORTHOPAEDIC SURGERY

## 2021-05-11 PROCEDURE — 1159F MED LIST DOCD IN RCRD: CPT | Mod: S$GLB,,, | Performed by: ORTHOPAEDIC SURGERY

## 2021-05-11 PROCEDURE — 1101F PR PT FALLS ASSESS DOC 0-1 FALLS W/OUT INJ PAST YR: ICD-10-PCS | Mod: CPTII,S$GLB,, | Performed by: ORTHOPAEDIC SURGERY

## 2021-05-11 PROCEDURE — 99213 PR OFFICE/OUTPT VISIT, EST, LEVL III, 20-29 MIN: ICD-10-PCS | Mod: S$GLB,,, | Performed by: ORTHOPAEDIC SURGERY

## 2021-05-11 PROCEDURE — 3288F PR FALLS RISK ASSESSMENT DOCUMENTED: ICD-10-PCS | Mod: CPTII,S$GLB,, | Performed by: ORTHOPAEDIC SURGERY

## 2021-05-11 PROCEDURE — 99999 PR PBB SHADOW E&M-EST. PATIENT-LVL III: CPT | Mod: PBBFAC,,, | Performed by: ORTHOPAEDIC SURGERY

## 2021-05-11 PROCEDURE — 3008F PR BODY MASS INDEX (BMI) DOCUMENTED: ICD-10-PCS | Mod: CPTII,S$GLB,, | Performed by: ORTHOPAEDIC SURGERY

## 2021-05-11 RX ORDER — TRIAMCINOLONE ACETONIDE 40 MG/ML
40 INJECTION, SUSPENSION INTRA-ARTICULAR; INTRAMUSCULAR
Status: DISCONTINUED | OUTPATIENT
Start: 2021-04-29 | End: 2021-05-11 | Stop reason: HOSPADM

## 2021-07-06 ENCOUNTER — PATIENT MESSAGE (OUTPATIENT)
Dept: ADMINISTRATIVE | Facility: HOSPITAL | Age: 71
End: 2021-07-06

## 2021-07-20 ENCOUNTER — TELEPHONE (OUTPATIENT)
Dept: INTERNAL MEDICINE | Facility: CLINIC | Age: 71
End: 2021-07-20

## 2021-07-20 DIAGNOSIS — E78.49 OTHER HYPERLIPIDEMIA: ICD-10-CM

## 2021-07-20 DIAGNOSIS — Z00.00 ANNUAL PHYSICAL EXAM: Primary | ICD-10-CM

## 2021-08-10 ENCOUNTER — PATIENT OUTREACH (OUTPATIENT)
Dept: ADMINISTRATIVE | Facility: HOSPITAL | Age: 71
End: 2021-08-10

## 2021-08-11 ENCOUNTER — LAB VISIT (OUTPATIENT)
Dept: LAB | Facility: HOSPITAL | Age: 71
End: 2021-08-11
Attending: INTERNAL MEDICINE
Payer: MEDICARE

## 2021-08-11 DIAGNOSIS — E78.49 OTHER HYPERLIPIDEMIA: ICD-10-CM

## 2021-08-11 DIAGNOSIS — Z00.00 ANNUAL PHYSICAL EXAM: ICD-10-CM

## 2021-08-11 LAB
ALBUMIN SERPL BCP-MCNC: 3.7 G/DL (ref 3.5–5.2)
ALP SERPL-CCNC: 111 U/L (ref 55–135)
ALT SERPL W/O P-5'-P-CCNC: 25 U/L (ref 10–44)
ANION GAP SERPL CALC-SCNC: 10 MMOL/L (ref 8–16)
AST SERPL-CCNC: 24 U/L (ref 10–40)
BASOPHILS # BLD AUTO: 0.05 K/UL (ref 0–0.2)
BASOPHILS NFR BLD: 0.8 % (ref 0–1.9)
BILIRUB SERPL-MCNC: 0.4 MG/DL (ref 0.1–1)
BUN SERPL-MCNC: 17 MG/DL (ref 8–23)
CALCIUM SERPL-MCNC: 9 MG/DL (ref 8.7–10.5)
CHLORIDE SERPL-SCNC: 108 MMOL/L (ref 95–110)
CHOLEST SERPL-MCNC: 147 MG/DL (ref 120–199)
CHOLEST/HDLC SERPL: 3.2 {RATIO} (ref 2–5)
CO2 SERPL-SCNC: 25 MMOL/L (ref 23–29)
CREAT SERPL-MCNC: 0.7 MG/DL (ref 0.5–1.4)
DIFFERENTIAL METHOD: ABNORMAL
EOSINOPHIL # BLD AUTO: 0.1 K/UL (ref 0–0.5)
EOSINOPHIL NFR BLD: 2.2 % (ref 0–8)
ERYTHROCYTE [DISTWIDTH] IN BLOOD BY AUTOMATED COUNT: 14.3 % (ref 11.5–14.5)
EST. GFR  (AFRICAN AMERICAN): >60 ML/MIN/1.73 M^2
EST. GFR  (NON AFRICAN AMERICAN): >60 ML/MIN/1.73 M^2
GLUCOSE SERPL-MCNC: 89 MG/DL (ref 70–110)
HCT VFR BLD AUTO: 44.4 % (ref 37–48.5)
HDLC SERPL-MCNC: 46 MG/DL (ref 40–75)
HDLC SERPL: 31.3 % (ref 20–50)
HGB BLD-MCNC: 13.7 G/DL (ref 12–16)
IMM GRANULOCYTES # BLD AUTO: 0.01 K/UL (ref 0–0.04)
IMM GRANULOCYTES NFR BLD AUTO: 0.2 % (ref 0–0.5)
LDLC SERPL CALC-MCNC: 79.4 MG/DL (ref 63–159)
LYMPHOCYTES # BLD AUTO: 2.6 K/UL (ref 1–4.8)
LYMPHOCYTES NFR BLD: 41 % (ref 18–48)
MCH RBC QN AUTO: 27 PG (ref 27–31)
MCHC RBC AUTO-ENTMCNC: 30.9 G/DL (ref 32–36)
MCV RBC AUTO: 88 FL (ref 82–98)
MONOCYTES # BLD AUTO: 0.5 K/UL (ref 0.3–1)
MONOCYTES NFR BLD: 7.9 % (ref 4–15)
NEUTROPHILS # BLD AUTO: 3 K/UL (ref 1.8–7.7)
NEUTROPHILS NFR BLD: 47.9 % (ref 38–73)
NONHDLC SERPL-MCNC: 101 MG/DL
NRBC BLD-RTO: 0 /100 WBC
PLATELET # BLD AUTO: 242 K/UL (ref 150–450)
PMV BLD AUTO: 11.6 FL (ref 9.2–12.9)
POTASSIUM SERPL-SCNC: 4.2 MMOL/L (ref 3.5–5.1)
PROT SERPL-MCNC: 6.3 G/DL (ref 6–8.4)
RBC # BLD AUTO: 5.07 M/UL (ref 4–5.4)
SODIUM SERPL-SCNC: 143 MMOL/L (ref 136–145)
TRIGL SERPL-MCNC: 108 MG/DL (ref 30–150)
TSH SERPL DL<=0.005 MIU/L-ACNC: 1.37 UIU/ML (ref 0.4–4)
WBC # BLD AUTO: 6.32 K/UL (ref 3.9–12.7)

## 2021-08-11 PROCEDURE — 36415 COLL VENOUS BLD VENIPUNCTURE: CPT | Mod: PO | Performed by: INTERNAL MEDICINE

## 2021-08-11 PROCEDURE — 84443 ASSAY THYROID STIM HORMONE: CPT | Performed by: INTERNAL MEDICINE

## 2021-08-11 PROCEDURE — 85025 COMPLETE CBC W/AUTO DIFF WBC: CPT | Performed by: INTERNAL MEDICINE

## 2021-08-11 PROCEDURE — 80061 LIPID PANEL: CPT | Performed by: INTERNAL MEDICINE

## 2021-08-11 PROCEDURE — 80053 COMPREHEN METABOLIC PANEL: CPT | Performed by: INTERNAL MEDICINE

## 2021-08-20 ENCOUNTER — OFFICE VISIT (OUTPATIENT)
Dept: INTERNAL MEDICINE | Facility: CLINIC | Age: 71
End: 2021-08-20
Payer: MEDICARE

## 2021-08-20 VITALS
WEIGHT: 115.75 LBS | SYSTOLIC BLOOD PRESSURE: 138 MMHG | RESPIRATION RATE: 18 BRPM | TEMPERATURE: 98 F | BODY MASS INDEX: 23.33 KG/M2 | HEART RATE: 62 BPM | OXYGEN SATURATION: 96 % | HEIGHT: 59 IN | DIASTOLIC BLOOD PRESSURE: 82 MMHG

## 2021-08-20 DIAGNOSIS — E78.49 OTHER HYPERLIPIDEMIA: ICD-10-CM

## 2021-08-20 DIAGNOSIS — M81.0 OSTEOPOROSIS, UNSPECIFIED OSTEOPOROSIS TYPE, UNSPECIFIED PATHOLOGICAL FRACTURE PRESENCE: ICD-10-CM

## 2021-08-20 DIAGNOSIS — G47.00 INSOMNIA, UNSPECIFIED TYPE: ICD-10-CM

## 2021-08-20 DIAGNOSIS — Z86.718 HISTORY OF DVT (DEEP VEIN THROMBOSIS): ICD-10-CM

## 2021-08-20 DIAGNOSIS — Z00.00 ANNUAL PHYSICAL EXAM: Primary | ICD-10-CM

## 2021-08-20 DIAGNOSIS — S83.242A ACUTE MEDIAL MENISCUS TEAR, LEFT, INITIAL ENCOUNTER: ICD-10-CM

## 2021-08-20 PROCEDURE — 1159F PR MEDICATION LIST DOCUMENTED IN MEDICAL RECORD: ICD-10-PCS | Mod: CPTII,S$GLB,, | Performed by: INTERNAL MEDICINE

## 2021-08-20 PROCEDURE — 99499 UNLISTED E&M SERVICE: CPT | Mod: S$GLB,,, | Performed by: INTERNAL MEDICINE

## 2021-08-20 PROCEDURE — 3075F PR MOST RECENT SYSTOLIC BLOOD PRESS GE 130-139MM HG: ICD-10-PCS | Mod: CPTII,S$GLB,, | Performed by: INTERNAL MEDICINE

## 2021-08-20 PROCEDURE — 3008F PR BODY MASS INDEX (BMI) DOCUMENTED: ICD-10-PCS | Mod: CPTII,S$GLB,, | Performed by: INTERNAL MEDICINE

## 2021-08-20 PROCEDURE — 1125F PR PAIN SEVERITY QUANTIFIED, PAIN PRESENT: ICD-10-PCS | Mod: CPTII,S$GLB,, | Performed by: INTERNAL MEDICINE

## 2021-08-20 PROCEDURE — 3288F FALL RISK ASSESSMENT DOCD: CPT | Mod: CPTII,S$GLB,, | Performed by: INTERNAL MEDICINE

## 2021-08-20 PROCEDURE — 99999 PR PBB SHADOW E&M-EST. PATIENT-LVL IV: CPT | Mod: PBBFAC,,, | Performed by: INTERNAL MEDICINE

## 2021-08-20 PROCEDURE — 99999 PR PBB SHADOW E&M-EST. PATIENT-LVL IV: ICD-10-PCS | Mod: PBBFAC,,, | Performed by: INTERNAL MEDICINE

## 2021-08-20 PROCEDURE — 99499 RISK ADDL DX/OHS AUDIT: ICD-10-PCS | Mod: S$GLB,,, | Performed by: INTERNAL MEDICINE

## 2021-08-20 PROCEDURE — 3008F BODY MASS INDEX DOCD: CPT | Mod: CPTII,S$GLB,, | Performed by: INTERNAL MEDICINE

## 2021-08-20 PROCEDURE — 99214 OFFICE O/P EST MOD 30 MIN: CPT | Mod: S$GLB,,, | Performed by: INTERNAL MEDICINE

## 2021-08-20 PROCEDURE — 1101F PT FALLS ASSESS-DOCD LE1/YR: CPT | Mod: CPTII,S$GLB,, | Performed by: INTERNAL MEDICINE

## 2021-08-20 PROCEDURE — 1125F AMNT PAIN NOTED PAIN PRSNT: CPT | Mod: CPTII,S$GLB,, | Performed by: INTERNAL MEDICINE

## 2021-08-20 PROCEDURE — 1159F MED LIST DOCD IN RCRD: CPT | Mod: CPTII,S$GLB,, | Performed by: INTERNAL MEDICINE

## 2021-08-20 PROCEDURE — 3075F SYST BP GE 130 - 139MM HG: CPT | Mod: CPTII,S$GLB,, | Performed by: INTERNAL MEDICINE

## 2021-08-20 PROCEDURE — 1101F PR PT FALLS ASSESS DOC 0-1 FALLS W/OUT INJ PAST YR: ICD-10-PCS | Mod: CPTII,S$GLB,, | Performed by: INTERNAL MEDICINE

## 2021-08-20 PROCEDURE — 3079F PR MOST RECENT DIASTOLIC BLOOD PRESSURE 80-89 MM HG: ICD-10-PCS | Mod: CPTII,S$GLB,, | Performed by: INTERNAL MEDICINE

## 2021-08-20 PROCEDURE — 3288F PR FALLS RISK ASSESSMENT DOCUMENTED: ICD-10-PCS | Mod: CPTII,S$GLB,, | Performed by: INTERNAL MEDICINE

## 2021-08-20 PROCEDURE — 99214 PR OFFICE/OUTPT VISIT, EST, LEVL IV, 30-39 MIN: ICD-10-PCS | Mod: S$GLB,,, | Performed by: INTERNAL MEDICINE

## 2021-08-20 PROCEDURE — 3079F DIAST BP 80-89 MM HG: CPT | Mod: CPTII,S$GLB,, | Performed by: INTERNAL MEDICINE

## 2021-09-02 ENCOUNTER — PATIENT MESSAGE (OUTPATIENT)
Dept: SPORTS MEDICINE | Facility: CLINIC | Age: 71
End: 2021-09-02

## 2021-09-13 ENCOUNTER — TELEPHONE (OUTPATIENT)
Dept: SPORTS MEDICINE | Facility: CLINIC | Age: 71
End: 2021-09-13

## 2021-09-29 ENCOUNTER — APPOINTMENT (OUTPATIENT)
Dept: RADIOLOGY | Facility: CLINIC | Age: 71
End: 2021-09-29
Attending: INTERNAL MEDICINE
Payer: MEDICARE

## 2021-09-29 DIAGNOSIS — M81.0 OSTEOPOROSIS, UNSPECIFIED OSTEOPOROSIS TYPE, UNSPECIFIED PATHOLOGICAL FRACTURE PRESENCE: ICD-10-CM

## 2021-09-29 PROCEDURE — 77080 DEXA BONE DENSITY SPINE HIP: ICD-10-PCS | Mod: 26,,, | Performed by: INTERNAL MEDICINE

## 2021-09-29 PROCEDURE — 77080 DXA BONE DENSITY AXIAL: CPT | Mod: TC,PO

## 2021-09-29 PROCEDURE — 77080 DXA BONE DENSITY AXIAL: CPT | Mod: 26,,, | Performed by: INTERNAL MEDICINE

## 2021-10-04 ENCOUNTER — PATIENT MESSAGE (OUTPATIENT)
Dept: ADMINISTRATIVE | Facility: HOSPITAL | Age: 71
End: 2021-10-04

## 2021-10-05 ENCOUNTER — HOSPITAL ENCOUNTER (OUTPATIENT)
Dept: RADIOLOGY | Facility: HOSPITAL | Age: 71
Discharge: HOME | End: 2021-10-05
Attending: INTERNAL MEDICINE
Payer: MEDICARE

## 2021-10-05 DIAGNOSIS — Z12.31 ENCOUNTER FOR SCREENING MAMMOGRAM FOR MALIGNANT NEOPLASM OF BREAST: ICD-10-CM

## 2021-10-05 PROCEDURE — 77063 BREAST TOMOSYNTHESIS BI: CPT | Mod: 26,,, | Performed by: RADIOLOGY

## 2021-10-05 PROCEDURE — 77067 MAMMO DIGITAL SCREENING BILAT WITH TOMO: ICD-10-PCS | Mod: 26,,, | Performed by: RADIOLOGY

## 2021-10-05 PROCEDURE — 77067 SCR MAMMO BI INCL CAD: CPT | Mod: 26,,, | Performed by: RADIOLOGY

## 2021-10-05 PROCEDURE — 77067 SCR MAMMO BI INCL CAD: CPT | Mod: TC,PO

## 2021-10-05 PROCEDURE — 77063 MAMMO DIGITAL SCREENING BILAT WITH TOMO: ICD-10-PCS | Mod: 26,,, | Performed by: RADIOLOGY

## 2021-10-20 ENCOUNTER — OFFICE VISIT (OUTPATIENT)
Dept: SPORTS MEDICINE | Facility: CLINIC | Age: 71
End: 2021-10-20
Payer: MEDICARE

## 2021-10-20 VITALS
SYSTOLIC BLOOD PRESSURE: 133 MMHG | DIASTOLIC BLOOD PRESSURE: 80 MMHG | BODY MASS INDEX: 23.33 KG/M2 | HEIGHT: 59 IN | WEIGHT: 115.75 LBS

## 2021-10-20 DIAGNOSIS — M94.262 CHONDROMALACIA OF KNEE, LEFT: ICD-10-CM

## 2021-10-20 DIAGNOSIS — M17.12 PRIMARY OSTEOARTHRITIS OF LEFT KNEE: Primary | ICD-10-CM

## 2021-10-20 DIAGNOSIS — S83.232D COMPLEX TEAR OF MEDIAL MENISCUS OF LEFT KNEE AS CURRENT INJURY, SUBSEQUENT ENCOUNTER: ICD-10-CM

## 2021-10-20 PROCEDURE — 3075F SYST BP GE 130 - 139MM HG: CPT | Mod: CPTII,S$GLB,, | Performed by: NEUROMUSCULOSKELETAL MEDICINE & OMM

## 2021-10-20 PROCEDURE — 99214 PR OFFICE/OUTPT VISIT, EST, LEVL IV, 30-39 MIN: ICD-10-PCS | Mod: 25,S$GLB,, | Performed by: NEUROMUSCULOSKELETAL MEDICINE & OMM

## 2021-10-20 PROCEDURE — 1125F AMNT PAIN NOTED PAIN PRSNT: CPT | Mod: CPTII,S$GLB,, | Performed by: NEUROMUSCULOSKELETAL MEDICINE & OMM

## 2021-10-20 PROCEDURE — 99999 PR PBB SHADOW E&M-EST. PATIENT-LVL III: CPT | Mod: PBBFAC,,, | Performed by: NEUROMUSCULOSKELETAL MEDICINE & OMM

## 2021-10-20 PROCEDURE — 3008F PR BODY MASS INDEX (BMI) DOCUMENTED: ICD-10-PCS | Mod: CPTII,S$GLB,, | Performed by: NEUROMUSCULOSKELETAL MEDICINE & OMM

## 2021-10-20 PROCEDURE — 3079F PR MOST RECENT DIASTOLIC BLOOD PRESSURE 80-89 MM HG: ICD-10-PCS | Mod: CPTII,S$GLB,, | Performed by: NEUROMUSCULOSKELETAL MEDICINE & OMM

## 2021-10-20 PROCEDURE — 1100F PTFALLS ASSESS-DOCD GE2>/YR: CPT | Mod: CPTII,S$GLB,, | Performed by: NEUROMUSCULOSKELETAL MEDICINE & OMM

## 2021-10-20 PROCEDURE — 3079F DIAST BP 80-89 MM HG: CPT | Mod: CPTII,S$GLB,, | Performed by: NEUROMUSCULOSKELETAL MEDICINE & OMM

## 2021-10-20 PROCEDURE — 1159F MED LIST DOCD IN RCRD: CPT | Mod: CPTII,S$GLB,, | Performed by: NEUROMUSCULOSKELETAL MEDICINE & OMM

## 2021-10-20 PROCEDURE — 99214 OFFICE O/P EST MOD 30 MIN: CPT | Mod: 25,S$GLB,, | Performed by: NEUROMUSCULOSKELETAL MEDICINE & OMM

## 2021-10-20 PROCEDURE — 20611 DRAIN/INJ JOINT/BURSA W/US: CPT | Mod: LT,S$GLB,, | Performed by: NEUROMUSCULOSKELETAL MEDICINE & OMM

## 2021-10-20 PROCEDURE — 99999 PR PBB SHADOW E&M-EST. PATIENT-LVL III: ICD-10-PCS | Mod: PBBFAC,,, | Performed by: NEUROMUSCULOSKELETAL MEDICINE & OMM

## 2021-10-20 PROCEDURE — 3288F FALL RISK ASSESSMENT DOCD: CPT | Mod: CPTII,S$GLB,, | Performed by: NEUROMUSCULOSKELETAL MEDICINE & OMM

## 2021-10-20 PROCEDURE — 1159F PR MEDICATION LIST DOCUMENTED IN MEDICAL RECORD: ICD-10-PCS | Mod: CPTII,S$GLB,, | Performed by: NEUROMUSCULOSKELETAL MEDICINE & OMM

## 2021-10-20 PROCEDURE — 20611 PR DRAIN/ASP/INJECT MAJOR JOINT/BURSA W/US GUIDANCE: ICD-10-PCS | Mod: LT,S$GLB,, | Performed by: NEUROMUSCULOSKELETAL MEDICINE & OMM

## 2021-10-20 PROCEDURE — 3008F BODY MASS INDEX DOCD: CPT | Mod: CPTII,S$GLB,, | Performed by: NEUROMUSCULOSKELETAL MEDICINE & OMM

## 2021-10-20 PROCEDURE — 3075F PR MOST RECENT SYSTOLIC BLOOD PRESS GE 130-139MM HG: ICD-10-PCS | Mod: CPTII,S$GLB,, | Performed by: NEUROMUSCULOSKELETAL MEDICINE & OMM

## 2021-10-20 PROCEDURE — 3288F PR FALLS RISK ASSESSMENT DOCUMENTED: ICD-10-PCS | Mod: CPTII,S$GLB,, | Performed by: NEUROMUSCULOSKELETAL MEDICINE & OMM

## 2021-10-20 PROCEDURE — 1125F PR PAIN SEVERITY QUANTIFIED, PAIN PRESENT: ICD-10-PCS | Mod: CPTII,S$GLB,, | Performed by: NEUROMUSCULOSKELETAL MEDICINE & OMM

## 2021-10-20 PROCEDURE — 1100F PR PT FALLS ASSESS DOC 2+ FALLS/FALL W/INJURY/YR: ICD-10-PCS | Mod: CPTII,S$GLB,, | Performed by: NEUROMUSCULOSKELETAL MEDICINE & OMM

## 2021-10-20 RX ORDER — TRIAMCINOLONE ACETONIDE 40 MG/ML
40 INJECTION, SUSPENSION INTRA-ARTICULAR; INTRAMUSCULAR
Status: COMPLETED | OUTPATIENT
Start: 2021-10-20 | End: 2021-10-20

## 2021-10-20 RX ADMIN — TRIAMCINOLONE ACETONIDE 40 MG: 40 INJECTION, SUSPENSION INTRA-ARTICULAR; INTRAMUSCULAR at 09:10

## 2021-10-25 ENCOUNTER — OFFICE VISIT (OUTPATIENT)
Dept: INTERNAL MEDICINE | Facility: CLINIC | Age: 71
End: 2021-10-25
Payer: MEDICARE

## 2021-10-25 VITALS
TEMPERATURE: 98 F | HEART RATE: 66 BPM | WEIGHT: 112.63 LBS | BODY MASS INDEX: 22.71 KG/M2 | HEIGHT: 59 IN | OXYGEN SATURATION: 99 % | DIASTOLIC BLOOD PRESSURE: 80 MMHG | SYSTOLIC BLOOD PRESSURE: 120 MMHG

## 2021-10-25 DIAGNOSIS — N30.00 ACUTE CYSTITIS WITHOUT HEMATURIA: Primary | ICD-10-CM

## 2021-10-25 PROCEDURE — 1159F PR MEDICATION LIST DOCUMENTED IN MEDICAL RECORD: ICD-10-PCS | Mod: CPTII,S$GLB,, | Performed by: NURSE PRACTITIONER

## 2021-10-25 PROCEDURE — 99213 OFFICE O/P EST LOW 20 MIN: CPT | Mod: S$GLB,,, | Performed by: NURSE PRACTITIONER

## 2021-10-25 PROCEDURE — 1125F PR PAIN SEVERITY QUANTIFIED, PAIN PRESENT: ICD-10-PCS | Mod: CPTII,S$GLB,, | Performed by: NURSE PRACTITIONER

## 2021-10-25 PROCEDURE — 87186 SC STD MICRODIL/AGAR DIL: CPT | Performed by: NURSE PRACTITIONER

## 2021-10-25 PROCEDURE — 3079F PR MOST RECENT DIASTOLIC BLOOD PRESSURE 80-89 MM HG: ICD-10-PCS | Mod: CPTII,S$GLB,, | Performed by: NURSE PRACTITIONER

## 2021-10-25 PROCEDURE — 87088 URINE BACTERIA CULTURE: CPT | Performed by: NURSE PRACTITIONER

## 2021-10-25 PROCEDURE — 1125F AMNT PAIN NOTED PAIN PRSNT: CPT | Mod: CPTII,S$GLB,, | Performed by: NURSE PRACTITIONER

## 2021-10-25 PROCEDURE — 3074F SYST BP LT 130 MM HG: CPT | Mod: CPTII,S$GLB,, | Performed by: NURSE PRACTITIONER

## 2021-10-25 PROCEDURE — 3288F PR FALLS RISK ASSESSMENT DOCUMENTED: ICD-10-PCS | Mod: CPTII,S$GLB,, | Performed by: NURSE PRACTITIONER

## 2021-10-25 PROCEDURE — 3008F PR BODY MASS INDEX (BMI) DOCUMENTED: ICD-10-PCS | Mod: CPTII,S$GLB,, | Performed by: NURSE PRACTITIONER

## 2021-10-25 PROCEDURE — 3079F DIAST BP 80-89 MM HG: CPT | Mod: CPTII,S$GLB,, | Performed by: NURSE PRACTITIONER

## 2021-10-25 PROCEDURE — 87077 CULTURE AEROBIC IDENTIFY: CPT | Performed by: NURSE PRACTITIONER

## 2021-10-25 PROCEDURE — 1100F PR PT FALLS ASSESS DOC 2+ FALLS/FALL W/INJURY/YR: ICD-10-PCS | Mod: CPTII,S$GLB,, | Performed by: NURSE PRACTITIONER

## 2021-10-25 PROCEDURE — 1159F MED LIST DOCD IN RCRD: CPT | Mod: CPTII,S$GLB,, | Performed by: NURSE PRACTITIONER

## 2021-10-25 PROCEDURE — 99999 PR PBB SHADOW E&M-EST. PATIENT-LVL III: CPT | Mod: PBBFAC,,, | Performed by: NURSE PRACTITIONER

## 2021-10-25 PROCEDURE — 3008F BODY MASS INDEX DOCD: CPT | Mod: CPTII,S$GLB,, | Performed by: NURSE PRACTITIONER

## 2021-10-25 PROCEDURE — 87086 URINE CULTURE/COLONY COUNT: CPT | Performed by: NURSE PRACTITIONER

## 2021-10-25 PROCEDURE — 3288F FALL RISK ASSESSMENT DOCD: CPT | Mod: CPTII,S$GLB,, | Performed by: NURSE PRACTITIONER

## 2021-10-25 PROCEDURE — 99213 PR OFFICE/OUTPT VISIT, EST, LEVL III, 20-29 MIN: ICD-10-PCS | Mod: S$GLB,,, | Performed by: NURSE PRACTITIONER

## 2021-10-25 PROCEDURE — 1100F PTFALLS ASSESS-DOCD GE2>/YR: CPT | Mod: CPTII,S$GLB,, | Performed by: NURSE PRACTITIONER

## 2021-10-25 PROCEDURE — 3074F PR MOST RECENT SYSTOLIC BLOOD PRESSURE < 130 MM HG: ICD-10-PCS | Mod: CPTII,S$GLB,, | Performed by: NURSE PRACTITIONER

## 2021-10-25 PROCEDURE — 99999 PR PBB SHADOW E&M-EST. PATIENT-LVL III: ICD-10-PCS | Mod: PBBFAC,,, | Performed by: NURSE PRACTITIONER

## 2021-10-25 RX ORDER — SULFAMETHOXAZOLE AND TRIMETHOPRIM 800; 160 MG/1; MG/1
1 TABLET ORAL 2 TIMES DAILY
Qty: 10 TABLET | Refills: 0 | Status: SHIPPED | OUTPATIENT
Start: 2021-10-25 | End: 2021-10-30

## 2021-10-26 ENCOUNTER — CLINICAL SUPPORT (OUTPATIENT)
Dept: REHABILITATION | Facility: HOSPITAL | Age: 71
End: 2021-10-26
Payer: MEDICARE

## 2021-10-26 DIAGNOSIS — S83.232D COMPLEX TEAR OF MEDIAL MENISCUS OF LEFT KNEE AS CURRENT INJURY, SUBSEQUENT ENCOUNTER: ICD-10-CM

## 2021-10-26 DIAGNOSIS — M94.262 CHONDROMALACIA OF KNEE, LEFT: ICD-10-CM

## 2021-10-26 DIAGNOSIS — M17.12 PRIMARY OSTEOARTHRITIS OF LEFT KNEE: ICD-10-CM

## 2021-10-26 DIAGNOSIS — M62.81 QUADRICEPS WEAKNESS: ICD-10-CM

## 2021-10-26 PROCEDURE — 97161 PT EVAL LOW COMPLEX 20 MIN: CPT | Mod: PO

## 2021-10-27 ENCOUNTER — TELEPHONE (OUTPATIENT)
Dept: INTERNAL MEDICINE | Facility: CLINIC | Age: 71
End: 2021-10-27
Payer: MEDICARE

## 2021-10-27 LAB — BACTERIA UR CULT: ABNORMAL

## 2021-11-03 DIAGNOSIS — E78.00 HYPERCHOLESTEREMIA: ICD-10-CM

## 2021-11-04 RX ORDER — ATORVASTATIN CALCIUM 20 MG/1
TABLET, FILM COATED ORAL
Qty: 90 TABLET | Refills: 3 | Status: SHIPPED | OUTPATIENT
Start: 2021-11-04 | End: 2022-12-23

## 2022-01-19 ENCOUNTER — PES CALL (OUTPATIENT)
Dept: ADMINISTRATIVE | Facility: CLINIC | Age: 72
End: 2022-01-19
Payer: MEDICARE

## 2022-03-11 ENCOUNTER — TELEPHONE (OUTPATIENT)
Dept: INTERNAL MEDICINE | Facility: CLINIC | Age: 72
End: 2022-03-11
Payer: MEDICARE

## 2022-03-11 DIAGNOSIS — M25.569 KNEE PAIN, UNSPECIFIED CHRONICITY, UNSPECIFIED LATERALITY: Primary | ICD-10-CM

## 2022-03-11 NOTE — TELEPHONE ENCOUNTER
----- Message from Breanne Sanchez sent at 3/11/2022  8:51 AM CST -----  Contact: Pt @ [phone]  Patient would like to get a referral.  Referral to what specialty:ortho  Does the patient want the referral with a specific physician:  No  Is the specialist an [facility]:    Reason (be specific):  Pain in left knee  Does the patient already have the specialty clinic appointment scheduled:  No  If yes, what date is the appointment scheduled:     Is the insurance listed in Epic correct? (this is important for a referral):  yes  Advised patient that once provider approves this either a nurse or  will return their call?: call  Would the patient like a call back, or a response through their [facility] portal?:  call  Comments:

## 2022-03-16 ENCOUNTER — TELEPHONE (OUTPATIENT)
Dept: ORTHOPEDICS | Facility: CLINIC | Age: 72
End: 2022-03-16
Payer: MEDICARE

## 2022-03-16 DIAGNOSIS — M25.569 KNEE PAIN, UNSPECIFIED CHRONICITY, UNSPECIFIED LATERALITY: Primary | ICD-10-CM

## 2022-03-17 ENCOUNTER — OFFICE VISIT (OUTPATIENT)
Dept: ORTHOPEDICS | Facility: CLINIC | Age: 72
End: 2022-03-17
Payer: MEDICARE

## 2022-03-17 ENCOUNTER — HOSPITAL ENCOUNTER (OUTPATIENT)
Dept: RADIOLOGY | Facility: HOSPITAL | Age: 72
Discharge: HOME | End: 2022-03-17
Attending: PHYSICIAN ASSISTANT
Payer: MEDICARE

## 2022-03-17 VITALS
BODY MASS INDEX: 23.07 KG/M2 | RESPIRATION RATE: 18 BRPM | WEIGHT: 114.44 LBS | TEMPERATURE: 98 F | SYSTOLIC BLOOD PRESSURE: 148 MMHG | DIASTOLIC BLOOD PRESSURE: 84 MMHG | HEART RATE: 55 BPM | HEIGHT: 59 IN

## 2022-03-17 DIAGNOSIS — M25.562 CHRONIC PAIN OF LEFT KNEE: ICD-10-CM

## 2022-03-17 DIAGNOSIS — M25.569 KNEE PAIN, UNSPECIFIED CHRONICITY, UNSPECIFIED LATERALITY: ICD-10-CM

## 2022-03-17 DIAGNOSIS — G89.29 CHRONIC PAIN OF LEFT KNEE: ICD-10-CM

## 2022-03-17 DIAGNOSIS — M17.12 PRIMARY OSTEOARTHRITIS OF LEFT KNEE: Primary | ICD-10-CM

## 2022-03-17 DIAGNOSIS — S83.242A ACUTE MEDIAL MENISCUS TEAR, LEFT, INITIAL ENCOUNTER: ICD-10-CM

## 2022-03-17 PROCEDURE — 99999 PR PBB SHADOW E&M-EST. PATIENT-LVL IV: ICD-10-PCS | Mod: PBBFAC,,, | Performed by: PHYSICIAN ASSISTANT

## 2022-03-17 PROCEDURE — 99203 PR OFFICE/OUTPT VISIT, NEW, LEVL III, 30-44 MIN: ICD-10-PCS | Mod: 25,S$GLB,, | Performed by: PHYSICIAN ASSISTANT

## 2022-03-17 PROCEDURE — 1125F PR PAIN SEVERITY QUANTIFIED, PAIN PRESENT: ICD-10-PCS | Mod: CPTII,S$GLB,, | Performed by: PHYSICIAN ASSISTANT

## 2022-03-17 PROCEDURE — 73564 X-RAY EXAM KNEE 4 OR MORE: CPT | Mod: 26,LT,, | Performed by: RADIOLOGY

## 2022-03-17 PROCEDURE — 99203 OFFICE O/P NEW LOW 30 MIN: CPT | Mod: 25,S$GLB,, | Performed by: PHYSICIAN ASSISTANT

## 2022-03-17 PROCEDURE — 3288F PR FALLS RISK ASSESSMENT DOCUMENTED: ICD-10-PCS | Mod: CPTII,S$GLB,, | Performed by: PHYSICIAN ASSISTANT

## 2022-03-17 PROCEDURE — 1100F PTFALLS ASSESS-DOCD GE2>/YR: CPT | Mod: CPTII,S$GLB,, | Performed by: PHYSICIAN ASSISTANT

## 2022-03-17 PROCEDURE — 73564 XR KNEE COMP 4 OR MORE VIEWS LEFT: ICD-10-PCS | Mod: 26,LT,, | Performed by: RADIOLOGY

## 2022-03-17 PROCEDURE — 1159F MED LIST DOCD IN RCRD: CPT | Mod: CPTII,S$GLB,, | Performed by: PHYSICIAN ASSISTANT

## 2022-03-17 PROCEDURE — 1159F PR MEDICATION LIST DOCUMENTED IN MEDICAL RECORD: ICD-10-PCS | Mod: CPTII,S$GLB,, | Performed by: PHYSICIAN ASSISTANT

## 2022-03-17 PROCEDURE — 73564 X-RAY EXAM KNEE 4 OR MORE: CPT | Mod: TC,FY,LT

## 2022-03-17 PROCEDURE — 3077F PR MOST RECENT SYSTOLIC BLOOD PRESSURE >= 140 MM HG: ICD-10-PCS | Mod: CPTII,S$GLB,, | Performed by: PHYSICIAN ASSISTANT

## 2022-03-17 PROCEDURE — 1160F PR REVIEW ALL MEDS BY PRESCRIBER/CLIN PHARMACIST DOCUMENTED: ICD-10-PCS | Mod: CPTII,S$GLB,, | Performed by: PHYSICIAN ASSISTANT

## 2022-03-17 PROCEDURE — 20610 LARGE JOINT ASPIRATION/INJECTION: L KNEE: ICD-10-PCS | Mod: LT,S$GLB,, | Performed by: PHYSICIAN ASSISTANT

## 2022-03-17 PROCEDURE — 20610 DRAIN/INJ JOINT/BURSA W/O US: CPT | Mod: LT,S$GLB,, | Performed by: PHYSICIAN ASSISTANT

## 2022-03-17 PROCEDURE — 3079F DIAST BP 80-89 MM HG: CPT | Mod: CPTII,S$GLB,, | Performed by: PHYSICIAN ASSISTANT

## 2022-03-17 PROCEDURE — 3077F SYST BP >= 140 MM HG: CPT | Mod: CPTII,S$GLB,, | Performed by: PHYSICIAN ASSISTANT

## 2022-03-17 PROCEDURE — 1125F AMNT PAIN NOTED PAIN PRSNT: CPT | Mod: CPTII,S$GLB,, | Performed by: PHYSICIAN ASSISTANT

## 2022-03-17 PROCEDURE — 1100F PR PT FALLS ASSESS DOC 2+ FALLS/FALL W/INJURY/YR: ICD-10-PCS | Mod: CPTII,S$GLB,, | Performed by: PHYSICIAN ASSISTANT

## 2022-03-17 PROCEDURE — 99999 PR PBB SHADOW E&M-EST. PATIENT-LVL IV: CPT | Mod: PBBFAC,,, | Performed by: PHYSICIAN ASSISTANT

## 2022-03-17 PROCEDURE — 3008F PR BODY MASS INDEX (BMI) DOCUMENTED: ICD-10-PCS | Mod: CPTII,S$GLB,, | Performed by: PHYSICIAN ASSISTANT

## 2022-03-17 PROCEDURE — 3079F PR MOST RECENT DIASTOLIC BLOOD PRESSURE 80-89 MM HG: ICD-10-PCS | Mod: CPTII,S$GLB,, | Performed by: PHYSICIAN ASSISTANT

## 2022-03-17 PROCEDURE — 3288F FALL RISK ASSESSMENT DOCD: CPT | Mod: CPTII,S$GLB,, | Performed by: PHYSICIAN ASSISTANT

## 2022-03-17 PROCEDURE — 1160F RVW MEDS BY RX/DR IN RCRD: CPT | Mod: CPTII,S$GLB,, | Performed by: PHYSICIAN ASSISTANT

## 2022-03-17 PROCEDURE — 3008F BODY MASS INDEX DOCD: CPT | Mod: CPTII,S$GLB,, | Performed by: PHYSICIAN ASSISTANT

## 2022-03-17 RX ORDER — TRIAMCINOLONE ACETONIDE 40 MG/ML
40 INJECTION, SUSPENSION INTRA-ARTICULAR; INTRAMUSCULAR
Status: DISCONTINUED | OUTPATIENT
Start: 2022-03-17 | End: 2022-03-17 | Stop reason: HOSPADM

## 2022-03-17 RX ADMIN — TRIAMCINOLONE ACETONIDE 40 MG: 40 INJECTION, SUSPENSION INTRA-ARTICULAR; INTRAMUSCULAR at 09:03

## 2022-03-17 NOTE — PROGRESS NOTES
"Subjective:      Patient ID: Azucena Luciano is a 71 y.o. female.    Chief Complaint: Pain of the Left Knee      70yo F presents with one year history of left knee pain. She states she stepped off a stool and heard a snap. Since then, her left knee has given out on her multiple times and she has had about 5 falls since. Most recent fall about a week ago in the [place]. Her pain is medially and around the patella. Worse with walking and stairs.  She has tried physical therapy and home exercises which has helped but still continues to fall. Has also had a corticosteroid injection which helps for a couple of months until " the next fall." Taking ibuprofen with minimal relief. Was seeing another provider her who ordered a MRI which showed meniscus tear.       Review of Systems   Constitutional: Negative for chills and fever.   Cardiovascular: Negative for chest pain.   Respiratory: Negative for cough.    Hematologic/Lymphatic: Does not bruise/bleed easily.   Skin: Negative for poor wound healing and rash.   Musculoskeletal: Positive for arthritis, falls, joint pain, muscle weakness, myalgias and stiffness.   Gastrointestinal: Negative for abdominal pain.   Genitourinary: Negative for bladder incontinence.   Neurological: Negative for dizziness, loss of balance and weakness.   Psychiatric/Behavioral: Negative for altered mental status.       Review of patient's allergies indicates:   Allergen Reactions    Cayenne pepper Anaphylaxis        Current Outpatient Medications   Medication Sig Dispense Refill    alendronate (FOSAMAX) 70 MG tablet TAKE ONE TABLET BY MOUTH EVERY WEEK 12 tablet 3    atorvastatin (LIPITOR) 20 MG tablet TAKE ONE TABLET BY MOUTH EVERY DAY 90 tablet 3    calcium-vitamin D3 500 mg(1,250mg) -200 unit per tablet Take 1 tablet by mouth 2 (two) times daily with meals.      fluticasone (FLONASE) 50 mcg/actuation nasal spray 1 spray by Each Nare route once daily.      magnesium hydroxide 400 " "mg/5 ml (MILK OF MAGNESIA) 400 mg/5 mL Susp Take 30 mLs by mouth once daily.      multivitamin with minerals tablet Take 1 tablet by mouth once daily.      oxymetazoline (AFRIN) 0.05 % nasal spray 1 spray by Nasal route every evening.      zolpidem (AMBIEN) 5 MG Tab TAKE ONE TABLET BY MOUTH AT BEDTIME 30 tablet 3     No current facility-administered medications for this visit.        The patient's relevant past medical, surgical, and social history was reviewed in Epic.       Objective:      VITAL SIGNS: BP (!) 148/84   Pulse (!) 55   Temp 97.7 °F (36.5 °C)   Resp 18   Ht 4' 11" (1.499 m)   Wt 51.9 kg (114 lb 6.7 oz)   BMI 23.11 kg/m²     General    Nursing note and vitals reviewed.  Constitutional: She is oriented to person, place, and time. She appears well-developed.   Neurological: She is alert and oriented to person, place, and time.     General Musculoskeletal Exam   Gait: antalgic       Right Knee Exam     Range of Motion   Extension: 5   Flexion: 130     Left Knee Exam     Tenderness   The patient tender to palpation of the medial joint line.    Range of Motion   Extension: 5   Flexion: 130     Tests   Meniscus   Wiliam:  Medial - negative Lateral - negative  Stability Lachman: normal (-1 to 2mm)   MCL - Valgus: normal (0 to 2mm)    Other   Sensation: normal    Muscle Strength   Right Lower Extremity   Quadriceps:  5/5   Left Lower Extremity   Quadriceps:  5/5     Vascular Exam       Left Pulses  Dorsalis Pedis:      2+  Posterior Tibial:      1+           X-Ray Knee Complete 4 or More Views Left  Narrative: EXAMINATION:  XR KNEE COMP 4 OR MORE VIEWS LEFT    CLINICAL HISTORY:  Pain in unspecified knee    TECHNIQUE:  AP, oblique, lateral, patellofemoral radiographs of the left knee.    COMPARISON:  04/29/2021.    FINDINGS:  There is no acute fracture or dislocation. Alignment is normal. There is moderate joint space narrowing of the medial tibiofemoral compartment.  Remaining joint spaces are " preserved.  No joint effusion.  Impression: No acute osseous abnormality of the left knee.    Moderate joint space narrowing of the medial tibiofemoral compartment.    Electronically signed by: Brayden Brown  Date:    03/17/2022  Time:    10:11    EXAMINATION:  MRI KNEE WITHOUT CONTRAST LEFT     CLINICAL HISTORY:  left knee pain;     TECHNIQUE:  Routine MRI evaluation of the left knee without contrast using the following sequences: Coronal PDFS, PD; sagittal T2FS, T1; axial PDFS.     COMPARISON:  Radiograph 04/29/2021.     FINDINGS:  Menisci: The medial meniscus demonstrates the following abnormalities:     *Complex tear at the junction of the posterior horn/posterior root ligament with surrounding synovitis.  *Extruded posterior horn and body segment with associated mucoid degeneration and a superimposed horizontal tear that extends toward the free edge.  There is mucoid degeneration of the anterior horn of the lateral meniscus with possible fraying of the superior articular surface.  Anterior and posterior root ligaments of the lateral meniscus are intact.     Ligaments: ACL, PCL, MCL and posterior-lateral corner structures are normal.     Extensor Mechanism: Patellofemoral alignment is maintained.  Quadriceps and patellar tendons are intact.  MPFL and medial/lateral retinacula are normal.     Cartilage:     *Patellofemoral: There is superficial and deep chondral fissuring overlying the medial patellar facet.  There is deep chondral fissuring overlying the medial trochlea.  No significant subchondral edema.  *Medial tibiofemoral: There is superficial chondral fissuring overlying the anterior, central and posterior weight-bearing portions of the femoral condyle.  No subchondral edema.  *Lateral tibiofemoral: Intact without partial or full-thickness defects.  No subchondral edema.  Bones: No fractures.  No avascular necrosis.  No marrow infiltrative process.     Miscellaneous: There is a small popliteal cyst.  There  is trace fluid within the semimembranosus bursa.  Distal iliotibial band is intact.  There is mild medial gastrocnemius tendinosis.  Neurovascular structures are normal.     Impression:     1. Medial meniscus complex and horizontal tears with associated extrusion and surrounding synovitis.  2. Mucoid degeneration anterior horn lateral meniscus with possible fraying of the superior articular surface.  3. Mild patellofemoral and medial tibiofemoral chondromalacia.  4. Small popliteal cyst.  5. Trace distal semimembranosus bursitis.        Electronically signed by: Gurpreet Mcallister MD  Date:                                            05/06/2021  Time:                                           09:11    I have reviewed the above radiograph and agree with the findings stated by the radiologist.         Assessment:       1. Primary osteoarthritis of left knee    2. Chronic pain of left knee    3. Acute medial meniscus tear, left, initial encounter          Plan:         Azucena was seen today for pain.    Diagnoses and all orders for this visit:    Primary osteoarthritis of left knee  -     Large Joint Aspiration/Injection: L knee    Chronic pain of left knee  -     Ambulatory referral/consult to Orthopedics    Acute medial meniscus tear, left, initial encounter      Patient does have a medial meniscus tear causing mechanical symptoms but has moderate amount of arthritis in her medial compartment. I will give her a corticosteroid injection today to help with pain. I would like her to f/u with Dr. Medrano to see if she would be a candidate for arthroscopy. If not, consider Durolane injection.         Lyndsey Nassar PA-C   03/17/2022

## 2022-03-17 NOTE — PROCEDURES
Large Joint Aspiration/Injection: L knee    Date/Time: 3/17/2022 9:45 AM  Performed by: Lyndsey Nassar PA-C  Authorized by: Lyndsey Nassar PA-C     Consent Done?:  Yes (Verbal)  Indications:  Pain  Site marked: the procedure site was marked    Timeout: prior to procedure the correct patient, procedure, and site was verified    Prep: patient was prepped and draped in usual sterile fashion    Local anesthesia used?: No    Local anesthetic:  Topical anesthetic and lidocaine 1% without epinephrine    Details:  Needle Size:  22 G  Ultrasonic Guidance for needle placement?: No    Approach:  Anterolateral  Location:  Knee  Site:  L knee  Medications:  40 mg triamcinolone acetonide 40 mg/mL  Patient tolerance:  Patient tolerated the procedure well with no immediate complications

## 2022-03-29 ENCOUNTER — OFFICE VISIT (OUTPATIENT)
Dept: ORTHOPEDICS | Facility: CLINIC | Age: 72
End: 2022-03-29
Payer: MEDICARE

## 2022-03-29 VITALS
DIASTOLIC BLOOD PRESSURE: 84 MMHG | BODY MASS INDEX: 22.73 KG/M2 | HEART RATE: 64 BPM | WEIGHT: 112.75 LBS | HEIGHT: 59 IN | SYSTOLIC BLOOD PRESSURE: 155 MMHG

## 2022-03-29 DIAGNOSIS — M17.12 PRIMARY OSTEOARTHRITIS OF LEFT KNEE: Primary | ICD-10-CM

## 2022-03-29 DIAGNOSIS — S83.242A ACUTE MEDIAL MENISCUS TEAR, LEFT, INITIAL ENCOUNTER: ICD-10-CM

## 2022-03-29 PROCEDURE — 1125F PR PAIN SEVERITY QUANTIFIED, PAIN PRESENT: ICD-10-PCS | Mod: CPTII,S$GLB,, | Performed by: ORTHOPAEDIC SURGERY

## 2022-03-29 PROCEDURE — 3008F PR BODY MASS INDEX (BMI) DOCUMENTED: ICD-10-PCS | Mod: CPTII,S$GLB,, | Performed by: ORTHOPAEDIC SURGERY

## 2022-03-29 PROCEDURE — 3079F PR MOST RECENT DIASTOLIC BLOOD PRESSURE 80-89 MM HG: ICD-10-PCS | Mod: CPTII,S$GLB,, | Performed by: ORTHOPAEDIC SURGERY

## 2022-03-29 PROCEDURE — 1125F AMNT PAIN NOTED PAIN PRSNT: CPT | Mod: CPTII,S$GLB,, | Performed by: ORTHOPAEDIC SURGERY

## 2022-03-29 PROCEDURE — 3008F BODY MASS INDEX DOCD: CPT | Mod: CPTII,S$GLB,, | Performed by: ORTHOPAEDIC SURGERY

## 2022-03-29 PROCEDURE — 3077F SYST BP >= 140 MM HG: CPT | Mod: CPTII,S$GLB,, | Performed by: ORTHOPAEDIC SURGERY

## 2022-03-29 PROCEDURE — 3079F DIAST BP 80-89 MM HG: CPT | Mod: CPTII,S$GLB,, | Performed by: ORTHOPAEDIC SURGERY

## 2022-03-29 PROCEDURE — 3288F PR FALLS RISK ASSESSMENT DOCUMENTED: ICD-10-PCS | Mod: CPTII,S$GLB,, | Performed by: ORTHOPAEDIC SURGERY

## 2022-03-29 PROCEDURE — 99999 PR PBB SHADOW E&M-EST. PATIENT-LVL III: CPT | Mod: PBBFAC,,, | Performed by: ORTHOPAEDIC SURGERY

## 2022-03-29 PROCEDURE — 1101F PR PT FALLS ASSESS DOC 0-1 FALLS W/OUT INJ PAST YR: ICD-10-PCS | Mod: CPTII,S$GLB,, | Performed by: ORTHOPAEDIC SURGERY

## 2022-03-29 PROCEDURE — 3288F FALL RISK ASSESSMENT DOCD: CPT | Mod: CPTII,S$GLB,, | Performed by: ORTHOPAEDIC SURGERY

## 2022-03-29 PROCEDURE — 99214 PR OFFICE/OUTPT VISIT, EST, LEVL IV, 30-39 MIN: ICD-10-PCS | Mod: S$GLB,,, | Performed by: ORTHOPAEDIC SURGERY

## 2022-03-29 PROCEDURE — 99214 OFFICE O/P EST MOD 30 MIN: CPT | Mod: S$GLB,,, | Performed by: ORTHOPAEDIC SURGERY

## 2022-03-29 PROCEDURE — 1159F PR MEDICATION LIST DOCUMENTED IN MEDICAL RECORD: ICD-10-PCS | Mod: CPTII,S$GLB,, | Performed by: ORTHOPAEDIC SURGERY

## 2022-03-29 PROCEDURE — 1101F PT FALLS ASSESS-DOCD LE1/YR: CPT | Mod: CPTII,S$GLB,, | Performed by: ORTHOPAEDIC SURGERY

## 2022-03-29 PROCEDURE — 3077F PR MOST RECENT SYSTOLIC BLOOD PRESSURE >= 140 MM HG: ICD-10-PCS | Mod: CPTII,S$GLB,, | Performed by: ORTHOPAEDIC SURGERY

## 2022-03-29 PROCEDURE — 99999 PR PBB SHADOW E&M-EST. PATIENT-LVL III: ICD-10-PCS | Mod: PBBFAC,,, | Performed by: ORTHOPAEDIC SURGERY

## 2022-03-29 PROCEDURE — 1159F MED LIST DOCD IN RCRD: CPT | Mod: CPTII,S$GLB,, | Performed by: ORTHOPAEDIC SURGERY

## 2022-03-29 NOTE — PROGRESS NOTES
Subjective:      Patient ID: Azucena Luciano is a 71 y.o. female.    Chief Complaint: Pain of the Left Knee    Knee Pain: left  swelling: yes  worsens with activity: yes  relieved by: nsaid's  Mri shows knee oa and men tear  Overall pain is 3/10 and manageable  Giving out symptoms are improving  Was discharged from therapy because was doing well      Social History     Occupational History    Occupation: Retired insurance     Tobacco Use    Smoking status: Never Smoker    Smokeless tobacco: Never Used   Substance and Sexual Activity    Alcohol use: Yes     Alcohol/week: 0.0 standard drinks     Comment: rare    Drug use: No    Sexual activity: Yes     Partners: Male      Review of Systems   Constitutional: Negative for diaphoresis.   HENT: Negative for ear discharge, nosebleeds and stridor.    Eyes: Negative for photophobia.   Cardiovascular: Negative for syncope.   Respiratory: Negative for hemoptysis, shortness of breath and wheezing.    Neurological: Negative for tremors.   Psychiatric/Behavioral: Negative.          Objective:    General    Constitutional: She is oriented to person, place, and time. She appears well-developed and well-nourished.   HENT:   Head: Normocephalic and atraumatic.   Right Ear: External ear normal.   Left Ear: External ear normal.   Eyes: EOM are normal.   Pulmonary/Chest: Effort normal.   Neurological: She is alert and oriented to person, place, and time.   Psychiatric: She has a normal mood and affect. Her behavior is normal. Judgment and thought content normal.     General Musculoskeletal Exam   Gait: antalgic and abnormal         Left Knee Exam     Inspection   Swelling: present  Effusion: present    Tenderness   The patient tender to palpation of the medial joint line.    Crepitus   The patient has crepitus of the patella.    Other   Sensation: normal    Muscle Strength   Left Lower Extremity   Quadriceps:  4/5   Hamstrin/5          Assessment:       1. Primary  osteoarthritis of left knee    2. Acute medial meniscus tear, left, initial encounter          Plan:       Will cont observation  If pain persists or worsens can consider knee scope in the future

## 2022-05-03 ENCOUNTER — TELEPHONE (OUTPATIENT)
Dept: INTERNAL MEDICINE | Facility: CLINIC | Age: 72
End: 2022-05-03
Payer: MEDICARE

## 2022-05-03 RX ORDER — NITROFURANTOIN 25; 75 MG/1; MG/1
100 CAPSULE ORAL 2 TIMES DAILY
Qty: 14 CAPSULE | Refills: 0 | Status: SHIPPED | OUTPATIENT
Start: 2022-05-03 | End: 2023-08-30

## 2022-05-03 NOTE — TELEPHONE ENCOUNTER
----- Message from Gabbi Alfonso sent at 5/3/2022  9:46 AM CDT -----  Contact: Pt [phone] or [phone]  Patient is calling for Medical Advice regarding: Burning when urinating     How long has patient had these symptoms: 2 days     Pharmacy name and phone#:   [facility] [place] - [address]  [address] [facility]  [place] [address]  Phone: [phone] Fax: [phone]      Would like response via iPointerhart:  call back     Comments: pt stated that she is leaving to go out of town tomorrow.

## 2022-05-12 DIAGNOSIS — M81.0 OSTEOPOROSIS, UNSPECIFIED OSTEOPOROSIS TYPE, UNSPECIFIED PATHOLOGICAL FRACTURE PRESENCE: ICD-10-CM

## 2022-05-12 RX ORDER — ALENDRONATE SODIUM 70 MG/1
TABLET ORAL
Qty: 12 TABLET | Refills: 3 | Status: SHIPPED | OUTPATIENT
Start: 2022-05-12 | End: 2023-07-15

## 2022-05-30 ENCOUNTER — OFFICE VISIT (OUTPATIENT)
Dept: DERMATOLOGY | Facility: CLINIC | Age: 72
End: 2022-05-30
Payer: MEDICARE

## 2022-05-30 VITALS — WEIGHT: 112 LBS | BODY MASS INDEX: 22.62 KG/M2

## 2022-05-30 DIAGNOSIS — L72.0 MILIA: ICD-10-CM

## 2022-05-30 DIAGNOSIS — D18.01 CHERRY ANGIOMA: ICD-10-CM

## 2022-05-30 DIAGNOSIS — L82.1 SEBORRHEIC KERATOSES: Primary | ICD-10-CM

## 2022-05-30 DIAGNOSIS — L81.4 LENTIGINES: ICD-10-CM

## 2022-05-30 DIAGNOSIS — Z12.83 SKIN CANCER SCREENING: ICD-10-CM

## 2022-05-30 PROCEDURE — 99203 PR OFFICE/OUTPT VISIT, NEW, LEVL III, 30-44 MIN: ICD-10-PCS | Mod: 25,S$GLB,, | Performed by: DERMATOLOGY

## 2022-05-30 PROCEDURE — 1159F PR MEDICATION LIST DOCUMENTED IN MEDICAL RECORD: ICD-10-PCS | Mod: CPTII,S$GLB,, | Performed by: DERMATOLOGY

## 2022-05-30 PROCEDURE — 99999 PR PBB SHADOW E&M-EST. PATIENT-LVL III: CPT | Mod: PBBFAC,,, | Performed by: DERMATOLOGY

## 2022-05-30 PROCEDURE — 1160F PR REVIEW ALL MEDS BY PRESCRIBER/CLIN PHARMACIST DOCUMENTED: ICD-10-PCS | Mod: CPTII,S$GLB,, | Performed by: DERMATOLOGY

## 2022-05-30 PROCEDURE — 99999 PR PBB SHADOW E&M-EST. PATIENT-LVL III: ICD-10-PCS | Mod: PBBFAC,,, | Performed by: DERMATOLOGY

## 2022-05-30 PROCEDURE — 3008F PR BODY MASS INDEX (BMI) DOCUMENTED: ICD-10-PCS | Mod: CPTII,S$GLB,, | Performed by: DERMATOLOGY

## 2022-05-30 PROCEDURE — 1159F MED LIST DOCD IN RCRD: CPT | Mod: CPTII,S$GLB,, | Performed by: DERMATOLOGY

## 2022-05-30 PROCEDURE — 99203 OFFICE O/P NEW LOW 30 MIN: CPT | Mod: 25,S$GLB,, | Performed by: DERMATOLOGY

## 2022-05-30 PROCEDURE — 17110 DESTRUCTION B9 LES UP TO 14: CPT | Mod: S$GLB,,, | Performed by: DERMATOLOGY

## 2022-05-30 PROCEDURE — 3288F PR FALLS RISK ASSESSMENT DOCUMENTED: ICD-10-PCS | Mod: CPTII,S$GLB,, | Performed by: DERMATOLOGY

## 2022-05-30 PROCEDURE — 1160F RVW MEDS BY RX/DR IN RCRD: CPT | Mod: CPTII,S$GLB,, | Performed by: DERMATOLOGY

## 2022-05-30 PROCEDURE — 1126F AMNT PAIN NOTED NONE PRSNT: CPT | Mod: CPTII,S$GLB,, | Performed by: DERMATOLOGY

## 2022-05-30 PROCEDURE — 1126F PR PAIN SEVERITY QUANTIFIED, NO PAIN PRESENT: ICD-10-PCS | Mod: CPTII,S$GLB,, | Performed by: DERMATOLOGY

## 2022-05-30 PROCEDURE — 17110 PR DESTRUCTION BENIGN LESIONS UP TO 14: ICD-10-PCS | Mod: S$GLB,,, | Performed by: DERMATOLOGY

## 2022-05-30 PROCEDURE — 1101F PT FALLS ASSESS-DOCD LE1/YR: CPT | Mod: CPTII,S$GLB,, | Performed by: DERMATOLOGY

## 2022-05-30 PROCEDURE — 1101F PR PT FALLS ASSESS DOC 0-1 FALLS W/OUT INJ PAST YR: ICD-10-PCS | Mod: CPTII,S$GLB,, | Performed by: DERMATOLOGY

## 2022-05-30 PROCEDURE — 3008F BODY MASS INDEX DOCD: CPT | Mod: CPTII,S$GLB,, | Performed by: DERMATOLOGY

## 2022-05-30 PROCEDURE — 3288F FALL RISK ASSESSMENT DOCD: CPT | Mod: CPTII,S$GLB,, | Performed by: DERMATOLOGY

## 2022-05-30 NOTE — PROGRESS NOTES
Subjective:       Patient ID:  Azucena Luciano is a 71 y.o. female who presents for   Chief Complaint   Patient presents with    Lesion     Back, itchy, several months     Skin Check     UBSE     Spot on back which itches does not bleed      Review of Systems   Constitutional: Negative for fever, chills, weight loss, weight gain, fatigue, night sweats and malaise.   Skin: Positive for daily sunscreen use, activity-related sunscreen use and wears hat.   Hematologic/Lymphatic: Does not bruise/bleed easily.        Objective:    Physical Exam   Constitutional: She appears well-developed and well-nourished. No distress.   Neurological: She is alert and oriented to person, place, and time. She is not disoriented.   Psychiatric: She has a normal mood and affect.   Skin:   Areas Examined (abnormalities noted in diagram):   Head / Face Inspection Performed  Neck Inspection Performed  Chest / Axilla Inspection Performed  Back Inspection Performed  RUE Inspected  LUE Inspection Performed                   Diagram Legend     Erythematous scaling macule/papule c/w actinic keratosis       Vascular papule c/w angioma      Pigmented verrucoid papule/plaque c/w seborrheic keratosis      Yellow umbilicated papule c/w sebaceous hyperplasia      Irregularly shaped tan macule c/w lentigo     1-2 mm smooth white papules consistent with Milia      Movable subcutaneous cyst with punctum c/w epidermal inclusion cyst      Subcutaneous movable cyst c/w pilar cyst      Firm pink to brown papule c/w dermatofibroma      Pedunculated fleshy papule(s) c/w skin tag(s)      Evenly pigmented macule c/w junctional nevus     Mildly variegated pigmented, slightly irregular-bordered macule c/w mildly atypical nevus      Flesh colored to evenly pigmented papule c/w intradermal nevus       Pink pearly papule/plaque c/w basal cell carcinoma      Erythematous hyperkeratotic cursted plaque c/w SCC      Surgical scar with no sign of skin cancer  "recurrence      Open and closed comedones      Inflammatory papules and pustules      Verrucoid papule consistent consistent with wart     Erythematous eczematous patches and plaques     Dystrophic onycholytic nail with subungual debris c/w onychomycosis     Umbilicated papule    Erythematous-base heme-crusted tan verrucoid plaque consistent with inflamed seborrheic keratosis     Erythematous Silvery Scaling Plaque c/w Psoriasis     See annotation      Assessment / Plan:        Seborrheic keratoses  Cryosurgery procedure note:    Verbal consent from the patient is obtained including, but not limited to, risk of hypopigmentation/hyperpigmentation, scar, recurrence of lesion. Liquid nitrogen cryosurgery is applied to 1 lesion on back to produce a freeze injury.   sk Brochure provided        Lentigines  The "ABCD" rules to observe pigmented lesions were reviewed.  sunscreen    Cherry angiomas  reassurance      Milia  reassurance      Skin cancer screening  on. No lesions suspicious for malignancy noted.    Recommend daily sun protection/avoidance and use of at least SPF 30, broad spectrum sunscreen (OTC drug).                Follow up in about 1 year (around 5/30/2023).  " none

## 2022-06-07 ENCOUNTER — PES CALL (OUTPATIENT)
Dept: ADMINISTRATIVE | Facility: CLINIC | Age: 72
End: 2022-06-07
Payer: MEDICARE

## 2022-06-07 NOTE — PROVATION PATIENT INSTRUCTIONS
[address], [address], [address] [place] [address]  P: [phone]       Patient     Bren Dill  2005    ChiefComplaint     Chief Complaint   Patient presents with   Therese Vitale     Mom states that Sunday into Monday there was a lot of bleeding- Patient states that she was puking anf the \"blood wouldn't stop\"       History of Present Illness     Chester Mello is a 80-year-old female 10 days status post tonsillectomy for chronic tonsillitis. Overall postop course doing well. Had episode of bleeding 2 days ago that resolved with gargling of ice water. Reports pain is 7 out of 10, wanting to go to [facility]. Past Medical History     Past Medical History:   Diagnosis Date    Broken finger     Left hand ring finger        Past Surgical History     Past Surgical History:   Procedure Laterality Date    FINGER SURGERY      TONSILLECTOMY N/A 5/26/2022    TONSILLECTOMY performed by Amy Zurita DO at [facility] OR       Family History     Family History   Problem Relation Age of Onset    High Blood Pressure Father        Social History     Social History     Tobacco Use    Smoking status: Never Smoker    Smokeless tobacco: Never Used   Vaping Use    Vaping Use: Never used   Substance Use Topics    Alcohol use: Never    Drug use: Never        Allergies     No Known Allergies    Medications     Current Outpatient Medications   Medication Sig Dispense Refill    methylPREDNISolone (MEDROL DOSEPACK) 4 MG tablet Take by mouth. 1 kit 0    medroxyPROGESTERone Acetate (DEPO-PROVERA IM) Inject into the muscle       No current facility-administered medications for this visit. PhysicalExam     Vitals:    06/07/22 0824   Resp: 16   Temp: 97.7 °F (36.5 °C)   TempSrc: Infrared   Weight: (!) 250 lb (113.4 kg)   Height: 5' 5\" (1.651 m)       Bilateral tonsillar fossa well-healing. Punctate area of right mid fossa with granulation tissue        Assessment and Plan     1.  S/P Discharge Summary/Instructions after an Endoscopic Procedure  Patient Name: Azucena Luciano  Patient MRN: [id number]  Patient YOB: 1950 Tuesday, August 07, 2018  Dionte Vernon MD  RESTRICTIONS:  During your procedure today, you received medications for sedation.  These   medications may affect your judgment, balance and coordination.  Therefore,   for 24 hours, you have the following restrictions:   - DO NOT drive a car, operate machinery, make legal/financial decisions,   sign important papers or drink alcohol.    ACTIVITY:  Today: no heavy lifting, straining or running due to procedural   sedation/anesthesia.  The following day: return to full activity including work.  DIET:  Eat and drink normally unless instructed otherwise.     TREATMENT FOR COMMON SIDE EFFECTS:  - Mild abdominal pain, nausea, belching, bloating or excessive gas:  rest,   eat lightly and use a heating pad.  - Sore Throat: treat with throat lozenges and/or gargle with warm salt   water.  - Because air was used during the procedure, expelling large amounts of air   from your rectum or belching is normal.  - If a bowel prep was taken, you may not have a bowel movement for 1-3 days.    This is normal.  SYMPTOMS TO WATCH FOR AND REPORT TO YOUR PHYSICIAN:  1. Abdominal pain or bloating, other than gas cramps.  2. Chest pain.  3. Back pain.  4. Signs of infection such as: chills or fever occurring within 24 hours   after the procedure.  5. Rectal bleeding, which would show as bright red, maroon, or black stools.   (A tablespoon of blood from the rectum is not serious, especially if   hemorrhoids are present.)  6. Vomiting.  7. Weakness or dizziness.  GO DIRECTLY TO THE NEAREST EMERGENCY ROOM IF YOU HAVE ANY OF THE FOLLOWING:      Difficulty breathing              Chills and/or fever over 101 F   Persistent vomiting and/or vomiting blood   Severe abdominal pain   Severe chest pain   Black, tarry stools   Bleeding- more than one  tablespoon   Any other symptom or condition that you feel may need urgent attention  Your doctor recommends these additional instructions:  If any biopsies were taken, your doctors clinic will contact you in 1 to 2   weeks with any results.  - Discharge patient to home (ambulatory).   - Patient has a contact number available for emergencies.  The signs and   symptoms of potential delayed complications were discussed with the   patient.  Return to normal activities tomorrow.  Written discharge   instructions were provided to the patient.   - Resume previous diet.   - Continue present medications.   - Resume Eliquis (apixaban) at prior dose today.  Refer to managing   physician for further adjustment of therapy.   - Repeat colonoscopy in 5 years for screening purposes.  For questions, problems or results please call your physician - Dionte Vernon MD at Work:  [phone].  [facility], EMERGENCY ROOM PHONE NUMBER: [phone]  IF A COMPLICATION OR EMERGENCY SITUATION ARISES AND YOU ARE UNABLE TO REACH   YOUR PHYSICIAN - GO DIRECTLY TO THE EMERGENCY ROOM.  Dionte Vernon MD  8/7/2018 12:46:24 PM  This report has been verified and signed electronically.  PROVATION   tonsillectomy    2. Post-op pain    - methylPREDNISolone (MEDROL DOSEPACK) 4 MG tablet; Take by mouth. Dispense: 1 kit; Refill: 0      10 days status post tonsillectomy doing well. Episode of postop bleeding yesterday resolved with ice water. Discussed Tylenol Motrin for pain and steroid. She will call or message with any concerns. Benito Ibarra DO  6/7/22      Portions of this note were dictated using Dragon.  There may be linguistic errors secondary to the use of this program.

## 2022-07-05 ENCOUNTER — OFFICE VISIT (OUTPATIENT)
Dept: ORTHOPEDICS | Facility: CLINIC | Age: 72
End: 2022-07-05
Payer: MEDICARE

## 2022-07-05 VITALS
WEIGHT: 114.88 LBS | HEART RATE: 67 BPM | BODY MASS INDEX: 23.2 KG/M2 | SYSTOLIC BLOOD PRESSURE: 164 MMHG | DIASTOLIC BLOOD PRESSURE: 80 MMHG

## 2022-07-05 DIAGNOSIS — M17.12 PRIMARY OSTEOARTHRITIS OF LEFT KNEE: Primary | ICD-10-CM

## 2022-07-05 DIAGNOSIS — S83.242A ACUTE MEDIAL MENISCUS TEAR, LEFT, INITIAL ENCOUNTER: ICD-10-CM

## 2022-07-05 PROCEDURE — 1160F RVW MEDS BY RX/DR IN RCRD: CPT | Mod: CPTII,S$GLB,, | Performed by: PHYSICIAN ASSISTANT

## 2022-07-05 PROCEDURE — 1159F PR MEDICATION LIST DOCUMENTED IN MEDICAL RECORD: ICD-10-PCS | Mod: CPTII,S$GLB,, | Performed by: PHYSICIAN ASSISTANT

## 2022-07-05 PROCEDURE — 1125F AMNT PAIN NOTED PAIN PRSNT: CPT | Mod: CPTII,S$GLB,, | Performed by: PHYSICIAN ASSISTANT

## 2022-07-05 PROCEDURE — 99999 PR PBB SHADOW E&M-EST. PATIENT-LVL III: CPT | Mod: PBBFAC,,, | Performed by: PHYSICIAN ASSISTANT

## 2022-07-05 PROCEDURE — 99499 UNLISTED E&M SERVICE: CPT | Mod: S$GLB,,, | Performed by: PHYSICIAN ASSISTANT

## 2022-07-05 PROCEDURE — 3079F DIAST BP 80-89 MM HG: CPT | Mod: CPTII,S$GLB,, | Performed by: PHYSICIAN ASSISTANT

## 2022-07-05 PROCEDURE — 99499 NO LOS: ICD-10-PCS | Mod: S$GLB,,, | Performed by: PHYSICIAN ASSISTANT

## 2022-07-05 PROCEDURE — 3077F PR MOST RECENT SYSTOLIC BLOOD PRESSURE >= 140 MM HG: ICD-10-PCS | Mod: CPTII,S$GLB,, | Performed by: PHYSICIAN ASSISTANT

## 2022-07-05 PROCEDURE — 3288F PR FALLS RISK ASSESSMENT DOCUMENTED: ICD-10-PCS | Mod: CPTII,S$GLB,, | Performed by: PHYSICIAN ASSISTANT

## 2022-07-05 PROCEDURE — 1159F MED LIST DOCD IN RCRD: CPT | Mod: CPTII,S$GLB,, | Performed by: PHYSICIAN ASSISTANT

## 2022-07-05 PROCEDURE — 3288F FALL RISK ASSESSMENT DOCD: CPT | Mod: CPTII,S$GLB,, | Performed by: PHYSICIAN ASSISTANT

## 2022-07-05 PROCEDURE — 3008F BODY MASS INDEX DOCD: CPT | Mod: CPTII,S$GLB,, | Performed by: PHYSICIAN ASSISTANT

## 2022-07-05 PROCEDURE — 1125F PR PAIN SEVERITY QUANTIFIED, PAIN PRESENT: ICD-10-PCS | Mod: CPTII,S$GLB,, | Performed by: PHYSICIAN ASSISTANT

## 2022-07-05 PROCEDURE — 1101F PR PT FALLS ASSESS DOC 0-1 FALLS W/OUT INJ PAST YR: ICD-10-PCS | Mod: CPTII,S$GLB,, | Performed by: PHYSICIAN ASSISTANT

## 2022-07-05 PROCEDURE — 3008F PR BODY MASS INDEX (BMI) DOCUMENTED: ICD-10-PCS | Mod: CPTII,S$GLB,, | Performed by: PHYSICIAN ASSISTANT

## 2022-07-05 PROCEDURE — 1101F PT FALLS ASSESS-DOCD LE1/YR: CPT | Mod: CPTII,S$GLB,, | Performed by: PHYSICIAN ASSISTANT

## 2022-07-05 PROCEDURE — 99999 PR PBB SHADOW E&M-EST. PATIENT-LVL III: ICD-10-PCS | Mod: PBBFAC,,, | Performed by: PHYSICIAN ASSISTANT

## 2022-07-05 PROCEDURE — 1160F PR REVIEW ALL MEDS BY PRESCRIBER/CLIN PHARMACIST DOCUMENTED: ICD-10-PCS | Mod: CPTII,S$GLB,, | Performed by: PHYSICIAN ASSISTANT

## 2022-07-05 PROCEDURE — 3079F PR MOST RECENT DIASTOLIC BLOOD PRESSURE 80-89 MM HG: ICD-10-PCS | Mod: CPTII,S$GLB,, | Performed by: PHYSICIAN ASSISTANT

## 2022-07-05 PROCEDURE — 3077F SYST BP >= 140 MM HG: CPT | Mod: CPTII,S$GLB,, | Performed by: PHYSICIAN ASSISTANT

## 2022-07-05 PROCEDURE — 20610 DRAIN/INJ JOINT/BURSA W/O US: CPT | Mod: LT,S$GLB,, | Performed by: PHYSICIAN ASSISTANT

## 2022-07-05 PROCEDURE — 20610 LARGE JOINT ASPIRATION/INJECTION: L KNEE: ICD-10-PCS | Mod: LT,S$GLB,, | Performed by: PHYSICIAN ASSISTANT

## 2022-07-05 RX ORDER — TRIAMCINOLONE ACETONIDE 40 MG/ML
40 INJECTION, SUSPENSION INTRA-ARTICULAR; INTRAMUSCULAR
Status: DISCONTINUED | OUTPATIENT
Start: 2022-07-05 | End: 2022-07-05 | Stop reason: HOSPADM

## 2022-07-05 RX ADMIN — TRIAMCINOLONE ACETONIDE 40 MG: 40 INJECTION, SUSPENSION INTRA-ARTICULAR; INTRAMUSCULAR at 01:07

## 2022-07-05 NOTE — PROGRESS NOTES
Subjective:      Patient ID: Azucena Luciano is a 71 y.o. female.    Chief Complaint: Pain of the Left Knee      71-year-old female follow-up left knee osteoarthritis and left knee meniscus tear.  She gets relief from corticosteroid injections.  She did see Dr. Medrano to discuss possible knee scope but her symptoms were manageable at that time.  She states they are still manageable and she is doing water aerobics to help with her instability.      Review of Systems   Constitutional: Negative for chills and fever.   Cardiovascular: Negative for chest pain.   Respiratory: Negative for cough.    Hematologic/Lymphatic: Does not bruise/bleed easily.   Skin: Negative for poor wound healing and rash.   Musculoskeletal: Positive for arthritis, joint pain, myalgias and stiffness.   Gastrointestinal: Negative for abdominal pain.   Genitourinary: Negative for bladder incontinence.   Neurological: Negative for dizziness, loss of balance and weakness.   Psychiatric/Behavioral: Negative for altered mental status.       Review of patient's allergies indicates:   Allergen Reactions    Cayenne pepper Anaphylaxis        Current Outpatient Medications   Medication Sig Dispense Refill    alendronate (FOSAMAX) 70 MG tablet TAKE ONE TABLET BY MOUTH ONCE WEEKLY 12 tablet 3    atorvastatin (LIPITOR) 20 MG tablet TAKE ONE TABLET BY MOUTH EVERY DAY 90 tablet 3    calcium-vitamin D3 500 mg(1,250mg) -200 unit per tablet Take 1 tablet by mouth 2 (two) times daily with meals.      fluticasone (FLONASE) 50 mcg/actuation nasal spray 1 spray by Each Nare route once daily.      magnesium hydroxide 400 mg/5 ml (MILK OF MAGNESIA) 400 mg/5 mL Susp Take 30 mLs by mouth once daily.      multivitamin with minerals tablet Take 1 tablet by mouth once daily.      nitrofurantoin, macrocrystal-monohydrate, (MACROBID) 100 MG capsule Take 1 capsule (100 mg total) by mouth 2 (two) times daily. 14 capsule 0    oxymetazoline (AFRIN) 0.05 % nasal spray 1  spray by Nasal route every evening.      zolpidem (AMBIEN) 5 MG Tab TAKE ONE TABLET BY MOUTH AT BEDTIME 30 tablet 3     No current facility-administered medications for this visit.        The patient's relevant past medical, surgical, and social history was reviewed in Epic.       Objective:      VITAL SIGNS: BP (!) 164/80   Pulse 67   Wt 52.1 kg (114 lb 13.8 oz)   BMI 23.20 kg/m²     Ortho/SPM Exam         Assessment:       1. Primary osteoarthritis of left knee    2. Acute medial meniscus tear, left, initial encounter          Plan:         Azucena was seen today for pain.    Diagnoses and all orders for this visit:    Primary osteoarthritis of left knee  -     Large Joint Aspiration/Injection: L knee    Acute medial meniscus tear, left, initial encounter  -     Large Joint Aspiration/Injection: L knee        Repeat left knee Kenalog injection.  Continue water aerobics.  Follow-up in 3 months or as needed.      Lyndsey Nassar PA-C   07/05/2022

## 2022-07-05 NOTE — PROCEDURES
Large Joint Aspiration/Injection: L knee    Date/Time: 7/5/2022 1:45 PM  Performed by: Lyndsey Nassar PA-C  Authorized by: Lyndsey Nassar PA-C     Consent Done?:  Yes (Verbal)  Indications:  Pain  Site marked: the procedure site was marked    Timeout: prior to procedure the correct patient, procedure, and site was verified    Prep: patient was prepped and draped in usual sterile fashion    Local anesthesia used?: No    Local anesthetic:  Topical anesthetic and lidocaine 1% without epinephrine    Details:  Needle Size:  22 G  Ultrasonic Guidance for needle placement?: No    Approach:  Anterolateral  Location:  Knee  Site:  L knee  Medications:  40 mg triamcinolone acetonide 40 mg/mL  Patient tolerance:  Patient tolerated the procedure well with no immediate complications

## 2022-07-13 ENCOUNTER — TELEPHONE (OUTPATIENT)
Dept: INTERNAL MEDICINE | Facility: CLINIC | Age: 72
End: 2022-07-13
Payer: MEDICARE

## 2022-07-13 DIAGNOSIS — I10 HTN, GOAL BELOW 130/80: Primary | ICD-10-CM

## 2022-07-13 DIAGNOSIS — E78.5 HYPERLIPIDEMIA, UNSPECIFIED HYPERLIPIDEMIA TYPE: ICD-10-CM

## 2022-07-13 NOTE — TELEPHONE ENCOUNTER
----- Message from Gabbi Alfonso sent at 7/13/2022  2:35 PM CDT -----  Contact: Pt [phone]  Pt has her annual visit scheduled for 7/25 and would like labs done before.     Please call and advise

## 2022-07-18 ENCOUNTER — LAB VISIT (OUTPATIENT)
Dept: LAB | Facility: HOSPITAL | Age: 72
End: 2022-07-18
Attending: INTERNAL MEDICINE
Payer: MEDICARE

## 2022-07-18 DIAGNOSIS — I10 HTN, GOAL BELOW 130/80: ICD-10-CM

## 2022-07-18 DIAGNOSIS — E78.5 HYPERLIPIDEMIA, UNSPECIFIED HYPERLIPIDEMIA TYPE: ICD-10-CM

## 2022-07-18 LAB
ALBUMIN SERPL BCP-MCNC: 4 G/DL (ref 3.5–5.2)
ALP SERPL-CCNC: 103 U/L (ref 55–135)
ALT SERPL W/O P-5'-P-CCNC: 24 U/L (ref 10–44)
ANION GAP SERPL CALC-SCNC: 9 MMOL/L (ref 8–16)
AST SERPL-CCNC: 24 U/L (ref 10–40)
BASOPHILS # BLD AUTO: 0.07 K/UL (ref 0–0.2)
BASOPHILS NFR BLD: 1 % (ref 0–1.9)
BILIRUB SERPL-MCNC: 0.6 MG/DL (ref 0.1–1)
BUN SERPL-MCNC: 17 MG/DL (ref 8–23)
CALCIUM SERPL-MCNC: 9.4 MG/DL (ref 8.7–10.5)
CHLORIDE SERPL-SCNC: 106 MMOL/L (ref 95–110)
CHOLEST SERPL-MCNC: 178 MG/DL (ref 120–199)
CHOLEST/HDLC SERPL: 3.4 {RATIO} (ref 2–5)
CO2 SERPL-SCNC: 26 MMOL/L (ref 23–29)
CREAT SERPL-MCNC: 0.8 MG/DL (ref 0.5–1.4)
DIFFERENTIAL METHOD: ABNORMAL
EOSINOPHIL # BLD AUTO: 0.1 K/UL (ref 0–0.5)
EOSINOPHIL NFR BLD: 1.9 % (ref 0–8)
ERYTHROCYTE [DISTWIDTH] IN BLOOD BY AUTOMATED COUNT: 14.2 % (ref 11.5–14.5)
EST. GFR  (AFRICAN AMERICAN): >60 ML/MIN/1.73 M^2
EST. GFR  (NON AFRICAN AMERICAN): >60 ML/MIN/1.73 M^2
GLUCOSE SERPL-MCNC: 90 MG/DL (ref 70–110)
HCT VFR BLD AUTO: 46.5 % (ref 37–48.5)
HDLC SERPL-MCNC: 53 MG/DL (ref 40–75)
HDLC SERPL: 29.8 % (ref 20–50)
HGB BLD-MCNC: 14.1 G/DL (ref 12–16)
IMM GRANULOCYTES # BLD AUTO: 0.02 K/UL (ref 0–0.04)
IMM GRANULOCYTES NFR BLD AUTO: 0.3 % (ref 0–0.5)
LDLC SERPL CALC-MCNC: 102.2 MG/DL (ref 63–159)
LYMPHOCYTES # BLD AUTO: 3.2 K/UL (ref 1–4.8)
LYMPHOCYTES NFR BLD: 45.7 % (ref 18–48)
MCH RBC QN AUTO: 27.4 PG (ref 27–31)
MCHC RBC AUTO-ENTMCNC: 30.3 G/DL (ref 32–36)
MCV RBC AUTO: 91 FL (ref 82–98)
MONOCYTES # BLD AUTO: 0.4 K/UL (ref 0.3–1)
MONOCYTES NFR BLD: 6.3 % (ref 4–15)
NEUTROPHILS # BLD AUTO: 3.1 K/UL (ref 1.8–7.7)
NEUTROPHILS NFR BLD: 44.8 % (ref 38–73)
NONHDLC SERPL-MCNC: 125 MG/DL
NRBC BLD-RTO: 0 /100 WBC
PLATELET # BLD AUTO: 257 K/UL (ref 150–450)
PMV BLD AUTO: 11.8 FL (ref 9.2–12.9)
POTASSIUM SERPL-SCNC: 3.8 MMOL/L (ref 3.5–5.1)
PROT SERPL-MCNC: 6.6 G/DL (ref 6–8.4)
RBC # BLD AUTO: 5.14 M/UL (ref 4–5.4)
SODIUM SERPL-SCNC: 141 MMOL/L (ref 136–145)
TRIGL SERPL-MCNC: 114 MG/DL (ref 30–150)
TSH SERPL DL<=0.005 MIU/L-ACNC: 1.19 UIU/ML (ref 0.4–4)
WBC # BLD AUTO: 6.96 K/UL (ref 3.9–12.7)

## 2022-07-18 PROCEDURE — 85025 COMPLETE CBC W/AUTO DIFF WBC: CPT | Performed by: INTERNAL MEDICINE

## 2022-07-18 PROCEDURE — 80053 COMPREHEN METABOLIC PANEL: CPT | Performed by: INTERNAL MEDICINE

## 2022-07-18 PROCEDURE — 84443 ASSAY THYROID STIM HORMONE: CPT | Performed by: INTERNAL MEDICINE

## 2022-07-18 PROCEDURE — 36415 COLL VENOUS BLD VENIPUNCTURE: CPT | Mod: PO | Performed by: INTERNAL MEDICINE

## 2022-07-18 PROCEDURE — 80061 LIPID PANEL: CPT | Performed by: INTERNAL MEDICINE

## 2022-07-25 ENCOUNTER — OFFICE VISIT (OUTPATIENT)
Dept: INTERNAL MEDICINE | Facility: CLINIC | Age: 72
End: 2022-07-25
Payer: MEDICARE

## 2022-07-25 VITALS
WEIGHT: 113.88 LBS | RESPIRATION RATE: 18 BRPM | OXYGEN SATURATION: 98 % | DIASTOLIC BLOOD PRESSURE: 68 MMHG | HEIGHT: 59 IN | BODY MASS INDEX: 22.96 KG/M2 | TEMPERATURE: 97 F | HEART RATE: 61 BPM | SYSTOLIC BLOOD PRESSURE: 120 MMHG

## 2022-07-25 DIAGNOSIS — E78.49 OTHER HYPERLIPIDEMIA: ICD-10-CM

## 2022-07-25 DIAGNOSIS — G47.00 INSOMNIA, UNSPECIFIED TYPE: ICD-10-CM

## 2022-07-25 DIAGNOSIS — I82.502 CHRONIC DEEP VEIN THROMBOSIS (DVT) OF LEFT LOWER EXTREMITY, UNSPECIFIED VEIN: ICD-10-CM

## 2022-07-25 DIAGNOSIS — M81.0 OSTEOPOROSIS, UNSPECIFIED OSTEOPOROSIS TYPE, UNSPECIFIED PATHOLOGICAL FRACTURE PRESENCE: ICD-10-CM

## 2022-07-25 DIAGNOSIS — Z00.00 ANNUAL PHYSICAL EXAM: Primary | ICD-10-CM

## 2022-07-25 PROCEDURE — 99999 PR PBB SHADOW E&M-EST. PATIENT-LVL III: ICD-10-PCS | Mod: PBBFAC,,, | Performed by: INTERNAL MEDICINE

## 2022-07-25 PROCEDURE — 3074F SYST BP LT 130 MM HG: CPT | Mod: CPTII,S$GLB,, | Performed by: INTERNAL MEDICINE

## 2022-07-25 PROCEDURE — 99397 PER PM REEVAL EST PAT 65+ YR: CPT | Mod: GZ,S$GLB,, | Performed by: INTERNAL MEDICINE

## 2022-07-25 PROCEDURE — 99397 PR PREVENTIVE VISIT,EST,65 & OVER: ICD-10-PCS | Mod: GZ,S$GLB,, | Performed by: INTERNAL MEDICINE

## 2022-07-25 PROCEDURE — 99499 UNLISTED E&M SERVICE: CPT | Mod: S$GLB,,, | Performed by: INTERNAL MEDICINE

## 2022-07-25 PROCEDURE — 99499 RISK ADDL DX/OHS AUDIT: ICD-10-PCS | Mod: S$GLB,,, | Performed by: INTERNAL MEDICINE

## 2022-07-25 PROCEDURE — 1159F PR MEDICATION LIST DOCUMENTED IN MEDICAL RECORD: ICD-10-PCS | Mod: CPTII,S$GLB,, | Performed by: INTERNAL MEDICINE

## 2022-07-25 PROCEDURE — 3078F DIAST BP <80 MM HG: CPT | Mod: CPTII,S$GLB,, | Performed by: INTERNAL MEDICINE

## 2022-07-25 PROCEDURE — 3288F FALL RISK ASSESSMENT DOCD: CPT | Mod: CPTII,S$GLB,, | Performed by: INTERNAL MEDICINE

## 2022-07-25 PROCEDURE — 1101F PR PT FALLS ASSESS DOC 0-1 FALLS W/OUT INJ PAST YR: ICD-10-PCS | Mod: CPTII,S$GLB,, | Performed by: INTERNAL MEDICINE

## 2022-07-25 PROCEDURE — 3078F PR MOST RECENT DIASTOLIC BLOOD PRESSURE < 80 MM HG: ICD-10-PCS | Mod: CPTII,S$GLB,, | Performed by: INTERNAL MEDICINE

## 2022-07-25 PROCEDURE — 1126F AMNT PAIN NOTED NONE PRSNT: CPT | Mod: CPTII,S$GLB,, | Performed by: INTERNAL MEDICINE

## 2022-07-25 PROCEDURE — 1126F PR PAIN SEVERITY QUANTIFIED, NO PAIN PRESENT: ICD-10-PCS | Mod: CPTII,S$GLB,, | Performed by: INTERNAL MEDICINE

## 2022-07-25 PROCEDURE — 3074F PR MOST RECENT SYSTOLIC BLOOD PRESSURE < 130 MM HG: ICD-10-PCS | Mod: CPTII,S$GLB,, | Performed by: INTERNAL MEDICINE

## 2022-07-25 PROCEDURE — 3008F PR BODY MASS INDEX (BMI) DOCUMENTED: ICD-10-PCS | Mod: CPTII,S$GLB,, | Performed by: INTERNAL MEDICINE

## 2022-07-25 PROCEDURE — 3288F PR FALLS RISK ASSESSMENT DOCUMENTED: ICD-10-PCS | Mod: CPTII,S$GLB,, | Performed by: INTERNAL MEDICINE

## 2022-07-25 PROCEDURE — 3008F BODY MASS INDEX DOCD: CPT | Mod: CPTII,S$GLB,, | Performed by: INTERNAL MEDICINE

## 2022-07-25 PROCEDURE — 1101F PT FALLS ASSESS-DOCD LE1/YR: CPT | Mod: CPTII,S$GLB,, | Performed by: INTERNAL MEDICINE

## 2022-07-25 PROCEDURE — 99999 PR PBB SHADOW E&M-EST. PATIENT-LVL III: CPT | Mod: PBBFAC,,, | Performed by: INTERNAL MEDICINE

## 2022-07-25 PROCEDURE — 1159F MED LIST DOCD IN RCRD: CPT | Mod: CPTII,S$GLB,, | Performed by: INTERNAL MEDICINE

## 2022-07-25 NOTE — PROGRESS NOTES
Subjective:       Patient ID: Azucena Luciano is a 71 y.o. female.    Chief Complaint: No chief complaint on file.    HPI   71 y.o. Female here for annual exam.      Vaccines: Influenza (deferred); Tetanus (2015); Pneumovax (2015); Zoster (2015)  Eye exam: 11/15  Mammogram: 10/21  Gyn exam: 1/16  Colonoscopy: 8/18  DEXA: 9/21     Mobility: normal  Falls: no     Exercise: cardio/stretching 3x/wk  Diet: regular     Past Medical History:    Carpal tunnel syndrome                                        Asthma               DVT                            HLD    Osteoporosis                    Past Surgical History:    Cataract surgery     OVARIAN CYST REMOVAL                                           FACIAL COSMETIC SURGERY                                        tummy tuck                                                   Social History    Marital Status:              Spouse Name:                       Years of Education:                 Number of children: 3              Occupational History  Occupation          Employer            Comment               Retired insurance *                          Social History Main Topics    Smoking Status: Never Smoker                      Smokeless Status: Not on file                       Alcohol Use: Yes           0.0 oz/week       0 Not specified per week       Comment: rare    Drug Use: No              Sexual Activity: Yes               Partners with: Male     No Known Allergies  Review of Systems   Constitutional: Negative for activity change, appetite change, chills, diaphoresis, fatigue, fever and unexpected weight change.   HENT: Negative for nasal congestion, mouth sores, postnasal drip, rhinorrhea, sinus pressure/congestion, sneezing, sore throat, trouble swallowing and voice change.    Eyes: Negative for pain, discharge and visual disturbance.   Respiratory: Negative for cough, shortness of breath and wheezing.    Cardiovascular: Negative for chest pain,  palpitations and leg swelling.   Gastrointestinal: Negative for abdominal pain, blood in stool, constipation, diarrhea, nausea and vomiting.   Endocrine: Negative for cold intolerance and heat intolerance.   Genitourinary: Negative for difficulty urinating, dysuria, frequency, hematuria and urgency.   Musculoskeletal: Negative for arthralgias and myalgias.   Integumentary:  Negative for rash and wound.   Allergic/Immunologic: Negative for environmental allergies and food allergies.   Neurological: Negative for dizziness, tremors, seizures, syncope, weakness, light-headedness and headaches.   Hematological: Negative for adenopathy. Does not bruise/bleed easily.   Psychiatric/Behavioral: Negative for confusion and sleep disturbance. The patient is not nervous/anxious.          Objective:      Physical Exam  Vitals and nursing note reviewed.   Constitutional:       General: She is not in acute distress.     Appearance: She is well-developed. She is not diaphoretic.   HENT:      Head: Normocephalic and atraumatic.      Right Ear: External ear normal.      Left Ear: External ear normal.      Nose: Nose normal.      Mouth/Throat:      Pharynx: No oropharyngeal exudate.   Eyes:      General: No scleral icterus.        Right eye: No discharge.         Left eye: No discharge.      Conjunctiva/sclera: Conjunctivae normal.      Pupils: Pupils are equal, round, and reactive to light.   Neck:      Thyroid: No thyromegaly.      Vascular: No JVD.   Cardiovascular:      Rate and Rhythm: Normal rate and regular rhythm.      Heart sounds: Normal heart sounds. No murmur heard.  Pulmonary:      Effort: Pulmonary effort is normal. No respiratory distress.      Breath sounds: Normal breath sounds. No wheezing or rales.   Chest:      Chest wall: No tenderness.   Abdominal:      General: Bowel sounds are normal. There is no distension.      Palpations: Abdomen is soft.      Tenderness: There is no abdominal tenderness. There is no  guarding.   Musculoskeletal:      Cervical back: Neck supple.      Right lower leg: No edema.      Left lower leg: No edema.   Lymphadenopathy:      Cervical: No cervical adenopathy.   Skin:     General: Skin is warm and dry.      Coloration: Skin is not pale.      Findings: No rash.   Neurological:      Mental Status: She is alert and oriented to person, place, and time.   Psychiatric:         Judgment: Judgment normal.         Assessment:       Problem List Items Addressed This Visit        Cardiac/Vascular    Other hyperlipidemia       Orthopedic    Osteoporosis       Other    Insomnia      Other Visit Diagnoses     Annual physical exam    -  Primary    Chronic deep vein thrombosis (DVT) of left lower extremity, unspecified vein              Plan:    Blood work reviewed with pt     Hx of LLE DVT- off Apixaban       Followed by Vascular Medicine       Osteoporosis- stable on Fosamax/Calcium/Vitamin D       Last DEXA(9/21)     HLD- controlled on Lipitor 20 mg daily      Insomnia- stable off Ambien 5 mg qHS PRN     F/u in 1 yr

## 2022-08-30 ENCOUNTER — OFFICE VISIT (OUTPATIENT)
Dept: ORTHOPEDICS | Facility: CLINIC | Age: 72
End: 2022-08-30
Payer: MEDICARE

## 2022-08-30 ENCOUNTER — CLINICAL SUPPORT (OUTPATIENT)
Dept: LAB | Facility: HOSPITAL | Age: 72
End: 2022-08-30
Attending: ORTHOPAEDIC SURGERY
Payer: MEDICARE

## 2022-08-30 ENCOUNTER — HOSPITAL ENCOUNTER (OUTPATIENT)
Dept: RADIOLOGY | Facility: HOSPITAL | Age: 72
Discharge: HOME | End: 2022-08-30
Attending: ORTHOPAEDIC SURGERY
Payer: MEDICARE

## 2022-08-30 VITALS
HEIGHT: 59 IN | BODY MASS INDEX: 23.02 KG/M2 | HEART RATE: 60 BPM | WEIGHT: 114.19 LBS | SYSTOLIC BLOOD PRESSURE: 151 MMHG | DIASTOLIC BLOOD PRESSURE: 84 MMHG

## 2022-08-30 DIAGNOSIS — M25.562 ACUTE PAIN OF LEFT KNEE: ICD-10-CM

## 2022-08-30 DIAGNOSIS — M25.562 ACUTE PAIN OF LEFT KNEE: Primary | ICD-10-CM

## 2022-08-30 PROCEDURE — 99215 OFFICE O/P EST HI 40 MIN: CPT | Mod: 25,S$GLB,, | Performed by: ORTHOPAEDIC SURGERY

## 2022-08-30 PROCEDURE — 1159F PR MEDICATION LIST DOCUMENTED IN MEDICAL RECORD: ICD-10-PCS | Mod: CPTII,S$GLB,, | Performed by: ORTHOPAEDIC SURGERY

## 2022-08-30 PROCEDURE — 3077F PR MOST RECENT SYSTOLIC BLOOD PRESSURE >= 140 MM HG: ICD-10-PCS | Mod: CPTII,S$GLB,, | Performed by: ORTHOPAEDIC SURGERY

## 2022-08-30 PROCEDURE — 3288F PR FALLS RISK ASSESSMENT DOCUMENTED: ICD-10-PCS | Mod: CPTII,S$GLB,, | Performed by: ORTHOPAEDIC SURGERY

## 2022-08-30 PROCEDURE — 71045 XR CHEST 1 VIEW PRE-OP: ICD-10-PCS | Mod: 26,,, | Performed by: RADIOLOGY

## 2022-08-30 PROCEDURE — 3079F PR MOST RECENT DIASTOLIC BLOOD PRESSURE 80-89 MM HG: ICD-10-PCS | Mod: CPTII,S$GLB,, | Performed by: ORTHOPAEDIC SURGERY

## 2022-08-30 PROCEDURE — 71045 X-RAY EXAM CHEST 1 VIEW: CPT | Mod: TC,FY

## 2022-08-30 PROCEDURE — 1125F AMNT PAIN NOTED PAIN PRSNT: CPT | Mod: CPTII,S$GLB,, | Performed by: ORTHOPAEDIC SURGERY

## 2022-08-30 PROCEDURE — 3079F DIAST BP 80-89 MM HG: CPT | Mod: CPTII,S$GLB,, | Performed by: ORTHOPAEDIC SURGERY

## 2022-08-30 PROCEDURE — 1125F PR PAIN SEVERITY QUANTIFIED, PAIN PRESENT: ICD-10-PCS | Mod: CPTII,S$GLB,, | Performed by: ORTHOPAEDIC SURGERY

## 2022-08-30 PROCEDURE — 20610 LARGE JOINT ASPIRATION/INJECTION: L KNEE: ICD-10-PCS | Mod: S$GLB,,, | Performed by: ORTHOPAEDIC SURGERY

## 2022-08-30 PROCEDURE — 99999 PR PBB SHADOW E&M-EST. PATIENT-LVL V: CPT | Mod: PBBFAC,,, | Performed by: ORTHOPAEDIC SURGERY

## 2022-08-30 PROCEDURE — 20610 DRAIN/INJ JOINT/BURSA W/O US: CPT | Mod: S$GLB,,, | Performed by: ORTHOPAEDIC SURGERY

## 2022-08-30 PROCEDURE — 3288F FALL RISK ASSESSMENT DOCD: CPT | Mod: CPTII,S$GLB,, | Performed by: ORTHOPAEDIC SURGERY

## 2022-08-30 PROCEDURE — 71045 X-RAY EXAM CHEST 1 VIEW: CPT | Mod: 26,,, | Performed by: RADIOLOGY

## 2022-08-30 PROCEDURE — 1159F MED LIST DOCD IN RCRD: CPT | Mod: CPTII,S$GLB,, | Performed by: ORTHOPAEDIC SURGERY

## 2022-08-30 PROCEDURE — 99999 PR PBB SHADOW E&M-EST. PATIENT-LVL V: ICD-10-PCS | Mod: PBBFAC,,, | Performed by: ORTHOPAEDIC SURGERY

## 2022-08-30 PROCEDURE — 3008F PR BODY MASS INDEX (BMI) DOCUMENTED: ICD-10-PCS | Mod: CPTII,S$GLB,, | Performed by: ORTHOPAEDIC SURGERY

## 2022-08-30 PROCEDURE — 1101F PR PT FALLS ASSESS DOC 0-1 FALLS W/OUT INJ PAST YR: ICD-10-PCS | Mod: CPTII,S$GLB,, | Performed by: ORTHOPAEDIC SURGERY

## 2022-08-30 PROCEDURE — 3008F BODY MASS INDEX DOCD: CPT | Mod: CPTII,S$GLB,, | Performed by: ORTHOPAEDIC SURGERY

## 2022-08-30 PROCEDURE — 1101F PT FALLS ASSESS-DOCD LE1/YR: CPT | Mod: CPTII,S$GLB,, | Performed by: ORTHOPAEDIC SURGERY

## 2022-08-30 PROCEDURE — 99215 PR OFFICE/OUTPT VISIT, EST, LEVL V, 40-54 MIN: ICD-10-PCS | Mod: 25,S$GLB,, | Performed by: ORTHOPAEDIC SURGERY

## 2022-08-30 PROCEDURE — 3077F SYST BP >= 140 MM HG: CPT | Mod: CPTII,S$GLB,, | Performed by: ORTHOPAEDIC SURGERY

## 2022-08-30 RX ORDER — KETOROLAC TROMETHAMINE 30 MG/ML
30 INJECTION, SOLUTION INTRAMUSCULAR; INTRAVENOUS
Status: DISCONTINUED | OUTPATIENT
Start: 2022-08-30 | End: 2023-11-14

## 2022-08-30 RX ORDER — LIDOCAINE HYDROCHLORIDE 10 MG/ML
4 INJECTION INFILTRATION; PERINEURAL
Status: SHIPPED | OUTPATIENT
Start: 2022-08-30

## 2022-08-30 RX ORDER — BUPIVACAINE HYDROCHLORIDE 5 MG/ML
5 INJECTION, SOLUTION PERINEURAL
Status: SHIPPED | OUTPATIENT
Start: 2022-08-30

## 2022-08-30 RX ADMIN — KETOROLAC TROMETHAMINE 30 MG: 30 INJECTION, SOLUTION INTRAMUSCULAR; INTRAVENOUS at 10:08

## 2022-08-30 RX ADMIN — BUPIVACAINE HYDROCHLORIDE 5 ML: 5 INJECTION, SOLUTION PERINEURAL at 10:08

## 2022-08-30 RX ADMIN — LIDOCAINE HYDROCHLORIDE 4 ML: 10 INJECTION INFILTRATION; PERINEURAL at 10:08

## 2022-08-30 NOTE — PROCEDURES
Large Joint Aspiration/Injection: L knee    Date/Time: 8/30/2022 10:15 AM  Performed by: Bryson Medrano MD  Authorized by: Bryson Medrano MD     Consent Done?:  Yes (Verbal)  Indications:  Arthritis  Site marked: the procedure site was marked    Timeout: prior to procedure the correct patient, procedure, and site was verified    Prep: patient was prepped and draped in usual sterile fashion      Local anesthesia used?: Yes    Local anesthetic:  Topical anesthetic and lidocaine 1% without epinephrine    Details:  Needle Size:  22 G  Ultrasonic Guidance for needle placement?: No    Approach:  Anteromedial  Location:  Knee  Site:  L knee  Medications:  4 mL LIDOcaine HCL 10 mg/ml (1%) 10 mg/mL (1 %); 5 mL BUPivacaine 0.5 % (5 mg/mL); 30 mg ketorolac 30 mg/mL (1 mL)  Patient tolerance:  Patient tolerated the procedure well with no immediate complications

## 2022-08-30 NOTE — H&P (VIEW-ONLY)
Subjective:      Patient ID: Azucena Luciano is a 72 y.o. female.    Chief Complaint: Pain of the Left Knee    L knee injections not helping any more  C/o worsening pain  Mri last year shows men tear      Social History     Occupational History    Occupation: Retired insurance     Tobacco Use    Smoking status: Never    Smokeless tobacco: Never   Substance and Sexual Activity    Alcohol use: Yes     Alcohol/week: 0.0 standard drinks     Comment: rare    Drug use: No    Sexual activity: Yes     Partners: Male      Review of Systems   Constitutional: Negative for diaphoresis.   HENT:  Negative for ear discharge, nosebleeds and stridor.    Eyes:  Negative for photophobia.   Cardiovascular:  Negative for syncope.   Respiratory:  Negative for hemoptysis, shortness of breath and wheezing.    Neurological:  Negative for tremors.   Psychiatric/Behavioral: Negative.         Objective:    Ortho/SPM Exam       Assessment:       1. Acute pain of left knee          Plan:       The patient presents today for followup. MRI revealed a tear of the meniscus as well as some degenerative arthritis of the Left knee. We discussed these findings with  the patient. We did discuss arthroscopy and the fact that its role would hopefully  help the symptoms related to meniscal tear; however, would not necessarily address  any symptoms related to degenerative arthritis. The patient was also offered a course of PT first but would rather move forward with arthroscopy. The patient understands this and would like to move forward. I think that is reasonable. We reviewed risks and benefits of surgery. We also reviewed the role of physical therapy vs arthroscopy. Research indicates that approx 9 mos of PT will achieve similar results to the near immediate improvement with arthroscopy. The patient would like the more quick improvement compared to the delayed improvement with PT. We will go ahead and schedule  this at a time that is  convenient for the patient.   we injected the left knee w 1cc of ketorolac, marcaine and lidocaine under sterile conditions with the patient's informed consent for mild/moderate knee oa

## 2022-09-19 ENCOUNTER — ANESTHESIA (OUTPATIENT)
Dept: SURGERY | Facility: HOSPITAL | Age: 72
End: 2022-09-19
Payer: MEDICARE

## 2022-09-19 ENCOUNTER — ANESTHESIA EVENT (OUTPATIENT)
Dept: SURGERY | Facility: HOSPITAL | Age: 72
End: 2022-09-19
Payer: MEDICARE

## 2022-09-19 ENCOUNTER — HOSPITAL ENCOUNTER (OUTPATIENT)
Facility: HOSPITAL | Age: 72
Discharge: HOME | End: 2022-09-19
Attending: ORTHOPAEDIC SURGERY | Admitting: ORTHOPAEDIC SURGERY
Payer: MEDICARE

## 2022-09-19 VITALS
HEIGHT: 59 IN | TEMPERATURE: 98 F | BODY MASS INDEX: 23.07 KG/M2 | RESPIRATION RATE: 17 BRPM | OXYGEN SATURATION: 95 % | HEART RATE: 70 BPM | DIASTOLIC BLOOD PRESSURE: 73 MMHG | SYSTOLIC BLOOD PRESSURE: 159 MMHG

## 2022-09-19 DIAGNOSIS — M17.12 PRIMARY OSTEOARTHRITIS OF LEFT KNEE: Primary | ICD-10-CM

## 2022-09-19 DIAGNOSIS — M25.569 KNEE PAIN: ICD-10-CM

## 2022-09-19 LAB
APPEARANCE FLD: NORMAL
BODY FLD TYPE: NORMAL
COLOR FLD: COLORLESS
LYMPHOCYTES NFR FLD MANUAL: 40 %
NEUTROPHILS NFR FLD MANUAL: 60 %
WBC # FLD: 16 /CU MM

## 2022-09-19 PROCEDURE — 29881 ARTHRS KNE SRG MNISECTMY M/L: CPT | Mod: LT,,, | Performed by: ORTHOPAEDIC SURGERY

## 2022-09-19 PROCEDURE — 25000003 PHARM REV CODE 250: Performed by: NURSE ANESTHETIST, CERTIFIED REGISTERED

## 2022-09-19 PROCEDURE — 63600175 PHARM REV CODE 636 W HCPCS: Performed by: NURSE ANESTHETIST, CERTIFIED REGISTERED

## 2022-09-19 PROCEDURE — 29881 PR KNEE SCOPE SINGLE MENISECECTOMY: ICD-10-PCS | Mod: LT,,, | Performed by: ORTHOPAEDIC SURGERY

## 2022-09-19 PROCEDURE — 25000003 PHARM REV CODE 250: Performed by: ORTHOPAEDIC SURGERY

## 2022-09-19 PROCEDURE — 63600175 PHARM REV CODE 636 W HCPCS: Performed by: STUDENT IN AN ORGANIZED HEALTH CARE EDUCATION/TRAINING PROGRAM

## 2022-09-19 PROCEDURE — 25000003 PHARM REV CODE 250: Performed by: STUDENT IN AN ORGANIZED HEALTH CARE EDUCATION/TRAINING PROGRAM

## 2022-09-19 PROCEDURE — 36000711: Performed by: ORTHOPAEDIC SURGERY

## 2022-09-19 PROCEDURE — 71000033 HC RECOVERY, INTIAL HOUR: Performed by: ORTHOPAEDIC SURGERY

## 2022-09-19 PROCEDURE — 37000009 HC ANESTHESIA EA ADD 15 MINS: Performed by: ORTHOPAEDIC SURGERY

## 2022-09-19 PROCEDURE — 71000015 HC POSTOP RECOV 1ST HR: Performed by: ORTHOPAEDIC SURGERY

## 2022-09-19 PROCEDURE — 89051 BODY FLUID CELL COUNT: CPT | Performed by: ORTHOPAEDIC SURGERY

## 2022-09-19 PROCEDURE — 37000008 HC ANESTHESIA 1ST 15 MINUTES: Performed by: ORTHOPAEDIC SURGERY

## 2022-09-19 PROCEDURE — 36000710: Performed by: ORTHOPAEDIC SURGERY

## 2022-09-19 RX ORDER — DEXAMETHASONE SODIUM PHOSPHATE 4 MG/ML
INJECTION, SOLUTION INTRA-ARTICULAR; INTRALESIONAL; INTRAMUSCULAR; INTRAVENOUS; SOFT TISSUE
Status: DISCONTINUED | OUTPATIENT
Start: 2022-09-19 | End: 2022-09-19

## 2022-09-19 RX ORDER — SODIUM CHLORIDE, SODIUM LACTATE, POTASSIUM CHLORIDE, CALCIUM CHLORIDE 600; 310; 30; 20 MG/100ML; MG/100ML; MG/100ML; MG/100ML
INJECTION, SOLUTION INTRAVENOUS CONTINUOUS PRN
Status: DISCONTINUED | OUTPATIENT
Start: 2022-09-19 | End: 2022-09-19

## 2022-09-19 RX ORDER — HYDROMORPHONE HYDROCHLORIDE 2 MG/ML
0.5 INJECTION, SOLUTION INTRAMUSCULAR; INTRAVENOUS; SUBCUTANEOUS EVERY 5 MIN PRN
Status: DISCONTINUED | OUTPATIENT
Start: 2022-09-19 | End: 2022-09-19 | Stop reason: HOSPADM

## 2022-09-19 RX ORDER — ONDANSETRON 2 MG/ML
4 INJECTION INTRAMUSCULAR; INTRAVENOUS EVERY 12 HOURS PRN
Status: CANCELLED | OUTPATIENT
Start: 2022-09-19

## 2022-09-19 RX ORDER — ACETAMINOPHEN 325 MG/1
650 TABLET ORAL EVERY 6 HOURS PRN
Qty: 30 TABLET | Refills: 0 | Status: SHIPPED | OUTPATIENT
Start: 2022-09-19

## 2022-09-19 RX ORDER — CEFAZOLIN SODIUM 2 G/50ML
2 SOLUTION INTRAVENOUS
Status: ACTIVE | OUTPATIENT
Start: 2022-09-19

## 2022-09-19 RX ORDER — FENTANYL CITRATE 50 UG/ML
INJECTION, SOLUTION INTRAMUSCULAR; INTRAVENOUS
Status: DISCONTINUED | OUTPATIENT
Start: 2022-09-19 | End: 2022-09-19

## 2022-09-19 RX ORDER — ONDANSETRON 2 MG/ML
4 INJECTION INTRAMUSCULAR; INTRAVENOUS DAILY PRN
Status: DISCONTINUED | OUTPATIENT
Start: 2022-09-19 | End: 2022-09-19 | Stop reason: HOSPADM

## 2022-09-19 RX ORDER — PROPOFOL 10 MG/ML
VIAL (ML) INTRAVENOUS
Status: DISCONTINUED | OUTPATIENT
Start: 2022-09-19 | End: 2022-09-19

## 2022-09-19 RX ORDER — BUPIVACAINE HYDROCHLORIDE 2.5 MG/ML
INJECTION, SOLUTION INFILTRATION; PERINEURAL
Status: DISCONTINUED | OUTPATIENT
Start: 2022-09-19 | End: 2022-09-19 | Stop reason: HOSPADM

## 2022-09-19 RX ORDER — ONDANSETRON 4 MG/1
8 TABLET, ORALLY DISINTEGRATING ORAL EVERY 8 HOURS PRN
Qty: 10 TABLET | Refills: 0 | Status: SHIPPED | OUTPATIENT
Start: 2022-09-19 | End: 2023-08-30

## 2022-09-19 RX ORDER — ONDANSETRON 2 MG/ML
INJECTION INTRAMUSCULAR; INTRAVENOUS
Status: DISCONTINUED | OUTPATIENT
Start: 2022-09-19 | End: 2022-09-19

## 2022-09-19 RX ORDER — BUPIVACAINE HCL/EPINEPHRINE 0.25-.0005
VIAL (ML) INJECTION
Status: DISCONTINUED | OUTPATIENT
Start: 2022-09-19 | End: 2022-09-19 | Stop reason: HOSPADM

## 2022-09-19 RX ORDER — DIPHENHYDRAMINE HYDROCHLORIDE 50 MG/ML
12.5 INJECTION INTRAMUSCULAR; INTRAVENOUS EVERY 6 HOURS PRN
Status: DISCONTINUED | OUTPATIENT
Start: 2022-09-19 | End: 2022-09-19 | Stop reason: HOSPADM

## 2022-09-19 RX ORDER — SODIUM CHLORIDE 0.9 % (FLUSH) 0.9 %
3 SYRINGE (ML) INJECTION
Status: DISCONTINUED | OUTPATIENT
Start: 2022-09-19 | End: 2022-09-19 | Stop reason: HOSPADM

## 2022-09-19 RX ORDER — LIDOCAINE HCL/PF 100 MG/5ML
SYRINGE (ML) INTRAVENOUS
Status: DISCONTINUED | OUTPATIENT
Start: 2022-09-19 | End: 2022-09-19

## 2022-09-19 RX ORDER — HYDROCODONE BITARTRATE AND ACETAMINOPHEN 5; 325 MG/1; MG/1
1 TABLET ORAL EVERY 4 HOURS PRN
Status: DISCONTINUED | OUTPATIENT
Start: 2022-09-19 | End: 2022-09-19 | Stop reason: HOSPADM

## 2022-09-19 RX ADMIN — ONDANSETRON 4 MG: 2 INJECTION, SOLUTION INTRAMUSCULAR; INTRAVENOUS at 10:09

## 2022-09-19 RX ADMIN — CEFAZOLIN SODIUM 2 G: 2 SOLUTION INTRAVENOUS at 10:09

## 2022-09-19 RX ADMIN — DEXAMETHASONE SODIUM PHOSPHATE 4 MG: 4 INJECTION, SOLUTION INTRA-ARTICULAR; INTRALESIONAL; INTRAMUSCULAR; INTRAVENOUS; SOFT TISSUE at 10:09

## 2022-09-19 RX ADMIN — SODIUM CHLORIDE, SODIUM LACTATE, POTASSIUM CHLORIDE, AND CALCIUM CHLORIDE: .6; .31; .03; .02 INJECTION, SOLUTION INTRAVENOUS at 10:09

## 2022-09-19 RX ADMIN — HYDROCODONE BITARTRATE AND ACETAMINOPHEN 1 TABLET: 5; 325 TABLET ORAL at 12:09

## 2022-09-19 RX ADMIN — LIDOCAINE HYDROCHLORIDE 75 MG: 20 INJECTION, SOLUTION INTRAVENOUS at 10:09

## 2022-09-19 RX ADMIN — FENTANYL CITRATE 25 MCG: 50 INJECTION, SOLUTION INTRAMUSCULAR; INTRAVENOUS at 11:09

## 2022-09-19 RX ADMIN — FENTANYL CITRATE 25 MCG: 50 INJECTION, SOLUTION INTRAMUSCULAR; INTRAVENOUS at 10:09

## 2022-09-19 RX ADMIN — PROPOFOL 150 MG: 10 INJECTION, EMULSION INTRAVENOUS at 10:09

## 2022-09-19 RX ADMIN — GLYCOPYRROLATE 0.2 MG: 0.2 INJECTION, SOLUTION INTRAMUSCULAR; INTRAVITREAL at 10:09

## 2022-09-19 NOTE — ANESTHESIA POSTPROCEDURE EVALUATION
Anesthesia Post Evaluation    Patient: Azucena Luciano    Procedure(s) Performed: Procedure(s) (LRB):  ARTHROSCOPY, KNEE (Left)    Final Anesthesia Type: general      Patient location during evaluation: PACU  Patient participation: Yes- Able to Participate  Level of consciousness: awake and alert, oriented and awake  Post-procedure vital signs: reviewed and stable  Pain management: adequate  Airway patency: patent    PONV status at discharge: No PONV  Anesthetic complications: no      Cardiovascular status: blood pressure returned to baseline  Respiratory status: unassisted and room air  Hydration status: euvolemic  Follow-up not needed.          Vitals Value Taken Time   /61 09/19/22 1134   Temp 97 09/19/22 1138   Pulse 74 09/19/22 1137   Resp 61 09/19/22 1137   SpO2 100 % 09/19/22 1137   Vitals shown include unvalidated device data.      No case tracking events are documented in the log.      Pain/Speedy Score: No data recorded

## 2022-09-19 NOTE — TRANSFER OF CARE
"Anesthesia Transfer of Care Note    Patient: Azucena Luciano    Procedure(s) Performed: Procedure(s) (LRB):  ARTHROSCOPY, KNEE (Left)    Patient location: PACU    Anesthesia Type: general    Transport from OR: Transported from OR on 6-10 L/min O2 by face mask with adequate spontaneous ventilation    Post pain: adequate analgesia    Post assessment: no apparent anesthetic complications and tolerated procedure well    Post vital signs: stable    Level of consciousness: awake    Nausea/Vomiting: no nausea/vomiting    Complications: none    Transfer of care protocol was followed      Last vitals:   Visit Vitals  BP (!) 158/72   Pulse (!) 51   Temp 37 °C (98.6 °F) (Skin)   Resp 18   Ht 4' 11" (1.499 m)   SpO2 98%   Breastfeeding No   BMI 23.07 kg/m²     "

## 2022-09-19 NOTE — ANESTHESIA PREPROCEDURE EVALUATION
09/19/2022  Azucena Luciano is a 72 y.o., female.      Pre-op Assessment    I have reviewed the Patient Summary Reports.     I have reviewed the Nursing Notes. I have reviewed the NPO Status.   I have reviewed the Medications.     Review of Systems  Anesthesia Hx:  No problems with previous Anesthesia    Cardiovascular:   Exercise tolerance: good Hypertension DVT lf leg sicne 70's   Pulmonary:   Asthma    Neurological:   Neuromuscular Disease,        Physical Exam  General: Well nourished, Cooperative, Alert and Oriented    Airway:  Mallampati: II   Mouth Opening: Normal  Tongue: Normal    Chest/Lungs:  Clear to auscultation, Normal Respiratory Rate    Heart:  Rate: Normal  Rhythm: Regular Rhythm  Sounds: Normal        Anesthesia Plan  Type of Anesthesia, risks & benefits discussed:    Anesthesia Type: Gen Supraglottic Airway  Intra-op Monitoring Plan: Standard ASA Monitors  Post Op Pain Control Plan: multimodal analgesia  Induction:  IV  Airway Plan: Direct  Informed Consent: Informed consent signed with the Patient and all parties understand the risks and agree with anesthesia plan.  All questions answered.   ASA Score: 2    Ready For Surgery From Anesthesia Perspective.     .

## 2022-09-19 NOTE — OP NOTE
Pre op dx:  Left knee meniscus tear    Post op dx:  Same and loose body    Procedure:  Arthroscopic left knee removal loose body and medial meniscectomy    Attending Surgeon: Bryson Medrano MD    Assistants: dr garnica and lyndsay student    Complications: none    Estimated bood loss: < 20cc    Implants:  None    Indication:  This is a 72-year-old female with mechanical knee pain.  MRI revealed possible tear of the meniscus.  Operative non operative management were discussed with the patient.  We discussed the fact that arthroscopy would hopefully help her symptoms related to meniscus tear however may not slightly address any symptoms related to degenerative arthritis.  She understood this and agreed to move forward with arthroscopy.    Findings:  There was a loose body off the proximal medial tibia.  Degenerative tear of the posterior horn medial meniscus.  Significant degenerative changes the medial compartment.    Procedure:  Patient is brought operating room placed supine on operative table.  General anesthesia was induced without difficulties.  Knee was placed leg griffiths and foot table flexed.  Posterior aspect right knee was well padded.  Prior to incision proper sent procedures well as antibiotic administration was verified.  Anterolateral and anteromedial portal sites were injected Marcaine.  Eleven blade was used to establish an tell portal.  This was then dilated using a trocar and camera placed in suprapatellar pouch.  Undersurface of the patella was visualized which showed some grade 3 fraying and grade 2 changes on the trochlea.  Patella is tracking centrally within trochlear groove.  Knee was then flexed and medial compartment visualized.  Spinal needle was used to establish the anteromedial portal.  This then created using 11 blade and dilated using a trocar.  Knee was placed in valgus position medial compartment probed.  Medial meniscus had a degenerative tear along the posterior horn which was debrided to a  stable rim using a shaver.  There was a loose body which was floating but still attached to the medial proximal tibia.  This was retrieved using a grasper and the loose body removed.  Articular surface tibial plateau and femoral condyle had grade 4 changes.  Knee was then flexed ACL visualized.  Looks like there is chronic ACL tear with ACL scarred down to the PCL.  Knee was then placed in figure 4 position lateral compartment visualized.  Lateral meniscus was intact with no tears or disruptions.  Ticklish surface of the tibial plateau had grade 3 changes and grade 1 softening of the femur.  Cannula was then placed back in suprapatellar pouch all excess fluid evacuated the cannula.  Portal sites were then closed with Dermabond Steri-Strips injected Marcaine.  Incision was dressed with sterile ABD and Ace wrap.  Patient was then extubated by Anesthesia without any difficulties and transferred to recovery room bed stable condition.

## 2022-09-19 NOTE — PLAN OF CARE
VSS  NAD  Discharge instructions given with claimed understanding by pt and family. Patient has ride home with family or friend. Claims pain level is ____0__ at this time.  Has voided without difficulty if required by surgical type.

## 2022-09-19 NOTE — ANESTHESIA PROCEDURE NOTES
Intubation    Date/Time: 9/19/2022 10:43 AM  Performed by: Home Stallworth Jr., CRNA  Authorized by: Dameon Mosley MD     Intubation:     Induction:  Intravenous    Intubated:  Postinduction    Attempts:  1    Attempted By:  CRNA    Difficult Airway Encountered?: No      Complications:  None    Airway Device:  Supraglottic airway/LMA    Airway Device Size:  3.0    Style/Cuff Inflation:  Cuffed (inflated to minimal occlusive pressure)    Secured at:  The lips    Placement Verified By:  Capnometry    Complicating Factors:  None    Findings Post-Intubation:  BS equal bilateral and atraumatic/condition of teeth unchanged

## 2022-09-19 NOTE — BRIEF OP NOTE
[facility] - Surgery (Hospital)  Brief Operative Note    Surgery Date: 9/19/2022     Surgeon(s) and Role:     * Mario Stovall MD - Primary    Assisting Surgeon: None    Pre-op Diagnosis:  Acute pain of left knee [M25.562]    Post-op Diagnosis:  Post-Op Diagnosis Codes:     * Acute pain of left knee [M25.562]    Procedure(s) (LRB):  ARTHROSCOPY, KNEE (Left)    Anesthesia: Choice    Operative Findings: see op note    Estimated Blood Loss: * No values recorded between 9/19/2022 11:00 AM and 9/19/2022 11:29 AM *         Specimens:   Specimen (24h ago, onward)       Start     Ordered    09/19/22 1106  Specimen to Pathology, Surgery Orthopedics  Once        Comments: Pre-op Diagnosis: Acute pain of left knee [M25.562]Procedure(s):ARTHROSCOPY, KNEE Number of specimens: 1Name of specimens: Left Knee Joint Fluid     References:    Click here for ordering Quick Tip   Question Answer Comment   Procedure Type: Orthopedics    Which provider would you like to cc? MARIO STOVALL    Release to patient Immediate        09/19/22 1106                      Discharge Note    OUTCOME: Patient tolerated treatment/procedure well without complication and is now ready for discharge.    DISPOSITION: Home or Self Care    FINAL DIAGNOSIS:  <principal problem not specified>    FOLLOWUP: In clinic    DISCHARGE INSTRUCTIONS:  No discharge procedures on file.

## 2022-09-20 ENCOUNTER — CLINICAL SUPPORT (OUTPATIENT)
Dept: REHABILITATION | Facility: HOSPITAL | Age: 72
End: 2022-09-20
Payer: MEDICARE

## 2022-09-20 DIAGNOSIS — M25.562 ACUTE PAIN OF LEFT KNEE: ICD-10-CM

## 2022-09-20 DIAGNOSIS — M62.81 QUADRICEPS WEAKNESS: Primary | ICD-10-CM

## 2022-09-20 PROCEDURE — 97161 PT EVAL LOW COMPLEX 20 MIN: CPT | Mod: PO

## 2022-09-20 PROCEDURE — 97110 THERAPEUTIC EXERCISES: CPT | Mod: PO

## 2022-09-20 NOTE — PLAN OF CARE
[facility]   Physical Therapy Initial Evaluation     Date: 9/20/2022   Name: Azucena Luciano  Clinic Number: [id number]    Therapy Diagnosis:   Encounter Diagnoses   Name Primary?    Acute pain of left knee     Quadriceps weakness Yes     Physician: Bryson Medrano MD    Physician Orders: PT Eval and Treat  Medical Diagnosis from Referral: M25.562 (ICD-10-CM) - Acute pain of left knee  Evaluation Date: 9/20/2022  Authorization Period Expiration: 10/18/22  Plan of Care Expiration: 10/20/22  Progress Note Due: 10/20/22  Visit # / Visits authorized: 1/ 1   FOTO: 1/3    Precautions: Standard     Time In: 12:00 pm  Time Out: 12:35 pm  Total Appointment Time (timed & untimed codes): 35 minutes      SUBJECTIVE     Date of onset: 9/19/22 Menisectomy     History of current condition - Azucena reports: she had surgery due to a torn meniscus, but she has been able perform aerobic classes at the gym. Walking has been her main concern which is the reason for her surgery.     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, carry ~25#   Prior Level of Function: limited by knee pain  Current Level of Function: post op    Pain:  Current 5/10, worst 8/10, best 5/10   Location: left knee  Description: Aching, Dull, and Sharp  Aggravating Factors: Walking  Easing Factors: pain medication and rest    Patients goals: improve walking, improve ability to carry art products      Medical History:   Past Medical History:   Diagnosis Date    Asthma     Carpal tunnel syndrome     DVT (deep venous thrombosis)     Hypertension     Osteoporosis     Urinary tract infection        Surgical History:   Azucena Luciano  has a past surgical history that includes Ovarian cyst removal; Facial cosmetic surgery; tummy tuck; Nasal septum surgery; Breast biopsy; Breast  surgery; Breast cyst aspiration; Colonoscopy (N/A, 8/7/2018); Augmentation of breast; and Cataract extraction.    Medications:   Azucena has a current medication list which includes the following prescription(s): acetaminophen, alendronate, atorvastatin, calcium-vitamin d3, fluticasone propionate, magnesium hydroxide 400 mg/5 ml, multivitamin with minerals, nitrofurantoin (macrocrystal-monohydrate), ondansetron, and oxymetazoline, and the following Facility-Administered Medications: bupivacaine, cefazolin 2 g/50ml dextrose ivpb, ketorolac, and lidocaine hcl 10 mg/ml (1%).    Allergies:   Review of patient's allergies indicates:   Allergen Reactions    Cayenne pepper Anaphylaxis          OBJECTIVE     Observation: Post-op dressing/brace in place.    Gait: arrives with antalgic gait and no AD. States that she left the walker in the car      Functional tests:               SL Squat: Not performed 2/2 post-op.              DL Squat: Not performed 2/2 post-op.              Step-down: Not performed 2/2 post-op.              SLS EO: Not performed 2/2 post-op.              SLS EC: Not performed 2/2 post-op.     Range of Motion:   Knee Right Passive Left Passive   Flexion    Extension WNL 5 hyper      Lower Extremity Strength:  Formal MMT not performed 2/2 POD#2 and increased pain.  fair quad activation noted left LE.     Special Tests:  Not performed 2/2 post-op.     Joint Mobility: normal patella      Palpation: minimal warmth around incision     Sensation:  Intact; diminished 2/2 residual nerve block.     Flexibility:  normal      Edema: slightly increased as expected post-op       Limitation/Restriction for FOTO knee Survey    Therapist reviewed FOTO scores for Azucena Luciano on 9/20/2022.   FOTO documents entered into EPIC - see Media section.    Limitation Score: 58%         TREATMENT     Total Treatment time (time-based codes) separate from Evaluation: 23 minutes      Azucena received the treatments  listed below:      Pt received therapeutic exercises to develop strength and ROM for 23 minutes including:  Quad sets - x20   Ankle pumps - x30   SLR, SL hip abd, prone extension, SL adduction - x20 each   Education on walking hourly and walker education     Pt received the following manual therapy techniques: Joint mobilizations and Soft tissue Mobilization were applied to the: knee for 00 minutes, including:  NP       PATIENT EDUCATION AND HOME EXERCISES     Education provided:   - HEP, POC, answered patient questions     Written Home Exercises Provided: yes. Exercises were reviewed and Azucena was able to demonstrate them prior to the end of the session.  Azucena demonstrated good  understanding of the education provided. See EMR under Patient Instructions for exercises provided during therapy sessions.    ASSESSMENT     Azucena is a 72 y.o. female referred to outpatient Physical Therapy with a medical diagnosis of M25.562 (ICD-10-CM) - Acute pain of left knee. Patient presents with reduced knee flexion and extension, increased swelling, poor quad contraction, and abnormal gait mechanics, all of which are expected per post op. Patient will benefit from skilled outpatient Physical Therapy to address the deficits stated above and in the chart below, provide patient /family education, and to maximize patientt's level of independence.     Patient prognosis is Good.     Plan of care discussed with patient: Yes  Patient's spiritual, cultural and educational needs considered and patient is agreeable to the plan of care and goals as stated below:     Anticipated Barriers for therapy: scheduling     Medical Necessity is demonstrated by the following  History  Co-morbidities and personal factors that may impact the plan of care Co-morbidities:   Past Medical History:   Diagnosis Date    Asthma     Carpal tunnel syndrome     DVT (deep venous thrombosis)     Hypertension     Osteoporosis     Urinary tract infection         Personal Factors:   no deficits     low   Examination  Body Structures and Functions, activity limitations and participation restrictions that may impact the plan of care Body Regions:   lower extremities    Body Systems:    ROM  strength  balance  gait    Participation Restrictions:   ADLs    Activity limitations:   Learning and applying knowledge  no deficits    General Tasks and Commands  no deficits    Communication  no deficits    Mobility  walking    Self care  no deficits    Domestic Life  no deficits    Interactions/Relationships  no deficits    Life Areas  no deficits    Community and Social Life  no deficits         low   Clinical Presentation stable and uncomplicated low   Decision Making/ Complexity Score: low     GOALS: Short Term Goals:  5 weeks  1.Report decreased knee pain  < / =  3/10  to increase tolerance for walking  2. Increase strength by 1/3 MMT grade in knee extensors  to increase tolerance for ADL and work activities.  3. Pt to tolerate HEP to improve ROM and independence with ADL's    Long Term Goals: 10 weeks  1.Report decreased knee pain < / = 1/10  to increase tolerance for walking  2.Patient goal: able to carry 25# 1 block with no pain and able to push 50# cart 1 block.   3.Increase strength to >/= 4+/5 in knee extensors  to increase tolerance for ADL and work activities.  4. Pt will report at CJ level (20-40% impaired) on FOTO knee to demonstrate increase in LE function with every day tasks.      PLAN   Plan of care Certification: 9/20/2022 to 11/30/22.    Outpatient Physical Therapy 2 times weekly for 8 weeks to include the following interventions: Gait Training, Manual Therapy, Neuromuscular Re-ed, Therapeutic Activities, and Therapeutic Exercise.     Checo Garcia PT      I CERTIFY THE NEED FOR THESE SERVICES FURNISHED UNDER THIS PLAN OF TREATMENT AND WHILE UNDER MY CARE   Physician's comments:     Physician's Signature: ___________________________________________________

## 2022-09-26 ENCOUNTER — CLINICAL SUPPORT (OUTPATIENT)
Dept: REHABILITATION | Facility: HOSPITAL | Age: 72
End: 2022-09-26
Attending: INTERNAL MEDICINE
Payer: MEDICARE

## 2022-09-26 DIAGNOSIS — M62.81 QUADRICEPS WEAKNESS: Primary | ICD-10-CM

## 2022-09-26 PROCEDURE — 97110 THERAPEUTIC EXERCISES: CPT | Mod: PO

## 2022-09-26 NOTE — PROGRESS NOTES
"[facility]   Physical Therapy Treatment Note     Name: Azucena Luciano  Clinic Number: [id number]    Therapy Diagnosis:   Encounter Diagnosis   Name Primary?    Quadriceps weakness Yes     Physician: Bryson Medrano MD    Visit Date: 9/26/2022    Physician Orders: PT Eval and Treat  Medical Diagnosis from Referral: M25.562 (ICD-10-CM) - Acute pain of left knee  Evaluation Date: 9/20/2022  Authorization Period Expiration: 10/18/22  Plan of Care Expiration: 10/20/22  Progress Note Due: 10/20/22  Visit # / Visits authorized: 1/ 11   FOTO: 1/3     Precautions: Standard      Time In: 12:15 PM  Time Out: 12:53 PM  Total Appointment Time (timed & untimed codes): 38 minutes (TE-3)     SUBJECTIVE     Pt reports: the left knee is dramatically better. Once the swelling went down, a lot of the pain went away.  She was compliant with home exercise program.  Response to previous treatment: it hurt the night of the initial eval  Functional change: decreased pain     Pain: 4/10 (when walking) 2/10 otherwise  Location: left knee      OBJECTIVE     Objective Measures updated at progress report unless specified.     Treatment     Azucena received the treatments listed below:      therapeutic exercises to develop strength, endurance, and ROM for 38 minutes including:    Recumbent bike level 3, 6 mins   Quad sets - x20  5" holds   Ankle pumps - x30 - NP  SLR 1x10 +2# ankle weight on LLE   Sidelying hip abd 2# ankle weight on LLE 2x10  +bridge 2x10  +hooklying hip adduction 30x3" holds   +leg press 20# 2x10  +sit to stands from gold chair with 5# KB   prone extension, SL adduction - x20 each - NP          Patient Education and Home Exercises     Home Exercises Provided and Patient Education Provided     Education provided:   - pt educated on all interventions performed today    Written Home Exercises Provided: Patient instructed to cont prior HEP. Exercises were reviewed and Azucena was able to demonstrate " them prior to the end of the session.  Azucena demonstrated good  understanding of the education provided. See EMR under Patient Instructions for exercises provided during therapy sessions    ASSESSMENT     Pt with good tolerance to session at first follow up visit after PT initial eval. Session focused on quad and hip strengthening therex. Pt with appropriate challenge to all interventions performed today with no increase in pain/symptoms. Continue to progress as tolerated.    Azucena Is progressing well towards her goals.   Pt prognosis is Good.     Pt will continue to benefit from skilled outpatient physical therapy to address the deficits listed in the problem list box on initial evaluation, provide pt/family education and to maximize pt's level of independence in the home and community environment.     Pt's spiritual, cultural and educational needs considered and pt agreeable to plan of care and goals.     Anticipated barriers to physical therapy: none    Goals: PROGRESSING    Short Term Goals:  5 weeks  1.Report decreased knee pain  < / =  3/10  to increase tolerance for walking  2. Increase strength by 1/3 MMT grade in knee extensors  to increase tolerance for ADL and work activities.  3. Pt to tolerate HEP to improve ROM and independence with ADL's-MET     Long Term Goals: 10 weeks  1.Report decreased knee pain < / = 1/10  to increase tolerance for walking  2.Patient goal: able to carry 25# 1 block with no pain and able to push 50# cart 1 block.   3.Increase strength to >/= 4+/5 in knee extensors  to increase tolerance for ADL and work activities.  4. Pt will report at CJ level (20-40% impaired) on FOTO knee to demonstrate increase in LE function with every day tasks.         PLAN     Continue PT POC    Anila Barrientos, PT

## 2022-09-28 ENCOUNTER — CLINICAL SUPPORT (OUTPATIENT)
Dept: REHABILITATION | Facility: HOSPITAL | Age: 72
End: 2022-09-28
Attending: INTERNAL MEDICINE
Payer: MEDICARE

## 2022-09-28 DIAGNOSIS — M62.81 QUADRICEPS WEAKNESS: Primary | ICD-10-CM

## 2022-09-28 PROCEDURE — 97110 THERAPEUTIC EXERCISES: CPT | Mod: PO

## 2022-09-28 PROCEDURE — 97140 MANUAL THERAPY 1/> REGIONS: CPT | Mod: PO

## 2022-09-28 NOTE — PROGRESS NOTES
"[facility]   Physical Therapy Treatment Note     Name: Azucena Luciano  Clinic Number: [id number]    Therapy Diagnosis:   Encounter Diagnosis   Name Primary?    Quadriceps weakness Yes       Physician: Bryson Medrano MD    Visit Date: 9/28/2022    Physician Orders: PT Eval and Treat  Medical Diagnosis from Referral: M25.562 (ICD-10-CM) - Acute pain of left knee  Evaluation Date: 9/20/2022  Authorization Period Expiration: 10/18/22  Plan of Care Expiration: 10/20/22  Progress Note Due: 10/20/22  Visit # / Visits authorized: 2/ 11   FOTO: 1/3     Precautions: Standard      Time In: 1:00 PM  Time Out: 1:38 PM  Total Appointment Time (timed & untimed codes): 38 minutes (TE-3)     SUBJECTIVE     Pt reports: She is in increased pain today thinks that she overdid it.   She was compliant with home exercise program.  Response to previous treatment: it hurt the night of the initial eval  Functional change: decreased pain     Pain: 8/10 (when walking) 2/10 otherwise  Location: left knee      OBJECTIVE     Objective Measures updated at progress report unless specified.     Treatment     Azucena received the treatments listed below:      therapeutic exercises to develop strength, endurance, and ROM for 30 minutes including:    Recumbent bike level 3, 8 mins   Quad sets - x20  5" holds   SLR 2x10 +0#   Sidelying hip abd 0# on LLE 2x10  bridge 3x10  hooklying hip adduction 30x3" holds   sit to stands from gold chair with 5# KB 2x10    Pt received the following manual therapy techniques: Joint mobilizations and Soft tissue Mobilization were applied to the: L knee for 08 minutes, including:  Patellar mobs all directions   PROM flexion and extension             Patient Education and Home Exercises     Home Exercises Provided and Patient Education Provided     Education provided:   - pt educated on all interventions performed today    Written Home Exercises Provided: Patient instructed to cont prior HEP. " Exercises were reviewed and Azucena was able to demonstrate them prior to the end of the session.  Azucena demonstrated good  understanding of the education provided. See EMR under Patient Instructions for exercises provided during therapy sessions    ASSESSMENT     Pt was educated that soreness after exercises is normal. Today we backed off on several exercises to allow for adequate recovery. She was a little concerned about her incision sights being elevated. When I inspected the area there does appear to be increased swelling around each incisions on the lateral and medial joint line with slight redness. But there is no pain, no warmth. I am not concerned of an infection at this time but I educated the patient to monitor the incision and look for increased swelling, redness, warmth, and pain and to call MD should these symptoms arise.     Azucena Is progressing well towards her goals.   Pt prognosis is Good.     Pt will continue to benefit from skilled outpatient physical therapy to address the deficits listed in the problem list box on initial evaluation, provide pt/family education and to maximize pt's level of independence in the home and community environment.     Pt's spiritual, cultural and educational needs considered and pt agreeable to plan of care and goals.     Anticipated barriers to physical therapy: none    Goals: PROGRESSING    Short Term Goals:  5 weeks  1.Report decreased knee pain  < / =  3/10  to increase tolerance for walking  2. Increase strength by 1/3 MMT grade in knee extensors  to increase tolerance for ADL and work activities.  3. Pt to tolerate HEP to improve ROM and independence with ADL's-MET     Long Term Goals: 10 weeks  1.Report decreased knee pain < / = 1/10  to increase tolerance for walking  2.Patient goal: able to carry 25# 1 block with no pain and able to push 50# cart 1 block.   3.Increase strength to >/= 4+/5 in knee extensors  to increase tolerance for ADL and work  activities.  4. Pt will report at CJ level (20-40% impaired) on FOTO knee to demonstrate increase in LE function with every day tasks.         PLAN     Continue PT POC    Checo Garcia, PT

## 2022-10-03 ENCOUNTER — CLINICAL SUPPORT (OUTPATIENT)
Dept: REHABILITATION | Facility: HOSPITAL | Age: 72
End: 2022-10-03
Payer: MEDICARE

## 2022-10-03 DIAGNOSIS — M62.81 QUADRICEPS WEAKNESS: Primary | ICD-10-CM

## 2022-10-03 PROCEDURE — 97110 THERAPEUTIC EXERCISES: CPT | Mod: PO

## 2022-10-03 NOTE — PROGRESS NOTES
"[facility]   Physical Therapy Treatment Note     Name: Azucena Luciano  Clinic Number: [id number]    Therapy Diagnosis:   Encounter Diagnosis   Name Primary?    Quadriceps weakness Yes         Physician: Bryson Medrano MD    Visit Date: 10/3/2022    Physician Orders: PT Eval and Treat  Medical Diagnosis from Referral: M25.562 (ICD-10-CM) - Acute pain of left knee  Evaluation Date: 9/20/2022  Authorization Period Expiration: 10/18/22  Plan of Care Expiration: 10/20/22  Progress Note Due: 10/20/22  Visit # / Visits authorized: 3/ 11   FOTO: 1/3     Precautions: Standard      Time In: 1:45 PM  Time Out: 2:23 PM  Total Appointment Time (timed & untimed codes): 38 minutes (TE-3)     SUBJECTIVE     Pt reports: she feels depressed and disappointed because she is still in pain. The back of the leg feels tight and feels it will not let her straighten the knee all the way.   She was compliant with home exercise program.  Response to previous treatment: felt great, it really helps to stretch her out and exercise   Functional change: strengthening the leg     Pain: 5/10  Location: left knee      OBJECTIVE     Objective Measures updated at progress report unless specified.     Treatment     Azucena received the treatments listed below:      therapeutic exercises to develop strength, endurance, and ROM for 38 minutes including:    +Upright bike x6 mins   +Hamstring stretch 10x10" in supine   Recumbent bike level 3, 8 mins - Not performed   Quad sets - x20  5" holds - not performed   SLR 2x10 LLE   Sidelying hip abd 0# on LLE 2x10  Leg press 20# 2x10  +step ups on 6" step 2x10 leading LLE  bridge 3x10  hooklying hip adduction 2x10, 5" holds   sit to stands from gold chair with 10# KB 3x10    Pt received the following manual therapy techniques: Joint mobilizations and Soft tissue Mobilization were applied to the: L knee for 00 minutes, including:- not performed today   Patellar mobs all directions   PROM " flexion and extension     Patient Education and Home Exercises     Home Exercises Provided and Patient Education Provided     Education provided:   - pt educated on all interventions performed today    Written Home Exercises Provided: Patient instructed to cont prior HEP. Exercises were reviewed and Azucena was able to demonstrate them prior to the end of the session.  Azucena demonstrated good  understanding of the education provided. See EMR under Patient Instructions for exercises provided during therapy sessions    ASSESSMENT     Pt with good tolerance to session. She complains of tightness at posterior knee/thigh that she feels is inhibiting her in straightening her knee, so she is educated on hamstring stretching today and endorses relief from this stretch. She has good response to therex this session. She is educated on expected muscle soreness following exercises and verbalizes understanding. Continue to progress as lalitha per protocol.     Azucena Is progressing well towards her goals.   Pt prognosis is Good.     Pt will continue to benefit from skilled outpatient physical therapy to address the deficits listed in the problem list box on initial evaluation, provide pt/family education and to maximize pt's level of independence in the home and community environment.     Pt's spiritual, cultural and educational needs considered and pt agreeable to plan of care and goals.     Anticipated barriers to physical therapy: none    Goals: PROGRESSING    Short Term Goals:  5 weeks  1.Report decreased knee pain  < / =  3/10  to increase tolerance for walking  2. Increase strength by 1/3 MMT grade in knee extensors  to increase tolerance for ADL and work activities.  3. Pt to tolerate HEP to improve ROM and independence with ADL's-MET     Long Term Goals: 10 weeks  1.Report decreased knee pain < / = 1/10  to increase tolerance for walking  2.Patient goal: able to carry 25# 1 block with no pain and able to push 50#  cart 1 block.   3.Increase strength to >/= 4+/5 in knee extensors  to increase tolerance for ADL and work activities.  4. Pt will report at CJ level (20-40% impaired) on FOTO knee to demonstrate increase in LE function with every day tasks.         PLAN     Continue PT POC    Anila Barrientos, PT

## 2022-10-06 ENCOUNTER — CLINICAL SUPPORT (OUTPATIENT)
Dept: REHABILITATION | Facility: HOSPITAL | Age: 72
End: 2022-10-06
Payer: MEDICARE

## 2022-10-06 DIAGNOSIS — M62.81 QUADRICEPS WEAKNESS: Primary | ICD-10-CM

## 2022-10-06 PROCEDURE — 97110 THERAPEUTIC EXERCISES: CPT | Mod: PO,CQ

## 2022-10-06 NOTE — PROGRESS NOTES
"[facility]   Physical Therapy Treatment Note     Name: Azucena Luciano  Clinic Number: [id number]    Therapy Diagnosis:   Encounter Diagnosis   Name Primary?    Quadriceps weakness Yes         Physician: Bryson Medrano MD    Visit Date: 10/6/2022    Physician Orders: PT Eval and Treat  Medical Diagnosis from Referral: M25.562 (ICD-10-CM) - Acute pain of left knee  Evaluation Date: 9/20/2022  Authorization Period Expiration: 10/18/22  Plan of Care Expiration: 10/20/22  Progress Note Due: 10/20/22  Visit # / Visits authorized: 4/ 11   FOTO: 1/3     Precautions: Standard   PTA visit: 1/5    Time In: 11:05 AM  Time Out: 11:45 PM  Total Appointment Time (timed & untimed codes): 40 minutes      SUBJECTIVE     Pt reports: She can't tolerated prolonged standing or walking without left knee pain    She was compliant with home exercise program.  Response to previous treatment: felt great, it really helps to stretch her out and exercise   Functional change: strengthening the leg     Pain: 8/10  Location: left knee      OBJECTIVE     Objective Measures updated at progress report unless specified.     Treatment     Azucena received the treatments listed below:      therapeutic exercises to develop strength, endurance, and ROM for 40 minutes including:    Upright bike x8 mins   Hamstring stretch 3x30" in supine   Recumbent bike level 3, 8 mins - Not performed   Quad sets - x20  5" holds - not performed   SLR 2x10 LLE   Sidelying hip abd 0# on LLE 2x10  Leg press 20# 2x10  DL Shuttle 2.5 bands 2x10  step ups on 6" step 2x10 leading LLE  bridge 2x10 5" hold   hooklying hip adduction 2x10, 5" holds   sit to stands from gold chair with 10# KB 3x10    Pt received the following manual therapy techniques: Joint mobilizations and Soft tissue Mobilization were applied to the: L knee for 00 minutes, including:- not performed today   Patellar mobs all directions   PROM flexion and extension     Patient Education " and Home Exercises     Home Exercises Provided and Patient Education Provided     Education provided:   - pt educated on all interventions performed today    Written Home Exercises Provided: Patient instructed to cont prior HEP. Exercises were reviewed and Azucena was able to demonstrate them prior to the end of the session.  Azucena demonstrated good  understanding of the education provided. See EMR under Patient Instructions for exercises provided during therapy sessions    ASSESSMENT     Today's session focused on hip strengthening which Azucena tolerated without any complaints of increasing knee pain just muscle fatigue. Next visit pt will benefit from CKC exercises for further strengthening.     Azucena Is progressing well towards her goals.   Pt prognosis is Good.     Pt will continue to benefit from skilled outpatient physical therapy to address the deficits listed in the problem list box on initial evaluation, provide pt/family education and to maximize pt's level of independence in the home and community environment.     Pt's spiritual, cultural and educational needs considered and pt agreeable to plan of care and goals.     Anticipated barriers to physical therapy: none    Goals: PROGRESSING    Short Term Goals:  5 weeks  1.Report decreased knee pain  < / =  3/10  to increase tolerance for walking  2. Increase strength by 1/3 MMT grade in knee extensors  to increase tolerance for ADL and work activities.  3. Pt to tolerate HEP to improve ROM and independence with ADL's-MET     Long Term Goals: 10 weeks  1.Report decreased knee pain < / = 1/10  to increase tolerance for walking  2.Patient goal: able to carry 25# 1 block with no pain and able to push 50# cart 1 block.   3.Increase strength to >/= 4+/5 in knee extensors  to increase tolerance for ADL and work activities.  4. Pt will report at CJ level (20-40% impaired) on FOTO knee to demonstrate increase in LE function with every day tasks.          PLAN     Continue PT POC    Dora Mcnally, PTA

## 2022-10-11 ENCOUNTER — PES CALL (OUTPATIENT)
Dept: ADMINISTRATIVE | Facility: CLINIC | Age: 72
End: 2022-10-11
Payer: MEDICARE

## 2022-10-13 ENCOUNTER — RESEARCH ENCOUNTER (OUTPATIENT)
Dept: RESEARCH | Facility: HOSPITAL | Age: 72
End: 2022-10-13
Payer: MEDICARE

## 2022-10-13 ENCOUNTER — OFFICE VISIT (OUTPATIENT)
Dept: ORTHOPEDICS | Facility: CLINIC | Age: 72
End: 2022-10-13
Payer: MEDICARE

## 2022-10-13 VITALS
DIASTOLIC BLOOD PRESSURE: 83 MMHG | SYSTOLIC BLOOD PRESSURE: 146 MMHG | HEIGHT: 59 IN | BODY MASS INDEX: 22.87 KG/M2 | HEART RATE: 64 BPM | WEIGHT: 113.44 LBS

## 2022-10-13 DIAGNOSIS — M17.12 PRIMARY OSTEOARTHRITIS OF LEFT KNEE: Primary | ICD-10-CM

## 2022-10-13 DIAGNOSIS — Z98.890 STATUS POST ARTHROSCOPY OF KNEE: ICD-10-CM

## 2022-10-13 PROCEDURE — 1101F PR PT FALLS ASSESS DOC 0-1 FALLS W/OUT INJ PAST YR: ICD-10-PCS | Mod: CPTII,S$GLB,, | Performed by: ORTHOPAEDIC SURGERY

## 2022-10-13 PROCEDURE — 3079F DIAST BP 80-89 MM HG: CPT | Mod: CPTII,S$GLB,, | Performed by: ORTHOPAEDIC SURGERY

## 2022-10-13 PROCEDURE — 1125F AMNT PAIN NOTED PAIN PRSNT: CPT | Mod: CPTII,S$GLB,, | Performed by: ORTHOPAEDIC SURGERY

## 2022-10-13 PROCEDURE — 99024 POSTOP FOLLOW-UP VISIT: CPT | Mod: S$GLB,,, | Performed by: ORTHOPAEDIC SURGERY

## 2022-10-13 PROCEDURE — 3077F PR MOST RECENT SYSTOLIC BLOOD PRESSURE >= 140 MM HG: ICD-10-PCS | Mod: CPTII,S$GLB,, | Performed by: ORTHOPAEDIC SURGERY

## 2022-10-13 PROCEDURE — 99024 PR POST-OP FOLLOW-UP VISIT: ICD-10-PCS | Mod: S$GLB,,, | Performed by: ORTHOPAEDIC SURGERY

## 2022-10-13 PROCEDURE — 3288F FALL RISK ASSESSMENT DOCD: CPT | Mod: CPTII,S$GLB,, | Performed by: ORTHOPAEDIC SURGERY

## 2022-10-13 PROCEDURE — 3079F PR MOST RECENT DIASTOLIC BLOOD PRESSURE 80-89 MM HG: ICD-10-PCS | Mod: CPTII,S$GLB,, | Performed by: ORTHOPAEDIC SURGERY

## 2022-10-13 PROCEDURE — 1101F PT FALLS ASSESS-DOCD LE1/YR: CPT | Mod: CPTII,S$GLB,, | Performed by: ORTHOPAEDIC SURGERY

## 2022-10-13 PROCEDURE — 1125F PR PAIN SEVERITY QUANTIFIED, PAIN PRESENT: ICD-10-PCS | Mod: CPTII,S$GLB,, | Performed by: ORTHOPAEDIC SURGERY

## 2022-10-13 PROCEDURE — 99999 PR PBB SHADOW E&M-EST. PATIENT-LVL III: CPT | Mod: PBBFAC,,, | Performed by: ORTHOPAEDIC SURGERY

## 2022-10-13 PROCEDURE — 99999 PR PBB SHADOW E&M-EST. PATIENT-LVL III: ICD-10-PCS | Mod: PBBFAC,,, | Performed by: ORTHOPAEDIC SURGERY

## 2022-10-13 PROCEDURE — 1159F PR MEDICATION LIST DOCUMENTED IN MEDICAL RECORD: ICD-10-PCS | Mod: CPTII,S$GLB,, | Performed by: ORTHOPAEDIC SURGERY

## 2022-10-13 PROCEDURE — 1159F MED LIST DOCD IN RCRD: CPT | Mod: CPTII,S$GLB,, | Performed by: ORTHOPAEDIC SURGERY

## 2022-10-13 PROCEDURE — 3077F SYST BP >= 140 MM HG: CPT | Mod: CPTII,S$GLB,, | Performed by: ORTHOPAEDIC SURGERY

## 2022-10-13 PROCEDURE — 3288F PR FALLS RISK ASSESSMENT DOCUMENTED: ICD-10-PCS | Mod: CPTII,S$GLB,, | Performed by: ORTHOPAEDIC SURGERY

## 2022-10-13 NOTE — PROGRESS NOTES
Subjective:      Patient ID: Azucena Luciano is a 72 y.o. female.    Chief Complaint: Post-op Evaluation of the Left Knee    Min relief w knee scope  Requesting additional options      Social History     Occupational History    Occupation: Retired insurance     Tobacco Use    Smoking status: Never    Smokeless tobacco: Never   Substance and Sexual Activity    Alcohol use: Yes     Alcohol/week: 0.0 standard drinks     Comment: rare    Drug use: No    Sexual activity: Yes     Partners: Male      Review of Systems   Constitutional: Negative for diaphoresis.   HENT:  Negative for ear discharge, nosebleeds and stridor.    Eyes:  Negative for photophobia.   Cardiovascular:  Negative for syncope.   Respiratory:  Negative for hemoptysis, shortness of breath and wheezing.    Neurological:  Negative for tremors.   Psychiatric/Behavioral: Negative.         Objective:    General    Constitutional: She is oriented to person, place, and time. She appears well-developed and well-nourished.   HENT:   Head: Normocephalic and atraumatic.   Right Ear: External ear normal.   Left Ear: External ear normal.   Eyes: EOM are normal.   Pulmonary/Chest: Effort normal.   Neurological: She is alert and oriented to person, place, and time.   Psychiatric: She has a normal mood and affect. Her behavior is normal. Judgment and thought content normal.     General Musculoskeletal Exam   Gait: antalgic and abnormal         Left Knee Exam     Inspection   Swelling: present  Effusion: present    Tenderness   The patient tender to palpation of the medial joint line.    Crepitus   The patient has crepitus of the patella.    Other   Sensation: normal    Comments:  Portal sites healed      Muscle Strength   Left Lower Extremity   Quadriceps:  4/5   Hamstrin/5        Assessment:       1. Primary osteoarthritis of left knee    2. Status post arthroscopy of knee          Plan:       Significant oa seen on scope. Min relief w knee scope  Will  try HA injections as next option

## 2022-10-13 NOTE — PROGRESS NOTES
Protocol: innovations in [employer]icular Outcomes Registry (Hu)  Protocol#: Hu  IRB#: 2021.156  Version Date: 18-Mar-2022  PI: Bryson Medrano MD  Patient Initials: C.W.     Declined Study    Patient was called on phone by Clinical Research Coordinator after clinic visit with Dr. Medrano to discuss possible participation in Hu study. After discussing the above mentioned study patient declined study due to time commitment to study.

## 2022-10-27 ENCOUNTER — OFFICE VISIT (OUTPATIENT)
Dept: ORTHOPEDICS | Facility: CLINIC | Age: 72
End: 2022-10-27
Payer: MEDICARE

## 2022-10-27 VITALS
DIASTOLIC BLOOD PRESSURE: 76 MMHG | SYSTOLIC BLOOD PRESSURE: 157 MMHG | HEART RATE: 59 BPM | WEIGHT: 113.56 LBS | HEIGHT: 59 IN | BODY MASS INDEX: 22.89 KG/M2

## 2022-10-27 DIAGNOSIS — M17.12 PRIMARY OSTEOARTHRITIS OF LEFT KNEE: Primary | ICD-10-CM

## 2022-10-27 PROBLEM — I82.4Y2: Status: RESOLVED | Noted: 2017-05-23 | Resolved: 2022-10-27

## 2022-10-27 PROBLEM — G47.00 INSOMNIA: Status: RESOLVED | Noted: 2020-07-15 | Resolved: 2022-10-27

## 2022-10-27 PROBLEM — M72.2 PLANTAR FASCIITIS: Status: RESOLVED | Noted: 2020-07-22 | Resolved: 2022-10-27

## 2022-10-27 PROBLEM — Z98.890 STATUS POST ARTHROSCOPY OF KNEE: Status: RESOLVED | Noted: 2022-10-13 | Resolved: 2022-10-27

## 2022-10-27 PROBLEM — M24.273: Status: RESOLVED | Noted: 2020-07-22 | Resolved: 2022-10-27

## 2022-10-27 PROBLEM — M62.81 QUADRICEPS WEAKNESS: Status: RESOLVED | Noted: 2021-10-26 | Resolved: 2022-10-27

## 2022-10-27 PROBLEM — M25.562 ACUTE PAIN OF LEFT KNEE: Status: RESOLVED | Noted: 2022-08-30 | Resolved: 2022-10-27

## 2022-10-27 PROBLEM — S83.242A ACUTE MEDIAL MENISCUS TEAR, LEFT, INITIAL ENCOUNTER: Status: RESOLVED | Noted: 2021-05-11 | Resolved: 2022-10-27

## 2022-10-27 PROBLEM — E65 FAT PAD SYNDROME: Status: RESOLVED | Noted: 2020-07-22 | Resolved: 2022-10-27

## 2022-10-27 PROBLEM — M76.821 POSTERIOR TIBIAL TENDON DYSFUNCTION (PTTD) OF RIGHT LOWER EXTREMITY: Status: RESOLVED | Noted: 2020-07-16 | Resolved: 2022-10-27

## 2022-10-27 PROCEDURE — 3077F SYST BP >= 140 MM HG: CPT | Mod: CPTII,S$GLB,, | Performed by: ORTHOPAEDIC SURGERY

## 2022-10-27 PROCEDURE — 20610 DRAIN/INJ JOINT/BURSA W/O US: CPT | Mod: 79,LT,S$GLB, | Performed by: ORTHOPAEDIC SURGERY

## 2022-10-27 PROCEDURE — 99999 PR PBB SHADOW E&M-EST. PATIENT-LVL III: ICD-10-PCS | Mod: PBBFAC,,, | Performed by: ORTHOPAEDIC SURGERY

## 2022-10-27 PROCEDURE — 1125F AMNT PAIN NOTED PAIN PRSNT: CPT | Mod: CPTII,S$GLB,, | Performed by: ORTHOPAEDIC SURGERY

## 2022-10-27 PROCEDURE — 99999 PR PBB SHADOW E&M-EST. PATIENT-LVL III: CPT | Mod: PBBFAC,,, | Performed by: ORTHOPAEDIC SURGERY

## 2022-10-27 PROCEDURE — 20610 LARGE JOINT ASPIRATION/INJECTION: L KNEE: ICD-10-PCS | Mod: 79,LT,S$GLB, | Performed by: ORTHOPAEDIC SURGERY

## 2022-10-27 PROCEDURE — 1101F PR PT FALLS ASSESS DOC 0-1 FALLS W/OUT INJ PAST YR: ICD-10-PCS | Mod: CPTII,S$GLB,, | Performed by: ORTHOPAEDIC SURGERY

## 2022-10-27 PROCEDURE — 1159F PR MEDICATION LIST DOCUMENTED IN MEDICAL RECORD: ICD-10-PCS | Mod: CPTII,S$GLB,, | Performed by: ORTHOPAEDIC SURGERY

## 2022-10-27 PROCEDURE — 1125F PR PAIN SEVERITY QUANTIFIED, PAIN PRESENT: ICD-10-PCS | Mod: CPTII,S$GLB,, | Performed by: ORTHOPAEDIC SURGERY

## 2022-10-27 PROCEDURE — 3078F PR MOST RECENT DIASTOLIC BLOOD PRESSURE < 80 MM HG: ICD-10-PCS | Mod: CPTII,S$GLB,, | Performed by: ORTHOPAEDIC SURGERY

## 2022-10-27 PROCEDURE — 99499 UNLISTED E&M SERVICE: CPT | Mod: S$GLB,,, | Performed by: ORTHOPAEDIC SURGERY

## 2022-10-27 PROCEDURE — 3077F PR MOST RECENT SYSTOLIC BLOOD PRESSURE >= 140 MM HG: ICD-10-PCS | Mod: CPTII,S$GLB,, | Performed by: ORTHOPAEDIC SURGERY

## 2022-10-27 PROCEDURE — 1101F PT FALLS ASSESS-DOCD LE1/YR: CPT | Mod: CPTII,S$GLB,, | Performed by: ORTHOPAEDIC SURGERY

## 2022-10-27 PROCEDURE — 1159F MED LIST DOCD IN RCRD: CPT | Mod: CPTII,S$GLB,, | Performed by: ORTHOPAEDIC SURGERY

## 2022-10-27 PROCEDURE — 3288F FALL RISK ASSESSMENT DOCD: CPT | Mod: CPTII,S$GLB,, | Performed by: ORTHOPAEDIC SURGERY

## 2022-10-27 PROCEDURE — 3078F DIAST BP <80 MM HG: CPT | Mod: CPTII,S$GLB,, | Performed by: ORTHOPAEDIC SURGERY

## 2022-10-27 PROCEDURE — 99499 NO LOS: ICD-10-PCS | Mod: S$GLB,,, | Performed by: ORTHOPAEDIC SURGERY

## 2022-10-27 PROCEDURE — 3288F PR FALLS RISK ASSESSMENT DOCUMENTED: ICD-10-PCS | Mod: CPTII,S$GLB,, | Performed by: ORTHOPAEDIC SURGERY

## 2022-10-27 NOTE — PROCEDURES
Large Joint Aspiration/Injection: L knee    Date/Time: 10/27/2022 11:15 AM  Performed by: Bryson Medrano MD  Authorized by: Bryson Medrano MD     Consent Done?:  Yes (Verbal)  Indications:  Arthritis  Site marked: the procedure site was marked    Timeout: prior to procedure the correct patient, procedure, and site was verified    Prep: patient was prepped and draped in usual sterile fashion      Local anesthesia used?: Yes    Local anesthetic:  Topical anesthetic    Details:  Needle Size:  22 G  Ultrasonic Guidance for needle placement?: No    Approach:  Superior  Location:  Knee  Site:  L knee  Medications:  60 mg hyaluronate sodium, stabilized 60 mg/3 mL

## 2022-10-31 ENCOUNTER — OFFICE VISIT (OUTPATIENT)
Dept: INTERNAL MEDICINE | Facility: CLINIC | Age: 72
End: 2022-10-31
Payer: MEDICARE

## 2022-10-31 VITALS
WEIGHT: 113.13 LBS | RESPIRATION RATE: 15 BRPM | SYSTOLIC BLOOD PRESSURE: 120 MMHG | HEIGHT: 59 IN | OXYGEN SATURATION: 96 % | HEART RATE: 71 BPM | BODY MASS INDEX: 22.8 KG/M2 | DIASTOLIC BLOOD PRESSURE: 82 MMHG

## 2022-10-31 DIAGNOSIS — E78.49 OTHER HYPERLIPIDEMIA: Primary | ICD-10-CM

## 2022-10-31 DIAGNOSIS — M81.6 LOCALIZED OSTEOPOROSIS, UNSPECIFIED PATHOLOGICAL FRACTURE PRESENCE: ICD-10-CM

## 2022-10-31 DIAGNOSIS — M23.207 OTHER OLD TEAR OF MENISCUS OF LEFT KNEE, UNSPECIFIED MENISCUS: ICD-10-CM

## 2022-10-31 DIAGNOSIS — Z00.00 ENCOUNTER FOR PREVENTIVE HEALTH EXAMINATION: ICD-10-CM

## 2022-10-31 DIAGNOSIS — Z91.81 RISK FOR FALLS: ICD-10-CM

## 2022-10-31 DIAGNOSIS — I83.92 VARICOSE VEINS OF LEFT LOWER EXTREMITY, UNSPECIFIED WHETHER COMPLICATED: ICD-10-CM

## 2022-10-31 DIAGNOSIS — Z86.718 HISTORY OF DEEP VEIN THROMBOSIS (DVT) OF LOWER EXTREMITY: ICD-10-CM

## 2022-10-31 DIAGNOSIS — M17.12 PRIMARY OSTEOARTHRITIS OF LEFT KNEE: ICD-10-CM

## 2022-10-31 DIAGNOSIS — Z12.31 ENCOUNTER FOR SCREENING MAMMOGRAM FOR MALIGNANT NEOPLASM OF BREAST: ICD-10-CM

## 2022-10-31 DIAGNOSIS — G56.03 BILATERAL CARPAL TUNNEL SYNDROME: ICD-10-CM

## 2022-10-31 DIAGNOSIS — R26.9 ABNORMALITY OF GAIT AND MOBILITY: ICD-10-CM

## 2022-10-31 DIAGNOSIS — H61.22 IMPACTED CERUMEN OF LEFT EAR: ICD-10-CM

## 2022-10-31 PROBLEM — I87.002 POST-THROMBOTIC SYNDROME OF LEFT LOWER EXTREMITY: Status: RESOLVED | Noted: 2017-11-13 | Resolved: 2022-10-31

## 2022-10-31 PROBLEM — S83.207A TEAR OF MENISCUS OF LEFT KNEE: Status: ACTIVE | Noted: 2022-10-31

## 2022-10-31 PROCEDURE — 3079F DIAST BP 80-89 MM HG: CPT | Mod: CPTII,S$GLB,, | Performed by: NURSE PRACTITIONER

## 2022-10-31 PROCEDURE — 1170F FXNL STATUS ASSESSED: CPT | Mod: CPTII,S$GLB,, | Performed by: NURSE PRACTITIONER

## 2022-10-31 PROCEDURE — 1159F PR MEDICATION LIST DOCUMENTED IN MEDICAL RECORD: ICD-10-PCS | Mod: CPTII,S$GLB,, | Performed by: NURSE PRACTITIONER

## 2022-10-31 PROCEDURE — G0439 PPPS, SUBSEQ VISIT: HCPCS | Mod: S$GLB,,, | Performed by: NURSE PRACTITIONER

## 2022-10-31 PROCEDURE — 99999 PR PBB SHADOW E&M-EST. PATIENT-LVL V: CPT | Mod: PBBFAC,,, | Performed by: NURSE PRACTITIONER

## 2022-10-31 PROCEDURE — 3079F PR MOST RECENT DIASTOLIC BLOOD PRESSURE 80-89 MM HG: ICD-10-PCS | Mod: CPTII,S$GLB,, | Performed by: NURSE PRACTITIONER

## 2022-10-31 PROCEDURE — 3288F FALL RISK ASSESSMENT DOCD: CPT | Mod: CPTII,S$GLB,, | Performed by: NURSE PRACTITIONER

## 2022-10-31 PROCEDURE — 1101F PT FALLS ASSESS-DOCD LE1/YR: CPT | Mod: CPTII,S$GLB,, | Performed by: NURSE PRACTITIONER

## 2022-10-31 PROCEDURE — G0439 PR MEDICARE ANNUAL WELLNESS SUBSEQUENT VISIT: ICD-10-PCS | Mod: S$GLB,,, | Performed by: NURSE PRACTITIONER

## 2022-10-31 PROCEDURE — 3074F SYST BP LT 130 MM HG: CPT | Mod: CPTII,S$GLB,, | Performed by: NURSE PRACTITIONER

## 2022-10-31 PROCEDURE — 1170F PR FUNCTIONAL STATUS ASSESSED: ICD-10-PCS | Mod: CPTII,S$GLB,, | Performed by: NURSE PRACTITIONER

## 2022-10-31 PROCEDURE — 1159F MED LIST DOCD IN RCRD: CPT | Mod: CPTII,S$GLB,, | Performed by: NURSE PRACTITIONER

## 2022-10-31 PROCEDURE — 1160F RVW MEDS BY RX/DR IN RCRD: CPT | Mod: CPTII,S$GLB,, | Performed by: NURSE PRACTITIONER

## 2022-10-31 PROCEDURE — 3074F PR MOST RECENT SYSTOLIC BLOOD PRESSURE < 130 MM HG: ICD-10-PCS | Mod: CPTII,S$GLB,, | Performed by: NURSE PRACTITIONER

## 2022-10-31 PROCEDURE — 3288F PR FALLS RISK ASSESSMENT DOCUMENTED: ICD-10-PCS | Mod: CPTII,S$GLB,, | Performed by: NURSE PRACTITIONER

## 2022-10-31 PROCEDURE — 1160F PR REVIEW ALL MEDS BY PRESCRIBER/CLIN PHARMACIST DOCUMENTED: ICD-10-PCS | Mod: CPTII,S$GLB,, | Performed by: NURSE PRACTITIONER

## 2022-10-31 PROCEDURE — 99999 PR PBB SHADOW E&M-EST. PATIENT-LVL V: ICD-10-PCS | Mod: PBBFAC,,, | Performed by: NURSE PRACTITIONER

## 2022-10-31 PROCEDURE — 1101F PR PT FALLS ASSESS DOC 0-1 FALLS W/OUT INJ PAST YR: ICD-10-PCS | Mod: CPTII,S$GLB,, | Performed by: NURSE PRACTITIONER

## 2022-10-31 RX ORDER — ASCORBIC ACID 250 MG
250 TABLET ORAL DAILY
COMMUNITY

## 2022-10-31 NOTE — PROGRESS NOTES
"Azucena Luciano presented for a  Medicare AWV and comprehensive Health Risk Assessment today. The following components were reviewed and updated:    Medical history  Family History  Social history  Allergies and Current Medications  Health Risk Assessment  Health Maintenance  Care Team     ** See Completed Assessments for Annual Wellness Visit within the encounter summary.**       The following assessments were completed:  Living Situation  CAGE  Depression Screening  Timed Get Up and Go  Whisper Test- SNHL- states hearing will not help sclerosis from age  Cognitive Function Screening  Nutrition Screening  ADL Screening  PAQ Screening    Vitals:    10/31/22 1204   BP: 120/82   BP Location: Left arm   Patient Position: Sitting   Pulse: 71   Resp: 15   SpO2: 96%   Weight: 51.3 kg (113 lb 1.6 oz)   Height: 4' 11" (1.499 m)     Body mass index is 22.84 kg/m².  Physical Exam  Constitutional:       Comments: Younger in appearance than age   HENT:      Right Ear: There is no impacted cerumen.      Left Ear: There is no impacted cerumen.   Eyes:      General: No scleral icterus.  Cardiovascular:      Rate and Rhythm: Normal rate and regular rhythm.   Pulmonary:      Effort: Pulmonary effort is normal.      Breath sounds: Normal breath sounds.   Abdominal:      Palpations: Abdomen is soft.   Musculoskeletal:         General: No swelling.      Comments: Abnormal ,antalgic gait   Skin:     General: Skin is warm and dry.   Neurological:      Mental Status: She is alert and oriented to person, place, and time.   Psychiatric:         Mood and Affect: Mood normal.         Behavior: Behavior normal.         Thought Content: Thought content normal.         Judgment: Judgment normal.          Opioid screening has been done- patient denies taking opioid medication.  Review for substance use disorder screen-  patient denies using substance medication.      Diagnoses and health risks identified today and associated " recommendations/orders:    1. Other hyperlipidemia  Stable on lipitor & followed by PCP    2. Localized osteoporosis, unspecified pathological fracture presence  Stable on alendronate & followed by PCP- DXA 9/29/2021- denies dental & GI S/E    3. Other old tear of meniscus of left knee, unspecified meniscus  Stable followed by othopedic    4. History of deep vein thrombosis (DVT) of lower extremity  Stable followed by PCP    5. Bilateral carpal tunnel syndrome  Stable followed by PCP    6. Encounter for screening mammogram for malignant neoplasm of breast  - Mammo Digital Screening Bilat w/ Juni; Future-    7. Abnormality of gait and mobility  Stable followed by PCP- R/T OA knee- prolonhed timed get up & go test- under care of orthopedics- temporary mobility certificate form completed & give to pt- copy to be scanned into epic w clock drawing    8. Encounter for preventive health examination  Here for Health Risk Assessment/Annual Wellness Visit.  Health maintenance reviewed and updated. Follow up in one year.        9. Impacted cerumen of left ear  - Ambulatory referral/consult to ENT; Future    10. Varicose veins of left lower extremity, unspecified whether complicated  Stable followed by PCP    11. Primary osteoarthritis of left knee  Stable followed by orthopedics    12. Risk for falls  Stable followed by PCP, orthopedics      Provided Azucena with a 5-10 year written screening schedule and personal prevention plan. Recommendations were developed using the USPSTF age appropriate recommendations. Education, counseling, and referrals were provided as needed. After Visit Summary printed and given to patient which includes a list of additional screenings\tests needed. Cognitive function clock drawing was labeled with the patient's identification & forwarded to medical records to be scanned into the patient's epic chart.  Prolonged timed get up & got erst- referred to ortho- pt R/T knee pain. Fall prevention  handouts.  Follow up in about 1 year (around 10/31/2023) for HRA.    Yulisa Knowles NP   I offered to discuss advanced care planning, including how to pick a person who would make decisions for you if you were unable to make them for yourself, called a health care power of , and what kind of decisions you might make such as use of life sustaining treatments such as ventilators and tube feeding when faced with a life limiting illness recorded on a living will that they will need to know. (How you want to be cared for as you near the end of your natural life)     X Patient is interested in learning more about how to make advanced directives.  I provided them paperwork and offered to discuss this with them.

## 2022-10-31 NOTE — PATIENT INSTRUCTIONS
Counseling and Referral of Other Preventative  (Italic type indicates deductible and co-insurance are waived)    Patient Name: Azucena Luciano  Today's Date: 10/31/2022    Health Maintenance         Date Due Completion Date    Mammogram Ordered   10/5/2021    Colorectal Cancer Screening 08/07/2023 8/7/2018    DEXA Scan 09/29/2023 9/29/2021    TETANUS VACCINE 10/30/2025   10/30/2015    Lipid Panel    Prolonged timed get up & go test-  follow up with orthopedics    Temporary Mobility Impairment- Left knee pain- fall risk- prolonged timed get up & go.   07/18/2023 7/18/2022          Orders Placed This Encounter   Procedures    Mammo Digital Screening Bilat w/ Juni     The following information is provided to all patients.  This information is to help you find resources for any of the problems found today that may be affecting your health:                Living healthy guide: www.[facility].[place].gov      Understanding Diabetes: www.diabetes.org      Eating healthy: www.cdc.gov/healthyweight      CDC home safety checklist: www.cdc.gov/steadi/patient.html      Agency on Aging: www.goea.[place].[facility]      Alcoholics anonymous (AA): www.aa.org      Physical Activity: www.luna.nih.gov/px6apbx      Tobacco use: www.quitwithusla.org

## 2022-12-23 DIAGNOSIS — E78.00 HYPERCHOLESTEREMIA: ICD-10-CM

## 2022-12-23 RX ORDER — ATORVASTATIN CALCIUM 20 MG/1
TABLET, FILM COATED ORAL
Qty: 90 TABLET | Refills: 1 | Status: SHIPPED | OUTPATIENT
Start: 2022-12-23 | End: 2023-07-15

## 2022-12-23 NOTE — TELEPHONE ENCOUNTER
Refill Decision Note   Azucena Mustapha  is requesting a refill authorization.  Brief Assessment and Rationale for Refill:  Approve     Medication Therapy Plan:       Medication Reconciliation Completed: No   Comments:     No Care Gaps recommended.     Note composed:2:54 PM 12/23/2022

## 2022-12-23 NOTE — TELEPHONE ENCOUNTER
No new care gaps identified.  [facility] Embedded Care Gaps. Reference number: [id number]. 12/23/2022   12:42:28 PM CST

## 2023-04-14 ENCOUNTER — PES CALL (OUTPATIENT)
Dept: ADMINISTRATIVE | Facility: CLINIC | Age: 73
End: 2023-04-14
Payer: MEDICARE

## 2023-06-09 ENCOUNTER — TELEPHONE (OUTPATIENT)
Dept: INTERNAL MEDICINE | Facility: CLINIC | Age: 73
End: 2023-06-09
Payer: MEDICARE

## 2023-06-09 NOTE — TELEPHONE ENCOUNTER
----- Message from Maryann Rodriguez sent at 6/9/2023 10:39 AM CDT -----  Contact: Azucena   type: Lab    Caller is requesting to schedule their Lab appointment prior to annual appointment.  Order is not listed in EPIC.  Please enter order and contact patient to schedule.    Name of Caller Azucena     Preferred Date and Time of Labs:06/22/23    Date of Annual Physical Appointment 06/28/23    Where would they like the lab performed [address]     Would the patient rather a call back or a response via My [facility] call back     Best Call Back Number     Additional Information

## 2023-06-09 NOTE — TELEPHONE ENCOUNTER
I spoke to pt, she is scheduled for annual visit on 6-28-23 and is requesting lab orders.  Pt's last annual was on 7-25-23.  She will call her insurance carrier to check on her benefits and call back with update.

## 2023-06-13 ENCOUNTER — OFFICE VISIT (OUTPATIENT)
Dept: ORTHOPEDICS | Facility: CLINIC | Age: 73
End: 2023-06-13
Payer: MEDICARE

## 2023-06-13 ENCOUNTER — HOSPITAL ENCOUNTER (OUTPATIENT)
Dept: RADIOLOGY | Facility: HOSPITAL | Age: 73
Discharge: HOME | End: 2023-06-13
Attending: ORTHOPAEDIC SURGERY
Payer: MEDICARE

## 2023-06-13 VITALS
WEIGHT: 110.69 LBS | HEART RATE: 65 BPM | DIASTOLIC BLOOD PRESSURE: 78 MMHG | SYSTOLIC BLOOD PRESSURE: 154 MMHG | HEIGHT: 59 IN | BODY MASS INDEX: 22.32 KG/M2

## 2023-06-13 DIAGNOSIS — M17.12 PRIMARY OSTEOARTHRITIS OF LEFT KNEE: Primary | ICD-10-CM

## 2023-06-13 DIAGNOSIS — M17.12 PRIMARY OSTEOARTHRITIS OF LEFT KNEE: ICD-10-CM

## 2023-06-13 DIAGNOSIS — M25.562 CHRONIC PAIN OF LEFT KNEE: ICD-10-CM

## 2023-06-13 DIAGNOSIS — G89.29 CHRONIC PAIN OF LEFT KNEE: ICD-10-CM

## 2023-06-13 PROCEDURE — 73564 XR KNEE COMP 4 OR MORE VIEWS LEFT: ICD-10-PCS | Mod: 26,LT,, | Performed by: INTERNAL MEDICINE

## 2023-06-13 PROCEDURE — 3008F PR BODY MASS INDEX (BMI) DOCUMENTED: ICD-10-PCS | Mod: CPTII,S$GLB,, | Performed by: ORTHOPAEDIC SURGERY

## 2023-06-13 PROCEDURE — 73564 X-RAY EXAM KNEE 4 OR MORE: CPT | Mod: TC,FY,LT

## 2023-06-13 PROCEDURE — 99214 PR OFFICE/OUTPT VISIT, EST, LEVL IV, 30-39 MIN: ICD-10-PCS | Mod: S$GLB,,, | Performed by: ORTHOPAEDIC SURGERY

## 2023-06-13 PROCEDURE — 73564 X-RAY EXAM KNEE 4 OR MORE: CPT | Mod: 26,LT,, | Performed by: INTERNAL MEDICINE

## 2023-06-13 PROCEDURE — 3078F DIAST BP <80 MM HG: CPT | Mod: CPTII,S$GLB,, | Performed by: ORTHOPAEDIC SURGERY

## 2023-06-13 PROCEDURE — 1159F PR MEDICATION LIST DOCUMENTED IN MEDICAL RECORD: ICD-10-PCS | Mod: CPTII,S$GLB,, | Performed by: ORTHOPAEDIC SURGERY

## 2023-06-13 PROCEDURE — 99999 PR PBB SHADOW E&M-EST. PATIENT-LVL V: CPT | Mod: PBBFAC,,, | Performed by: ORTHOPAEDIC SURGERY

## 2023-06-13 PROCEDURE — 99999 PR PBB SHADOW E&M-EST. PATIENT-LVL V: ICD-10-PCS | Mod: PBBFAC,,, | Performed by: ORTHOPAEDIC SURGERY

## 2023-06-13 PROCEDURE — 3288F PR FALLS RISK ASSESSMENT DOCUMENTED: ICD-10-PCS | Mod: CPTII,S$GLB,, | Performed by: ORTHOPAEDIC SURGERY

## 2023-06-13 PROCEDURE — 3288F FALL RISK ASSESSMENT DOCD: CPT | Mod: CPTII,S$GLB,, | Performed by: ORTHOPAEDIC SURGERY

## 2023-06-13 PROCEDURE — 1125F AMNT PAIN NOTED PAIN PRSNT: CPT | Mod: CPTII,S$GLB,, | Performed by: ORTHOPAEDIC SURGERY

## 2023-06-13 PROCEDURE — 1125F PR PAIN SEVERITY QUANTIFIED, PAIN PRESENT: ICD-10-PCS | Mod: CPTII,S$GLB,, | Performed by: ORTHOPAEDIC SURGERY

## 2023-06-13 PROCEDURE — 1101F PR PT FALLS ASSESS DOC 0-1 FALLS W/OUT INJ PAST YR: ICD-10-PCS | Mod: CPTII,S$GLB,, | Performed by: ORTHOPAEDIC SURGERY

## 2023-06-13 PROCEDURE — 1159F MED LIST DOCD IN RCRD: CPT | Mod: CPTII,S$GLB,, | Performed by: ORTHOPAEDIC SURGERY

## 2023-06-13 PROCEDURE — 99214 OFFICE O/P EST MOD 30 MIN: CPT | Mod: S$GLB,,, | Performed by: ORTHOPAEDIC SURGERY

## 2023-06-13 PROCEDURE — 1101F PT FALLS ASSESS-DOCD LE1/YR: CPT | Mod: CPTII,S$GLB,, | Performed by: ORTHOPAEDIC SURGERY

## 2023-06-13 PROCEDURE — 3077F PR MOST RECENT SYSTOLIC BLOOD PRESSURE >= 140 MM HG: ICD-10-PCS | Mod: CPTII,S$GLB,, | Performed by: ORTHOPAEDIC SURGERY

## 2023-06-13 PROCEDURE — 3078F PR MOST RECENT DIASTOLIC BLOOD PRESSURE < 80 MM HG: ICD-10-PCS | Mod: CPTII,S$GLB,, | Performed by: ORTHOPAEDIC SURGERY

## 2023-06-13 PROCEDURE — 3008F BODY MASS INDEX DOCD: CPT | Mod: CPTII,S$GLB,, | Performed by: ORTHOPAEDIC SURGERY

## 2023-06-13 PROCEDURE — 3077F SYST BP >= 140 MM HG: CPT | Mod: CPTII,S$GLB,, | Performed by: ORTHOPAEDIC SURGERY

## 2023-06-13 NOTE — PROGRESS NOTES
[facility] Note    Knee Pain: left  swelling: Yes  worsens with activity: Yes  relieved by: Anti inflammatory cream    72-year-old female with left knee osteoarthritis who presents to clinic for follow-up.  Patient has tried multiple nonoperative interventions including injections, physical therapy, and knee arthroscopy all with minimal relief of pain.  She states pain has gotten bad enough that she is considering a total knee replacement.    PMH:   Past Medical History:   Diagnosis Date    Asthma     Carpal tunnel syndrome     DVT (deep venous thrombosis)     Hypertension     Osteoporosis     Urinary tract infection        PSH:   Past Surgical History:   Procedure Laterality Date    ARTHROSCOPY OF KNEE Left 9/19/2022    Procedure: ARTHROSCOPY, KNEE;  Surgeon: Bryson Medrano MD;  Location: [facility];  Service: Orthopedics;  Laterality: Left;    AUGMENTATION OF BREAST      BREAST BIOPSY      BREAST CYST ASPIRATION      BREAST SURGERY      CATARACT EXTRACTION      COLONOSCOPY N/A 8/7/2018    Procedure: COLONOSCOPY;  Surgeon: KATE Vernon MD;  Location: [facility] ([address] [place] [address]);  Service: Endoscopy;  Laterality: N/A;  Ok to hold Eliquis x 2 days per Dr. Gee    FACIAL COSMETIC SURGERY      NASAL SEPTUM SURGERY      OVARIAN CYST REMOVAL      tummy tuck         SH:   Social History     Socioeconomic History    Marital status:     Number of children: 3   Occupational History    Occupation: Retired insurance     Tobacco Use    Smoking status: Never    Smokeless tobacco: Never   Substance and Sexual Activity    Alcohol use: Not Currently    Drug use: No    Sexual activity: Yes     Partners: Male     Social Determinants of Health     Financial Resource Strain: Medium Risk    Difficulty of Paying Living Expenses: Somewhat hard   Food Insecurity: No Food Insecurity    Worried About Running Out of Food in the Last Year: Never true    Ran Out of Food in the Last Year: Never true   Transportation  Needs: No Transportation Needs    Lack of Transportation (Medical): No    Lack of Transportation (Non-Medical): No   Physical Activity: Insufficiently Active    Days of Exercise per Week: 3 days    Minutes of Exercise per Session: 40 min   Stress: Stress Concern Present    Feeling of Stress : Very much   Social Connections: Unknown    Frequency of Communication with Friends and Family: Three times a week    Frequency of Social Gatherings with Friends and Family: Twice a week    Active Member of Clubs or Organizations: Yes    Attends Club or Organization Meetings: More than 4 times per year    Marital Status:    Housing Stability: Low Risk     Unable to Pay for Housing in the Last Year: No    Number of Places Lived in the Last Year: 1    Unstable Housing in the Last Year: No       FH:   Family History   Problem Relation Age of Onset    Osteoporosis Mother     Cancer Father 66        Colon    Colon cancer Father     Cancer Brother         Brain tumor    Diabetes Maternal Grandmother     Cancer Maternal Grandmother 75        Colon    Diabetes Paternal Grandmother     Heart disease Paternal Grandmother     Hypertension Neg Hx     Stroke Neg Hx     Breast cancer Neg Hx     Ovarian cancer Neg Hx     Prostate cancer Neg Hx     Kidney disease Neg Hx        Allergies:   Review of patient's allergies indicates:   Allergen Reactions    Cayenne pepper Anaphylaxis       ROS:  Constitutional- no fever, fatigue, weakness  Eye- no vision loss, eye redness, drainage, or pain  ENMT- no sore throat, ear pain, sinus pain/congestion, nasal congestion/drainage  Respiratory- no cough, wheezing, or shortness of breath  CV- no chest pain, no palpitations, no edema  GI- no N/V/D; no abdominal pain    Physical Exam:  Vitals:    06/13/23 1154   BP: (!) 154/78   Pulse: 65       General: NAD  Cardio: RRR by peripheral pulse  Pulm: Normal WOB on room air, symmetric chest rise  Abd: Soft, NT/ND    MSK:  General Musculoskeletal Exam   Gait:  antalgic         Right Knee Exam      Range of Motion   Extension: 5   Flexion: 130      Left Knee Exam      Tenderness   The patient tender to palpation of the medial joint line.     Range of Motion   Extension: 5   Flexion: 130      Tests   Meniscus   Wilima:  Medial - negative Lateral - negative  Stability Lachman: normal (-1 to 2mm)   MCL - Valgus: normal (0 to 2mm)     Other   Sensation: normal     Muscle Strength   Right Lower Extremity   Quadriceps:  5/5   Left Lower Extremity   Quadriceps:  5/5      Vascular Exam         Left Pulses  Dorsalis Pedis:      2+  Posterior Tibial:      1+    Imaging:   No new imaging today    Assessment:  72-year-old female with significant left knee osteoarthritis. Will plan for tka in sept       Chano Rogers MD  [facility] Orthopaedic Surgery  6/13/2023 12:27 PM

## 2023-06-14 ENCOUNTER — OFFICE VISIT (OUTPATIENT)
Dept: OTOLARYNGOLOGY | Facility: CLINIC | Age: 73
End: 2023-06-14
Payer: MEDICARE

## 2023-06-14 VITALS
HEART RATE: 61 BPM | DIASTOLIC BLOOD PRESSURE: 74 MMHG | BODY MASS INDEX: 22.33 KG/M2 | WEIGHT: 110.56 LBS | SYSTOLIC BLOOD PRESSURE: 131 MMHG | TEMPERATURE: 98 F

## 2023-06-14 DIAGNOSIS — H61.22 LEFT EAR IMPACTED CERUMEN: Primary | ICD-10-CM

## 2023-06-14 DIAGNOSIS — J30.89 NON-SEASONAL ALLERGIC RHINITIS, UNSPECIFIED TRIGGER: ICD-10-CM

## 2023-06-14 PROCEDURE — 3008F PR BODY MASS INDEX (BMI) DOCUMENTED: ICD-10-PCS | Mod: CPTII,S$GLB,, | Performed by: NURSE PRACTITIONER

## 2023-06-14 PROCEDURE — 69210 EAR CERUMEN REMOVAL: ICD-10-PCS | Mod: S$GLB,,, | Performed by: NURSE PRACTITIONER

## 2023-06-14 PROCEDURE — 1159F MED LIST DOCD IN RCRD: CPT | Mod: CPTII,S$GLB,, | Performed by: NURSE PRACTITIONER

## 2023-06-14 PROCEDURE — 99203 PR OFFICE/OUTPT VISIT, NEW, LEVL III, 30-44 MIN: ICD-10-PCS | Mod: 25,S$GLB,, | Performed by: NURSE PRACTITIONER

## 2023-06-14 PROCEDURE — 1126F PR PAIN SEVERITY QUANTIFIED, NO PAIN PRESENT: ICD-10-PCS | Mod: CPTII,S$GLB,, | Performed by: NURSE PRACTITIONER

## 2023-06-14 PROCEDURE — 1126F AMNT PAIN NOTED NONE PRSNT: CPT | Mod: CPTII,S$GLB,, | Performed by: NURSE PRACTITIONER

## 2023-06-14 PROCEDURE — 3075F PR MOST RECENT SYSTOLIC BLOOD PRESS GE 130-139MM HG: ICD-10-PCS | Mod: CPTII,S$GLB,, | Performed by: NURSE PRACTITIONER

## 2023-06-14 PROCEDURE — 99999 PR PBB SHADOW E&M-EST. PATIENT-LVL III: CPT | Mod: PBBFAC,,, | Performed by: NURSE PRACTITIONER

## 2023-06-14 PROCEDURE — 1159F PR MEDICATION LIST DOCUMENTED IN MEDICAL RECORD: ICD-10-PCS | Mod: CPTII,S$GLB,, | Performed by: NURSE PRACTITIONER

## 2023-06-14 PROCEDURE — 3078F DIAST BP <80 MM HG: CPT | Mod: CPTII,S$GLB,, | Performed by: NURSE PRACTITIONER

## 2023-06-14 PROCEDURE — 69210 REMOVE IMPACTED EAR WAX UNI: CPT | Mod: S$GLB,,, | Performed by: NURSE PRACTITIONER

## 2023-06-14 PROCEDURE — 99999 PR PBB SHADOW E&M-EST. PATIENT-LVL III: ICD-10-PCS | Mod: PBBFAC,,, | Performed by: NURSE PRACTITIONER

## 2023-06-14 PROCEDURE — 3075F SYST BP GE 130 - 139MM HG: CPT | Mod: CPTII,S$GLB,, | Performed by: NURSE PRACTITIONER

## 2023-06-14 PROCEDURE — 1160F RVW MEDS BY RX/DR IN RCRD: CPT | Mod: CPTII,S$GLB,, | Performed by: NURSE PRACTITIONER

## 2023-06-14 PROCEDURE — 99203 OFFICE O/P NEW LOW 30 MIN: CPT | Mod: 25,S$GLB,, | Performed by: NURSE PRACTITIONER

## 2023-06-14 PROCEDURE — 1160F PR REVIEW ALL MEDS BY PRESCRIBER/CLIN PHARMACIST DOCUMENTED: ICD-10-PCS | Mod: CPTII,S$GLB,, | Performed by: NURSE PRACTITIONER

## 2023-06-14 PROCEDURE — 3008F BODY MASS INDEX DOCD: CPT | Mod: CPTII,S$GLB,, | Performed by: NURSE PRACTITIONER

## 2023-06-14 PROCEDURE — 3078F PR MOST RECENT DIASTOLIC BLOOD PRESSURE < 80 MM HG: ICD-10-PCS | Mod: CPTII,S$GLB,, | Performed by: NURSE PRACTITIONER

## 2023-06-14 NOTE — PROCEDURES
Ear Cerumen Removal    Date/Time: 6/14/2023 10:40 AM  Performed by: Selma Ceballos NP  Authorized by: Selma Ceballos NP     Consent Done?:  Yes (Verbal)  Location details:  Left ear  Procedure type: curette    Procedure type comment:  Suction  Cerumen  Removal Results:  Cerumen completely removed  Patient tolerance:  Patient tolerated the procedure well with no immediate complications     Minimal cerumen impaction

## 2023-06-14 NOTE — PROGRESS NOTES
Patient ID: Azucena Luciano is a 72 y.o. y.o. female    Chief Complaint:   Chief Complaint   Patient presents with    Ear Fullness     Both ears filled with wax       Patient is self-referred for evaluation of a possible wax impaction in bilateral ears.  she has complaints of hearing loss in the affected ears, but denies pain or drainage.  This has not been an issue in the past.  The patient has not been using any sort of ear drop to soften the wax. She reports of hearing loss but refusing audiogram today. She also reports of chronic rhinitis with nasal congestion. Had 2 septoplasties in the past with no improvement. She takes Benadryl and phenylephrine nasal spray PRN.       Review of Systems   Constitutional: Negative for fever, chills, fatigue and unexpected weight change.   HENT: Positive for ear blockage. Negative for hearing loss, nosebleeds, congestion, sore throat, facial swelling, rhinorrhea, sneezing, trouble swallowing, dental problem, voice change, postnasal drip, sinus pressure, tinnitus and ear discharge.    Eyes: Negative for redness, itching and visual disturbance.   Respiratory: Negative for cough, choking and wheezing.    Cardiovascular: Negative for chest pain and palpitations.   Gastrointestinal: Negative for abdominal pain.        No reflux.   Musculoskeletal: Negative for gait problem.   Skin: Negative for rash.   Neurological: Negative for dizziness, light-headedness and headaches.     Past Medical History:   Diagnosis Date    Asthma     Carpal tunnel syndrome     DVT (deep venous thrombosis)     Hypertension     Osteoporosis     Urinary tract infection      Past Surgical History:   Procedure Laterality Date    ARTHROSCOPY OF KNEE Left 9/19/2022    Procedure: ARTHROSCOPY, KNEE;  Surgeon: Bryson Medrano MD;  Location: [facility];  Service: Orthopedics;  Laterality: Left;    AUGMENTATION OF BREAST      BREAST BIOPSY      BREAST CYST ASPIRATION      BREAST SURGERY      CATARACT EXTRACTION       COLONOSCOPY N/A 8/7/2018    Procedure: COLONOSCOPY;  Surgeon: KATE Vernon MD;  Location: [facility] ([address] [place] [address]);  Service: Endoscopy;  Laterality: N/A;  Ok to hold Eliquis x 2 days per Dr. Gee    FACIAL COSMETIC SURGERY      NASAL SEPTUM SURGERY      OVARIAN CYST REMOVAL      tummy tuck       Social History     Socioeconomic History    Marital status:     Number of children: 3   Occupational History    Occupation: Retired insurance     Tobacco Use    Smoking status: Never    Smokeless tobacco: Never   Substance and Sexual Activity    Alcohol use: Not Currently    Drug use: No    Sexual activity: Yes     Partners: Male     Social Determinants of Health     Financial Resource Strain: Medium Risk    Difficulty of Paying Living Expenses: Somewhat hard   Food Insecurity: No Food Insecurity    Worried About Running Out of Food in the Last Year: Never true    Ran Out of Food in the Last Year: Never true   Transportation Needs: No Transportation Needs    Lack of Transportation (Medical): No    Lack of Transportation (Non-Medical): No   Physical Activity: Insufficiently Active    Days of Exercise per Week: 3 days    Minutes of Exercise per Session: 40 min   Stress: Stress Concern Present    Feeling of Stress : Very much   Social Connections: Unknown    Frequency of Communication with Friends and Family: Three times a week    Frequency of Social Gatherings with Friends and Family: Twice a week    Active Member of Clubs or Organizations: Yes    Attends Club or Organization Meetings: More than 4 times per year    Marital Status:    Housing Stability: Low Risk     Unable to Pay for Housing in the Last Year: No    Number of Places Lived in the Last Year: 1    Unstable Housing in the Last Year: No     Family History   Problem Relation Age of Onset    Osteoporosis Mother     Cancer Father 66        Colon    Colon cancer Father     Cancer Brother         Brain tumor    Diabetes Maternal Grandmother      Cancer Maternal Grandmother 75        Colon    Diabetes Paternal Grandmother     Heart disease Paternal Grandmother     Hypertension Neg Hx     Stroke Neg Hx     Breast cancer Neg Hx     Ovarian cancer Neg Hx     Prostate cancer Neg Hx     Kidney disease Neg Hx        Objective:      Vitals:    06/14/23 1044   BP: 131/74   Pulse: 61   Temp: 98.1 °F (36.7 °C)       Physical Exam   HENT:   NC: Septum midline, turbinates 3+, no lesions, mucosa normal.  OC/OP: Mucosa normal and without lesions.  Tongue and FOM soft, normal, no lesions.  Uvula midline. Soft palate and tonsillar area normal.    Neck: no adenopathy or masses.   Right Ear: Tympanic membrane scarring, external ear and ear canal normal.  Decreased hearing is noted secondary to CI.   Left Ear: Tympanic membrane, external ear and ear canal normal.  EAC has minimal impacted cerumen. Decreased hearing is noted secondary to CI.      Ear Cerumen Removal    Date/Time: 6/14/2023 10:40 AM  Performed by: Selma Ceballos NP  Authorized by: Selma Ceballos NP     Consent Done?:  Yes (Verbal)  Location details:  Left ear  Procedure type: curette    Procedure type comment:  Suction  Cerumen  Removal Results:  Cerumen completely removed  Patient tolerance:  Patient tolerated the procedure well with no immediate complications  Minimal cerumen impaction    Assessment:         ICD-10-CM ICD-9-CM    1. Left ear impacted cerumen  H61.22 380.4 Ear Cerumen Removal      2. Non-seasonal allergic rhinitis, unspecified trigger  J30.89 477.8            Plan:       1.   Cerumen impaction:  Removed today without difficulty.  I would recommend the use of a wax softening drop, either over the counter Debrox or mineral oil, on a weekly basis.  I also instructed the patient to avoid Qtips. She is refusing audiogram today.   2. Recommended Flonase 2 sprays in each nostril daily and stop phenylephrine   3. F/u 1 year or sooner as needed         Selma Ceballos NP    Answers submitted by the  patient for this visit:  Review of Symptoms Questionnaire  (Submitted on 6/13/2023)  None of these: Yes  hearing loss: Yes  None of these : Yes  None of these: Yes  None of these : Yes  None of these: Yes  None of these: Yes  None of these : Yes  Food Allergies?: Yes  Seasonal Allergies?: Yes  None of these : Yes  None of these: Yes  None of these: Yes  None of these: Yes

## 2023-06-28 ENCOUNTER — PES CALL (OUTPATIENT)
Dept: ADMINISTRATIVE | Facility: CLINIC | Age: 73
End: 2023-06-28
Payer: MEDICARE

## 2023-07-12 DIAGNOSIS — I10 HTN, GOAL BELOW 130/80: ICD-10-CM

## 2023-07-12 DIAGNOSIS — R73.01 IMPAIRED FASTING BLOOD SUGAR: ICD-10-CM

## 2023-07-12 DIAGNOSIS — E78.49 OTHER HYPERLIPIDEMIA: Primary | ICD-10-CM

## 2023-07-14 DIAGNOSIS — M81.0 OSTEOPOROSIS, UNSPECIFIED OSTEOPOROSIS TYPE, UNSPECIFIED PATHOLOGICAL FRACTURE PRESENCE: ICD-10-CM

## 2023-07-14 DIAGNOSIS — E78.00 HYPERCHOLESTEREMIA: ICD-10-CM

## 2023-07-14 NOTE — TELEPHONE ENCOUNTER
Care Due:                  Date            Visit Type   Department     Provider  --------------------------------------------------------------------------------                                EP -                              PRIMARY      [facility] INTERNAL  Last Visit: 07-      CARE ([facility])   MEDICINE       Navarro GARCIA                              ANNUAL                              CHECKUP/PHY  [facility] INTERNAL  Next Visit: 08-      [facility]       Samir Ann                                                            Last  Test          Frequency    Reason                     Performed    Due Date  --------------------------------------------------------------------------------    CMP.........  12 months..  atorvastatin.............  07- 07-    Lipid Panel.  12 months..  atorvastatin.............  07- 07-    [employer] Embedded Care Due Messages. Reference number: [id number].   7/14/2023 5:27:10 PM CDT

## 2023-07-14 NOTE — TELEPHONE ENCOUNTER
Refill Routing Note   Medication(s) are not appropriate for processing by [facility] for the following reason(s):      Medication outside of protocol  Required labs outdated    [facility] action(s):  Defer  Route Care Due:  None identified     Medication Therapy Plan: FLOS      Appointments  past 12m or future 3m with PCP    Date Provider   Last Visit   7/25/2022 Navarro Limon DO   Next Visit   Visit date not found Navarro Limon DO   ED visits in past 90 days: 0        Note composed:5:42 PM 07/14/2023

## 2023-07-15 RX ORDER — ATORVASTATIN CALCIUM 20 MG/1
TABLET, FILM COATED ORAL
Qty: 90 TABLET | Refills: 1 | Status: SHIPPED | OUTPATIENT
Start: 2023-07-15

## 2023-07-15 RX ORDER — ALENDRONATE SODIUM 70 MG/1
TABLET ORAL
Qty: 12 TABLET | Refills: 3 | Status: SHIPPED | OUTPATIENT
Start: 2023-07-15

## 2023-07-18 ENCOUNTER — OFFICE VISIT (OUTPATIENT)
Dept: ORTHOPEDICS | Facility: CLINIC | Age: 73
End: 2023-07-18
Payer: MEDICARE

## 2023-07-18 ENCOUNTER — HOSPITAL ENCOUNTER (OUTPATIENT)
Dept: RADIOLOGY | Facility: HOSPITAL | Age: 73
Discharge: HOME | End: 2023-07-18
Attending: ORTHOPAEDIC SURGERY
Payer: MEDICARE

## 2023-07-18 VITALS
BODY MASS INDEX: 22.18 KG/M2 | HEART RATE: 62 BPM | WEIGHT: 110 LBS | DIASTOLIC BLOOD PRESSURE: 78 MMHG | HEIGHT: 59 IN | SYSTOLIC BLOOD PRESSURE: 149 MMHG

## 2023-07-18 DIAGNOSIS — M17.12 PRIMARY OSTEOARTHRITIS OF LEFT KNEE: ICD-10-CM

## 2023-07-18 DIAGNOSIS — M25.562 CHRONIC PAIN OF LEFT KNEE: ICD-10-CM

## 2023-07-18 DIAGNOSIS — G89.29 CHRONIC PAIN OF LEFT KNEE: ICD-10-CM

## 2023-07-18 DIAGNOSIS — M81.6 LOCALIZED OSTEOPOROSIS, UNSPECIFIED PATHOLOGICAL FRACTURE PRESENCE: ICD-10-CM

## 2023-07-18 DIAGNOSIS — M62.81 QUADRICEPS WEAKNESS: ICD-10-CM

## 2023-07-18 DIAGNOSIS — M62.81 QUADRICEPS WEAKNESS: Primary | ICD-10-CM

## 2023-07-18 PROBLEM — M17.11 PRIMARY OSTEOARTHRITIS OF RIGHT KNEE: Status: ACTIVE | Noted: 2023-07-18

## 2023-07-18 PROBLEM — M25.561 CHRONIC PAIN OF RIGHT KNEE: Status: ACTIVE | Noted: 2022-08-30

## 2023-07-18 PROCEDURE — 71045 X-RAY EXAM CHEST 1 VIEW: CPT | Mod: 26,,, | Performed by: RADIOLOGY

## 2023-07-18 PROCEDURE — 3077F PR MOST RECENT SYSTOLIC BLOOD PRESSURE >= 140 MM HG: ICD-10-PCS | Mod: CPTII,S$GLB,, | Performed by: ORTHOPAEDIC SURGERY

## 2023-07-18 PROCEDURE — 1101F PR PT FALLS ASSESS DOC 0-1 FALLS W/OUT INJ PAST YR: ICD-10-PCS | Mod: CPTII,S$GLB,, | Performed by: ORTHOPAEDIC SURGERY

## 2023-07-18 PROCEDURE — 77073 XR HIP TO ANKLE: ICD-10-PCS | Mod: 26,,, | Performed by: RADIOLOGY

## 2023-07-18 PROCEDURE — 1159F PR MEDICATION LIST DOCUMENTED IN MEDICAL RECORD: ICD-10-PCS | Mod: CPTII,S$GLB,, | Performed by: ORTHOPAEDIC SURGERY

## 2023-07-18 PROCEDURE — 3288F PR FALLS RISK ASSESSMENT DOCUMENTED: ICD-10-PCS | Mod: CPTII,S$GLB,, | Performed by: ORTHOPAEDIC SURGERY

## 2023-07-18 PROCEDURE — 3077F SYST BP >= 140 MM HG: CPT | Mod: CPTII,S$GLB,, | Performed by: ORTHOPAEDIC SURGERY

## 2023-07-18 PROCEDURE — 1101F PT FALLS ASSESS-DOCD LE1/YR: CPT | Mod: CPTII,S$GLB,, | Performed by: ORTHOPAEDIC SURGERY

## 2023-07-18 PROCEDURE — 99215 PR OFFICE/OUTPT VISIT, EST, LEVL V, 40-54 MIN: ICD-10-PCS | Mod: S$GLB,,, | Performed by: ORTHOPAEDIC SURGERY

## 2023-07-18 PROCEDURE — 99999 PR PBB SHADOW E&M-EST. PATIENT-LVL IV: ICD-10-PCS | Mod: PBBFAC,,, | Performed by: ORTHOPAEDIC SURGERY

## 2023-07-18 PROCEDURE — 1125F AMNT PAIN NOTED PAIN PRSNT: CPT | Mod: CPTII,S$GLB,, | Performed by: ORTHOPAEDIC SURGERY

## 2023-07-18 PROCEDURE — 3078F PR MOST RECENT DIASTOLIC BLOOD PRESSURE < 80 MM HG: ICD-10-PCS | Mod: CPTII,S$GLB,, | Performed by: ORTHOPAEDIC SURGERY

## 2023-07-18 PROCEDURE — 77073 BONE LENGTH STUDIES: CPT | Mod: TC,FY

## 2023-07-18 PROCEDURE — 99999 PR PBB SHADOW E&M-EST. PATIENT-LVL IV: CPT | Mod: PBBFAC,,, | Performed by: ORTHOPAEDIC SURGERY

## 2023-07-18 PROCEDURE — 3078F DIAST BP <80 MM HG: CPT | Mod: CPTII,S$GLB,, | Performed by: ORTHOPAEDIC SURGERY

## 2023-07-18 PROCEDURE — 99215 OFFICE O/P EST HI 40 MIN: CPT | Mod: S$GLB,,, | Performed by: ORTHOPAEDIC SURGERY

## 2023-07-18 PROCEDURE — 3288F FALL RISK ASSESSMENT DOCD: CPT | Mod: CPTII,S$GLB,, | Performed by: ORTHOPAEDIC SURGERY

## 2023-07-18 PROCEDURE — 3008F BODY MASS INDEX DOCD: CPT | Mod: CPTII,S$GLB,, | Performed by: ORTHOPAEDIC SURGERY

## 2023-07-18 PROCEDURE — 1159F MED LIST DOCD IN RCRD: CPT | Mod: CPTII,S$GLB,, | Performed by: ORTHOPAEDIC SURGERY

## 2023-07-18 PROCEDURE — 3008F PR BODY MASS INDEX (BMI) DOCUMENTED: ICD-10-PCS | Mod: CPTII,S$GLB,, | Performed by: ORTHOPAEDIC SURGERY

## 2023-07-18 PROCEDURE — 1125F PR PAIN SEVERITY QUANTIFIED, PAIN PRESENT: ICD-10-PCS | Mod: CPTII,S$GLB,, | Performed by: ORTHOPAEDIC SURGERY

## 2023-07-18 PROCEDURE — 71045 X-RAY EXAM CHEST 1 VIEW: CPT | Mod: TC,FY

## 2023-07-18 PROCEDURE — 77073 BONE LENGTH STUDIES: CPT | Mod: 26,,, | Performed by: RADIOLOGY

## 2023-07-18 PROCEDURE — 71045 XR CHEST 1 VIEW PRE-OP: ICD-10-PCS | Mod: 26,,, | Performed by: RADIOLOGY

## 2023-07-18 NOTE — PROGRESS NOTES
Subjective:      Patient ID: Azucena Luciano is a 72 y.o. female.    Chief Complaint: Pain of the Left Knee    Knee Pain: left  swelling: Yes  worsens with activity: Yes  relieved by: injections       Social History     Occupational History    Occupation: Retired insurance     Tobacco Use    Smoking status: Never    Smokeless tobacco: Never   Substance and Sexual Activity    Alcohol use: Not Currently    Drug use: No    Sexual activity: Yes     Partners: Male      ROS      Objective:    Ortho/SPM Exam       Assessment:       1. Quadriceps weakness    2. Localized osteoporosis, unspecified pathological fracture presence    3. Primary osteoarthritis of left knee    4. Chronic pain of left knee          Plan:       The patient has failed non operative management, has painful crepitus, and has swelling. The natural history of severe degenerative arthritis was discussed at length and nonoperative and operative treatment was reviewed. Due to the pain and associated disability, I recommend left total knee replacement for KL 4.  The risks, benefits, convalescence, and alternatives were reviewed.  Numerous questions were asked and answered.  Models were used as an education tool.  Surgery will be scheduled at a convenient time.  The discussion with the patient included a description of a total knee replacement.  A total knee replacement (TKR) means a resurfacing of all three surfaces-the patella, femur, and tibia, with metal and plastic parts. The prosthetic parts are usually cemented into position and will allow a patient a full range of motion from. The postoperative motion, however, is determined by multiple factors, the most important of which is preoperative motion. In general, the better the motion preoperatively, the better the motion postoperatively.  In patients with good bone stock and bone quality (ie males, younger patients) I will generally use an uncemented tibial component and leave the patella  unresurfaced, in an effort to preserve bone stock for any future revision surgeries. In those patients we discuss the risk of anterior knee pain in a small subset of patients (5-10%) with an unresurfaced patella. The operation, pending medical clearance, generally requires a hospitalization of 0-3 days for one knee (possibly longer for a bilateral replacement). In general, we prefer to perform the procedure under epidural/spinal anesthesia.  Patient blood donation will be based on the individual patient but is not common and based on preoperative hemoglobin/hematocrit levels.The operation will be done preferably in a minimally invasive fashion which will facilitate recovery.  While performing the procedure in a minimally invasive manner is our preference, some patients are not candidates.  In that situation, a non-minimally invasive technique will be performed.  This will not adversely affect the long term success of the TKR.  Postoperatively, the patient is  walking the day of surgery and may be discahrged the day of surgery if stable with a walker or cane.  The first couple of days can be painful and the pain medication will alleviate but not eliminate the pain.  Thus, the patient must really push hard to get the range of motion.   The cane is to be dispensed with once the patient is secure enough.  In general, there is no cast or brace required with a routine knee replacement. In the long term, we do not encourage our patients to run for the sake of running; although, pending their preoperative status, we often allow patients to play doubles tennis or comparable activities.  We also allow them to do gentle intermediate downhill skiing if they are truly an expert skier.  Biking is encouraged as well as swimming.  The follow-up periods are usually at 2 weeks, 3 months, six months, and yearly intervals.  Potential complications with total knee replacements include infection (less than 2%), which is much higher in  patients with obesity, diabetes, or other conditions which adversely affect the immune system.  (Patients with a previous history of osteomyelitis can have infection rates as high as 15%).  By nerve damage we mean a peroneal nerve palsy with a foot drop (a flapping foot with ambulation).  This is particularly more apt to occur with a bad valgus (knock knee) deformity.  The incidence can be quite high in this particular patient population.  There are always areas of skin numbness, but this is not an untoward effect, nor do we consider it a complication.  Other potential complications include dislocation of the patellar component (less than 2%).  The loosening of either the tibial or femoral component is much more infrequent.  It usually occurs with infection or long-term use in a patient population that is at extreme risk (e.g., markedly overweight and not conscientious about their exercises).  Major blood vessel damage is also extremely rare.  Theoretically, because of the anatomic proximity of the popliteal artery, it could be lacerated with subsequent repairs required.  Albeit unlikely, a disruption of the popliteal artery could theoretically result in an amputation.  Similarly, infection could theoretically result in an amputation if one were to grow out an organism that cannot be controlled with antibiotics.  General medical complications include phlebitis for which we prophylactically anticoagulate, but it could still occur and fatal pulmonary embolus has been reported.  Cardiovascular problems, such as a heart attack or ischemia, are always a concern with such hemodynamic changes in the blood vascular system.  Our goal is to improve at least 80% of the pain and hopefully 100% but some aspects of the patients anatomy or other factors may not provide complete pain relief. Other general complications are very rare but in medicine anything could theoretically happen. We also discussed performing a pre-operative  peripheral sensory block of the bracnhes of the AFCN and ISN of the same knee using iovera to help with pain control post surgery. This is a relatively new technology with early data demonstrating significant pain improvement and functional recovery. However the science defining all the associated risks is still developing. From what we know thus far, a small number of patients can have nerve pain along the areas of the treated nerves. I think the patient understands the risk benefit ratio of total knee replacement and the iovera treatment and would like to pursue the total knee replacement and iovera treatment. we will begin the pre-operative process.

## 2023-07-28 ENCOUNTER — LAB VISIT (OUTPATIENT)
Dept: LAB | Facility: HOSPITAL | Age: 73
End: 2023-07-28
Payer: MEDICARE

## 2023-07-28 DIAGNOSIS — I10 HTN, GOAL BELOW 130/80: ICD-10-CM

## 2023-07-28 DIAGNOSIS — E78.49 OTHER HYPERLIPIDEMIA: ICD-10-CM

## 2023-07-28 DIAGNOSIS — R73.01 IMPAIRED FASTING BLOOD SUGAR: ICD-10-CM

## 2023-07-28 LAB
ALBUMIN SERPL BCP-MCNC: 3.8 G/DL (ref 3.5–5.2)
ALP SERPL-CCNC: 106 U/L (ref 55–135)
ALT SERPL W/O P-5'-P-CCNC: 24 U/L (ref 10–44)
ANION GAP SERPL CALC-SCNC: 12 MMOL/L (ref 8–16)
AST SERPL-CCNC: 27 U/L (ref 10–40)
BASOPHILS # BLD AUTO: 0.07 K/UL (ref 0–0.2)
BASOPHILS NFR BLD: 1.2 % (ref 0–1.9)
BILIRUB SERPL-MCNC: 0.5 MG/DL (ref 0.1–1)
BUN SERPL-MCNC: 18 MG/DL (ref 8–23)
CALCIUM SERPL-MCNC: 9.5 MG/DL (ref 8.7–10.5)
CHLORIDE SERPL-SCNC: 108 MMOL/L (ref 95–110)
CHOLEST SERPL-MCNC: 159 MG/DL (ref 120–199)
CHOLEST/HDLC SERPL: 3.5 {RATIO} (ref 2–5)
CO2 SERPL-SCNC: 24 MMOL/L (ref 23–29)
CREAT SERPL-MCNC: 0.8 MG/DL (ref 0.5–1.4)
DIFFERENTIAL METHOD: ABNORMAL
EOSINOPHIL # BLD AUTO: 0.2 K/UL (ref 0–0.5)
EOSINOPHIL NFR BLD: 2.8 % (ref 0–8)
ERYTHROCYTE [DISTWIDTH] IN BLOOD BY AUTOMATED COUNT: 14.5 % (ref 11.5–14.5)
EST. GFR  (NO RACE VARIABLE): >60 ML/MIN/1.73 M^2
ESTIMATED AVG GLUCOSE: 105 MG/DL (ref 68–131)
GLUCOSE SERPL-MCNC: 89 MG/DL (ref 70–110)
HBA1C MFR BLD: 5.3 % (ref 4–5.6)
HCT VFR BLD AUTO: 44.9 % (ref 37–48.5)
HDLC SERPL-MCNC: 45 MG/DL (ref 40–75)
HDLC SERPL: 28.3 % (ref 20–50)
HGB BLD-MCNC: 13.9 G/DL (ref 12–16)
IMM GRANULOCYTES # BLD AUTO: 0.02 K/UL (ref 0–0.04)
IMM GRANULOCYTES NFR BLD AUTO: 0.3 % (ref 0–0.5)
LDLC SERPL CALC-MCNC: 83.6 MG/DL (ref 63–159)
LYMPHOCYTES # BLD AUTO: 3.1 K/UL (ref 1–4.8)
LYMPHOCYTES NFR BLD: 52.9 % (ref 18–48)
MCH RBC QN AUTO: 27.1 PG (ref 27–31)
MCHC RBC AUTO-ENTMCNC: 31 G/DL (ref 32–36)
MCV RBC AUTO: 88 FL (ref 82–98)
MONOCYTES # BLD AUTO: 0.4 K/UL (ref 0.3–1)
MONOCYTES NFR BLD: 6.9 % (ref 4–15)
NEUTROPHILS # BLD AUTO: 2.1 K/UL (ref 1.8–7.7)
NEUTROPHILS NFR BLD: 35.9 % (ref 38–73)
NONHDLC SERPL-MCNC: 114 MG/DL
NRBC BLD-RTO: 0 /100 WBC
PLATELET # BLD AUTO: 216 K/UL (ref 150–450)
PMV BLD AUTO: 12 FL (ref 9.2–12.9)
POTASSIUM SERPL-SCNC: 4.1 MMOL/L (ref 3.5–5.1)
PROT SERPL-MCNC: 6.3 G/DL (ref 6–8.4)
RBC # BLD AUTO: 5.12 M/UL (ref 4–5.4)
SODIUM SERPL-SCNC: 144 MMOL/L (ref 136–145)
TRIGL SERPL-MCNC: 152 MG/DL (ref 30–150)
TSH SERPL DL<=0.005 MIU/L-ACNC: 2.28 UIU/ML (ref 0.4–4)
WBC # BLD AUTO: 5.77 K/UL (ref 3.9–12.7)

## 2023-07-28 PROCEDURE — 84443 ASSAY THYROID STIM HORMONE: CPT | Performed by: INTERNAL MEDICINE

## 2023-07-28 PROCEDURE — 80061 LIPID PANEL: CPT | Performed by: INTERNAL MEDICINE

## 2023-07-28 PROCEDURE — 36415 COLL VENOUS BLD VENIPUNCTURE: CPT | Mod: PO | Performed by: INTERNAL MEDICINE

## 2023-07-28 PROCEDURE — 85025 COMPLETE CBC W/AUTO DIFF WBC: CPT | Performed by: INTERNAL MEDICINE

## 2023-07-28 PROCEDURE — 83036 HEMOGLOBIN GLYCOSYLATED A1C: CPT | Performed by: INTERNAL MEDICINE

## 2023-07-28 PROCEDURE — 80053 COMPREHEN METABOLIC PANEL: CPT | Performed by: INTERNAL MEDICINE

## 2023-08-02 ENCOUNTER — OFFICE VISIT (OUTPATIENT)
Dept: INTERNAL MEDICINE | Facility: CLINIC | Age: 73
End: 2023-08-02
Payer: MEDICARE

## 2023-08-02 VITALS
BODY MASS INDEX: 22.49 KG/M2 | HEART RATE: 70 BPM | WEIGHT: 111.56 LBS | SYSTOLIC BLOOD PRESSURE: 160 MMHG | RESPIRATION RATE: 10 BRPM | DIASTOLIC BLOOD PRESSURE: 80 MMHG | HEIGHT: 59 IN

## 2023-08-02 DIAGNOSIS — R68.2 DRY MOUTH: ICD-10-CM

## 2023-08-02 DIAGNOSIS — E78.49 OTHER HYPERLIPIDEMIA: Primary | ICD-10-CM

## 2023-08-02 DIAGNOSIS — M81.6 LOCALIZED OSTEOPOROSIS, UNSPECIFIED PATHOLOGICAL FRACTURE PRESENCE: ICD-10-CM

## 2023-08-02 DIAGNOSIS — I10 PRIMARY HYPERTENSION: ICD-10-CM

## 2023-08-02 PROBLEM — M81.0 OSTEOPOROSIS: Chronic | Status: ACTIVE | Noted: 2018-07-13

## 2023-08-02 PROCEDURE — 99999 PR PBB SHADOW E&M-EST. PATIENT-LVL III: CPT | Mod: PBBFAC,,, | Performed by: INTERNAL MEDICINE

## 2023-08-02 PROCEDURE — 99214 OFFICE O/P EST MOD 30 MIN: CPT | Mod: S$GLB,,, | Performed by: INTERNAL MEDICINE

## 2023-08-02 PROCEDURE — 3044F HG A1C LEVEL LT 7.0%: CPT | Mod: CPTII,S$GLB,, | Performed by: INTERNAL MEDICINE

## 2023-08-02 PROCEDURE — 99214 PR OFFICE/OUTPT VISIT, EST, LEVL IV, 30-39 MIN: ICD-10-PCS | Mod: S$GLB,,, | Performed by: INTERNAL MEDICINE

## 2023-08-02 PROCEDURE — 3044F PR MOST RECENT HEMOGLOBIN A1C LEVEL <7.0%: ICD-10-PCS | Mod: CPTII,S$GLB,, | Performed by: INTERNAL MEDICINE

## 2023-08-02 PROCEDURE — 3077F SYST BP >= 140 MM HG: CPT | Mod: CPTII,S$GLB,, | Performed by: INTERNAL MEDICINE

## 2023-08-02 PROCEDURE — 3077F PR MOST RECENT SYSTOLIC BLOOD PRESSURE >= 140 MM HG: ICD-10-PCS | Mod: CPTII,S$GLB,, | Performed by: INTERNAL MEDICINE

## 2023-08-02 PROCEDURE — 1160F RVW MEDS BY RX/DR IN RCRD: CPT | Mod: CPTII,S$GLB,, | Performed by: INTERNAL MEDICINE

## 2023-08-02 PROCEDURE — 3079F PR MOST RECENT DIASTOLIC BLOOD PRESSURE 80-89 MM HG: ICD-10-PCS | Mod: CPTII,S$GLB,, | Performed by: INTERNAL MEDICINE

## 2023-08-02 PROCEDURE — 1159F PR MEDICATION LIST DOCUMENTED IN MEDICAL RECORD: ICD-10-PCS | Mod: CPTII,S$GLB,, | Performed by: INTERNAL MEDICINE

## 2023-08-02 PROCEDURE — 3008F BODY MASS INDEX DOCD: CPT | Mod: CPTII,S$GLB,, | Performed by: INTERNAL MEDICINE

## 2023-08-02 PROCEDURE — 1159F MED LIST DOCD IN RCRD: CPT | Mod: CPTII,S$GLB,, | Performed by: INTERNAL MEDICINE

## 2023-08-02 PROCEDURE — 3079F DIAST BP 80-89 MM HG: CPT | Mod: CPTII,S$GLB,, | Performed by: INTERNAL MEDICINE

## 2023-08-02 PROCEDURE — 1160F PR REVIEW ALL MEDS BY PRESCRIBER/CLIN PHARMACIST DOCUMENTED: ICD-10-PCS | Mod: CPTII,S$GLB,, | Performed by: INTERNAL MEDICINE

## 2023-08-02 PROCEDURE — 99999 PR PBB SHADOW E&M-EST. PATIENT-LVL III: ICD-10-PCS | Mod: PBBFAC,,, | Performed by: INTERNAL MEDICINE

## 2023-08-02 PROCEDURE — 3008F PR BODY MASS INDEX (BMI) DOCUMENTED: ICD-10-PCS | Mod: CPTII,S$GLB,, | Performed by: INTERNAL MEDICINE

## 2023-08-02 RX ORDER — VALSARTAN 80 MG/1
80 TABLET ORAL DAILY
Qty: 90 TABLET | Refills: 3 | Status: SHIPPED | OUTPATIENT
Start: 2023-08-02 | End: 2023-08-23

## 2023-08-02 NOTE — PROGRESS NOTES
Subjective:       Patient ID: Azucena Luciano is a 72 y.o. female.    Chief Complaint: Follow-up    HPI    72-year-old female here for follow-up of chronic medical conditions.  She has had high readings at Dr. Medrano's office.  She has taken her BP at home and it was in the yellow.  She recently lost 6#.    She is thirsty all the time and urinates all the time.  She gets up twice in the middle of the night to urinate.  Her throat is dry, because she breaths out of her mouth.  She has tried flonase and astelin without relief.  She has a lot of respiratory allergies.  She struggles to breath out of her nose.    HLD - Patient is currently on lipitor 20 mg.  Her last lipid panel was   Cholesterol   Date Value Ref Range Status   07/28/2023 159 120 - 199 mg/dL Final     Comment:     The National Cholesterol Education Program (NCEP) has set the  following guidelines (reference ranges) for Cholesterol:  Optimal.....................<200 mg/dL  Borderline High.............200-239 mg/dL  High........................> or = 240 mg/dL       Triglycerides   Date Value Ref Range Status   07/28/2023 152 (H) 30 - 150 mg/dL Final     Comment:     The National Cholesterol Education Program (NCEP) has set the  following guidelines (reference values) for triglycerides:  Normal......................<150 mg/dL  Borderline High.............150-199 mg/dL  High........................200-499 mg/dL       HDL   Date Value Ref Range Status   07/28/2023 45 40 - 75 mg/dL Final     Comment:     The National Cholesterol Education Program (NCEP) has set the  following guidelines (reference values) for HDL Cholesterol:  Low...............<40 mg/dL  Optimal...........>60 mg/dL       LDL Cholesterol   Date Value Ref Range Status   07/28/2023 83.6 63.0 - 159.0 mg/dL Final     Comment:     The National Cholesterol Education Program (NCEP) has set the  following guidelines (reference values) for LDL Cholesterol:  Optimal.......................<130  mg/dL  Borderline High...............130-159 mg/dL  High..........................160-189 mg/dL  Very High.....................>190 mg/dL     .  Side effects of the medication: none.    Patient has osteoporosis and is on Fosamax 70 mg.    Review of Systems   Constitutional:  Negative for activity change and unexpected weight change.   HENT:  Negative for hearing loss, rhinorrhea and trouble swallowing.    Eyes:  Negative for discharge and visual disturbance.   Respiratory:  Negative for chest tightness and wheezing.    Cardiovascular:  Negative for chest pain and palpitations.   Gastrointestinal:  Negative for blood in stool, constipation, diarrhea and vomiting.   Endocrine: Negative for polydipsia and polyuria.   Genitourinary:  Negative for difficulty urinating, dysuria, hematuria and menstrual problem.   Musculoskeletal:  Negative for arthralgias, joint swelling and neck pain.   Neurological:  Negative for weakness and headaches.   Psychiatric/Behavioral:  Negative for confusion and dysphoric mood.          Objective:      Physical Exam  Vitals reviewed.   Constitutional:       Appearance: She is well-developed.   HENT:      Head: Normocephalic and atraumatic.      Mouth/Throat:      Pharynx: No oropharyngeal exudate.   Eyes:      General: No scleral icterus.        Right eye: No discharge.         Left eye: No discharge.      Pupils: Pupils are equal, round, and reactive to light.   Neck:      Thyroid: No thyromegaly.      Trachea: No tracheal deviation.   Cardiovascular:      Rate and Rhythm: Normal rate and regular rhythm.      Heart sounds: Normal heart sounds. No murmur heard.     No friction rub. No gallop.   Pulmonary:      Effort: Pulmonary effort is normal. No respiratory distress.      Breath sounds: Normal breath sounds. No wheezing or rales.   Chest:      Chest wall: No tenderness.   Abdominal:      General: Bowel sounds are normal. There is no distension.      Palpations: Abdomen is soft. There is no  mass.      Tenderness: There is no abdominal tenderness. There is no guarding or rebound.   Musculoskeletal:         General: No tenderness. Normal range of motion.      Cervical back: Normal range of motion and neck supple.   Skin:     General: Skin is warm and dry.      Coloration: Skin is not pale.      Findings: No erythema or rash.   Neurological:      Mental Status: She is alert and oriented to person, place, and time.   Psychiatric:         Behavior: Behavior normal.         Assessment:       1. Other hyperlipidemia    2. Localized osteoporosis, unspecified pathological fracture presence    3. Dry mouth      Plan:       1. Continue Lipitor 20 mg.  2.  Continue Fosamax 70 mg.    3. Think this is related to heat and patient breathing through her mouth.  4.  Start valsartan 80 mg return to clinic in 2-3 weeks to reassess.

## 2023-08-23 ENCOUNTER — OFFICE VISIT (OUTPATIENT)
Dept: INTERNAL MEDICINE | Facility: CLINIC | Age: 73
End: 2023-08-23
Payer: MEDICARE

## 2023-08-23 ENCOUNTER — LAB VISIT (OUTPATIENT)
Dept: LAB | Facility: HOSPITAL | Age: 73
End: 2023-08-23
Attending: INTERNAL MEDICINE
Payer: MEDICARE

## 2023-08-23 VITALS
RESPIRATION RATE: 18 BRPM | HEIGHT: 59 IN | SYSTOLIC BLOOD PRESSURE: 140 MMHG | DIASTOLIC BLOOD PRESSURE: 82 MMHG | OXYGEN SATURATION: 96 % | TEMPERATURE: 98 F | HEART RATE: 76 BPM | WEIGHT: 112.63 LBS | BODY MASS INDEX: 22.71 KG/M2

## 2023-08-23 DIAGNOSIS — I10 HTN, GOAL BELOW 130/80: ICD-10-CM

## 2023-08-23 DIAGNOSIS — E78.49 OTHER HYPERLIPIDEMIA: ICD-10-CM

## 2023-08-23 DIAGNOSIS — I10 HTN, GOAL BELOW 130/80: Primary | ICD-10-CM

## 2023-08-23 DIAGNOSIS — Z12.31 VISIT FOR SCREENING MAMMOGRAM: ICD-10-CM

## 2023-08-23 LAB
ALBUMIN SERPL BCP-MCNC: 3.8 G/DL (ref 3.5–5.2)
ALP SERPL-CCNC: 111 U/L (ref 55–135)
ALT SERPL W/O P-5'-P-CCNC: 28 U/L (ref 10–44)
ANION GAP SERPL CALC-SCNC: 7 MMOL/L (ref 8–16)
AST SERPL-CCNC: 25 U/L (ref 10–40)
BILIRUB SERPL-MCNC: 0.4 MG/DL (ref 0.1–1)
BUN SERPL-MCNC: 20 MG/DL (ref 8–23)
CALCIUM SERPL-MCNC: 9.5 MG/DL (ref 8.7–10.5)
CHLORIDE SERPL-SCNC: 110 MMOL/L (ref 95–110)
CO2 SERPL-SCNC: 24 MMOL/L (ref 23–29)
CREAT SERPL-MCNC: 0.8 MG/DL (ref 0.5–1.4)
EST. GFR  (NO RACE VARIABLE): >60 ML/MIN/1.73 M^2
GLUCOSE SERPL-MCNC: 88 MG/DL (ref 70–110)
POTASSIUM SERPL-SCNC: 4.1 MMOL/L (ref 3.5–5.1)
PROT SERPL-MCNC: 6.4 G/DL (ref 6–8.4)
SODIUM SERPL-SCNC: 141 MMOL/L (ref 136–145)

## 2023-08-23 PROCEDURE — 3288F FALL RISK ASSESSMENT DOCD: CPT | Mod: CPTII,S$GLB,, | Performed by: INTERNAL MEDICINE

## 2023-08-23 PROCEDURE — 3044F HG A1C LEVEL LT 7.0%: CPT | Mod: CPTII,S$GLB,, | Performed by: INTERNAL MEDICINE

## 2023-08-23 PROCEDURE — 1160F RVW MEDS BY RX/DR IN RCRD: CPT | Mod: CPTII,S$GLB,, | Performed by: INTERNAL MEDICINE

## 2023-08-23 PROCEDURE — 4010F ACE/ARB THERAPY RXD/TAKEN: CPT | Mod: CPTII,S$GLB,, | Performed by: INTERNAL MEDICINE

## 2023-08-23 PROCEDURE — 1101F PR PT FALLS ASSESS DOC 0-1 FALLS W/OUT INJ PAST YR: ICD-10-PCS | Mod: CPTII,S$GLB,, | Performed by: INTERNAL MEDICINE

## 2023-08-23 PROCEDURE — 1159F PR MEDICATION LIST DOCUMENTED IN MEDICAL RECORD: ICD-10-PCS | Mod: CPTII,S$GLB,, | Performed by: INTERNAL MEDICINE

## 2023-08-23 PROCEDURE — 1160F PR REVIEW ALL MEDS BY PRESCRIBER/CLIN PHARMACIST DOCUMENTED: ICD-10-PCS | Mod: CPTII,S$GLB,, | Performed by: INTERNAL MEDICINE

## 2023-08-23 PROCEDURE — 99214 PR OFFICE/OUTPT VISIT, EST, LEVL IV, 30-39 MIN: ICD-10-PCS | Mod: S$GLB,,, | Performed by: INTERNAL MEDICINE

## 2023-08-23 PROCEDURE — 3079F PR MOST RECENT DIASTOLIC BLOOD PRESSURE 80-89 MM HG: ICD-10-PCS | Mod: CPTII,S$GLB,, | Performed by: INTERNAL MEDICINE

## 2023-08-23 PROCEDURE — 99999 PR PBB SHADOW E&M-EST. PATIENT-LVL IV: CPT | Mod: PBBFAC,,, | Performed by: INTERNAL MEDICINE

## 2023-08-23 PROCEDURE — 3077F SYST BP >= 140 MM HG: CPT | Mod: CPTII,S$GLB,, | Performed by: INTERNAL MEDICINE

## 2023-08-23 PROCEDURE — 1125F AMNT PAIN NOTED PAIN PRSNT: CPT | Mod: CPTII,S$GLB,, | Performed by: INTERNAL MEDICINE

## 2023-08-23 PROCEDURE — 99999 PR PBB SHADOW E&M-EST. PATIENT-LVL IV: ICD-10-PCS | Mod: PBBFAC,,, | Performed by: INTERNAL MEDICINE

## 2023-08-23 PROCEDURE — 3077F PR MOST RECENT SYSTOLIC BLOOD PRESSURE >= 140 MM HG: ICD-10-PCS | Mod: CPTII,S$GLB,, | Performed by: INTERNAL MEDICINE

## 2023-08-23 PROCEDURE — 99214 OFFICE O/P EST MOD 30 MIN: CPT | Mod: S$GLB,,, | Performed by: INTERNAL MEDICINE

## 2023-08-23 PROCEDURE — 3008F PR BODY MASS INDEX (BMI) DOCUMENTED: ICD-10-PCS | Mod: CPTII,S$GLB,, | Performed by: INTERNAL MEDICINE

## 2023-08-23 PROCEDURE — 1159F MED LIST DOCD IN RCRD: CPT | Mod: CPTII,S$GLB,, | Performed by: INTERNAL MEDICINE

## 2023-08-23 PROCEDURE — 80053 COMPREHEN METABOLIC PANEL: CPT | Performed by: INTERNAL MEDICINE

## 2023-08-23 PROCEDURE — 4010F PR ACE/ARB THEARPY RXD/TAKEN: ICD-10-PCS | Mod: CPTII,S$GLB,, | Performed by: INTERNAL MEDICINE

## 2023-08-23 PROCEDURE — 3288F PR FALLS RISK ASSESSMENT DOCUMENTED: ICD-10-PCS | Mod: CPTII,S$GLB,, | Performed by: INTERNAL MEDICINE

## 2023-08-23 PROCEDURE — 3079F DIAST BP 80-89 MM HG: CPT | Mod: CPTII,S$GLB,, | Performed by: INTERNAL MEDICINE

## 2023-08-23 PROCEDURE — 3044F PR MOST RECENT HEMOGLOBIN A1C LEVEL <7.0%: ICD-10-PCS | Mod: CPTII,S$GLB,, | Performed by: INTERNAL MEDICINE

## 2023-08-23 PROCEDURE — 36415 COLL VENOUS BLD VENIPUNCTURE: CPT | Mod: PO | Performed by: INTERNAL MEDICINE

## 2023-08-23 PROCEDURE — 1101F PT FALLS ASSESS-DOCD LE1/YR: CPT | Mod: CPTII,S$GLB,, | Performed by: INTERNAL MEDICINE

## 2023-08-23 PROCEDURE — 1125F PR PAIN SEVERITY QUANTIFIED, PAIN PRESENT: ICD-10-PCS | Mod: CPTII,S$GLB,, | Performed by: INTERNAL MEDICINE

## 2023-08-23 PROCEDURE — 3008F BODY MASS INDEX DOCD: CPT | Mod: CPTII,S$GLB,, | Performed by: INTERNAL MEDICINE

## 2023-08-23 RX ORDER — VALSARTAN 160 MG/1
160 TABLET ORAL DAILY
Qty: 90 TABLET | Refills: 3 | Status: SHIPPED | OUTPATIENT
Start: 2023-08-23 | End: 2023-10-26

## 2023-08-23 NOTE — PROGRESS NOTES
Subjective:       Patient ID: Azucena Luciano is a 73 y.o. female.    Chief Complaint: Follow-up    HPI    73-year-old female here for follow-up.    HTN - Patient is currently on valsartan 80 mg. She does check her BP at home, and it runs 140/80. Side effects of medications note: none. Denies headaches, blurred vision, chest pain, shortness of breath, nausea.    HLD - Patient is currently on lipitor 20 mg.  Her last lipid panel was   Cholesterol   Date Value Ref Range Status   07/28/2023 159 120 - 199 mg/dL Final     Comment:     The National Cholesterol Education Program (NCEP) has set the  following guidelines (reference ranges) for Cholesterol:  Optimal.....................<200 mg/dL  Borderline High.............200-239 mg/dL  High........................> or = 240 mg/dL       Triglycerides   Date Value Ref Range Status   07/28/2023 152 (H) 30 - 150 mg/dL Final     Comment:     The National Cholesterol Education Program (NCEP) has set the  following guidelines (reference values) for triglycerides:  Normal......................<150 mg/dL  Borderline High.............150-199 mg/dL  High........................200-499 mg/dL       HDL   Date Value Ref Range Status   07/28/2023 45 40 - 75 mg/dL Final     Comment:     The National Cholesterol Education Program (NCEP) has set the  following guidelines (reference values) for HDL Cholesterol:  Low...............<40 mg/dL  Optimal...........>60 mg/dL       LDL Cholesterol   Date Value Ref Range Status   07/28/2023 83.6 63.0 - 159.0 mg/dL Final     Comment:     The National Cholesterol Education Program (NCEP) has set the  following guidelines (reference values) for LDL Cholesterol:  Optimal.......................<130 mg/dL  Borderline High...............130-159 mg/dL  High..........................160-189 mg/dL  Very High.....................>190 mg/dL     .  Side effects of the medication: none.    Review of Systems      Objective:      Physical Exam  Vitals reviewed.    Constitutional:       Appearance: She is well-developed.   HENT:      Head: Normocephalic and atraumatic.      Mouth/Throat:      Pharynx: No oropharyngeal exudate.   Eyes:      General: No scleral icterus.        Right eye: No discharge.         Left eye: No discharge.      Pupils: Pupils are equal, round, and reactive to light.   Neck:      Thyroid: No thyromegaly.      Trachea: No tracheal deviation.   Cardiovascular:      Rate and Rhythm: Normal rate and regular rhythm.      Heart sounds: Normal heart sounds. No murmur heard.     No friction rub. No gallop.   Pulmonary:      Effort: Pulmonary effort is normal. No respiratory distress.      Breath sounds: Normal breath sounds. No wheezing or rales.   Chest:      Chest wall: No tenderness.   Abdominal:      General: Bowel sounds are normal. There is no distension.      Palpations: Abdomen is soft. There is no mass.      Tenderness: There is no abdominal tenderness. There is no guarding or rebound.   Musculoskeletal:         General: No tenderness. Normal range of motion.      Cervical back: Normal range of motion and neck supple.   Skin:     General: Skin is warm and dry.      Coloration: Skin is not pale.      Findings: No erythema or rash.   Neurological:      Mental Status: She is alert and oriented to person, place, and time.   Psychiatric:         Behavior: Behavior normal.         Assessment:       1. HTN, goal below 130/80  - Comprehensive Metabolic Panel; Future    2. Other hyperlipidemia    3. Visit for screening mammogram  - Mammo Digital Screening Bilat; Future      Plan:       1. Increase valsartan to 160 mg.  Call in 7-10 days with blood pressure readings.  2.  Continue Lipitor 20 mg.  3. Check mammogram.

## 2023-08-30 ENCOUNTER — OFFICE VISIT (OUTPATIENT)
Dept: FAMILY MEDICINE | Facility: HOSPITAL | Age: 73
End: 2023-08-30
Attending: NURSE PRACTITIONER
Payer: MEDICARE

## 2023-08-30 ENCOUNTER — ANESTHESIA EVENT (OUTPATIENT)
Dept: SURGERY | Facility: HOSPITAL | Age: 73
End: 2023-08-30
Payer: MEDICARE

## 2023-08-30 ENCOUNTER — HOSPITAL ENCOUNTER (OUTPATIENT)
Dept: PREADMISSION TESTING | Facility: HOSPITAL | Age: 73
Discharge: HOME | End: 2023-08-30
Attending: NURSE PRACTITIONER
Payer: MEDICARE

## 2023-08-30 VITALS
SYSTOLIC BLOOD PRESSURE: 135 MMHG | HEIGHT: 59 IN | WEIGHT: 113.13 LBS | SYSTOLIC BLOOD PRESSURE: 135 MMHG | HEART RATE: 65 BPM | BODY MASS INDEX: 22.8 KG/M2 | OXYGEN SATURATION: 96 % | TEMPERATURE: 98 F | DIASTOLIC BLOOD PRESSURE: 76 MMHG | WEIGHT: 112.88 LBS | HEIGHT: 59 IN | BODY MASS INDEX: 22.76 KG/M2 | DIASTOLIC BLOOD PRESSURE: 76 MMHG | HEART RATE: 65 BPM | RESPIRATION RATE: 16 BRPM

## 2023-08-30 DIAGNOSIS — Z01.818 PREOP EXAMINATION: Primary | ICD-10-CM

## 2023-08-30 PROCEDURE — 99213 OFFICE O/P EST LOW 20 MIN: CPT | Performed by: STUDENT IN AN ORGANIZED HEALTH CARE EDUCATION/TRAINING PROGRAM

## 2023-08-30 RX ORDER — LIDOCAINE HYDROCHLORIDE 10 MG/ML
1 INJECTION, SOLUTION EPIDURAL; INFILTRATION; INTRACAUDAL; PERINEURAL ONCE
Status: CANCELLED | OUTPATIENT
Start: 2023-08-30 | End: 2023-08-30

## 2023-08-30 RX ORDER — SODIUM CHLORIDE, SODIUM LACTATE, POTASSIUM CHLORIDE, CALCIUM CHLORIDE 600; 310; 30; 20 MG/100ML; MG/100ML; MG/100ML; MG/100ML
INJECTION, SOLUTION INTRAVENOUS CONTINUOUS
Status: CANCELLED | OUTPATIENT
Start: 2023-08-30

## 2023-08-30 NOTE — ANESTHESIA PREPROCEDURE EVALUATION
"                                                                                                             08/30/2023  Azucena Luciano is a 73 y.o., female scheduled forARTHROPLASTY, KNEE, SIGHT ASSISTED (Left: Knee) 9/11/23.    Per family medicine Dr. Doe, "The patient is medically optimized with a low to moderate risk to proceed with (per ACC/AHA екатерина-operative guidelines) a moderate risk surgery".    Past Medical History:   Diagnosis Date    Asthma     Carpal tunnel syndrome     DVT (deep venous thrombosis)     Hypertension     Osteoporosis     Urinary tract infection      Past Surgical History:   Procedure Laterality Date    ARTHROSCOPY OF KNEE Left 9/19/2022    Procedure: ARTHROSCOPY, KNEE;  Surgeon: Bryson Medrano MD;  Location: [facility] OR;  Service: Orthopedics;  Laterality: Left;    AUGMENTATION OF BREAST      BREAST BIOPSY      BREAST CYST ASPIRATION      BREAST SURGERY      CATARACT EXTRACTION      COLONOSCOPY N/A 8/7/2018    Procedure: COLONOSCOPY;  Surgeon: KATE Vernon MD;  Location: [address]);  Service: Endoscopy;  Laterality: N/A;  Ok to hold Eliquis x 2 days per Dr. Gee    FACIAL COSMETIC SURGERY      NASAL SEPTUM SURGERY      OVARIAN CYST REMOVAL      tummy tuck       Review of patient's allergies indicates:   Allergen Reactions    Cayenne pepper Anaphylaxis     Current Outpatient Medications   Medication Instructions    acetaminophen (TYLENOL) 650 mg, Oral, Every 6 hours PRN    alendronate (FOSAMAX) 70 MG tablet TAKE ONE TABLET BY MOUTH EVERY WEEK    ascorbic acid (vitamin C) (VITAMIN C) 250 mg, Oral, Daily    atorvastatin (LIPITOR) 20 MG tablet TAKE ONE TABLET BY MOUTH EVERY DAY    calcium-vitamin D3 500 mg(1,250mg) -200 unit per tablet 1 tablet, Oral, 2 times daily with meals    magnesium hydroxide 400 mg/5 ml (MILK OF MAGNESIA) 400 mg/5 mL Susp 30 mLs, Oral, Daily    multivitamin with minerals tablet 1 tablet, Oral, Daily    oxymetazoline (AFRIN) 0.05 " % nasal spray 1 spray, Nasal, Nightly    valsartan (DIOVAN) 160 mg, Oral, Daily       Pre-op Assessment    I have reviewed the Patient Summary Reports.     I have reviewed the Nursing Notes.    I have reviewed the Medications.     Review of Systems  Anesthesia Hx:  No problems with previous Anesthesia   Denies Personal Hx of Anesthesia complications.   Social:  Non-Smoker, Social Alcohol Use    Cardiovascular:   Exercise tolerance: good Denies Pacemaker. Hypertension   Denies Angina.  Deep Venous Thrombosis (DVT)    Pulmonary:   Asthma mild Denies Shortness of breath.    Renal/:  Renal/ Normal     Hepatic/GI:  Hepatic/GI Normal    Musculoskeletal:   Arthritis     Neurological:   Denies TIA. Denies CVA. Neuromuscular Disease,  Denies Seizures.   Chronic Pain Syndrome   Endocrine:  Endocrine Normal    Psych:  Psychiatric Normal           Physical Exam  General: Well nourished and Cooperative    Airway:  Mallampati: I   Mouth Opening: Normal  TM Distance: Normal  Tongue: Normal  Neck ROM: Normal ROM    Dental:  Intact, Retainer    Chest/Lungs:  Clear to auscultation, Normal Respiratory Rate    Heart:  Rate: Normal  Rhythm: Regular Rhythm  Sounds: Normal      Lab Results   Component Value Date    WBC 5.77 07/28/2023    HGB 13.9 07/28/2023    HCT 44.9 07/28/2023     07/28/2023    CHOL 159 07/28/2023    TRIG 152 (H) 07/28/2023    HDL 45 07/28/2023    ALT 28 08/23/2023    AST 25 08/23/2023     08/23/2023    K 4.1 08/23/2023     08/23/2023    CREATININE 0.8 08/23/2023    BUN 20 08/23/2023    CO2 24 08/23/2023    TSH 2.279 07/28/2023    HGBA1C 5.3 07/28/2023     Results for orders placed or performed in visit on 07/18/23   EKG 12-lead    Collection Time: 07/18/23 12:46 PM    Narrative    Test Reason : M62.81,M81.6,M17.12,M25.562,G89.29,    Vent. Rate : 049 BPM     Atrial Rate : 049 BPM     P-R Int : 154 ms          QRS Dur : 082 ms      QT Int : 496 ms       P-R-T Axes : 056 017 050 degrees     QTc  Int : 448 ms    Sinus bradycardia  Normal ECG    When compared with ECG of 30-AUG-2022 11:30,  No significant change was found  Confirmed by Luis Fernando Oliva MD ([id number]) on 7/19/2023 3:06:57 PM    Referred By: MARIO STOVALL           Confirmed By:Luis Fernando Oliva MD     CXR 7/18/23  FINDINGS:  The lungs are clear, with normal appearance of pulmonary vasculature and no pleural effusion or pneumothorax.  The cardiac silhouette is normal in size. The hilar and mediastinal contours are unremarkable.  Bones are intact.     Impression:  No acute abnormality.      Anesthesia Plan  Type of Anesthesia, risks & benefits discussed:    Anesthesia Type: Spinal, Epidural, Gen ETT, Regional  Intra-op Monitoring Plan: Standard ASA Monitors  Post Op Pain Control Plan: multimodal analgesia and peripheral nerve block  Induction:  IV  Informed Consent: Informed consent signed with the Patient and all parties understand the risks and agree with anesthesia plan.  All questions answered.   ASA Score: 2  Day of Surgery Review of History & Physical: H&P Update referred to the surgeon/provider.  Anesthesia Plan Notes: Anesthesia consent to be signed prior to surgery 9/11/23      Ready For Surgery From Anesthesia Perspective.     .

## 2023-08-30 NOTE — DISCHARGE INSTRUCTIONS
Your surgery is scheduled for 9/11/23.    Please report to [facility] on the 1st Floor at 0530 a.m.    THIS TIME IS SUBJECT TO CHANGE.  YOU WILL RECEIVE A PHONE CALL THE DAY BEFORE SURGERY BY 3:30 PM TO CONFIRM YOUR TIME OF ARRIVAL.  IF YOU HAVE NOT RECEIVED A PHONE CALL BY 3:30 PM THE DAY BEFORE YOUR SURGERY PLEASE CALL [phone]     INSTRUCTIONS IMPORTANT!!!  ¨ Do not eat or drink after 12 midnight-including water, candy, gum, & mints. OK to brush teeth.      ____  Proceed to [facility] on *** for additional testing.        ____  Do not wear makeup, including mascara.  ____  No powder, lotions or creams to surgical area.  ____  Please remove all jewelry, including piercings and leave at home.  ____  No money or valuables needed. Please leave at home.  ____  Please bring any documents given by your doctor.  ____  If going home the same day, arrange for a ride home. You will not be able to             drive if Anesthesia was used.  ____  Children under 18 years require a parent / guardian present the entire time             they are in surgery / recovery.  ____  Wear loose fitting clothing. Allow for dressings, bandages.  ____  Stop Aspirin, Ibuprofen, Motrin, Aleve, Goody's/BC powders, Excedrine and Naproxen (NSAIDS) at least 3-5 days before surgery, unless otherwise instructed by your doctor, or the nurse.   You MAY use Tylenol/acetaminophen until day of surgery.  ____  Wash the surgical area with Hibiclens or Dial Antibacterial bar soap the night before surgery, and again the             morning of surgery.  Be sure to rinse hibiclens or Dial Antibacterial bar soap off completely (if instructed by   nurse).  ____  If you take diabetic medication including Metformin, Glimepiride, Glipizide, Glyburide, Byetta, Januvia, Actos, do not take am of surgery unless instructed by Doctor.  ____ Hold Invokana, Farxiga, and Jardiance for 3 days prior to surgery.   ____  Call MD for temperature  above 101 degrees or any other signs of infection such as Urinary (bladder) infection, Upper respiratory infection, skin boils, etc.  ____ Stop taking any Fish Oil supplement or any Vitamins that contain Vitamin E at least 5 days prior to surgery.  ____ Do Not wear your contact lenses the day of your procedure.  You may wear your glasses.      ____Do not shave surgical site for 3 days prior to surgery.  ____ Practice Good hand washing before, during, and after procedure.      I have read or had read and explained to me, and understand the above information.  Additional comments or instructions:  For additional questions call [phone]      ANESTHESIA SIDE EFFECTS  -For the first 24 hours after surgery:  Do not drive, use heavy equipment, make important decisions, or drink alcohol  -It is normal to feel sleepy for several hours.  Rest until you are more awake.  -Have someone stay with you, if needed.  They can watch for problems and help keep you safe.  -Some possible post anesthesia side effects include: nausea and vomiting, sore throat and hoarseness, sleepiness, and dizziness.        Pre-Op Bathing Instructions    Before surgery, you can play an important role in your own health.    Because skin is not sterile, we need to be sure that your skin is as free of germs as possible. By following the instructions below, you can reduce the number of germs on your skin before surgery.    IMPORTANT: You will need to shower with a special soap called Hibiclens*, available at any pharmacy.  If you are allergic to Chlorhexidine (the antiseptic in Hibiclens), use an antibacterial soap such as Dial Soap for your preoperative shower.  You will shower with Hibiclens both the night before your surgery and the morning of your surgery.  Do not use Hibiclens on the head, face or genitals to avoid injury to those areas.    STEP #1: THE NIGHT BEFORE YOUR SURGERY     Do not shave the area of your body where your surgery will be  performed.  Shower and wash your hair and body as usual with your normal soap and shampoo.  Rinse your hair and body thoroughly after you shower to remove all soap residue.  With your hand, apply one packet of Hibiclens soap to the surgical site.   Wash the site gently for five (5) minutes. Do not scrub your skin too hard.   Do not wash with your regular soap after Hibiclens is used.  Rinse your body thoroughly.  Pat yourself dry with a clean, soft towel.  Do not use lotion, cream, or powder.  Wear clean clothes.    STEP #2: THE MORNING OF YOUR SURGERY     Repeat Step #1.    * Not to be used by people allergic to Chlorhexidine.

## 2023-08-30 NOTE — PROGRESS NOTES
Progress Note  [facility]    Subjective:      Patient ID: Azucena Luciano is a 73 y.o. female    Chief Complaint: Pre-op Exam    Azucena Luciano is a 73-year-old female with a PMHx HTN, HLD, Asthma, DVT, osteoporosis, L Knee OA presenting for preop evaluation for future left TKA.  Patient is scheduled for 09/11/2023 with Dr. Medrano with [facility]. Patient reports persistent knee pain secondary to knee osteoarthritis. Patient reports a history of hypertension and recently was started on valsartan 160 mg daily. In office blood pressure 135/76. Patient is overall healthy and stays active and exercises daily.         Health Maintenance         Date Due Completion Date    Mammogram 10/05/2022 10/5/2021    Colorectal Cancer Screening 08/07/2023 8/7/2018    DEXA Scan 09/29/2023 9/29/2021    Hemoglobin A1c (Prediabetes) 07/28/2024 7/28/2023    TETANUS VACCINE 10/30/2025 10/30/2015    Lipid Panel 07/28/2028 7/28/2023            Review of Systems   Respiratory:  Negative for shortness of breath.    Cardiovascular:  Negative for chest pain and palpitations.   Musculoskeletal:  Positive for arthralgias (L Knee).        Objective:      Vitals:    08/30/23 1017   BP: 135/76   Pulse: 65     BP Readings from Last 3 Encounters:   08/30/23 135/76   08/23/23 (!) 140/82   08/02/23 (!) 160/80     Body mass index is 22.84 kg/m².    Physical Exam  Vitals reviewed.   Constitutional:       Appearance: Normal appearance.   HENT:      Head: Normocephalic and atraumatic.   Cardiovascular:      Rate and Rhythm: Normal rate and regular rhythm.   Pulmonary:      Effort: Pulmonary effort is normal.      Breath sounds: Normal breath sounds.   Musculoskeletal:      Right lower leg: No edema.      Left lower leg: No edema.   Skin:     General: Skin is warm.      Findings: No rash.   Neurological:      Mental Status: She is alert and oriented to person, place, and time.   Psychiatric:         Mood and Affect: Mood normal.          Behavior: Behavior normal.     The 10-year ASCVD risk score (Jaciel FREITAS, et al., 2019) is: 18.5%    Values used to calculate the score:      Age: 73 years      Sex: Female      Is Non- : No      Diabetic: No      Tobacco smoker: No      Systolic Blood Pressure: 135 mmHg      Is BP treated: Yes      HDL Cholesterol: 45 mg/dL      Total Cholesterol: 159 mg/dL     Assessment:     1. Preop examination       Plan:     Pre-operative evaluation with risk assessment              -           Scheduled for L TKA on 9/11/23 by Dr. Medrano with [facility]  -           DASI score: 24.2 -w/ Functional Capacity in METS: 5.72 (>4) with a revised cardiac index  risk of 3.9%.    - Patient is on no antiplatelets/anticoagulation.   - Denies h/o blood dyscrasias or previous reaction to anesthesia   - Not a smoker.  - Has history of HLD, currently on atorvastatin 20 mg daily. ASCVD: 18.5%.   - No prior h/o DM. Last HgbA1C: 5.3  - No prior h/o thyroid disease. TSH: 2.279  - Patient has prior h/o HTN. BP: 135/76. Current medications include valsartan 160 mg daily.    - Hx of asthma and allergy to cayenne pepper. Stable.  - No prior h/o HF  - BMI: 22.80              -           The patient is medically optimized with a low to moderate risk to proceed with (per ACC/AHA екатерина-operative guidelines) a moderate risk surgery.   - Please call our office for any additional questions or concerns.       Maynor Doe DO  [facility], PGY-3  08/30/2023      This note was partially created using [employer] Voice Recognition software. Typographical and content errors may occur with this process. While efforts are made to detect and correct such errors, in some cases errors will persist. For this reason, wording in this document should be considered in the proper context and not strictly verbatim

## 2023-08-30 NOTE — H&P (VIEW-ONLY)
Progress Note  [facility]    Subjective:      Patient ID: Azucena Luciano is a 73 y.o. female    Chief Complaint: Pre-op Exam    Azucena Luciano is a 73-year-old female with a PMHx HTN, HLD, Asthma, DVT, osteoporosis, L Knee OA presenting for preop evaluation for future left TKA.  Patient is scheduled for 09/11/2023 with Dr. Medrano with [facility]. Patient reports persistent knee pain secondary to knee osteoarthritis. Patient reports a history of hypertension and recently was started on valsartan 160 mg daily. In office blood pressure 135/76. Patient is overall healthy and stays active and exercises daily.         Health Maintenance         Date Due Completion Date    Mammogram 10/05/2022 10/5/2021    Colorectal Cancer Screening 08/07/2023 8/7/2018    DEXA Scan 09/29/2023 9/29/2021    Hemoglobin A1c (Prediabetes) 07/28/2024 7/28/2023    TETANUS VACCINE 10/30/2025 10/30/2015    Lipid Panel 07/28/2028 7/28/2023            Review of Systems   Respiratory:  Negative for shortness of breath.    Cardiovascular:  Negative for chest pain and palpitations.   Musculoskeletal:  Positive for arthralgias (L Knee).        Objective:      Vitals:    08/30/23 1017   BP: 135/76   Pulse: 65     BP Readings from Last 3 Encounters:   08/30/23 135/76   08/23/23 (!) 140/82   08/02/23 (!) 160/80     Body mass index is 22.84 kg/m².    Physical Exam  Vitals reviewed.   Constitutional:       Appearance: Normal appearance.   HENT:      Head: Normocephalic and atraumatic.   Cardiovascular:      Rate and Rhythm: Normal rate and regular rhythm.   Pulmonary:      Effort: Pulmonary effort is normal.      Breath sounds: Normal breath sounds.   Musculoskeletal:      Right lower leg: No edema.      Left lower leg: No edema.   Skin:     General: Skin is warm.      Findings: No rash.   Neurological:      Mental Status: She is alert and oriented to person, place, and time.   Psychiatric:         Mood and Affect: Mood normal.          Behavior: Behavior normal.     The 10-year ASCVD risk score (Jaciel FREITAS, et al., 2019) is: 18.5%    Values used to calculate the score:      Age: 73 years      Sex: Female      Is Non- : No      Diabetic: No      Tobacco smoker: No      Systolic Blood Pressure: 135 mmHg      Is BP treated: Yes      HDL Cholesterol: 45 mg/dL      Total Cholesterol: 159 mg/dL     Assessment:     1. Preop examination       Plan:     Pre-operative evaluation with risk assessment              -           Scheduled for L TKA on 9/11/23 by Dr. Medrano with \[facility]\"" [facility]  -           DASI score: 24.2 -w/ Functional Capacity in METS: 5.72 (>4) with a revised cardiac index  risk of 3.9%.    - Patient is on no antiplatelets/anticoagulation.   - Denies h/o blood dyscrasias or previous reaction to anesthesia   - Not a smoker.  - Has history of HLD, currently on atorvastatin 20 mg daily. ASCVD: 18.5%.   - No prior h/o DM. Last HgbA1C: 5.3  - No prior h/o thyroid disease. TSH: 2.279  - Patient has prior h/o HTN. BP: 135/76. Current medications include valsartan 160 mg daily.    - Hx of asthma and allergy to cayenne pepper. Stable.  - No prior h/o HF  - BMI: 22.80              -           The patient is medically optimized with a low to moderate risk to proceed with (per ACC/AHA екатерина-operative guidelines) a moderate risk surgery.   - Please call our office for any additional questions or concerns.       Maynor Doe DO  \[facility]"" [facility], PGY-3  08/30/2023      This note was partially created using [employer] Voice Recognition software. Typographical and content errors may occur with this process. While efforts are made to detect and correct such errors, in some cases errors will persist. For this reason, wording in this document should be considered in the proper context and not strictly verbatim

## 2023-08-30 NOTE — PRE-PROCEDURE INSTRUCTIONS
Allergies, medical, surgical, family and psychosocial histories reviewed with patient. Periop plan of care reviewed. Preop instructions given, including medications to take and to hold. Hibiclens soap and instructions on use given. Time allotted for questions to be addressed.  Patient verbalized understanding.    Arrival time 0530

## 2023-09-05 NOTE — PATIENT INSTRUCTIONS
Post Op Total Knee Arthroplasty Instructions     1. Enteric coated aspirin 81 mg by mouth twice a day for 6 weeks to prevent blood clots,  unless otherwise indicated.  2. Please take a stomach reflux medication such as pepcid, prevacid, nexium (H-2 blocker or PPI) while on aspirin to prevent stomach ulcers. You will be given a prescription for pepcid.  3. Coy stockings should be worn as much as possible for 6 weeks to prevent blood clots.  4. Do not start antibiotics for any suspected infections related to the surgery until evaluated by dr perez staff  5. No driving for approximately 2-4 weeks  6. You can shower once the incision is completely dry, otherwise place a new dry dressing twice a day if there is drainage. Please call the office if the drainage increases after discharge.  7. You may resume all pre-surgery medications unless otherwise indicated  8. All patients should be seen in Dr Perez office approx 2 weeks after surgery  9. Dr Medrano prefers outpatient physical therapy upon discharge home. If home PT is needed, please contact Dr Medrano for approval  10. Patients should see their primary care doctor after discharge home

## 2023-09-07 ENCOUNTER — PROCEDURE VISIT (OUTPATIENT)
Dept: ORTHOPEDICS | Facility: CLINIC | Age: 73
End: 2023-09-07
Payer: MEDICARE

## 2023-09-07 VITALS
HEART RATE: 71 BPM | HEIGHT: 59 IN | SYSTOLIC BLOOD PRESSURE: 144 MMHG | DIASTOLIC BLOOD PRESSURE: 75 MMHG | BODY MASS INDEX: 22.53 KG/M2 | WEIGHT: 111.75 LBS

## 2023-09-07 DIAGNOSIS — M25.562 CHRONIC PAIN OF LEFT KNEE: ICD-10-CM

## 2023-09-07 DIAGNOSIS — M17.12 PRIMARY OSTEOARTHRITIS OF LEFT KNEE: ICD-10-CM

## 2023-09-07 DIAGNOSIS — M62.81 QUADRICEPS WEAKNESS: ICD-10-CM

## 2023-09-07 DIAGNOSIS — G89.29 CHRONIC PAIN OF LEFT KNEE: ICD-10-CM

## 2023-09-07 DIAGNOSIS — M81.6 LOCALIZED OSTEOPOROSIS, UNSPECIFIED PATHOLOGICAL FRACTURE PRESENCE: ICD-10-CM

## 2023-09-07 PROCEDURE — 99499 UNLISTED E&M SERVICE: CPT | Mod: S$GLB,,, | Performed by: PHYSICIAN ASSISTANT

## 2023-09-07 PROCEDURE — 64640 INJECTION TREATMENT OF NERVE: CPT | Mod: LT,S$GLB,, | Performed by: PHYSICIAN ASSISTANT

## 2023-09-07 PROCEDURE — 64640 PR DESTRUCT BY NEURO AGENT; OTHER PERIPH NERVE: ICD-10-PCS | Mod: LT,S$GLB,, | Performed by: PHYSICIAN ASSISTANT

## 2023-09-07 PROCEDURE — 99499 NO LOS: ICD-10-PCS | Mod: S$GLB,,, | Performed by: PHYSICIAN ASSISTANT

## 2023-09-07 NOTE — PROCEDURES
Procedures  Procedure Note Iovera:    DATE OF PROCEDURE:  09/07/2023     PREOPERATIVE DIAGNOSIS: left knee pain.     POSTOPERATIVE DIAGNOSIS: left knee pain.     PROCEDURE: Iovera treatment of anterior femoral cutaneous nerve, Lateral femoral cut nerve, and both branches of infrapatellar saphenous nerve using at least 4 different punctures to treat all 4 nerves. (cpt 64640 x3)    COMPLICATIONS: None.     IMPLANTS:  None    ESTIMATED BLOOD LOSS:  < 5cc    SPECIMENS REMOVED:  None    ANESTHESIA: Local lidocaine    INDICATIONS FOR PROCEDURE: This is a 73 y.o. female with longstanding knee pain. They have failed non operative management including injections.I discussed a new treatment therapy called Iovera, which is cryotherapy, to provide symptomatic relief along the sensory distribution of the infrapatellar tendon branch of the saphenous nerve  and AFCN. The patient elected to move forward with this  We did discuss the fact that this is a fairly novel procedure and there is very limited scientific data around this.  However, it FDA approved.  The patient was given patient information and literature to review prior to the procedure as well.  Based on this, the patient agreed to move forward with doing the procedure.      PROCEDURE:    The patient was placed supine on the exam table and the proximal medial aspect of the left tibia and anterior aspect of distal femur was prepped with sterile Betadine and alcohol.  A line was drawn extending approximately 5 cm medial to inferior pole of the patella distally to a point approximately 5 cm medial to the tibial tubercle.  A second line was drawn in a medial to lateral direction the width of the patella approximately 7 cm proximal to the patella. We then infiltrated the skin with lidocaine along both lines using a 25g needle. We then introduced the Iovera device along these lines and this device penetrated the skin, creating cryotherapy to both branches of the infrapatellar  saphenous nerve, a third treatment to the anterior femoral cutaneous nerve, and fourth LFCN. 7 punctures of the skin were made to treat the 2 branches of the ISN and another 7 punctures were made to treat the AFCN and LFCN. There were a total of 4 nerves treated with iovera of each knee. The patient tolerated the procedure well with no problems.

## 2023-09-07 NOTE — PROGRESS NOTES
I assume primary medical responsibility for this patient. I have reviewed the history, physical, and assessement & treatment plan with the resident and agree that the care is reasonable and necessary. This service has been performed by a resident without the presence of a teaching physician under the primary care exception. If necessary, an addendum of additional findings or evaluation beyond the resident documentation will be noted below.     Rosi Drew MD

## 2023-09-08 ENCOUNTER — CLINICAL SUPPORT (OUTPATIENT)
Dept: REHABILITATION | Facility: HOSPITAL | Age: 73
End: 2023-09-08
Attending: ORTHOPAEDIC SURGERY
Payer: MEDICARE

## 2023-09-08 ENCOUNTER — TELEPHONE (OUTPATIENT)
Dept: INTERNAL MEDICINE | Facility: CLINIC | Age: 73
End: 2023-09-08
Payer: MEDICARE

## 2023-09-08 DIAGNOSIS — G89.29 CHRONIC PAIN OF LEFT KNEE: ICD-10-CM

## 2023-09-08 DIAGNOSIS — Z74.09 IMPAIRED FUNCTIONAL MOBILITY, BALANCE, GAIT, AND ENDURANCE: ICD-10-CM

## 2023-09-08 DIAGNOSIS — R29.898 DECREASED STRENGTH INVOLVING KNEE JOINT: ICD-10-CM

## 2023-09-08 DIAGNOSIS — M62.81 QUADRICEPS WEAKNESS: ICD-10-CM

## 2023-09-08 DIAGNOSIS — M17.12 PRIMARY OSTEOARTHRITIS OF LEFT KNEE: ICD-10-CM

## 2023-09-08 DIAGNOSIS — M25.562 CHRONIC PAIN OF LEFT KNEE: ICD-10-CM

## 2023-09-08 PROCEDURE — 97161 PT EVAL LOW COMPLEX 20 MIN: CPT | Mod: PN

## 2023-09-08 PROCEDURE — 97110 THERAPEUTIC EXERCISES: CPT | Mod: PN

## 2023-09-08 NOTE — PLAN OF CARE
"[facility]  Physical Therapy Initial Evaluation    Name: Azucena Luciano  Clinic Number: [id number]    Therapy Diagnosis:   Encounter Diagnoses   Name Primary?    Quadriceps weakness     Primary osteoarthritis of left knee     Chronic pain of left knee     Impaired functional mobility, balance, gait, and endurance     Decreased strength involving knee joint         Physician: Bryson Medrano MD    Physician Orders: PT Eval and Treat   Medical Diagnosis from Referral:   M62.81 (ICD-10-CM) - Quadriceps weakness   M17.12 (ICD-10-CM) - Primary osteoarthritis of left knee   M25.562,G89.29 (ICD-10-CM) - Chronic pain of left knee     Evaluation Date: 9/8/2023  Authorization Period Expiration: 11/03/2023  Plan of Care Expiration: 9/8/2023  to 10/27/2023  Visit # / Visits authorized: 1/TBD     Time In: 0900  Time Out: 0940  Total Appointment Time (timed & untimed codes): 40 minutes (1 LCE, 1 TE)  Precautions: hypertension, osteoporosis, DVT     SUBJECTIVE      Azucena reports to OPPT today.  Scheduled for left TKA per Dr. Medrano in 3 days.  Comes to PT for pre-hab and establishment of care with PT.  Had her iovera injection yesterday. Denies right knee issues/pain.       Imaging:   Left knee radiographs: "No fracture or dislocation.  Moderate degenerative changes of the medial compartment with near bone-on-bone contact.  Mild degenerative changes of the patellofemoral compartment.  Lateral tibiofemoral joint space is preserved.  No joint effusion.  No focal soft tissue swelling."    Prior Therapy:  None  Social History:  Lives at home with .  6 grandchildren.   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   Aquatic exercise.    Current Level of Function:  See above.     Pain:  Current 5/10, worst 8/10, best 4/10   Location: left knee "   Description: Aching, Dull, and Deep  Aggravating Factors: standing, walking, stairs, deep squatting.   Easing Factors: rest in non-weightbearing positions.     Patients goals:  1. To return to work without knee pain.      Medical History:   Past Medical History:   Diagnosis Date    Asthma     Carpal tunnel syndrome     DVT (deep venous thrombosis)     Hypertension     Osteoporosis     Urinary tract infection        Surgical History:   Azucena Luciano  has a past surgical history that includes Ovarian cyst removal; Facial cosmetic surgery; tummy tuck; Nasal septum surgery; Breast biopsy; Breast surgery; Breast cyst aspiration; Colonoscopy (N/A, 8/7/2018); Augmentation of breast; Cataract extraction; and Arthroscopy of knee (Left, 9/19/2022).    Medications:   Azucena has a current medication list which includes the following prescription(s): acetaminophen, alendronate, ascorbic acid (vitamin c), atorvastatin, calcium-vitamin d3, magnesium hydroxide 400 mg/5 ml, multivitamin with minerals, oxymetazoline, and valsartan, and the following Facility-Administered Medications: bupivacaine, cefazolin 2 g/50ml dextrose ivpb, ketorolac, and lidocaine hcl 10 mg/ml (1%).    Allergies:   Review of patient's allergies indicates:   Allergen Reactions    Cayenne pepper Anaphylaxis          OBJECTIVE     Posture: bilateral knee genu valgum structure (left > right)  Palpation: bruising from iovera injection.  Mild left knee joint effusion.     Range of motion:  Left knee: (+) 5-0-140  Right knee: (+) 7-0-140    L/E Strength w/ MicroFET [employer] [place] Dynamometer Right Left Pain/Dysfunction with Movement   (approx 4 sec hold w/ max contraction)   Quadriceps 11.6 kg  11.6 kg     Hamstrings 8.1 kg  7.1 kg       TUG:  <15 seconds (w/out assisitve device  30 second sit-to-stand test (without U/E support): deferred      Limitation/Restriction for FOTO Knee Survey    Therapist reviewed FOTO scores for Azucena Luciano on 9/8/2023.    FOTO documents entered into [facility] - see Media section.    Limitation Score: 60% --> 39%  KOOS, Jr: 50         TREATMENT     Total Treatment time (time-based codes) separate from Evaluation: 15 minutes      Azucena received the treatments listed below:      Therapeutic exercises to develop strength, endurance, ROM, flexibility, and patient education for 15 minutes including:  Quad sets 10x  Straight leg raise 2x10  Education on rolling walker use      PATIENT EDUCATION AND HOME EXERCISES     Education provided:   - post-op expectations, healing time-frames, typical PT course.     Written Home Exercises Provided: Patient instructed to cont prior HEP. Exercises were reviewed and Azucena was able to demonstrate them prior to the end of the session.  Azucena demonstrated good  understanding of the education provided. See EMR under Patient Instructions for exercises provided during therapy sessions.    ASSESSMENT     Azucena is a 73 y.o. female referred to outpatient Physical Therapy with a medical diagnosis of 1) M62.81 (ICD-10-CM) - Quadriceps weakness, 2) M17.12 (ICD-10-CM) - Primary osteoarthritis of left knee, 3) M25.562,G89.29 (ICD-10-CM) - Chronic pain of left knee. Patient presents 3 days before scheduled elective left knee TKA per Dr. Medrano.  Focus of session on scheduling, patient education, and discussion of typical OPPT course.  Will transition to post-op TKA program next week.     Patient prognosis is Excellent.   Patient will benefit from skilled outpatient Physical Therapy to address the deficits stated above and in the chart below, provide patient /family education, and to maximize patientt's level of independence.     Plan of care discussed with patient: Yes  Patient's spiritual, cultural and educational needs considered and patient is agreeable to the plan of care and goals as stated below:     Anticipated Barriers for therapy: none    Medical Necessity is demonstrated by the  following  History  Co-morbidities and personal factors that may impact the plan of care Co-morbidities:   : Allergies, Arthritis, High Blood Pressure, Prior Surgery    Personal Factors:   age  lifestyle     moderate   Examination  Body Structures and Functions, activity limitations and participation restrictions that may impact the plan of care Body Regions:   lower extremities    Body Systems:    strength  balance  gait  transfers  motor control    Participation Restrictions:   Won't be able to drive post-op     Activity limitations:   Learning and applying knowledge  no deficits    General Tasks and Commands  undertaking multiple tasks    Communication  no deficits    Mobility  lifting and carrying objects  walking  driving (bike, car, motorcycle)  Squatting, bending, stooping  Pushing, pulling    Self care  no deficits    Domestic Life  cooking  doing house work (cleaning house, washing dishes, laundry)  assisting others    Interactions/Relationships  no deficits    Life Areas  employment    Community and Social Life  community life  recreation and leisure         moderate   Clinical Presentation stable and uncomplicated low   Decision Making/ Complexity Score: low     Goals:  Short Term Goals: 3  weeks  1. Pt will be independent with post- HEP supplement PT in improving functional mobility.  2. Pt will improve LLE strength to at least 70% of RLE strength as measured via MicroFet handheld dynamometer in order to improve functional mobility  3. Pt will improve L knee AROM post-operatively to at least 0 - 110 degrees in order to improve gait.    Long Term Goals: 6-8 weeks  1. Pt will be independent with updated HEP supplement PT in improving functional mobility.  2. Pt will improve LLE strength to at least 80% of RLE strength as measured via MicroFet handheld dynamometer in order to improve functional mobility  3. Pt will improve L knee AROM to at least 0 - 120 in order to improve gait and ability to perform  "ADLs  4. Pt will perform TUG in < 14 seconds without AD in order to demo improved gait speed  5. Pt will perform at least 10 sit to stands without UE support on 30 second sit to stand test in order to demo improved ability to perform transfers      TUG Cutoff Scores:        30" sit to stand Cutoff Scores:          Plan     Plan of care Certification: 9/8/2023 to 10/27/2023.    Outpatient Physical Therapy 4 times weekly for 6 weeks to include the following interventions: Gait Training, Manual Therapy, Moist Heat/ Ice, Neuromuscular Re-ed, Orthotic Management and Training, Patient Education, Therapeutic Activites and Therapeutic Exercise, IASTM, Balance training.     Brandin Orona, PT, DPT, OCS    "

## 2023-09-08 NOTE — TELEPHONE ENCOUNTER
----- Message from Ramses Spain sent at 9/8/2023  2:16 PM CDT -----  Contact: Pt [phone]  Pt called in regards to b/p reading for 09/07/23 144/75 p 71 other days it's been 130/80 after medication please advise

## 2023-09-11 ENCOUNTER — ANESTHESIA (OUTPATIENT)
Dept: SURGERY | Facility: HOSPITAL | Age: 73
End: 2023-09-11
Payer: MEDICARE

## 2023-09-11 ENCOUNTER — HOSPITAL ENCOUNTER (OUTPATIENT)
Facility: HOSPITAL | Age: 73
Discharge: HOME | End: 2023-09-11
Attending: ORTHOPAEDIC SURGERY | Admitting: ORTHOPAEDIC SURGERY
Payer: MEDICARE

## 2023-09-11 VITALS
BODY MASS INDEX: 22.38 KG/M2 | HEART RATE: 67 BPM | OXYGEN SATURATION: 99 % | HEIGHT: 59 IN | DIASTOLIC BLOOD PRESSURE: 66 MMHG | RESPIRATION RATE: 16 BRPM | TEMPERATURE: 98 F | SYSTOLIC BLOOD PRESSURE: 125 MMHG | WEIGHT: 111 LBS

## 2023-09-11 DIAGNOSIS — G89.29 CHRONIC PAIN OF LEFT KNEE: Primary | ICD-10-CM

## 2023-09-11 DIAGNOSIS — M25.562 CHRONIC PAIN OF LEFT KNEE: Primary | ICD-10-CM

## 2023-09-11 DIAGNOSIS — M17.9 OSTEOARTHRITIS, KNEE: ICD-10-CM

## 2023-09-11 LAB
APPEARANCE FLD: NORMAL
BODY FLD TYPE: NORMAL
COLOR FLD: YELLOW
LYMPHOCYTES NFR FLD MANUAL: 84 %
NEUTROPHILS NFR FLD MANUAL: 16 %
WBC # FLD: 35 /CU MM

## 2023-09-11 PROCEDURE — 71000015 HC POSTOP RECOV 1ST HR: Performed by: ORTHOPAEDIC SURGERY

## 2023-09-11 PROCEDURE — 0055T BONE SRGRY CMPTR CT/MRI IMAG: CPT | Mod: ,,, | Performed by: ORTHOPAEDIC SURGERY

## 2023-09-11 PROCEDURE — 37000008 HC ANESTHESIA 1ST 15 MINUTES: Performed by: ORTHOPAEDIC SURGERY

## 2023-09-11 PROCEDURE — 27447 PR TOTAL KNEE ARTHROPLASTY: ICD-10-PCS | Mod: LT,,, | Performed by: ORTHOPAEDIC SURGERY

## 2023-09-11 PROCEDURE — 27201423 OPTIME MED/SURG SUP & DEVICES STERILE SUPPLY: Performed by: ORTHOPAEDIC SURGERY

## 2023-09-11 PROCEDURE — D9220A PRA ANESTHESIA: ICD-10-PCS | Mod: ANES,,, | Performed by: ANESTHESIOLOGY

## 2023-09-11 PROCEDURE — 63600175 PHARM REV CODE 636 W HCPCS: Performed by: STUDENT IN AN ORGANIZED HEALTH CARE EDUCATION/TRAINING PROGRAM

## 2023-09-11 PROCEDURE — 63600175 PHARM REV CODE 636 W HCPCS: Performed by: NURSE PRACTITIONER

## 2023-09-11 PROCEDURE — 36000710: Performed by: ORTHOPAEDIC SURGERY

## 2023-09-11 PROCEDURE — 97535 SELF CARE MNGMENT TRAINING: CPT

## 2023-09-11 PROCEDURE — 0055T PR COMPUTER-ASSIST MUSCSKEL NAVIG, ORTHO PROC, CT/MRI: ICD-10-PCS | Mod: ,,, | Performed by: ORTHOPAEDIC SURGERY

## 2023-09-11 PROCEDURE — 25000003 PHARM REV CODE 250: Performed by: STUDENT IN AN ORGANIZED HEALTH CARE EDUCATION/TRAINING PROGRAM

## 2023-09-11 PROCEDURE — 71000033 HC RECOVERY, INTIAL HOUR: Performed by: ORTHOPAEDIC SURGERY

## 2023-09-11 PROCEDURE — 63600175 PHARM REV CODE 636 W HCPCS: Performed by: NURSE ANESTHETIST, CERTIFIED REGISTERED

## 2023-09-11 PROCEDURE — 36000711: Performed by: ORTHOPAEDIC SURGERY

## 2023-09-11 PROCEDURE — 25000003 PHARM REV CODE 250: Performed by: ANESTHESIOLOGY

## 2023-09-11 PROCEDURE — 27447 TOTAL KNEE ARTHROPLASTY: CPT | Mod: LT,,, | Performed by: ORTHOPAEDIC SURGERY

## 2023-09-11 PROCEDURE — 71000016 HC POSTOP RECOV ADDL HR: Performed by: ORTHOPAEDIC SURGERY

## 2023-09-11 PROCEDURE — 25000003 PHARM REV CODE 250: Performed by: ORTHOPAEDIC SURGERY

## 2023-09-11 PROCEDURE — D9220A PRA ANESTHESIA: Mod: CRNA,,, | Performed by: NURSE ANESTHETIST, CERTIFIED REGISTERED

## 2023-09-11 PROCEDURE — 63600175 PHARM REV CODE 636 W HCPCS: Performed by: ORTHOPAEDIC SURGERY

## 2023-09-11 PROCEDURE — D9220A PRA ANESTHESIA: Mod: ANES,,, | Performed by: ANESTHESIOLOGY

## 2023-09-11 PROCEDURE — 97165 OT EVAL LOW COMPLEX 30 MIN: CPT

## 2023-09-11 PROCEDURE — 97161 PT EVAL LOW COMPLEX 20 MIN: CPT

## 2023-09-11 PROCEDURE — 97530 THERAPEUTIC ACTIVITIES: CPT

## 2023-09-11 PROCEDURE — C1713 ANCHOR/SCREW BN/BN,TIS/BN: HCPCS | Performed by: ORTHOPAEDIC SURGERY

## 2023-09-11 PROCEDURE — 37000009 HC ANESTHESIA EA ADD 15 MINS: Performed by: ORTHOPAEDIC SURGERY

## 2023-09-11 PROCEDURE — 97116 GAIT TRAINING THERAPY: CPT

## 2023-09-11 PROCEDURE — C1776 JOINT DEVICE (IMPLANTABLE): HCPCS | Performed by: ORTHOPAEDIC SURGERY

## 2023-09-11 PROCEDURE — 89051 BODY FLUID CELL COUNT: CPT | Performed by: ORTHOPAEDIC SURGERY

## 2023-09-11 PROCEDURE — D9220A PRA ANESTHESIA: ICD-10-PCS | Mod: CRNA,,, | Performed by: NURSE ANESTHETIST, CERTIFIED REGISTERED

## 2023-09-11 PROCEDURE — 63600175 PHARM REV CODE 636 W HCPCS: Performed by: ANESTHESIOLOGY

## 2023-09-11 PROCEDURE — 25000003 PHARM REV CODE 250: Performed by: NURSE ANESTHETIST, CERTIFIED REGISTERED

## 2023-09-11 DEVICE — IMPLANTABLE DEVICE: Type: IMPLANTABLE DEVICE | Site: KNEE | Status: FUNCTIONAL

## 2023-09-11 DEVICE — IMPLANTABLE DEVICE
Type: IMPLANTABLE DEVICE | Site: KNEE | Status: FUNCTIONAL
Brand: NEXGEN®

## 2023-09-11 DEVICE — CEMENT BONE ANTIBIO SIMPLEX P: Type: IMPLANTABLE DEVICE | Site: KNEE | Status: FUNCTIONAL

## 2023-09-11 DEVICE — PATELLA NEXGEN ALL-POLY: Type: IMPLANTABLE DEVICE | Site: KNEE | Status: FUNCTIONAL

## 2023-09-11 RX ORDER — ONDANSETRON 2 MG/ML
4 INJECTION INTRAMUSCULAR; INTRAVENOUS DAILY PRN
Status: DISCONTINUED | OUTPATIENT
Start: 2023-09-11 | End: 2023-09-11 | Stop reason: HOSPADM

## 2023-09-11 RX ORDER — DEXAMETHASONE SODIUM PHOSPHATE 4 MG/ML
INJECTION, SOLUTION INTRA-ARTICULAR; INTRALESIONAL; INTRAMUSCULAR; INTRAVENOUS; SOFT TISSUE
Status: DISCONTINUED | OUTPATIENT
Start: 2023-09-11 | End: 2023-09-11

## 2023-09-11 RX ORDER — POLYETHYLENE GLYCOL 3350 17 G/17G
17 POWDER, FOR SOLUTION ORAL DAILY
Status: DISCONTINUED | OUTPATIENT
Start: 2023-09-11 | End: 2023-09-11 | Stop reason: HOSPADM

## 2023-09-11 RX ORDER — BUPIVACAINE HYDROCHLORIDE 2.5 MG/ML
INJECTION, SOLUTION EPIDURAL; INFILTRATION; INTRACAUDAL
Status: DISCONTINUED | OUTPATIENT
Start: 2023-09-11 | End: 2023-09-11

## 2023-09-11 RX ORDER — LOPERAMIDE HYDROCHLORIDE 2 MG/1
4 CAPSULE ORAL ONCE AS NEEDED
Status: DISCONTINUED | OUTPATIENT
Start: 2023-09-11 | End: 2023-09-11 | Stop reason: HOSPADM

## 2023-09-11 RX ORDER — CEFAZOLIN SODIUM 2 G/50ML
2 SOLUTION INTRAVENOUS
Status: DISCONTINUED | OUTPATIENT
Start: 2023-09-11 | End: 2023-09-11 | Stop reason: HOSPADM

## 2023-09-11 RX ORDER — TALC
6 POWDER (GRAM) TOPICAL NIGHTLY PRN
Status: DISCONTINUED | OUTPATIENT
Start: 2023-09-11 | End: 2023-09-11 | Stop reason: HOSPADM

## 2023-09-11 RX ORDER — CEPHALEXIN 500 MG/1
500 CAPSULE ORAL EVERY 6 HOURS
Qty: 4 CAPSULE | Refills: 0 | Status: SHIPPED | OUTPATIENT
Start: 2023-09-11 | End: 2023-09-12

## 2023-09-11 RX ORDER — ACETAMINOPHEN 325 MG/1
650 TABLET ORAL EVERY 4 HOURS PRN
Status: DISCONTINUED | OUTPATIENT
Start: 2023-09-11 | End: 2023-09-11 | Stop reason: HOSPADM

## 2023-09-11 RX ORDER — OXYCODONE HYDROCHLORIDE 5 MG/1
5 TABLET ORAL
Status: DISCONTINUED | OUTPATIENT
Start: 2023-09-11 | End: 2023-09-11 | Stop reason: HOSPADM

## 2023-09-11 RX ORDER — LIDOCAINE HYDROCHLORIDE 10 MG/ML
1 INJECTION, SOLUTION EPIDURAL; INFILTRATION; INTRACAUDAL; PERINEURAL ONCE
Status: DISCONTINUED | OUTPATIENT
Start: 2023-09-11 | End: 2023-09-11 | Stop reason: HOSPADM

## 2023-09-11 RX ORDER — CELECOXIB 100 MG/1
200 CAPSULE ORAL 2 TIMES DAILY
Status: DISCONTINUED | OUTPATIENT
Start: 2023-09-11 | End: 2023-09-11 | Stop reason: HOSPADM

## 2023-09-11 RX ORDER — MIDAZOLAM HYDROCHLORIDE 1 MG/ML
INJECTION, SOLUTION INTRAMUSCULAR; INTRAVENOUS
Status: DISCONTINUED | OUTPATIENT
Start: 2023-09-11 | End: 2023-09-11

## 2023-09-11 RX ORDER — FENTANYL CITRATE 50 UG/ML
25 INJECTION, SOLUTION INTRAMUSCULAR; INTRAVENOUS EVERY 5 MIN PRN
Status: DISCONTINUED | OUTPATIENT
Start: 2023-09-11 | End: 2023-09-11 | Stop reason: HOSPADM

## 2023-09-11 RX ORDER — ASPIRIN 81 MG/1
81 TABLET ORAL 2 TIMES DAILY
Qty: 84 TABLET | Refills: 0 | Status: SHIPPED | OUTPATIENT
Start: 2023-09-11 | End: 2023-10-23

## 2023-09-11 RX ORDER — SODIUM CHLORIDE 0.9 G/100ML
IRRIGANT IRRIGATION
Status: DISCONTINUED | OUTPATIENT
Start: 2023-09-11 | End: 2023-09-11 | Stop reason: HOSPADM

## 2023-09-11 RX ORDER — BUPIVACAINE HYDROCHLORIDE 2.5 MG/ML
INJECTION, SOLUTION INFILTRATION; PERINEURAL
Status: DISCONTINUED | OUTPATIENT
Start: 2023-09-11 | End: 2023-09-11 | Stop reason: HOSPADM

## 2023-09-11 RX ORDER — CELECOXIB 100 MG/1
400 CAPSULE ORAL ONCE
Status: COMPLETED | OUTPATIENT
Start: 2023-09-11 | End: 2023-09-11

## 2023-09-11 RX ORDER — SODIUM CHLORIDE 0.9 % (FLUSH) 0.9 %
10 SYRINGE (ML) INJECTION
Status: DISCONTINUED | OUTPATIENT
Start: 2023-09-11 | End: 2023-09-11 | Stop reason: HOSPADM

## 2023-09-11 RX ORDER — TRANEXAMIC ACID 650 MG/1
1950 TABLET ORAL ONCE
Status: COMPLETED | OUTPATIENT
Start: 2023-09-11 | End: 2023-09-11

## 2023-09-11 RX ORDER — FAMOTIDINE 20 MG/1
20 TABLET, FILM COATED ORAL 2 TIMES DAILY PRN
Qty: 84 TABLET | Refills: 0 | Status: SHIPPED | OUTPATIENT
Start: 2023-09-11 | End: 2023-10-23

## 2023-09-11 RX ORDER — ACETAMINOPHEN 325 MG/1
325 TABLET ORAL EVERY 4 HOURS
Qty: 84 TABLET | Refills: 0 | Status: SHIPPED | OUTPATIENT
Start: 2023-09-11 | End: 2023-09-25

## 2023-09-11 RX ORDER — PROPOFOL 10 MG/ML
VIAL (ML) INTRAVENOUS CONTINUOUS PRN
Status: DISCONTINUED | OUTPATIENT
Start: 2023-09-11 | End: 2023-09-11

## 2023-09-11 RX ORDER — DEXAMETHASONE SODIUM PHOSPHATE 10 MG/ML
10 INJECTION INTRAMUSCULAR; INTRAVENOUS ONCE
Status: COMPLETED | OUTPATIENT
Start: 2023-09-11 | End: 2023-09-11

## 2023-09-11 RX ORDER — PROPOFOL 10 MG/ML
VIAL (ML) INTRAVENOUS
Status: DISCONTINUED | OUTPATIENT
Start: 2023-09-11 | End: 2023-09-11

## 2023-09-11 RX ORDER — ONDANSETRON 2 MG/ML
INJECTION INTRAMUSCULAR; INTRAVENOUS
Status: DISCONTINUED | OUTPATIENT
Start: 2023-09-11 | End: 2023-09-11

## 2023-09-11 RX ORDER — DICLOFENAC SODIUM 75 MG/1
75 TABLET, DELAYED RELEASE ORAL 2 TIMES DAILY
Qty: 84 TABLET | Refills: 0 | Status: SHIPPED | OUTPATIENT
Start: 2023-09-11 | End: 2023-10-23

## 2023-09-11 RX ORDER — MUPIROCIN 20 MG/G
1 OINTMENT TOPICAL 2 TIMES DAILY
Status: DISCONTINUED | OUTPATIENT
Start: 2023-09-11 | End: 2023-09-11 | Stop reason: HOSPADM

## 2023-09-11 RX ORDER — PREGABALIN 75 MG/1
75 CAPSULE ORAL 2 TIMES DAILY
Status: DISCONTINUED | OUTPATIENT
Start: 2023-09-11 | End: 2023-09-11 | Stop reason: HOSPADM

## 2023-09-11 RX ORDER — SODIUM CHLORIDE, SODIUM LACTATE, POTASSIUM CHLORIDE, CALCIUM CHLORIDE 600; 310; 30; 20 MG/100ML; MG/100ML; MG/100ML; MG/100ML
INJECTION, SOLUTION INTRAVENOUS CONTINUOUS
Status: DISCONTINUED | OUTPATIENT
Start: 2023-09-11 | End: 2023-09-11 | Stop reason: HOSPADM

## 2023-09-11 RX ORDER — CEFAZOLIN SODIUM 2 G/50ML
2 SOLUTION INTRAVENOUS ONCE
Status: COMPLETED | OUTPATIENT
Start: 2023-09-11 | End: 2023-09-11

## 2023-09-11 RX ORDER — LIDOCAINE HYDROCHLORIDE 20 MG/ML
INJECTION INTRAVENOUS
Status: DISCONTINUED | OUTPATIENT
Start: 2023-09-11 | End: 2023-09-11

## 2023-09-11 RX ORDER — ONDANSETRON 2 MG/ML
4 INJECTION INTRAMUSCULAR; INTRAVENOUS EVERY 12 HOURS PRN
Status: DISCONTINUED | OUTPATIENT
Start: 2023-09-11 | End: 2023-09-11 | Stop reason: HOSPADM

## 2023-09-11 RX ORDER — ACETAMINOPHEN 500 MG
1000 TABLET ORAL ONCE
Status: COMPLETED | OUTPATIENT
Start: 2023-09-11 | End: 2023-09-11

## 2023-09-11 RX ORDER — PREGABALIN 75 MG/1
150 CAPSULE ORAL ONCE
Status: COMPLETED | OUTPATIENT
Start: 2023-09-11 | End: 2023-09-11

## 2023-09-11 RX ORDER — FAMOTIDINE 20 MG/1
20 TABLET, FILM COATED ORAL 2 TIMES DAILY
Status: DISCONTINUED | OUTPATIENT
Start: 2023-09-11 | End: 2023-09-11 | Stop reason: HOSPADM

## 2023-09-11 RX ORDER — NAPROXEN SODIUM 220 MG/1
81 TABLET, FILM COATED ORAL 2 TIMES DAILY
Status: DISCONTINUED | OUTPATIENT
Start: 2023-09-11 | End: 2023-09-11 | Stop reason: HOSPADM

## 2023-09-11 RX ADMIN — PROPOFOL 50 MG: 10 INJECTION, EMULSION INTRAVENOUS at 07:09

## 2023-09-11 RX ADMIN — CEFAZOLIN SODIUM 2 G: 2 SOLUTION INTRAVENOUS at 07:09

## 2023-09-11 RX ADMIN — OXYCODONE HYDROCHLORIDE 5 MG: 5 TABLET ORAL at 09:09

## 2023-09-11 RX ADMIN — ONDANSETRON HYDROCHLORIDE 4 MG: 2 SOLUTION INTRAMUSCULAR; INTRAVENOUS at 12:09

## 2023-09-11 RX ADMIN — ONDANSETRON 8 MG: 2 INJECTION, SOLUTION INTRAMUSCULAR; INTRAVENOUS at 07:09

## 2023-09-11 RX ADMIN — BUPIVACAINE HYDROCHLORIDE 10 ML: 2.5 INJECTION, SOLUTION EPIDURAL; INFILTRATION; INTRACAUDAL; PERINEURAL at 09:09

## 2023-09-11 RX ADMIN — TRANEXAMIC ACID 1000 MG: 100 INJECTION, SOLUTION INTRAVENOUS at 08:09

## 2023-09-11 RX ADMIN — CELECOXIB 400 MG: 100 CAPSULE ORAL at 06:09

## 2023-09-11 RX ADMIN — PREGABALIN 150 MG: 75 CAPSULE ORAL at 06:09

## 2023-09-11 RX ADMIN — DEXAMETHASONE SODIUM PHOSPHATE 10 MG: 10 INJECTION, SOLUTION INTRAMUSCULAR; INTRAVENOUS at 07:09

## 2023-09-11 RX ADMIN — LIDOCAINE HYDROCHLORIDE 50 MG: 20 INJECTION, SOLUTION INTRAVENOUS at 07:09

## 2023-09-11 RX ADMIN — PROPOFOL 50 MCG/KG/MIN: 10 INJECTION, EMULSION INTRAVENOUS at 07:09

## 2023-09-11 RX ADMIN — SODIUM CHLORIDE, POTASSIUM CHLORIDE, SODIUM LACTATE AND CALCIUM CHLORIDE: 600; 310; 30; 20 INJECTION, SOLUTION INTRAVENOUS at 06:09

## 2023-09-11 RX ADMIN — SODIUM CHLORIDE, SODIUM LACTATE, POTASSIUM CHLORIDE, AND CALCIUM CHLORIDE: .6; .31; .03; .02 INJECTION, SOLUTION INTRAVENOUS at 07:09

## 2023-09-11 RX ADMIN — TRANEXAMIC ACID 1950 MG: 650 TABLET ORAL at 06:09

## 2023-09-11 RX ADMIN — SODIUM CHLORIDE, SODIUM LACTATE, POTASSIUM CHLORIDE, AND CALCIUM CHLORIDE: .6; .31; .03; .02 INJECTION, SOLUTION INTRAVENOUS at 08:09

## 2023-09-11 RX ADMIN — ACETAMINOPHEN 1000 MG: 500 TABLET ORAL at 06:09

## 2023-09-11 RX ADMIN — MIDAZOLAM 1 MG: 1 INJECTION INTRAMUSCULAR; INTRAVENOUS at 07:09

## 2023-09-11 NOTE — PLAN OF CARE
Problem: Physical Therapy  Goal: Physical Therapy Goal  Description: Goals to be met by: 23     Patient will increase functional independence with mobility by performin. Sit to stand transfer with Modified [place] with RW  2. Gait  x 150 feet with Modified [place] using Rolling Walker.   3. Ascend/descend 5 stair with left Handrails Modified [place] and Stand-by Assistance.     2023 1239 by Debbie Trammell, PT  Outcome: Ongoing, Progressing    PT/OT co evaluation performed. Pt was previously Independent with no AD. Pt at this time requires SBA with bed mobility and SBA/CGA with use of RW with standing transfers/ambulation. Pt requires MIN/MOD A with descending step with use of LHR due to LLE weakness. Pt becomes dizzy/nauseous during session and requires seated rest break with return to supine (BP decreases with mobility). Pt will continue to benefit from skilled acute therapy during hospital stay. PT recommending home with assistance from  and low intensity therapy (OP PT) with use of RW with all standing mobility at this time; pending improved BP and dizzy/nauseous symptoms.

## 2023-09-11 NOTE — ANESTHESIA POSTPROCEDURE EVALUATION
Anesthesia Post Evaluation    Patient: Azucena Luciano    Procedure(s) Performed: Procedure(s) (LRB):  ARTHROPLASTY, KNEE, SIGHT ASSISTED (Left)    Final Anesthesia Type: spinal      Patient location during evaluation: PACU  Patient participation: Yes- Able to Participate  Level of consciousness: awake and alert  Post-procedure vital signs: reviewed and stable  Pain management: adequate  Airway patency: patent    PONV status at discharge: No PONV  Anesthetic complications: no      Cardiovascular status: blood pressure returned to baseline  Respiratory status: unassisted  Hydration status: euvolemic  Follow-up not needed.          Vitals Value Taken Time   /68 09/11/23 0951   Temp 36.5 °C (97.7 °F) 09/11/23 0946   Pulse 62 09/11/23 0955   Resp 17 09/11/23 0955   SpO2 96 % 09/11/23 0955   Vitals shown include unvalidated device data.      Event Time   Out of Recovery 09:54:35         Pain/Speedy Score: Pain Rating Prior to Med Admin: 4 (9/11/2023  9:46 AM)  Pain Rating Post Med Admin: 4 (9/11/2023  9:50 AM)  Speedy Score: 10 (9/11/2023  9:50 AM)

## 2023-09-11 NOTE — PT/OT/SLP EVAL
Occupational Therapy   Evaluation and Discharge Note    Name: Azucena Luciano  MRN: [id number]  Admitting Diagnosis: <principal problem not specified>  Recent Surgery: Procedure(s) (LRB):  ARTHROPLASTY, KNEE, SIGHT ASSISTED (Left) Day of Surgery    Recommendations:     Discharge Recommendations: outpatient PT, other (see comments) (& 24/7 care/assistance)  Discharge Equipment Recommendations: none  Barriers to discharge:  None    Assessment:     Azucena Luciano is a 73 y.o. female with a medical diagnosis of <principal problem not specified>. At this time, patient is functioning at their prior level of function and does not require further acute OT services.     Plan:     During this hospitalization, patient does not require further acute OT services.  Please re-consult if situation changes.    Plan of Care Reviewed with: patient, spouse    Subjective     Chief Complaint: Nausea & dizziness during first session  Patient/Family Comments/goals: Return home    Occupational Profile:  Pt lives with  in a 2 story home with no steps to enter home. Second story has LHR (bathroom upstairs; has toilet downstairs and sofa/recliner downstairs). Pt has tub/shower with transfer tub bench.   Prior to admission, patients level of function was Independent with no AD.  Equipment used at home: none, bath bench (owns RW (was not previously using)).  DME owned (not currently used): rolling walker.    Upon discharge, patient will have assistance from .    Pain/Comfort:  Pain Rating 1: 0/10    Patients cultural, spiritual, Nondenominational conflicts given the current situation: no    Objective:     Communicated with: nsg prior to session.  Patient found HOB elevated with peripheral IV upon OT entry to room.    General Precautions: Standard, fall  Orthopedic Precautions: LLE weight bearing as tolerated  Braces: N/A  Respiratory Status: Room air     Occupational Performance:    Bed Mobility:    Patient completed Scooting/Bridging  with supervision and stand by assistance  Patient completed Supine to Sit with supervision and stand by assistance  Patient completed Sit to Supine with supervision and stand by assistance    Functional Mobility/Transfers:  Patient completed Sit <> Stand Transfer with stand by assistance and contact guard assistance  with  rolling walker   Patient completed Toilet Transfer Step Transfer technique with contact guard assistance with  rolling walker  Functional Mobility: Pt performing functional mobility initially with CGA & use of RW; pt progressing to SBA with use of RW during second session. Pt educated on three point gait sequence & safety with use of RW. Pt educated on steps for home with assist from spouse & gait belt provided.     Activities of Daily Living:  Upper Body Dressing: modified independence seated EOB  Lower Body Dressing: contact guard assistance and minimum assistance seated EOB using adaptive dressing techniques  Toileting: supervision for hygiene seated on toilet    Cognitive/Visual Perceptual:  Cognitive/Psychosocial Skills:     -       Oriented to: Person, Place, Time, and Situation   -       Memory: No Deficits noted  -       Mood/Affect/Coping skills/emotional control: Cooperative and Pleasant    Physical Exam:  BUE grossly WFL based on observation during functional mobility & ADLs    AMPA 6 Click ADL:  AMPAC Total Score: 22    Treatment & Education:  Pt participating in initial OT/PT evaluations this date.   Pt becoming orthostatic & symptomatic during first session after functional mobility.   See below for BPs.   Pt seen second session in PM, symptoms improved.   Pt performing functional mobility in room/hallway with SBA & use of RW.   Pt educated on adaptive dsg post surgery, home safety, car/toilet/shower/tub t/fs, use of DME for functional mobility and ADLs.   Pt to attend OP PT tomorrow's date. D/c OT     First Session:  BP /77  BP after toilet t/f 122/63  BP after functional  mobility 103/50  BP sitting 100/56    Patient left HOB elevated with all lines intact, call button in reach, nsg notified, and spouse present    GOALS:   Multidisciplinary Problems       Occupational Therapy Goals          Problem: Occupational Therapy    Goal Priority Disciplines Outcome Interventions   Occupational Therapy Goal     OT, PT/OT Adequate for Care Transition                        History:     Past Medical History:   Diagnosis Date    Asthma     Carpal tunnel syndrome     DVT (deep venous thrombosis)     Hypertension     Osteoporosis     Urinary tract infection          Past Surgical History:   Procedure Laterality Date    ARTHROSCOPY OF KNEE Left 9/19/2022    Procedure: ARTHROSCOPY, KNEE;  Surgeon: Bryson Medrano MD;  Location: [facility];  Service: Orthopedics;  Laterality: Left;    AUGMENTATION OF BREAST      BREAST BIOPSY      BREAST CYST ASPIRATION      BREAST SURGERY      CATARACT EXTRACTION      COLONOSCOPY N/A 8/7/2018    Procedure: COLONOSCOPY;  Surgeon: KATE Vernon MD;  Location: [address];  Service: Endoscopy;  Laterality: N/A;  Ok to hold Eliquis x 2 days per Dr. Gee    FACIAL COSMETIC SURGERY      NASAL SEPTUM SURGERY      OVARIAN CYST REMOVAL      tummy tuck         Time Tracking:     OT Date of Treatment: 09/11/23  OT Start Time: 1138 1341  OT Stop Time: 1209 1356  OT Total Time (min): 31 min (15 min) with PT    Billable Minutes:Evaluation 8  Self Care/Home Management 8  Therapeutic Activity 15    9/11/2023

## 2023-09-11 NOTE — PLAN OF CARE
Physical therapy at bedside pt able to work with PT without difficulty and has been cleared to be discharged.

## 2023-09-11 NOTE — DISCHARGE SUMMARY
[facility] (Hospital)  Discharge Note  Short Stay    Procedure(s) (LRB):  ARTHROPLASTY, KNEE, SIGHT ASSISTED (Left)      OUTCOME: Patient tolerated treatment/procedure well without complication and is now ready for discharge.    DISPOSITION: Home or Self Care    FINAL DIAGNOSIS:  <principal problem not specified>    FOLLOWUP: In clinic    DISCHARGE INSTRUCTIONS:    Discharge Procedure Orders   Diet general     Call MD for:  temperature >100.4     Call MD for:  persistent nausea and vomiting     Call MD for:  severe uncontrolled pain     Call MD for:  difficulty breathing, headache or visual disturbances     Call MD for:  redness, tenderness, or signs of infection (pain, swelling, redness, odor or green/yellow discharge around incision site)     Call MD for:  hives     Call MD for:  persistent dizziness or light-headedness     Call MD for:  extreme fatigue     Activity as tolerated        TIME SPENT ON DISCHARGE: 15 minutes

## 2023-09-11 NOTE — PLAN OF CARE
Pt participating in initial OT/PT evaluations this date. Pt becoming orthostatic & symptomatic during first session after functional mobility. See chart for BPs. Pt seen second session in PM, symptoms improved. Pt performing functional mobility in room/hallway with SBA & use of RW. Pt educated on adaptive dsg post surgery, home safety, car/toilet/shower/tub t/fs, use of DME for functional mobility and ADLs. Pt to attend OP PT tomorrow's date. D/c OT     Problem: Occupational Therapy  Goal: Occupational Therapy Goal  9/11/2023 1536 by HOLA Huynh, OT  Outcome: Adequate for Care Transition

## 2023-09-11 NOTE — TRANSFER OF CARE
"Anesthesia Transfer of Care Note    Patient: Azucena Luciano    Procedure(s) Performed: Procedure(s) (LRB):  ARTHROPLASTY, KNEE, SIGHT ASSISTED (Left)    Patient location: PACU    Anesthesia Type: spinal and MAC    Transport from OR: Transported from OR on room air with adequate spontaneous ventilation    Post pain: adequate analgesia    Post assessment: no apparent anesthetic complications    Post vital signs: stable    Level of consciousness: awake and alert    Nausea/Vomiting: no nausea/vomiting    Complications: none    Transfer of care protocol was followed      Last vitals:   Visit Vitals  BP (!) 177/81   Pulse 61   Temp 36.6 °C (97.9 °F) (Temporal)   Resp 17   Ht 4' 11" (1.499 m)   Wt 50.3 kg (111 lb)   SpO2 99%   Breastfeeding No   BMI 22.42 kg/m²     "

## 2023-09-11 NOTE — ANESTHESIA PROCEDURE NOTES
L Adductor SS    Patient location during procedure: OR   Block not for primary anesthetic.  Reason for block: at surgeon's request and post-op pain management   Post-op Pain Location: L knee pain   Start time: 9/11/2023 9:10 AM  Timeout: 9/11/2023 9:09 AM   End time: 9/11/2023 9:12 AM    Staffing  Authorizing Provider: Prudence Spain MD  Performing Provider: Prudence Spain MD    Staffing  Performed by: Prudence Spain MD  Authorized by: Prudence Spain MD    Preanesthetic Checklist  Completed: patient identified, IV checked, site marked, risks and benefits discussed, surgical consent, monitors and equipment checked, pre-op evaluation and timeout performed  Peripheral Block  Patient position: supine  Prep: ChloraPrep and site prepped and draped  Patient monitoring: heart rate, cardiac monitor, continuous pulse ox, continuous capnometry and frequent blood pressure checks  Block type: adductor canal  Laterality: left  Injection technique: single shot  Needle  Needle type: Echogenic   Needle gauge: 20 G  Needle length: 4 in  Needle localization: ultrasound guidance  Needle insertion depth: 3 cm   -ultrasound image captured on disc.  Assessment  Injection assessment: negative aspiration, negative parasthesia and local visualized surrounding nerve  Paresthesia pain: none  Heart rate change: no  Slow fractionated injection: yes  Pain Tolerance: no complaints and comfortable throughout block  Medications:    Medications: bupivacaine (pf) (MARCAINE) injection 0.25% - Perineural   10 mL - 9/11/2023 9:11:00 AM    Additional Notes  10 cc 0.25% bupiv and 10 cc exparel with 1: 400 k epi injected incrementally after neg aspiration

## 2023-09-11 NOTE — INTERVAL H&P NOTE
The patient has been examined and the H&P has been reviewed:    I concur with the findings and no changes have occurred since H&P was written.    Surgery risks, benefits and alternative options discussed and understood by patient/family. Patient going for L TKA with Dr. Medrano.           There are no hospital problems to display for this patient.

## 2023-09-11 NOTE — PT/OT/SLP EVAL
Physical Therapy Evaluation and Treatment    Patient Name:  Azucena Luciano   MRN:  [id number]    Recommendations:     Discharge Recommendations: outpatient PT (low intensity therapy)   Discharge Equipment Recommendations: none   Barriers to discharge:  decreasing BP with mobility; dizziness; otherwise functionally ready for d/c ; **post second session pt safe for d/c    Assessment:     Azucena Luciano is a 73 y.o. female admitted with a medical diagnosis of <principal problem not specified>.  She presents with the following impairments/functional limitations: weakness, impaired endurance, impaired functional mobility, gait instability, impaired balance, decreased lower extremity function, impaired cardiopulmonary response to activity, orthopedic precautions.    PT/OT co evaluation performed. Pt was previously Independent with no AD. Pt at this time requires SBA with bed mobility and SBA/CGA with use of RW with standing transfers/ambulation. Pt requires MIN/MOD A with descending step with use of LHR due to LLE weakness. Pt becomes dizzy/nauseous during session and requires seated rest break with return to supine (BP decreases with mobility). Pt will continue to benefit from skilled acute therapy during hospital stay. PT recommending home with assistance from  and low intensity therapy (OP PT) with use of RW with all standing mobility at this time; pending improved BP and dizzy/nauseous symptoms.     2nd session: (13:41-13:56) Pt able to continue progressing ambulation with use of RW and SBA; able to practice stair navigation with CGA/MIN A and practice proper sequencing and stepping pattern. Pt has improved BP response during second session and improved dizziness/nausea. Pt deemed functionally safe for d/c home with assistance from  and use of RW; recommending low intensity therapy (OP PT) to continue progressing functional strength, mobility and independence.     Rehab Prognosis: Good; patient would  benefit from acute skilled PT services to address these deficits and reach maximum level of function.    Recent Surgery: Procedure(s) (LRB):  ARTHROPLASTY, KNEE, SIGHT ASSISTED (Left) Day of Surgery    Plan:     During this hospitalization, patient to be seen BID to address the identified rehab impairments via gait training, therapeutic activities, therapeutic exercises, neuromuscular re-education and progress toward the following goals:    Plan of Care Expires:  09/13/23    Subjective     Chief Complaint: dizziness/nausea with increased mobility; (second session: no complaints)  Patient/Family Comments/goals: to return home  Pain/Comfort:  Pain Rating 1: 0/10  Pain Rating Post-Intervention 1: 0/10    Patients cultural, spiritual, Anglican conflicts given the current situation: no    Living Environment:  Pt lives with  in a 2 story home with no steps to enter home. Second story has LHR (bathroom upstairs; has toilet downstairs and sofa/recliner downstairs). Pt has tub/shower with transfer tub bench.   Prior to admission, patients level of function was Independent with no AD.  Equipment used at home: none, bath bench (owns RW (was not previously using)).  DME owned (not currently used): rolling walker.  Upon discharge, patient will have assistance from .    Objective:     Communicated with Nurse prior to session.  Patient found HOB elevated with    upon PT entry to room.    General Precautions: Standard, fall  Orthopedic Precautions:LLE weight bearing as tolerated   Braces: N/A  Respiratory Status: Room air    Exams:  Cognitive Exam:  Patient is oriented to Person, Place, Time, and Situation  Sensation:    -       Intact  light/touch to BLE  RLE ROM: WFL  RLE Strength: WFL  LLE ROM: WFL  LLE Strength: 3/5 hip flexion, 3-/5 knee extension, 3/5 ankle PF/DF in supine    Functional Mobility:  Bed Mobility:     Supine to Sit: stand by assistance  Sit to Supine: stand by assistance  Transfers:     Sit to  "Stand:  contact guard assistance with rolling walker  Toilet Transfer: contact guard assistance with  rolling walker and grab bars  using  Step Transfer  Gait: ~50ft with use of RW and CGA/SBA; verbal cues for proper stepping pattern and sequencing with use of RW.   Stairs:  Pt ascended/descended 6" curb step with Handrail with right handrail with MIN A to ascend and MOD A to descend step; verbal cues for proper stepping pattern with BLE and cues to increase overall safety.       AM-PAC 6 CLICK MOBILITY  Total Score:18       Treatment & Education:  Pt educated on role of therapy.   Pt educated on proper positioning of LLE at rest to prevent L knee flexion contracture.   Educated on LLE supine exercises to improve strength and mobility.   Pt performs mobility as stated above in functional mobility section of note with use of RW.   Pt requires verbal cues to improve safety and BLE stepping pattern as well as improve sequencing with use of RW.   Pt able to attempt step navigation with 1HR with MIN A on ascent and MOD A on descent (increased LLE weakness on descent). Further demonstration provided for proper sequencing on stair navigation.  Pt begins to feel increasingly dizzy towards end of session and requires transfer to sitting and transfer back to bed.   BP upon sitting EOB initially 137/77; after transfer to bathroom 122/63; post ambulation/stair trial 103/50.   Pt positioned back in supine in bed and nursing notified and present towards end of session.     2nd session: (13:41-13:56);   Pt performs ambulation ~60ft with use of RW and SBA and step navigation with HHA (CGA/MIN A).   Pt able to recall proper positioning of LLE at rest and supine therapeutic exercises.   Pt requires verbal cues to further improve safety and sequencing with BLE stepping pattern and use of RW.   Improved BP and improved dizziness/nausea during 2nd session.    Patient left HOB elevated with all lines intact, call button in reach, nurse " notified, and nurse and  present.    GOALS:   Multidisciplinary Problems       Physical Therapy Goals          Problem: Physical Therapy    Goal Priority Disciplines Outcome Goal Variances Interventions   Physical Therapy Goal     PT, PT/OT Ongoing, Progressing     Description: Goals to be met by: 23     Patient will increase functional independence with mobility by performin. Sit to stand transfer with Modified Maynard with RW  2. Gait  x 150 feet with Modified Maynard using Rolling Walker.   3. Ascend/descend 5 stair with left Handrails Modified Maynard and Stand-by Assistance.                          History:     Past Medical History:   Diagnosis Date    Asthma     Carpal tunnel syndrome     DVT (deep venous thrombosis)     Hypertension     Osteoporosis     Urinary tract infection        Past Surgical History:   Procedure Laterality Date    ARTHROSCOPY OF KNEE Left 2022    Procedure: ARTHROSCOPY, KNEE;  Surgeon: Bryson Medrano MD;  Location: [facility];  Service: Orthopedics;  Laterality: Left;    AUGMENTATION OF BREAST      BREAST BIOPSY      BREAST CYST ASPIRATION      BREAST SURGERY      CATARACT EXTRACTION      COLONOSCOPY N/A 2018    Procedure: COLONOSCOPY;  Surgeon: KATE Vernon MD;  Location: [address]);  Service: Endoscopy;  Laterality: N/A;  Ok to hold Eliquis x 2 days per Dr. Gee    FACIAL COSMETIC SURGERY      NASAL SEPTUM SURGERY      OVARIAN CYST REMOVAL      tummy tuck         Time Tracking:     PT Received On: 23  PT Start Time: 1137     PT Stop Time: 1208  PT Total Time (min): 31 min With OT  2nd session: 13:41-13:56 With OT  Total: 46 min    Billable Minutes: Evaluation 8 and Gait Training 11      2023

## 2023-09-11 NOTE — OP NOTE
Procedure Note Aileen Monoblock TKA:      DATE OF PROCEDURE:  9/11/2023    PREOPERATIVE DIAGNOSIS: left knee bone-on-bone osteoarthritis.     POSTOPERATIVE DIAGNOSIS: same    PROCEDURE: Computer-assisted left total knee arthroplasty with resurfaced patella.     ATTENDING SURGEON: Bryson Medrano M.D.   ASSISTANT: annamarie Greenberg, med student    Risk Adjustment: Please see media tab for any additional diagnoses faxed from my office related to theTKA.    COMPLICATIONS: None.     Estimated blood loss: <100cc    IMPLANTS:   1. Aileen NexGen trabecular metal monoblock tibial tray size 3 and 10 mm height .   2. Aileen Persona femoral component  Size 4  left.   3. Aileen patella size 29  3. antibiotic bone cement     INDICATIONS FOR PROCEDURE: This is a 73 y.o. female with longstanding knee pain. They have failed nonoperative management including injections. Risks and benefits of total knee arthroplasty were explained to the patient. The patient agreed to move forward with total knee arthroplasty. We also discussed the fact that not resurfacing the patella could lead to anterior knee pain requiring additional surgery in the future.     FINDINGS: The patient had a complete articular cartilage loss down to subchondral bone throughout the knee.      PROCEDURE IN DETAIL: The patient was then brought to the Operating Room and spinal anesthesia started without any difficulties. The patient was placed supine on the operative table.  Right knee was then prepped and draped in sterile fashion. Prior to incision, proper site and procedure as well as antibiotic administration was verified. AFCN and ISN based field block weas used with marcaine. Anterior midline incision was created overlying center patella proximally to the medial border of the tibial tubercle distally. Skin, subcutaneous fat and deep fascial layer were sharply incised until we came to extensor mechanism retinaculum. Flap was then elevated medially to VMO and laterally  to lateral border of patella. Knee was then aspirated and synovial fluid sent for cell count. Medial parapatellar arthrotomy was injected with Marcaine with epinephrine and a medial parapatellar arthrotomy was then created. Synovium overlying the anterolateral distal femur was then excised as well as the infrapatellar tendon fat pad. Sleeve of soft tissue was elevated off the proximal medial tibia. Periphery of the patella was denvervated using bovie cautery, Hohmann retractors then placed medially and laterally along the distal femur to protect the collateral ligaments. ACL and anterior horn of lateral meniscus were then transected. We then pinned our navigation tracker on to distal femur and then performed our anatomy survey for the femur. We placed distal femoral cutting guide such that we were perpendicular to mechanical axis, aiming for about 1 degree of flexion and about 9 mm of bony resection. Distal femur was then resected. Next, we placed AP sizing guide on distal femur and measured for a size 4 femoral component. We then drilled 2 pin holes in 3 degrees of external rotation relative to posterior condylar axis. Anteroposterior condylar bone was then resected. Resected posterior femoral bone measured approximately 3 mm in difference with the lateral piece thinner than the medial piece. Next, we subluxed the tibia forward and resected medial and lateral menisci. We then pinned our navigation tracker on to the proximal tibia and then performed our anatomy survey for tibia. We placed our proximal tibial cutting guide such that we were perpendiculao mechanical axis, aiming for about 1 to 2 mm of bony resection medially  and about  4 degrees posterior slope. Proximal tibial bone was then resected and detached from posterior soft tissues using Bovie cautery. Next, with knee at 90 degrees, we placed a laminar  in lateral compartment and resected the ACL and the PCL as well as small osteophytes posteriorly  along the femur. We then injected the posterior capsule with marcaine. We then assessed our gaps using a 10 mm spacer block. With the 10 mm spacer block, the knee came out to full extension with nice stability with varus and valgus stress, and nice symmetry between flexion and extension. We assessed our tibial cut and our alignment angelita fell within the center of the ankle. Once we were happy with soft tissue sleeves and bony cuts, we then placed tibial protector on the proximal tibia and our box cutting guide for the femur. We then created our chamfer cuts. We then placed our femoral trial. This was lateralized as much as possible maintaining symmetric condylar resection and anchored using the lug hole screws. We then performed our box cut. Next, we subluxed the tibia forward, and sized our proximal tibia for a size 3 baseplate, the center of which was rotated such that it was in line with medial third tibial tubercle. This was then pinned in place. We then trialed using a 10 trial polyethylene. With 10 mm polyethylene, the knee came out to full extension. We had nice stability with varus and valgus stress and nice symmetry between flexion and extension. Patella tracked centrally within trochlear groove. We then everted our patella and measured our patellar  thickness for 20 mm. We then resected down to approximately 12 mm of remaining bone. We then sized patella for a 29 patellar button. Three peg holes were then drilled for the patellar button and a patellar trial was then placed. We then assessed our tracking. Patella tracked centrally within trochlear groove. Once we were happy with all our trial components, we then removed all our trial components, except the tibial baseplate. We then drilled our trabecular metal peg holes. We then drilled the sclerotic bone along the tibia using a short drill as well. We then placed the knee in extension and examined the posterior aspect of knee for bleeding. Once we achieve  hemostasis, we then packed the knee and inflated the tourniquet. We then prepared our bony surfaces for cementation using pulse lavage antibiotic saline. Femoral component was then impacted next using a pressurized cement gun and all excess bony cement removed from the periphery and box. We then protected the femoral component with a lap pad and subluxed the tibia forward. we then subluxed the tibia forward and placed pressurized cement on the tibial surface and backside of the tibial implant. We then impacted the monoblock tibial component into place and removed all excess cement from the periphery. Next we cemented the patella button into place. We then paused to allow for cement to harden. Once cement was hardened, we then let the tourniquet down and reexamined our gaps, stability and tracking; all of which remained unchanged. We then copiously irrigated the knee with pulse lavage betadine saline. We then closed our medial parapatellar arthrotomy with a running #2 barbed suture,  subcutaneous deep fascial layer was closed with simple interrupted #1 vicryl suture, subcutaneous skin with 2-0 Vicryl and incision with Dermabond and silver water proof dressing. The patient was then transferred to Recovery Room bed in stable condition.

## 2023-09-11 NOTE — ANESTHESIA PROCEDURE NOTES
Spinal    Diagnosis: l knee pain  Patient location during procedure: OR  Start time: 9/11/2023 7:14 AM  Timeout: 9/11/2023 7:12 AM  End time: 9/11/2023 7:17 AM    Staffing  Authorizing Provider: Prudence Spain MD  Performing Provider: Prudence Spain MD    Staffing  Performed by: Prudence Spain MD  Authorized by: Prudence Spain MD    Preanesthetic Checklist  Completed: patient identified, IV checked, site marked, risks and benefits discussed, surgical consent, monitors and equipment checked, pre-op evaluation and timeout performed  Spinal Block  Patient position: sitting  Prep: ChloraPrep  Patient monitoring: heart rate, cardiac monitor, continuous pulse ox, continuous capnometry and frequent blood pressure checks  Approach: midline  Location: L3-4  Injection technique: single shot  CSF Fluid: clear free-flowing CSF  Needle  Needle type: pencil-tip   Needle gauge: 25 G  Needle length: 3.5 in  Additional Documentation: left transient paresthesia  Needle localization: anatomical landmarks  Assessment  Sensory level: T9   Dermatomal levels determined by pinch or prick  Ease of block: easy  Patient's tolerance of the procedure: comfortable throughout block and no complaints  Additional Notes  L sided transient paresthesia; no paresthesia with intrathecal injection   Medications:    Medications: mepivacaine (CARBOCAINE) injection 15 mg/mL (1.5%) - Other   3 mL - 9/11/2023 7:15:00 AM

## 2023-09-11 NOTE — PLAN OF CARE
Pt has feeling and sensation to both legs and pelvis.  Able to lift both legs off the bed and hold for over 10 sec without difficulty.  PT notified and will come to gait train asap.

## 2023-09-12 ENCOUNTER — CLINICAL SUPPORT (OUTPATIENT)
Dept: REHABILITATION | Facility: HOSPITAL | Age: 73
End: 2023-09-12
Payer: MEDICARE

## 2023-09-12 DIAGNOSIS — R29.898 DECREASED STRENGTH INVOLVING KNEE JOINT: ICD-10-CM

## 2023-09-12 DIAGNOSIS — Z74.09 IMPAIRED FUNCTIONAL MOBILITY, BALANCE, GAIT, AND ENDURANCE: Primary | ICD-10-CM

## 2023-09-12 PROCEDURE — 97112 NEUROMUSCULAR REEDUCATION: CPT | Mod: PN

## 2023-09-12 PROCEDURE — 97116 GAIT TRAINING THERAPY: CPT | Mod: PN

## 2023-09-12 PROCEDURE — 97140 MANUAL THERAPY 1/> REGIONS: CPT | Mod: PN

## 2023-09-12 PROCEDURE — 97110 THERAPEUTIC EXERCISES: CPT | Mod: PN

## 2023-09-12 NOTE — PROGRESS NOTES
"[facility]   Physical Therapy Treatment Note      Name: Azucena Luciano  Clinic Number: [id number]    Therapy Diagnosis:   Encounter Diagnoses   Name Primary?    Impaired functional mobility, balance, gait, and endurance Yes    Decreased strength involving knee joint      Physician: Bryson Medrano MD    Visit Date: 9/12/2023    Physician Orders: PT Eval and Treat   Medical Diagnosis from Referral:   M62.81 (ICD-10-CM) - Quadriceps weakness   M17.12 (ICD-10-CM) - Primary osteoarthritis of left knee   M25.562,G89.29 (ICD-10-CM) - Chronic pain of left knee      Evaluation Date: 9/8/2023  Authorization Period Expiration: 11/03/2023  Plan of Care Expiration: 9/8/2023  to 10/27/2023  Visit # / Visits authorized: 1/23 (+eval)  FOTO: 1/7  PTA visits: 0/5      Time In: 1200  Time Out: 1255  Total Appointment Time (timed & untimed codes): 55 minutes (1 MT, 1 GT, 1 TE, 1 NM)  Precautions: hypertension, osteoporosis, DVT       Subjective     Patient reports: for first f/u appt post-op.  Doing well but fell getting out her door today. Denies falling onto her surgery knee.  Caught herself with her hands.   She was compliant with home exercise program.  Response to previous treatment: pre-op visit  Functional change: now s/p    Pain: 7/10  Location: left knee     Objective      Objective Measures updated at progress report unless specified.     Treatment     Azucena received the treatments listed below:      Therapeutic exercises to develop strength, endurance, and ROM for 15 minutes including:  -- seated calf towel stretch 3x30"  -- seated hamstring byron strethc 3x30"  -- seated ankle pumps 2x20    Manual therapy techniques: 15 minutes, including:  -- grade II/III left knee passive extension/flexion to tolerance     Neuromuscular re-education activities to improve: Balance, Kinesthetic, Sense, and Proprioception for 15 minutes. The following activities were included:  Quad sets 2x10x5"  SAQs " 3x6    Gait training: total time: 10 minutes  -- cueing for step-through gait pattern using rolling walker.     Patient Education and Home Exercises       Education provided:   - home exercise program: continue pre-op HEP    Written Home Exercises Provided: Patient instructed to cont prior HEP. Exercises were reviewed and Azucena was able to demonstrate them prior to the end of the session.  Azucena demonstrated good  understanding of the education provided. See Electronic Medical Record under Patient Instructions for exercises provided during therapy sessions    Assessment   Azucena is a 73 y.o. female.  Now s/p left total knee arthroplasty.  Surgery date: 09/11/2023.  POD#1.  Fell at home prior to coming to PT but denies falling on her left knee.  Left knee sore from the surgery.  Swelling noted but no s/s of infection.  Good quad activation.  Range of motion ~ 5 -80 degrees.  Pain well-controlled.       Patient prognosis is Excellent.   Patient will benefit from skilled outpatient Physical Therapy to address the deficits stated above and in the chart below, provide patient /family education, and to maximize patientt's level of independence.     Azucena Is progressing well towards her goals.   Patient prognosis is Excellent.     Patient will continue to benefit from skilled outpatient physical therapy to address the deficits listed in the problem list box on initial evaluation, provide pt/family education and to maximize pt's level of independence in the home and community environment.   Patient's spiritual, cultural and educational needs considered and pt agreeable to plan of care and goals.     Anticipated barriers to physical therapy: none    Goals:   Short Term Goals: 3  weeks  1. Pt will be independent with post- HEP supplement PT in improving functional mobility.  2. Pt will improve LLE strength to at least 70% of RLE strength as measured via [employer] handheld dynamometer in order to improve  functional mobility  3. Pt will improve L knee AROM post-operatively to at least 0 - 110 degrees in order to improve gait.     Long Term Goals: 6-8 weeks  1. Pt will be independent with updated HEP supplement PT in improving functional mobility.  2. Pt will improve LLE strength to at least 80% of RLE strength as measured via [employer] handheld dynamometer in order to improve functional mobility  3. Pt will improve L knee AROM to at least 0 - 120 in order to improve gait and ability to perform ADLs  4. Pt will perform TUG in < 14 seconds without AD in order to demo improved gait speed  5. Pt will perform at least 10 sit to stands without UE support on 30 second sit to stand test in order to demo improved ability to perform transfers    Plan   Phase I total knee arthroplasty program.     Brandin Orona, PT, DPT, OCS

## 2023-09-14 ENCOUNTER — CLINICAL SUPPORT (OUTPATIENT)
Dept: REHABILITATION | Facility: HOSPITAL | Age: 73
End: 2023-09-14
Payer: MEDICARE

## 2023-09-14 DIAGNOSIS — Z74.09 IMPAIRED FUNCTIONAL MOBILITY, BALANCE, GAIT, AND ENDURANCE: Primary | ICD-10-CM

## 2023-09-14 DIAGNOSIS — R29.898 DECREASED STRENGTH INVOLVING KNEE JOINT: ICD-10-CM

## 2023-09-14 PROCEDURE — 97112 NEUROMUSCULAR REEDUCATION: CPT | Mod: PN

## 2023-09-14 PROCEDURE — 97140 MANUAL THERAPY 1/> REGIONS: CPT | Mod: PN

## 2023-09-14 NOTE — PROGRESS NOTES
"[facility]   Physical Therapy Treatment Note      Name: Azucena Luciano  Clinic Number: [id number]    Therapy Diagnosis:   Encounter Diagnoses   Name Primary?    Impaired functional mobility, balance, gait, and endurance Yes    Decreased strength involving knee joint      Physician: Bryson Medrano MD    Visit Date: 9/14/2023    Physician Orders: PT Eval and Treat   Medical Diagnosis from Referral:   M62.81 (ICD-10-CM) - Quadriceps weakness   M17.12 (ICD-10-CM) - Primary osteoarthritis of left knee   M25.562,G89.29 (ICD-10-CM) - Chronic pain of left knee      Evaluation Date: 9/8/2023  Authorization Period Expiration: 11/03/2023  Plan of Care Expiration: 9/8/2023  to 10/27/2023  Visit # / Visits authorized: 2/23 (+eval)  FOTO: 2/7  PTA visits: 0/5      Time In: 1200  Time Out: 1255  Total Appointment Time (timed & untimed codes): 30 minutes (1 MT, 1 NM)  Precautions: hypertension, osteoporosis, DVT       Subjective     Patient reports: for 2nd f/u appt post-op.  Increased knee stiffness this morning.    Response to previous treatment: pre-op visit  Functional change: now s/p    Pain: 7/10  Location: left knee     Objective      Left knee range of motion:  (-) 3 - 90 degrees  Quad activation:  Good    Treatment     Azucena received the treatments listed below:      Therapeutic exercises to develop strength, endurance, and ROM for 10 minutes with PT 1:1 including:  -- seated calf towel stretch 3x30"  -- seated hamstring byron strethc 3x30"  -- seated ankle pumps 2x20  -- heel prop 2 rounds x 2'/each    Manual therapy techniques: 10 minutes, including:  -- grade II/III left knee passive extension/flexion to tolerance     Neuromuscular re-education activities to improve: Balance, Kinesthetic, Sense, and Proprioception for 10 minutes with PT 1:1 . The following activities were included:  Quad sets 2x10x5"  SAQs 3x10    Gait training: total time: 0 minutes  -- cueing for step-through gait " pattern using rolling walker.     Patient Education and Home Exercises       Education provided:   - home exercise program: continue pre-op home exercise program + addition of heel prop TID    Written Home Exercises Provided: Patient instructed to cont prior HEP. Exercises were reviewed and Azucena was able to demonstrate them prior to the end of the session.  Azucena demonstrated good  understanding of the education provided. See Electronic Medical Record under Patient Instructions for exercises provided during therapy sessions    Assessment   Azucena is a 73 y.o. female.  Now s/p left total knee arthroplasty.  Surgery date: 09/11/2023.  POD#3.  Presentation consistent with acute post-op state s/p total knee arthroplasty.  Swelling noted but no s/s of infection.  Good quad activation.  Range of motion ~ 5 - 90 degrees.  Pain well-controlled.       Patient prognosis is Excellent.   Patient will benefit from skilled outpatient Physical Therapy to address the deficits stated above and in the chart below, provide patient /family education, and to maximize patientt's level of independence.     Azucena Is progressing well towards her goals.   Patient prognosis is Excellent.     Patient will continue to benefit from skilled outpatient physical therapy to address the deficits listed in the problem list box on initial evaluation, provide pt/family education and to maximize pt's level of independence in the home and community environment.   Patient's spiritual, cultural and educational needs considered and pt agreeable to plan of care and goals.     Anticipated barriers to physical therapy: none    Goals:   Short Term Goals: 3  weeks  1. Pt will be independent with post- HEP supplement PT in improving functional mobility.  2. Pt will improve LLE strength to at least 70% of RLE strength as measured via MicroFet handheld dynamometer in order to improve functional mobility  3. Pt will improve L knee AROM  post-operatively to at least 0 - 110 degrees in order to improve gait.     Long Term Goals: 6-8 weeks  1. Pt will be independent with updated HEP supplement PT in improving functional mobility.  2. Pt will improve LLE strength to at least 80% of RLE strength as measured via MicroFet handheld dynamometer in order to improve functional mobility  3. Pt will improve L knee AROM to at least 0 - 120 in order to improve gait and ability to perform ADLs  4. Pt will perform TUG in < 14 seconds without AD in order to demo improved gait speed  5. Pt will perform at least 10 sit to stands without UE support on 30 second sit to stand test in order to demo improved ability to perform transfers    Plan   Phase I total knee arthroplasty program.     Brandin Orona, PT, DPT, OCS

## 2023-09-15 ENCOUNTER — CLINICAL SUPPORT (OUTPATIENT)
Dept: REHABILITATION | Facility: HOSPITAL | Age: 73
End: 2023-09-15
Payer: MEDICARE

## 2023-09-15 DIAGNOSIS — R29.898 DECREASED STRENGTH INVOLVING KNEE JOINT: ICD-10-CM

## 2023-09-15 DIAGNOSIS — Z74.09 IMPAIRED FUNCTIONAL MOBILITY, BALANCE, GAIT, AND ENDURANCE: Primary | ICD-10-CM

## 2023-09-15 PROCEDURE — 97112 NEUROMUSCULAR REEDUCATION: CPT | Mod: PN,CQ

## 2023-09-15 PROCEDURE — 97140 MANUAL THERAPY 1/> REGIONS: CPT | Mod: PN,CQ

## 2023-09-15 PROCEDURE — 97110 THERAPEUTIC EXERCISES: CPT | Mod: PN,CQ

## 2023-09-15 NOTE — PROGRESS NOTES
"[facility]   Physical Therapy Treatment Note      Name: Azucena Luciano  Clinic Number: [id number]    Therapy Diagnosis:   Encounter Diagnoses   Name Primary?    Impaired functional mobility, balance, gait, and endurance Yes    Decreased strength involving knee joint      Physician: Bryson Medrano MD    Visit Date: 9/15/2023    Physician Orders: PT Eval and Treat   Medical Diagnosis from Referral:   M62.81 (ICD-10-CM) - Quadriceps weakness   M17.12 (ICD-10-CM) - Primary osteoarthritis of left knee   M25.562,G89.29 (ICD-10-CM) - Chronic pain of left knee      Evaluation Date: 9/8/2023  Authorization Period Expiration: 11/03/2023  Plan of Care Expiration: 9/8/2023  to 10/27/2023  Visit # / Visits authorized: 3/23 (+eval)  FOTO: 4/7  PTA visits: 1/5      Time In: 1200  Time Out: 1255  Total Appointment Time (timed & untimed codes): 30 minutes (2 MT, 1 NM, 1TE)  Precautions: hypertension, osteoporosis, DVT       Subjective     Patient reports: Walking around house without AD. Walking more around the house. A lot more sore today.   Response to previous treatment: pre-op visit  Functional change: now s/p    Pain: 7/10  Location: left knee     Objective      Left knee range of motion:  (-) 3 - 90 degrees  Quad activation:  Good    Treatment     Azucena received the treatments listed below:      Therapeutic exercises to develop strength, endurance, and ROM for 15 minutes with PT 1:1 including:  -- seated calf towel stretch 3x30" NP  -- seated hamstring byron strethc 3x30" NP  -- seated ankle pumps 2x20  -- heel prop 2 rounds x 2'/each NP  -- Seated heel side with towel 5" x 20  -- Nu step 6' lvl 1    Manual therapy techniques: 30 minutes, including:  -- grade II/III left knee passive extension/flexion to tolerance   -- Bandage removal    Neuromuscular re-education activities to improve: Balance, Kinesthetic, Sense, and Proprioception for 10 minutes with PT 1:1 . The following activities were " "included:  Quad sets 2x10x5"  SAQs 3x10    Gait training: total time: 0 minutes  -- cueing for step-through gait pattern using rolling walker.     Patient Education and Home Exercises       Education provided:   - home exercise program: continue pre-op home exercise program + addition of heel prop TID    Written Home Exercises Provided: Patient instructed to cont prior HEP. Exercises were reviewed and Azucena was able to demonstrate them prior to the end of the session.  Azucena demonstrated good  understanding of the education provided. See Electronic Medical Record under Patient Instructions for exercises provided during therapy sessions    Assessment   Azucena is a 73 y.o. female.  Now s/p left total knee arthroplasty.  Surgery date: 09/11/2023.  POD#5.  Presentation consistent with acute post-op state s/p total knee arthroplasty. Increased soreness today. Reports walking around house without AD. Educated patient on gradual loading of knee joint to avoid high soreness and using AD for fall prevention and safety. Swelling noted but no s/s of infection. Bandage removal today. Moderate blistering noted around steri strips. Trimmed strips and applied telfa, ABD pad and tubi  to absorb and maintain area dry. Will continue to monitor. Good quad activation. Introduced knee flexion ROM exercises with good tolerance.      Patient prognosis is Excellent.   Patient will benefit from skilled outpatient Physical Therapy to address the deficits stated above and in the chart below, provide patient /family education, and to maximize patientt's level of independence.     Azucena Is progressing well towards her goals.   Patient prognosis is Excellent.     Patient will continue to benefit from skilled outpatient physical therapy to address the deficits listed in the problem list box on initial evaluation, provide pt/family education and to maximize pt's level of independence in the home and community environment. "   Patient's spiritual, cultural and educational needs considered and pt agreeable to plan of care and goals.     Anticipated barriers to physical therapy: none    Goals:   Short Term Goals: 3  weeks  1. Pt will be independent with post- HEP supplement PT in improving functional mobility.  2. Pt will improve LLE strength to at least 70% of RLE strength as measured via MicroFet handheld dynamometer in order to improve functional mobility  3. Pt will improve L knee AROM post-operatively to at least 0 - 110 degrees in order to improve gait.     Long Term Goals: 6-8 weeks  1. Pt will be independent with updated HEP supplement PT in improving functional mobility.  2. Pt will improve LLE strength to at least 80% of RLE strength as measured via MicroFet handheld dynamometer in order to improve functional mobility  3. Pt will improve L knee AROM to at least 0 - 120 in order to improve gait and ability to perform ADLs  4. Pt will perform TUG in < 14 seconds without AD in order to demo improved gait speed  5. Pt will perform at least 10 sit to stands without UE support on 30 second sit to stand test in order to demo improved ability to perform transfers    Plan   Phase I total knee arthroplasty program.     Ashlyn Garzon, PTA

## 2023-09-18 ENCOUNTER — CLINICAL SUPPORT (OUTPATIENT)
Dept: REHABILITATION | Facility: HOSPITAL | Age: 73
End: 2023-09-18
Payer: MEDICARE

## 2023-09-18 DIAGNOSIS — Z74.09 IMPAIRED FUNCTIONAL MOBILITY, BALANCE, GAIT, AND ENDURANCE: Primary | ICD-10-CM

## 2023-09-18 DIAGNOSIS — R29.898 DECREASED STRENGTH INVOLVING KNEE JOINT: ICD-10-CM

## 2023-09-18 PROCEDURE — 97112 NEUROMUSCULAR REEDUCATION: CPT | Mod: PN

## 2023-09-18 PROCEDURE — 97140 MANUAL THERAPY 1/> REGIONS: CPT | Mod: PN

## 2023-09-18 NOTE — PROGRESS NOTES
"[facility]   Physical Therapy Treatment Note      Name: Azucena Luciano  Clinic Number: [id number]    Therapy Diagnosis:   Encounter Diagnoses   Name Primary?    Impaired functional mobility, balance, gait, and endurance Yes    Decreased strength involving knee joint      Physician: Bryson Medrano MD    Visit Date: 9/18/2023    Physician Orders: PT Eval and Treat   Medical Diagnosis from Referral:   M62.81 (ICD-10-CM) - Quadriceps weakness   M17.12 (ICD-10-CM) - Primary osteoarthritis of left knee   M25.562,G89.29 (ICD-10-CM) - Chronic pain of left knee      Evaluation Date: 9/8/2023  Authorization Period Expiration: 11/03/2023  Plan of Care Expiration: 9/8/2023  to 10/27/2023  Visit # / Visits authorized: 4/23 (+eval)  FOTO: 5/7  PTA visits: 0/5      Time In: 1100  Time Out: 1155  Total Appointment Time (timed & untimed codes): 30 minutes (1 MT, 1 NM)  Precautions: hypertension, osteoporosis, DVT       Subjective     Patient reports: increased calf pain/soreness over the last few days.  Also voices that her wound is draining.    Response to previous treatment: pre-op visit  Functional change: now s/p    Pain: 7/10  Location: left knee     Objective      Left knee range of motion:  (-) 3 - 80 degrees  Quad activation:  Good    Wound inspection:  vesicles, hives, and blistering along with yellow екатерина-wound weeping from екатерина-wound area throughout the length of the surgical incision.  Appears to have a contact allergic reaction to the steri-strips. Patient denies fever, chills, malaise.     Calf inspection:  no warmth or edema.  TTP along muscle belly.  No excessive ankle or leg edema.     Treatment     Azucena received the treatments listed below:      Therapeutic exercises to develop strength, endurance, and ROM for 5 minutes with PT 1:1 including:  -- seated calf towel stretch 3x30"  -- seated hamstring byron strethc 3x30"   -- heel prop 2 rounds x 3'/each     Manual therapy techniques: " "20 minutes, including:  -- grade II/III left knee passive extension/flexion to tolerance   -- wound care including removal of steri-strips, cleaning wound, wicking up blisters, and re-dressing wound with non-adhesive bandaging.     Neuromuscular re-education activities to improve: Balance, Kinesthetic, Sense, and Proprioception for 10 minutes with PT 1:1. The following activities were included:  Quad sets 2x10x5"  SAQs 3x10  (+) seated straight leg raise 3x5  (+) seated heel slides AA 3x6    Gait training: total time: 0 minutes  -- cueing for step-through gait pattern using rolling walker.     Patient Education and Home Exercises       Education provided:   - home exercise program: continue pre-op home exercise program + addition of heel prop TID    Written Home Exercises Provided: Patient instructed to cont prior HEP. Exercises were reviewed and Azucena was able to demonstrate them prior to the end of the session.  Azucena demonstrated good  understanding of the education provided. See Electronic Medical Record under Patient Instructions for exercises provided during therapy sessions    Assessment   Azucena is a 73 y.o. female.  Now s/p left total knee arthroplasty.  Surgery date: 09/11/2023.  POD#7.  Presentation consistent with acute post-op state s/p total knee arthroplasty. Increased soreness today in calf today but no redness or warmth in this area. Most likely due to increased activity at home. Most of session today again spent addressing surgical wound.  Patient with significant reaction to adhesive causing hives and blistering to екатерина-wound area.  Sterile technique utilized with cleaning and re-dressing wound.     Patient prognosis is Excellent.   Patient will benefit from skilled outpatient Physical Therapy to address the deficits stated above and in the chart below, provide patient /family education, and to maximize patientt's level of independence.     Azucena Is progressing well towards her " goals.   Patient prognosis is Excellent.     Patient will continue to benefit from skilled outpatient physical therapy to address the deficits listed in the problem list box on initial evaluation, provide pt/family education and to maximize pt's level of independence in the home and community environment.   Patient's spiritual, cultural and educational needs considered and pt agreeable to plan of care and goals.     Anticipated barriers to physical therapy: none    Goals:   Short Term Goals: 3  weeks  1. Pt will be independent with post- HEP supplement PT in improving functional mobility.  Met 09/18/2023  2. Pt will improve LLE strength to at least 70% of RLE strength as measured via MicroFet handheld dynamometer in order to improve functional mobility  3. Pt will improve L knee AROM post-operatively to at least 0 - 110 degrees in order to improve gait.     Long Term Goals: 6-8 weeks  1. Pt will be independent with updated HEP supplement PT in improving functional mobility.  2. Pt will improve LLE strength to at least 80% of RLE strength as measured via MicroFet handheld dynamometer in order to improve functional mobility  3. Pt will improve L knee AROM to at least 0 - 120 in order to improve gait and ability to perform ADLs  4. Pt will perform TUG in < 14 seconds without AD in order to demo improved gait speed  5. Pt will perform at least 10 sit to stands without UE support on 30 second sit to stand test in order to demo improved ability to perform transfers    Plan   Phase I total knee arthroplasty program.     Brandin Orona, PT, DPT, OCS

## 2023-09-19 ENCOUNTER — CLINICAL SUPPORT (OUTPATIENT)
Dept: REHABILITATION | Facility: HOSPITAL | Age: 73
End: 2023-09-19
Payer: MEDICARE

## 2023-09-19 DIAGNOSIS — R29.898 DECREASED STRENGTH INVOLVING KNEE JOINT: ICD-10-CM

## 2023-09-19 DIAGNOSIS — Z74.09 IMPAIRED FUNCTIONAL MOBILITY, BALANCE, GAIT, AND ENDURANCE: Primary | ICD-10-CM

## 2023-09-19 PROCEDURE — 97112 NEUROMUSCULAR REEDUCATION: CPT | Mod: PN

## 2023-09-19 PROCEDURE — 97140 MANUAL THERAPY 1/> REGIONS: CPT | Mod: PN

## 2023-09-19 NOTE — PROGRESS NOTES
"[facility]   Physical Therapy Treatment Note      Name: Azucena Luciano  Clinic Number: [id number]    Therapy Diagnosis:   Encounter Diagnoses   Name Primary?    Impaired functional mobility, balance, gait, and endurance Yes    Decreased strength involving knee joint      Physician: Bryson Medrano MD    Visit Date: 9/19/2023    Physician Orders: PT Eval and Treat   Medical Diagnosis from Referral:   M62.81 (ICD-10-CM) - Quadriceps weakness   M17.12 (ICD-10-CM) - Primary osteoarthritis of left knee   M25.562,G89.29 (ICD-10-CM) - Chronic pain of left knee      Evaluation Date: 9/8/2023  Authorization Period Expiration: 11/03/2023  Plan of Care Expiration: 9/8/2023  to 10/27/2023  Visit # / Visits authorized: 5/23 (+eval)  FOTO: 6/7  PTA visits: 0/5      Time In: 1100  Time Out: 1155  Total Appointment Time (timed & untimed codes): 30 minutes (1 MT, 1 NM)  Precautions: hypertension, osteoporosis, DVT       Subjective     Patient reports: did not change dressing since yesterday.  Voices difficulty bending her knee.   Response to previous treatment: pre-op visit  Functional change: now s/p    Pain: 6/10  Location: left knee     Objective      Left knee range of motion:  (-) 4 - 90 degrees  Quad activation:  Good    Wound inspection:  vesicles, hives, and blistering along with yellow екатерина-wound weeping from екатерина-wound area throughout the length of the surgical incision.  Appears to have a contact allergic reaction to the steri-strips. Patient denies fever, chills, malaise. Wound with less draining than yesterday and less red, irritate.     Calf inspection:  no warmth or edema.  TTP along muscle belly.  No excessive ankle or leg edema.     Treatment     Azucena received the treatments listed below:      Therapeutic exercises to develop strength, endurance, and ROM for 5 minutes with PT 1:1 including:  -- seated calf towel stretch 3x30"  -- seated hamstring byron strethc 3x30"   -- heel prop 2 " "rounds x 3'/each     Manual therapy techniques: 15 minutes, including:  -- grade II/III left knee passive extension/flexion to tolerance   -- wound care including removal of steri-strips, cleaning wound, wicking up blisters, and re-dressing wound with non-adhesive bandaging.     Neuromuscular re-education activities to improve: Balance, Kinesthetic, Sense, and Proprioception for 10 minutes with PT 1:1. The following activities were included:  Quad sets 2x10x5"  LAQs (90-45) yellow tb 2x8 and (90-0) 2# 2x8  seated heel slides AA 2x10 with strap assist    Gait training: total time: 0 minutes  -- cueing for step-through gait pattern using rolling walker.     Patient Education and Home Exercises       Education provided:   - home exercise program: continue pre-op home exercise program + addition of heel prop TID    Written Home Exercises Provided: Patient instructed to cont prior HEP. Exercises were reviewed and Azucena was able to demonstrate them prior to the end of the session.  Azucena demonstrated good  understanding of the education provided. See Electronic Medical Record under Patient Instructions for exercises provided during therapy sessions    Assessment   Azucena is a 73 y.o. female.  Now s/p left total knee arthroplasty.  Surgery date: 09/11/2023.  POD#8.  Presentation consistent with acute/subacute post-op state s/p total knee arthroplasty. Patient with significant reaction to adhesive causing hives and blistering to екатерина-wound area.  Sterile technique again utilized today with cleaning and re-dressing wound. Ortho team aware of reaction.  Will continue to monitor. Tight into flexion.      Patient prognosis is Excellent.   Patient will benefit from skilled outpatient Physical Therapy to address the deficits stated above and in the chart below, provide patient /family education, and to maximize patientt's level of independence.     Azucena Is progressing well towards her goals.   Patient prognosis is " Excellent.     Patient will continue to benefit from skilled outpatient physical therapy to address the deficits listed in the problem list box on initial evaluation, provide pt/family education and to maximize pt's level of independence in the home and community environment.   Patient's spiritual, cultural and educational needs considered and pt agreeable to plan of care and goals.     Anticipated barriers to physical therapy: none    Goals:   Short Term Goals: 3  weeks  1. Pt will be independent with post- HEP supplement PT in improving functional mobility.  Met 09/18/2023  2. Pt will improve LLE strength to at least 70% of RLE strength as measured via MicroFet handheld dynamometer in order to improve functional mobility  3. Pt will improve L knee AROM post-operatively to at least 0 - 110 degrees in order to improve gait.     Long Term Goals: 6-8 weeks  1. Pt will be independent with updated HEP supplement PT in improving functional mobility.  2. Pt will improve LLE strength to at least 80% of RLE strength as measured via MicroFet handheld dynamometer in order to improve functional mobility  3. Pt will improve L knee AROM to at least 0 - 120 in order to improve gait and ability to perform ADLs  4. Pt will perform TUG in < 14 seconds without AD in order to demo improved gait speed  5. Pt will perform at least 10 sit to stands without UE support on 30 second sit to stand test in order to demo improved ability to perform transfers    Plan   Phase I/II total knee arthroplasty program.     Brandin Orona, PT, DPT, OCS

## 2023-09-20 ENCOUNTER — OFFICE VISIT (OUTPATIENT)
Dept: ORTHOPEDICS | Facility: CLINIC | Age: 73
End: 2023-09-20
Payer: MEDICARE

## 2023-09-20 VITALS
WEIGHT: 113.31 LBS | DIASTOLIC BLOOD PRESSURE: 80 MMHG | HEIGHT: 59 IN | BODY MASS INDEX: 22.84 KG/M2 | HEART RATE: 101 BPM | SYSTOLIC BLOOD PRESSURE: 149 MMHG

## 2023-09-20 DIAGNOSIS — T78.49XA ALLERGIC REACTION TO ADHESIVE: Primary | ICD-10-CM

## 2023-09-20 DIAGNOSIS — Z47.1 AFTERCARE FOLLOWING LEFT KNEE JOINT REPLACEMENT SURGERY: ICD-10-CM

## 2023-09-20 DIAGNOSIS — Z96.652 AFTERCARE FOLLOWING LEFT KNEE JOINT REPLACEMENT SURGERY: ICD-10-CM

## 2023-09-20 PROCEDURE — 3288F PR FALLS RISK ASSESSMENT DOCUMENTED: ICD-10-PCS | Mod: CPTII,S$GLB,, | Performed by: PHYSICIAN ASSISTANT

## 2023-09-20 PROCEDURE — 1101F PT FALLS ASSESS-DOCD LE1/YR: CPT | Mod: CPTII,S$GLB,, | Performed by: PHYSICIAN ASSISTANT

## 2023-09-20 PROCEDURE — 3044F PR MOST RECENT HEMOGLOBIN A1C LEVEL <7.0%: ICD-10-PCS | Mod: CPTII,S$GLB,, | Performed by: PHYSICIAN ASSISTANT

## 2023-09-20 PROCEDURE — 99024 PR POST-OP FOLLOW-UP VISIT: ICD-10-PCS | Mod: S$GLB,,, | Performed by: PHYSICIAN ASSISTANT

## 2023-09-20 PROCEDURE — 3077F PR MOST RECENT SYSTOLIC BLOOD PRESSURE >= 140 MM HG: ICD-10-PCS | Mod: CPTII,S$GLB,, | Performed by: PHYSICIAN ASSISTANT

## 2023-09-20 PROCEDURE — 1159F MED LIST DOCD IN RCRD: CPT | Mod: CPTII,S$GLB,, | Performed by: PHYSICIAN ASSISTANT

## 2023-09-20 PROCEDURE — 3044F HG A1C LEVEL LT 7.0%: CPT | Mod: CPTII,S$GLB,, | Performed by: PHYSICIAN ASSISTANT

## 2023-09-20 PROCEDURE — 3008F BODY MASS INDEX DOCD: CPT | Mod: CPTII,S$GLB,, | Performed by: PHYSICIAN ASSISTANT

## 2023-09-20 PROCEDURE — 4010F ACE/ARB THERAPY RXD/TAKEN: CPT | Mod: CPTII,S$GLB,, | Performed by: PHYSICIAN ASSISTANT

## 2023-09-20 PROCEDURE — 99999 PR PBB SHADOW E&M-EST. PATIENT-LVL III: CPT | Mod: PBBFAC,,, | Performed by: PHYSICIAN ASSISTANT

## 2023-09-20 PROCEDURE — 1125F AMNT PAIN NOTED PAIN PRSNT: CPT | Mod: CPTII,S$GLB,, | Performed by: PHYSICIAN ASSISTANT

## 2023-09-20 PROCEDURE — 4010F PR ACE/ARB THEARPY RXD/TAKEN: ICD-10-PCS | Mod: CPTII,S$GLB,, | Performed by: PHYSICIAN ASSISTANT

## 2023-09-20 PROCEDURE — 99024 POSTOP FOLLOW-UP VISIT: CPT | Mod: S$GLB,,, | Performed by: PHYSICIAN ASSISTANT

## 2023-09-20 PROCEDURE — 1159F PR MEDICATION LIST DOCUMENTED IN MEDICAL RECORD: ICD-10-PCS | Mod: CPTII,S$GLB,, | Performed by: PHYSICIAN ASSISTANT

## 2023-09-20 PROCEDURE — 3008F PR BODY MASS INDEX (BMI) DOCUMENTED: ICD-10-PCS | Mod: CPTII,S$GLB,, | Performed by: PHYSICIAN ASSISTANT

## 2023-09-20 PROCEDURE — 3079F PR MOST RECENT DIASTOLIC BLOOD PRESSURE 80-89 MM HG: ICD-10-PCS | Mod: CPTII,S$GLB,, | Performed by: PHYSICIAN ASSISTANT

## 2023-09-20 PROCEDURE — 1101F PR PT FALLS ASSESS DOC 0-1 FALLS W/OUT INJ PAST YR: ICD-10-PCS | Mod: CPTII,S$GLB,, | Performed by: PHYSICIAN ASSISTANT

## 2023-09-20 PROCEDURE — 3079F DIAST BP 80-89 MM HG: CPT | Mod: CPTII,S$GLB,, | Performed by: PHYSICIAN ASSISTANT

## 2023-09-20 PROCEDURE — 1125F PR PAIN SEVERITY QUANTIFIED, PAIN PRESENT: ICD-10-PCS | Mod: CPTII,S$GLB,, | Performed by: PHYSICIAN ASSISTANT

## 2023-09-20 PROCEDURE — 3077F SYST BP >= 140 MM HG: CPT | Mod: CPTII,S$GLB,, | Performed by: PHYSICIAN ASSISTANT

## 2023-09-20 PROCEDURE — 3288F FALL RISK ASSESSMENT DOCD: CPT | Mod: CPTII,S$GLB,, | Performed by: PHYSICIAN ASSISTANT

## 2023-09-20 PROCEDURE — 99999 PR PBB SHADOW E&M-EST. PATIENT-LVL III: ICD-10-PCS | Mod: PBBFAC,,, | Performed by: PHYSICIAN ASSISTANT

## 2023-09-20 NOTE — PROGRESS NOTES
Subjective:      Patient ID: Azucena Luciano is a 73 y.o. female.    Chief Complaint: Pain and Post-op Evaluation of the Left Knee      74yo F s/p Left knee TKA 9/11/23. Patient is presenting with redness around the incision from allergic reaction to steri strips. Photos were sent yesterday from physical therapist of the patients knee. Confirmed the redness was adhesive reaction. She noted some oozing from the bulla around the incision. Recommended benedryl. Patients states she was not told to take benedryl and wanted to have her knee looked at. She is also having increased pain from PT. No fevers, chills noted.         Review of Systems   Constitutional: Negative for chills and fever.   Cardiovascular:  Negative for chest pain.   Respiratory:  Negative for cough.    Hematologic/Lymphatic: Does not bruise/bleed easily.   Skin:  Negative for poor wound healing and rash.   Musculoskeletal:  Positive for joint pain, myalgias and stiffness.   Gastrointestinal:  Negative for abdominal pain.   Genitourinary:  Negative for bladder incontinence.   Neurological:  Negative for dizziness, loss of balance and weakness.   Psychiatric/Behavioral:  Negative for altered mental status.        Review of patient's allergies indicates:   Allergen Reactions    Cayenne pepper Anaphylaxis        Current Outpatient Medications   Medication Sig Dispense Refill    acetaminophen (TYLENOL) 325 MG tablet Take 2 tablets (650 mg total) by mouth every 6 (six) hours as needed for Pain. 30 tablet 0    acetaminophen (TYLENOL) 325 MG tablet Take 1 tablet (325 mg total) by mouth every 4 (four) hours. for 14 days 84 tablet 0    alendronate (FOSAMAX) 70 MG tablet TAKE ONE TABLET BY MOUTH EVERY WEEK 12 tablet 3    ascorbic acid, vitamin C, (VITAMIN C) 250 MG tablet Take 250 mg by mouth once daily.      aspirin (ECOTRIN) 81 MG EC tablet Take 1 tablet (81 mg total) by mouth 2 (two) times a day. 84 tablet 0    atorvastatin (LIPITOR) 20 MG tablet TAKE ONE  "TABLET BY MOUTH EVERY DAY 90 tablet 1    calcium-vitamin D3 500 mg(1,250mg) -200 unit per tablet Take 1 tablet by mouth 2 (two) times daily with meals.      diclofenac (VOLTAREN) 75 MG EC tablet Take 1 tablet (75 mg total) by mouth 2 (two) times daily. 84 tablet 0    docusate sodium (COLACE) 50 MG capsule Take 1 capsule (50 mg total) by mouth every 6 (six) hours. 168 capsule 0    famotidine (PEPCID) 20 MG tablet Take 1 tablet (20 mg total) by mouth 2 (two) times daily as needed for Heartburn. 84 tablet 0    magnesium hydroxide 400 mg/5 ml (MILK OF MAGNESIA) 400 mg/5 mL Susp Take 30 mLs by mouth once daily.      multivitamin with minerals tablet Take 1 tablet by mouth once daily.      oxymetazoline (AFRIN) 0.05 % nasal spray 1 spray by Nasal route every evening.      valsartan (DIOVAN) 160 MG tablet Take 1 tablet (160 mg total) by mouth once daily. 90 tablet 3     No current facility-administered medications for this visit.     Facility-Administered Medications Ordered in Other Visits   Medication Dose Route Frequency Provider Last Rate Last Admin    BUPivacaine injection 5 mL  5 mL   Bryson Medrano MD   5 mL at 08/30/22 1015    cefazolin (ANCEF) 2 gram in dextrose 5% 50 mL IVPB (premix)  2 g Intravenous Q8H Carlyle Wallace MD   2 g at 09/19/22 1050    ketorolac injection 30 mg  30 mg Intramuscular  Bryson Medrano MD   30 mg at 08/30/22 1015    LIDOcaine HCL 10 mg/ml (1%) injection 4 mL  4 mL   Bryson Medrano MD   4 mL at 08/30/22 1015        The patient's relevant past medical, surgical, and social history was reviewed in Epic.       Objective:      VITAL SIGNS: BP (!) 149/80   Pulse 101   Ht 4' 11" (1.499 m)   Wt 51.4 kg (113 lb 5.1 oz)   BMI 22.89 kg/m²     General    Nursing note and vitals reviewed.  Constitutional: She is oriented to person, place, and time. She appears well-developed and well-nourished.   Neurological: She is alert and oriented to person, place, and time.             Left Knee Exam "     Inspection   Erythema: present  Scars: present    Comments:  Erythematous rash noted in the shape of steri strips which have been since taken off. Some oozing and blood noted but not over the incision itself.   Image in media section.            Assessment:       1. Allergic reaction to adhesive    2. Aftercare following left knee joint replacement surgery          Plan:         Azucena was seen today for pain and post-op evaluation.    Diagnoses and all orders for this visit:    Allergic reaction to adhesive    Aftercare following left knee joint replacement surgery     Allergic reaction to steri-strips. Bandage change and wound care today. Recommend benadryl and hydro-cortisone cream for itch. Tylenol 650mg TID for pain. Continue PT and home exercises. She will f/u in one week for incision check at two week PO visit.     Diagnoses and plan discussed with the patient, as well as the expected course and duration of his symptoms.  All questions and concerns were addressed prior to the end of the visit.   Instructed patient to call office if they have any future questions/concerns or to schedule apt. Patient will return to see me if symptoms worsen or fail to improve    Note dictated with voice recognition software, please excuse any grammatical errors.        Lyndsey Nassar PA-C   09/20/2023

## 2023-09-22 ENCOUNTER — CLINICAL SUPPORT (OUTPATIENT)
Dept: REHABILITATION | Facility: HOSPITAL | Age: 73
End: 2023-09-22
Payer: MEDICARE

## 2023-09-22 DIAGNOSIS — R29.898 DECREASED STRENGTH INVOLVING KNEE JOINT: ICD-10-CM

## 2023-09-22 DIAGNOSIS — Z74.09 IMPAIRED FUNCTIONAL MOBILITY, BALANCE, GAIT, AND ENDURANCE: Primary | ICD-10-CM

## 2023-09-22 PROCEDURE — 97110 THERAPEUTIC EXERCISES: CPT | Mod: PN

## 2023-09-22 PROCEDURE — 97112 NEUROMUSCULAR REEDUCATION: CPT | Mod: PN

## 2023-09-22 NOTE — PROGRESS NOTES
"[facility]   Physical Therapy Treatment Note      Name: Azucena Luciano  Clinic Number: [id number]    Therapy Diagnosis:   Encounter Diagnoses   Name Primary?    Impaired functional mobility, balance, gait, and endurance Yes    Decreased strength involving knee joint      Physician: Bryson Medrano MD    Visit Date: 9/22/2023    Physician Orders: PT Eval and Treat   Medical Diagnosis from Referral:   M62.81 (ICD-10-CM) - Quadriceps weakness   M17.12 (ICD-10-CM) - Primary osteoarthritis of left knee   M25.562,G89.29 (ICD-10-CM) - Chronic pain of left knee      Evaluation Date: 9/8/2023  Authorization Period Expiration: 11/03/2023  Plan of Care Expiration: 9/8/2023  to 10/27/2023  Visit # / Visits authorized: 6/23 (+eval)  FOTO: 7/10 PTA visits: 0/5      Time In: 1100  Time Out: 1155  Total Appointment Time (timed & untimed codes): 30 minutes (1 TE, 1 NM)  Precautions: hypertension, osteoporosis, DVT       Subjective     Patient reports: went to Ortho team for wound check.  Also voices increased night pain which she attributes to PT bending her knee.  She voices walking without any assistive device at home and being on her feet more.   Response to previous treatment: pre-op visit  Functional change: now s/p    Pain: 6/10  Location: left knee     Objective      Left knee range of motion:  0 - 90 degrees  Quad activation:  Good  Straight leg raise: no lag but with cueing.   Wound inspection:  vesicles, hives, and blistering along with drainage is clearing.    Treatment     Azucena received the treatments listed below:      Therapeutic exercises to develop strength, endurance, and ROM for 10 minutes with PT 1:1 including:  -- seated calf towel stretch 3x30"  -- seated hamstring byron stretch 3x30"   -- heel prop 1 rounds x 4'' with 4#    Manual therapy techniques: 10 minutes, including:  -- grade II/III left knee passive extension/flexion to tolerance     Neuromuscular re-education activities " to improve: Balance, Kinesthetic, Sense, and Proprioception for 10 minutes with PT 1:1. The following activities were included:  Straight leg raise 2x10 (mild cueing)  LAQs (90-45) yellow tb 1x10 and (90-0) 5# 3x6-10  seated heel slides A 2x10  --> 1x10 with strap assist    Gait training: total time: 0 minutes  -- cueing for step-through gait pattern using rolling walker.     Patient Education and Home Exercises       Education provided:   - home exercise program: continue pre-op home exercise program + addition of heel prop TID  - education on more sitting rest breaks at home.     Written Home Exercises Provided: Patient instructed to cont prior HEP. Exercises were reviewed and Azucena was able to demonstrate them prior to the end of the session.  Azucena demonstrated good  understanding of the education provided. See Electronic Medical Record under Patient Instructions for exercises provided during therapy sessions    Assessment   Azucena is a 73 y.o. female.  Now s/p left total knee arthroplasty.  Surgery date: 09/11/2023.  POD#11.  *Presentation consistent with acute/subacute post-op state s/p total knee arthroplasty. Reaction to adhesive improving; drying out.  Tight into flexion but meeting flexion goal.  No increased pain immediately post session. Suspect patient is overdoing it at home with too much standing and activity.  Recommend more scheduled rest breaks at home.       Patient prognosis is Excellent.   Patient will benefit from skilled outpatient Physical Therapy to address the deficits stated above and in the chart below, provide patient /family education, and to maximize patientt's level of independence.     Azucena Is progressing well towards her goals.   Patient prognosis is Excellent.     Patient will continue to benefit from skilled outpatient physical therapy to address the deficits listed in the problem list box on initial evaluation, provide pt/family education and to maximize pt's  level of independence in the home and community environment.   Patient's spiritual, cultural and educational needs considered and pt agreeable to plan of care and goals.     Anticipated barriers to physical therapy: none    Goals:   Short Term Goals: 3  weeks  1. Pt will be independent with post- HEP supplement PT in improving functional mobility.  Met 09/18/2023  2. Pt will improve LLE strength to at least 70% of RLE strength as measured via MicroFet handheld dynamometer in order to improve functional mobility  3. Pt will improve L knee AROM post-operatively to at least 0 - 110 degrees in order to improve gait.     Long Term Goals: 6-8 weeks  1. Pt will be independent with updated HEP supplement PT in improving functional mobility.  2. Pt will improve LLE strength to at least 80% of RLE strength as measured via MicroFet handheld dynamometer in order to improve functional mobility  3. Pt will improve L knee AROM to at least 0 - 120 in order to improve gait and ability to perform ADLs  4. Pt will perform TUG in < 14 seconds without AD in order to demo improved gait speed  5. Pt will perform at least 10 sit to stands without UE support on 30 second sit to stand test in order to demo improved ability to perform transfers    Plan   Phase I/II total knee arthroplasty program.     Brandin Orona, PT, DPT, OCS

## 2023-09-25 ENCOUNTER — CLINICAL SUPPORT (OUTPATIENT)
Dept: REHABILITATION | Facility: HOSPITAL | Age: 73
End: 2023-09-25
Payer: MEDICARE

## 2023-09-25 DIAGNOSIS — R29.898 DECREASED STRENGTH INVOLVING KNEE JOINT: ICD-10-CM

## 2023-09-25 DIAGNOSIS — Z74.09 IMPAIRED FUNCTIONAL MOBILITY, BALANCE, GAIT, AND ENDURANCE: Primary | ICD-10-CM

## 2023-09-25 PROCEDURE — 97112 NEUROMUSCULAR REEDUCATION: CPT | Mod: PN

## 2023-09-25 PROCEDURE — 97530 THERAPEUTIC ACTIVITIES: CPT | Mod: PN

## 2023-09-25 PROCEDURE — 97140 MANUAL THERAPY 1/> REGIONS: CPT | Mod: PN

## 2023-09-25 PROCEDURE — 97110 THERAPEUTIC EXERCISES: CPT | Mod: PN

## 2023-09-25 NOTE — PROGRESS NOTES
"[facility]   Physical Therapy Treatment Note      Name: Azucena Luciano  Clinic Number: [id number]    Therapy Diagnosis:   Encounter Diagnoses   Name Primary?    Impaired functional mobility, balance, gait, and endurance Yes    Decreased strength involving knee joint      Physician: Bryson Medrano MD    Visit Date: 9/25/2023    Physician Orders: PT Eval and Treat   Medical Diagnosis from Referral:   M62.81 (ICD-10-CM) - Quadriceps weakness   M17.12 (ICD-10-CM) - Primary osteoarthritis of left knee   M25.562,G89.29 (ICD-10-CM) - Chronic pain of left knee      Evaluation Date: 9/8/2023  Authorization Period Expiration: 11/03/2023  Plan of Care Expiration: 9/8/2023  to 10/27/2023  Visit # / Visits authorized: 7/23 (+eval)  FOTO: 8/10 PTA visits: 0/5      Time In: 1200  Time Out: 1255  Total Appointment Time (timed & untimed codes): 55 minutes (1 TE, 1 NM, 1 TH, 1 MT)  Precautions: hypertension, osteoporosis, DVT       Subjective     Patient reports: increased night pain last night. Voices being diligent with home exercise program this weekend.    Response to previous treatment: pre-op visit  Functional change: comes to PT today without assistive device     Pain: 6/10  Location: left knee     Objective      Left knee range of motion:  0 - 100 degrees  Quad activation:  Good  Straight leg raise: no lag but with cueing.   Wound inspection:  vesicles, hives, and blistering along with drainage is clearing. Surgical wound closing.     Treatment     Azucena received the treatments listed below:      Therapeutic exercises to develop strength, endurance, and ROM for 15 minutes including:  -- seated hamstring byron stretch 3x30"   -- heel prop 1 rounds x 4'' with 4#  -- seated heel slides A 2x10  --> 1x10 with strap assist    Manual therapy techniques: 10 minutes, including:  -- grade II/III left knee passive extension/flexion to tolerance     Neuromuscular re-education activities to improve: " Balance, Kinesthetic, Sense, and Proprioception for 20 minutes The following activities were included:  Straight leg raise 2x10 (mild cueing)  LAQs (90-45) red tb 2x10 RLE, green 2x10 and (90-0) 8# 3x6-10  Nu-Step x 7' at L3    Therapeutic Activities: total time 10 minutes, including:  Leg press dL 3x10 at 3.5 plates      Patient Education and Home Exercises       Education provided:   - home exercise program: continue pre-op home exercise program + addition of heel prop TID  - education on more sitting rest breaks at home.     Written Home Exercises Provided: Patient instructed to cont prior HEP. Exercises were reviewed and Azucena was able to demonstrate them prior to the end of the session.  Azucena demonstrated good  understanding of the education provided. See Electronic Medical Record under Patient Instructions for exercises provided during therapy sessions    Assessment   Azucena is a 73 y.o. female.  Now s/p left total knee arthroplasty.  Surgery date: 09/11/2023.  POD#14.  Presentation consistent with acute/subacute post-op state s/p total knee arthroplasty. Reaction to adhesive improving; drying out and healing well.  Tight into flexion but meeting flexion goals.  Improving quad activation and loading tolerance.  Continue to voice education on load management at home.      Patient prognosis is Excellent.   Patient will benefit from skilled outpatient Physical Therapy to address the deficits stated above and in the chart below, provide patient /family education, and to maximize patientt's level of independence.     Azucena Is progressing well towards her goals.   Patient prognosis is Excellent.     Patient will continue to benefit from skilled outpatient physical therapy to address the deficits listed in the problem list box on initial evaluation, provide pt/family education and to maximize pt's level of independence in the home and community environment.   Patient's spiritual, cultural and  educational needs considered and pt agreeable to plan of care and goals.     Anticipated barriers to physical therapy: none    Goals:   Short Term Goals: 3  weeks  1. Pt will be independent with post- HEP supplement PT in improving functional mobility.  Met 09/18/2023  2. Pt will improve LLE strength to at least 70% of RLE strength as measured via MicroFet handheld dynamometer in order to improve functional mobility  3. Pt will improve L knee AROM post-operatively to at least 0 - 110 degrees in order to improve gait.     Long Term Goals: 6-8 weeks  1. Pt will be independent with updated HEP supplement PT in improving functional mobility.  2. Pt will improve LLE strength to at least 80% of RLE strength as measured via MicroFet handheld dynamometer in order to improve functional mobility  3. Pt will improve L knee AROM to at least 0 - 120 in order to improve gait and ability to perform ADLs  4. Pt will perform TUG in < 14 seconds without AD in order to demo improved gait speed  5. Pt will perform at least 10 sit to stands without UE support on 30 second sit to stand test in order to demo improved ability to perform transfers    Plan   Phase I/II total knee arthroplasty program.     Brandin Orona, PT, DPT, OCS

## 2023-09-26 ENCOUNTER — OFFICE VISIT (OUTPATIENT)
Dept: ORTHOPEDICS | Facility: CLINIC | Age: 73
End: 2023-09-26
Payer: MEDICARE

## 2023-09-26 VITALS
HEIGHT: 59 IN | DIASTOLIC BLOOD PRESSURE: 84 MMHG | SYSTOLIC BLOOD PRESSURE: 161 MMHG | WEIGHT: 113.13 LBS | HEART RATE: 75 BPM | BODY MASS INDEX: 22.8 KG/M2

## 2023-09-26 DIAGNOSIS — Z96.652 AFTERCARE FOLLOWING LEFT KNEE JOINT REPLACEMENT SURGERY: Primary | ICD-10-CM

## 2023-09-26 DIAGNOSIS — M79.662 PAIN OF LEFT CALF: ICD-10-CM

## 2023-09-26 DIAGNOSIS — Z47.1 AFTERCARE FOLLOWING LEFT KNEE JOINT REPLACEMENT SURGERY: Primary | ICD-10-CM

## 2023-09-26 DIAGNOSIS — M25.562 LEFT KNEE PAIN, UNSPECIFIED CHRONICITY: Primary | ICD-10-CM

## 2023-09-26 PROCEDURE — 3044F HG A1C LEVEL LT 7.0%: CPT | Mod: CPTII,S$GLB,, | Performed by: PHYSICIAN ASSISTANT

## 2023-09-26 PROCEDURE — 3079F PR MOST RECENT DIASTOLIC BLOOD PRESSURE 80-89 MM HG: ICD-10-PCS | Mod: CPTII,S$GLB,, | Performed by: PHYSICIAN ASSISTANT

## 2023-09-26 PROCEDURE — 3288F FALL RISK ASSESSMENT DOCD: CPT | Mod: CPTII,S$GLB,, | Performed by: PHYSICIAN ASSISTANT

## 2023-09-26 PROCEDURE — 4010F ACE/ARB THERAPY RXD/TAKEN: CPT | Mod: CPTII,S$GLB,, | Performed by: PHYSICIAN ASSISTANT

## 2023-09-26 PROCEDURE — 99999 PR PBB SHADOW E&M-EST. PATIENT-LVL IV: CPT | Mod: PBBFAC,,, | Performed by: PHYSICIAN ASSISTANT

## 2023-09-26 PROCEDURE — 1159F PR MEDICATION LIST DOCUMENTED IN MEDICAL RECORD: ICD-10-PCS | Mod: CPTII,S$GLB,, | Performed by: PHYSICIAN ASSISTANT

## 2023-09-26 PROCEDURE — 4010F PR ACE/ARB THEARPY RXD/TAKEN: ICD-10-PCS | Mod: CPTII,S$GLB,, | Performed by: PHYSICIAN ASSISTANT

## 2023-09-26 PROCEDURE — 99024 PR POST-OP FOLLOW-UP VISIT: ICD-10-PCS | Mod: S$GLB,,, | Performed by: PHYSICIAN ASSISTANT

## 2023-09-26 PROCEDURE — 3008F PR BODY MASS INDEX (BMI) DOCUMENTED: ICD-10-PCS | Mod: CPTII,S$GLB,, | Performed by: PHYSICIAN ASSISTANT

## 2023-09-26 PROCEDURE — 1101F PR PT FALLS ASSESS DOC 0-1 FALLS W/OUT INJ PAST YR: ICD-10-PCS | Mod: CPTII,S$GLB,, | Performed by: PHYSICIAN ASSISTANT

## 2023-09-26 PROCEDURE — 3288F PR FALLS RISK ASSESSMENT DOCUMENTED: ICD-10-PCS | Mod: CPTII,S$GLB,, | Performed by: PHYSICIAN ASSISTANT

## 2023-09-26 PROCEDURE — 1125F PR PAIN SEVERITY QUANTIFIED, PAIN PRESENT: ICD-10-PCS | Mod: CPTII,S$GLB,, | Performed by: PHYSICIAN ASSISTANT

## 2023-09-26 PROCEDURE — 3077F PR MOST RECENT SYSTOLIC BLOOD PRESSURE >= 140 MM HG: ICD-10-PCS | Mod: CPTII,S$GLB,, | Performed by: PHYSICIAN ASSISTANT

## 2023-09-26 PROCEDURE — 1125F AMNT PAIN NOTED PAIN PRSNT: CPT | Mod: CPTII,S$GLB,, | Performed by: PHYSICIAN ASSISTANT

## 2023-09-26 PROCEDURE — 1101F PT FALLS ASSESS-DOCD LE1/YR: CPT | Mod: CPTII,S$GLB,, | Performed by: PHYSICIAN ASSISTANT

## 2023-09-26 PROCEDURE — 3077F SYST BP >= 140 MM HG: CPT | Mod: CPTII,S$GLB,, | Performed by: PHYSICIAN ASSISTANT

## 2023-09-26 PROCEDURE — 99999 PR PBB SHADOW E&M-EST. PATIENT-LVL IV: ICD-10-PCS | Mod: PBBFAC,,, | Performed by: PHYSICIAN ASSISTANT

## 2023-09-26 PROCEDURE — 3079F DIAST BP 80-89 MM HG: CPT | Mod: CPTII,S$GLB,, | Performed by: PHYSICIAN ASSISTANT

## 2023-09-26 PROCEDURE — 1159F MED LIST DOCD IN RCRD: CPT | Mod: CPTII,S$GLB,, | Performed by: PHYSICIAN ASSISTANT

## 2023-09-26 PROCEDURE — 3008F BODY MASS INDEX DOCD: CPT | Mod: CPTII,S$GLB,, | Performed by: PHYSICIAN ASSISTANT

## 2023-09-26 PROCEDURE — 3044F PR MOST RECENT HEMOGLOBIN A1C LEVEL <7.0%: ICD-10-PCS | Mod: CPTII,S$GLB,, | Performed by: PHYSICIAN ASSISTANT

## 2023-09-26 PROCEDURE — 99024 POSTOP FOLLOW-UP VISIT: CPT | Mod: S$GLB,,, | Performed by: PHYSICIAN ASSISTANT

## 2023-09-26 RX ORDER — CYCLOBENZAPRINE HCL 5 MG
5 TABLET ORAL NIGHTLY
Qty: 30 TABLET | Refills: 0 | Status: SHIPPED | OUTPATIENT
Start: 2023-09-26 | End: 2023-10-26

## 2023-09-26 RX ORDER — GABAPENTIN 300 MG/1
300 CAPSULE ORAL 3 TIMES DAILY
Qty: 90 CAPSULE | Refills: 0 | Status: SHIPPED | OUTPATIENT
Start: 2023-09-26 | End: 2023-11-14 | Stop reason: SDUPTHER

## 2023-09-26 NOTE — PROGRESS NOTES
Subjective:      Patient ID: Azucena Luciano is a 73 y.o. female.    Chief Complaint: Pain and Post-op Evaluation of the Left Knee      Patient is 2 weeks s/p  left primary total knee replacement  Anterior knee pain: Yes  Has worse pain  Is in physical therapy  Yes  Problems w incision  No  Is  happy with result  No  Opiod free: No  States she has significant pain from physical therapy especially at night. Notes pain worse this week than last week. Causing her to have trouble with sleeping. She notes numbness in her leg also. Pain is anteriorly, in her groin region and in her calf. She is having trouble with knee flexion but is to 100 flexion in PT. Previous allergic reaction to steri-strips has pretty much resolved. Using walker today.           Review of Systems   Constitutional: Negative for chills and fever.   Cardiovascular:  Negative for chest pain.   Respiratory:  Negative for cough.    Hematologic/Lymphatic: Does not bruise/bleed easily.   Skin:  Negative for poor wound healing and rash.   Musculoskeletal:  Positive for joint pain, myalgias and stiffness.   Gastrointestinal:  Negative for abdominal pain.   Genitourinary:  Negative for bladder incontinence.   Neurological:  Negative for dizziness, loss of balance and weakness.   Psychiatric/Behavioral:  Negative for altered mental status.        Review of patient's allergies indicates:   Allergen Reactions    Cayenne pepper Anaphylaxis        Current Outpatient Medications   Medication Sig Dispense Refill    acetaminophen (TYLENOL) 325 MG tablet Take 2 tablets (650 mg total) by mouth every 6 (six) hours as needed for Pain. 30 tablet 0    alendronate (FOSAMAX) 70 MG tablet TAKE ONE TABLET BY MOUTH EVERY WEEK 12 tablet 3    ascorbic acid, vitamin C, (VITAMIN C) 250 MG tablet Take 250 mg by mouth once daily.      aspirin (ECOTRIN) 81 MG EC tablet Take 1 tablet (81 mg total) by mouth 2 (two) times a day. 84 tablet 0    atorvastatin (LIPITOR) 20 MG tablet TAKE  "ONE TABLET BY MOUTH EVERY DAY 90 tablet 1    calcium-vitamin D3 500 mg(1,250mg) -200 unit per tablet Take 1 tablet by mouth 2 (two) times daily with meals.      diclofenac (VOLTAREN) 75 MG EC tablet Take 1 tablet (75 mg total) by mouth 2 (two) times daily. 84 tablet 0    docusate sodium (COLACE) 50 MG capsule Take 1 capsule (50 mg total) by mouth every 6 (six) hours. 168 capsule 0    famotidine (PEPCID) 20 MG tablet Take 1 tablet (20 mg total) by mouth 2 (two) times daily as needed for Heartburn. 84 tablet 0    magnesium hydroxide 400 mg/5 ml (MILK OF MAGNESIA) 400 mg/5 mL Susp Take 30 mLs by mouth once daily.      multivitamin with minerals tablet Take 1 tablet by mouth once daily.      oxymetazoline (AFRIN) 0.05 % nasal spray 1 spray by Nasal route every evening.      valsartan (DIOVAN) 160 MG tablet Take 1 tablet (160 mg total) by mouth once daily. 90 tablet 3    cyclobenzaprine (FLEXERIL) 5 MG tablet Take 1 tablet (5 mg total) by mouth nightly. 30 tablet 0    gabapentin (NEURONTIN) 300 MG capsule Take 1 capsule (300 mg total) by mouth 3 (three) times daily. 90 capsule 0     No current facility-administered medications for this visit.     Facility-Administered Medications Ordered in Other Visits   Medication Dose Route Frequency Provider Last Rate Last Admin    BUPivacaine injection 5 mL  5 mL   Bryson Medrano MD   5 mL at 08/30/22 1015    cefazolin (ANCEF) 2 gram in dextrose 5% 50 mL IVPB (premix)  2 g Intravenous Q8H Carlyle Wallace MD   2 g at 09/19/22 1050    ketorolac injection 30 mg  30 mg Intramuscular  Bryson Medrano MD   30 mg at 08/30/22 1015    LIDOcaine HCL 10 mg/ml (1%) injection 4 mL  4 mL   Bryson Medrano MD   4 mL at 08/30/22 1015        The patient's relevant past medical, surgical, and social history was reviewed in Epic.       Objective:      VITAL SIGNS: BP (!) 161/84   Pulse 75   Ht 4' 11" (1.499 m)   Wt 51.3 kg (113 lb 1.5 oz)   BMI 22.84 kg/m²     General    Nursing note and vitals " reviewed.  Constitutional: She is oriented to person, place, and time. She appears well-developed and well-nourished.   Neurological: She is alert and oriented to person, place, and time.     General Musculoskeletal Exam   Gait: antalgic         Left Knee Exam     Inspection   Scars: present  Swelling: present    Tenderness   The patient tender to palpation of the medial joint line (left groin, calf).    Range of Motion   Extension:  10   Flexion:  90     Tests   Stability   Lachman: normal (-1 to 2mm)     Comments:  +stinchfield with resisted hip flexion  +calf pain, negative homans     Muscle Strength   Left Lower Extremity   Quadriceps:  4/5            Assessment:       1. Aftercare following left knee joint replacement surgery    2. Pain of left calf          Plan:         Azucena was seen today for pain and post-op evaluation.    Diagnoses and all orders for this visit:    Aftercare following left knee joint replacement surgery  -     cyclobenzaprine (FLEXERIL) 5 MG tablet; Take 1 tablet (5 mg total) by mouth nightly.  -     gabapentin (NEURONTIN) 300 MG capsule; Take 1 capsule (300 mg total) by mouth 3 (three) times daily.  -     US Lower Extremity Veins Left; Future    Pain of left calf  -     US Lower Extremity Veins Left; Future    Will try gabapentin and flexeril for pain especially at night per Dr. Medrano's protocol. She can continue diclofenac and tylenol as needed also. I believe she has some hip joint irritation from specific exercises she is doing at PT. Mild OA noted on previous Xrays.   Will order left LE ultrasound to rule out DVT because of increased pain this week.  If negative, she will continue medication and physical therapy.  F/u with Dr. Medrano at 3 mon PO with xrays       Diagnoses and plan discussed with the patient, as well as the expected course and duration of his symptoms.  All questions and concerns were addressed prior to the end of the visit.   Instructed patient to call office if  they have any future questions/concerns or to schedule apt. Patient will return to see me if symptoms worsen or fail to improve    Note dictated with voice recognition software, please excuse any grammatical errors.        Lyndsey Nassar PA-C   09/26/2023

## 2023-09-26 NOTE — PATIENT INSTRUCTIONS
Gabapentin 300mg 3x a day- may cause drowsiness  Cyclobenzaprine 5mg muscle relaxer -Take a night. Will cause drowsiness  Diclofenac 75mg- Anti-inflammatory for pain- Can cause stomach upset   Tylenol 650mg 3x a day -Pain medication

## 2023-09-27 ENCOUNTER — HOSPITAL ENCOUNTER (OUTPATIENT)
Dept: CARDIOLOGY | Facility: HOSPITAL | Age: 73
Discharge: HOME | End: 2023-09-27
Attending: PHYSICIAN ASSISTANT
Payer: MEDICARE

## 2023-09-27 DIAGNOSIS — M79.662 PAIN OF LEFT CALF: ICD-10-CM

## 2023-09-27 DIAGNOSIS — Z47.1 AFTERCARE FOLLOWING LEFT KNEE JOINT REPLACEMENT SURGERY: ICD-10-CM

## 2023-09-27 DIAGNOSIS — Z96.652 AFTERCARE FOLLOWING LEFT KNEE JOINT REPLACEMENT SURGERY: ICD-10-CM

## 2023-09-27 PROCEDURE — 93971 CV US DOPPLER VENOUS LEG LEFT (CUPID ONLY): ICD-10-PCS | Mod: 26,LT,, | Performed by: INTERNAL MEDICINE

## 2023-09-27 PROCEDURE — 93971 EXTREMITY STUDY: CPT | Mod: 26,LT,, | Performed by: INTERNAL MEDICINE

## 2023-09-27 PROCEDURE — 93971 EXTREMITY STUDY: CPT | Mod: LT

## 2023-09-28 ENCOUNTER — CLINICAL SUPPORT (OUTPATIENT)
Dept: REHABILITATION | Facility: HOSPITAL | Age: 73
End: 2023-09-28
Payer: MEDICARE

## 2023-09-28 DIAGNOSIS — Z74.09 IMPAIRED FUNCTIONAL MOBILITY, BALANCE, GAIT, AND ENDURANCE: Primary | ICD-10-CM

## 2023-09-28 DIAGNOSIS — R29.898 DECREASED STRENGTH INVOLVING KNEE JOINT: ICD-10-CM

## 2023-09-28 PROCEDURE — 97140 MANUAL THERAPY 1/> REGIONS: CPT | Mod: PN

## 2023-09-28 PROCEDURE — 97110 THERAPEUTIC EXERCISES: CPT | Mod: PN

## 2023-09-28 NOTE — PROGRESS NOTES
"[facility]   Physical Therapy Treatment Note      Name: Azucena Luciano  Clinic Number: [id number]    Therapy Diagnosis:   Encounter Diagnoses   Name Primary?    Impaired functional mobility, balance, gait, and endurance Yes    Decreased strength involving knee joint      Physician: Bryson Medrano MD    Visit Date: 9/28/2023    Physician Orders: PT Eval and Treat   Medical Diagnosis from Referral:   M62.81 (ICD-10-CM) - Quadriceps weakness   M17.12 (ICD-10-CM) - Primary osteoarthritis of left knee   M25.562,G89.29 (ICD-10-CM) - Chronic pain of left knee      Evaluation Date: 9/8/2023  Authorization Period Expiration: 11/03/2023  Plan of Care Expiration: 9/8/2023  to 10/27/2023  Visit # / Visits authorized: 8/23 (+eval)  FOTO: 9/10 PTA visits: 0/5      Time In: 1100  Time Out: 1155  Total Appointment Time (timed & untimed codes): 30 minutes (1 TE, MT)  Precautions: hypertension, osteoporosis, DVT       Subjective     Patient reports: increased night pain last night despite not being to PT for 3 days.    Response to previous treatment: pre-op visit  Functional change: comes to PT today without assistive device     Pain: 6/10  Location: left knee     Objective      Left knee range of motion:  0 - 105 degrees  Quad activation:  Good  Straight leg raise: no lag but with cueing.   Wound inspection:  vesicles, hives, and blistering along with drainage is clearing. Surgical wound closing.     Treatment     Azucena received the treatments listed below:      Therapeutic exercises to develop strength, endurance, and ROM for 10 minutes with PT 1:1, including:  -- seated hamstring byron stretch 3x30"   -- heel prop 1 rounds x 4'' with 4#  -- seated heel slides A 2x10  --> 1x10 with strap assist    Manual therapy techniques: 10 minutes, including:  -- grade II/III left knee passive extension/flexion to tolerance     Neuromuscular re-education activities to improve: Balance, Kinesthetic, Sense, and " Proprioception for 5 minutes with PT 1:1, The following activities were included:  LAQs (90-45)  green 2x10 and (90-0) 8# 3x6-10  Nu-Step x 7' at L3    Therapeutic Activities: total time 5 minutes with PT 1:1, including:  Leg press dL 3x10 at 3.5 plates      Patient Education and Home Exercises       Education provided:   - home exercise program: continue pre-op home exercise program + addition of heel prop TID  - education on more sitting rest breaks at home.     Written Home Exercises Provided: Patient instructed to cont prior HEP. Exercises were reviewed and Azucena was able to demonstrate them prior to the end of the session.  Azucena demonstrated good  understanding of the education provided. See Electronic Medical Record under Patient Instructions for exercises provided during therapy sessions    Assessment   Azucena is a 73 y.o. female.  Now s/p left total knee arthroplasty.  Surgery date: 09/11/2023.  POD#17.  Presentation consistent with acute/subacute post-op state s/p total knee arthroplasty. Reaction to adhesive steri-strips mostly healed now.  Continued tightness into flexion but meeting flexion goals.  Improving quad activation and loading tolerance.  Patient continues to overdo it at home despite PT loading conversations.      Patient prognosis is Excellent.   Patient will benefit from skilled outpatient Physical Therapy to address the deficits stated above and in the chart below, provide patient /family education, and to maximize patientt's level of independence.     Azucena Is progressing well towards her goals.   Patient prognosis is Excellent.     Patient will continue to benefit from skilled outpatient physical therapy to address the deficits listed in the problem list box on initial evaluation, provide pt/family education and to maximize pt's level of independence in the home and community environment.   Patient's spiritual, cultural and educational needs considered and pt agreeable to  plan of care and goals.     Anticipated barriers to physical therapy: none    Goals:   Short Term Goals: 3  weeks  1. Pt will be independent with post- HEP supplement PT in improving functional mobility.  Met 09/18/2023  2. Pt will improve LLE strength to at least 70% of RLE strength as measured via MicroFet handheld dynamometer in order to improve functional mobility  3. Pt will improve L knee AROM post-operatively to at least 0 - 110 degrees in order to improve gait.     Long Term Goals: 6-8 weeks  1. Pt will be independent with updated HEP supplement PT in improving functional mobility.  2. Pt will improve LLE strength to at least 80% of RLE strength as measured via MicroFet handheld dynamometer in order to improve functional mobility  3. Pt will improve L knee AROM to at least 0 - 120 in order to improve gait and ability to perform ADLs  4. Pt will perform TUG in < 14 seconds without AD in order to demo improved gait speed  5. Pt will perform at least 10 sit to stands without UE support on 30 second sit to stand test in order to demo improved ability to perform transfers    Plan   Phase I/II total knee arthroplasty program.     Brandin Orona, PT, DPT, OCS

## 2023-09-29 ENCOUNTER — CLINICAL SUPPORT (OUTPATIENT)
Dept: REHABILITATION | Facility: HOSPITAL | Age: 73
End: 2023-09-29
Attending: ORTHOPAEDIC SURGERY
Payer: MEDICARE

## 2023-09-29 DIAGNOSIS — Z74.09 IMPAIRED FUNCTIONAL MOBILITY, BALANCE, GAIT, AND ENDURANCE: Primary | ICD-10-CM

## 2023-09-29 DIAGNOSIS — R29.898 DECREASED STRENGTH INVOLVING KNEE JOINT: ICD-10-CM

## 2023-09-29 PROCEDURE — 97140 MANUAL THERAPY 1/> REGIONS: CPT | Mod: PN

## 2023-09-29 PROCEDURE — 97110 THERAPEUTIC EXERCISES: CPT | Mod: PN

## 2023-09-29 NOTE — PROGRESS NOTES
"[facility]   Physical Therapy Treatment Note      Name: Azucena Luciano  Clinic Number: [id number]    Therapy Diagnosis:   Encounter Diagnoses   Name Primary?    Impaired functional mobility, balance, gait, and endurance Yes    Decreased strength involving knee joint      Physician: Bryson Medrano MD    Visit Date: 9/29/2023    Physician Orders: PT Eval and Treat   Medical Diagnosis from Referral:   M62.81 (ICD-10-CM) - Quadriceps weakness   M17.12 (ICD-10-CM) - Primary osteoarthritis of left knee   M25.562,G89.29 (ICD-10-CM) - Chronic pain of left knee      Evaluation Date: 9/8/2023  Authorization Period Expiration: 11/03/2023  Plan of Care Expiration: 9/8/2023 to 10/27/2023  Visit # / Visits authorized: 9/23 (+eval)  FOTO: 10/10 next PTA visits: 0/5      Time In: 1100  Time Out: 1200  Total Appointment Time (timed & untimed codes): 30 minutes (1 TE, MT)  Precautions: hypertension, osteoporosis, DVT       Subjective     Patient reports: having a good morning.   Response to previous treatment: pre-op visit  Functional change: comes to PT today without assistive device     Pain: 6/10  Location: left knee     Objective      Left knee range of motion:  0 - 110 degrees passive --> active (-) 5 - 95 degrees  Quad activation:  Good  Straight leg raise: no lag but with cueing.   Wound inspection:  vesicles, hives, and blistering along with drainage is clearing. Surgical wound closing.     Treatment     Azucena received the treatments listed below:      Therapeutic exercises to develop strength, endurance, and ROM for 20 minutes with PT 1:1, including:  -- seated hamstring byron stretch 3x30"   -- heel prop 1 rounds x 4'' with 4#  -- seated heel slides A 2x10  --> 1x10 with strap assist  -- stair lunges 2x20 (2nd step)  (+) hamstring machine 2 plates 3x8-10  (+) knee ext machine (90-30) 2x10 dL at 10# --> 2x5 LLE 0#, 2x5 RLE 15#    Manual therapy techniques: 10 minutes, including:  -- grade " II/III left knee passive extension/flexion to tolerance   -- patellar mobs grade II/III inferior focus     Therapeutic Activities: total time 0 minutes with PT 1:1, including:  Leg press dL 3x10 at 3.5 plates      Patient Education and Home Exercises       Education provided:   - home exercise program: continue pre-op home exercise program + addition of heel prop TID  - education on more sitting rest breaks at home.     Written Home Exercises Provided: Patient instructed to cont prior HEP. Exercises were reviewed and Azucena was able to demonstrate them prior to the end of the session.  Azucena demonstrated good  understanding of the education provided. See Electronic Medical Record under Patient Instructions for exercises provided during therapy sessions    Assessment   Azucena is a 73 y.o. female.  Now s/p left total knee arthroplasty.  Surgery date: 09/11/2023.  POD#18.  Presentation consistent with acute/subacute post-op state s/p total knee arthroplasty. Reaction to adhesive steri-strips mostly healed now.  Continued tightness into flexion but meeting flexion goals.  Marked hamstring weakness bilateral.  Introduced knee thigh resistance machines today with fair tolerance.      Patient prognosis is Excellent.   Patient will benefit from skilled outpatient Physical Therapy to address the deficits stated above and in the chart below, provide patient /family education, and to maximize patientt's level of independence.     Azucena Is progressing well towards her goals.   Patient prognosis is Excellent.     Patient will continue to benefit from skilled outpatient physical therapy to address the deficits listed in the problem list box on initial evaluation, provide pt/family education and to maximize pt's level of independence in the home and community environment.   Patient's spiritual, cultural and educational needs considered and pt agreeable to plan of care and goals.     Anticipated barriers to physical  therapy: none    Goals:   Short Term Goals: 3  weeks  1. Pt will be independent with post- HEP supplement PT in improving functional mobility.  Met 09/18/2023  2. Pt will improve LLE strength to at least 70% of RLE strength as measured via MicroFet handheld dynamometer in order to improve functional mobility  3. Pt will improve L knee AROM post-operatively to at least 0 - 110 degrees in order to improve gait.     Long Term Goals: 6-8 weeks  1. Pt will be independent with updated HEP supplement PT in improving functional mobility.  2. Pt will improve LLE strength to at least 80% of RLE strength as measured via MicroFet handheld dynamometer in order to improve functional mobility  3. Pt will improve L knee AROM to at least 0 - 120 in order to improve gait and ability to perform ADLs  4. Pt will perform TUG in < 14 seconds without AD in order to demo improved gait speed  5. Pt will perform at least 10 sit to stands without UE support on 30 second sit to stand test in order to demo improved ability to perform transfers    Plan   Phase I/II total knee arthroplasty program.     Brandin Orona, PT, DPT, OCS

## 2023-10-02 ENCOUNTER — CLINICAL SUPPORT (OUTPATIENT)
Dept: REHABILITATION | Facility: HOSPITAL | Age: 73
End: 2023-10-02
Payer: MEDICARE

## 2023-10-02 DIAGNOSIS — Z74.09 IMPAIRED FUNCTIONAL MOBILITY, BALANCE, GAIT, AND ENDURANCE: Primary | ICD-10-CM

## 2023-10-02 DIAGNOSIS — R29.898 DECREASED STRENGTH INVOLVING KNEE JOINT: ICD-10-CM

## 2023-10-02 PROCEDURE — 97140 MANUAL THERAPY 1/> REGIONS: CPT | Mod: PN

## 2023-10-02 PROCEDURE — 97110 THERAPEUTIC EXERCISES: CPT | Mod: PN

## 2023-10-02 NOTE — PROGRESS NOTES
"[facility]   Physical Therapy Treatment Note      Name: Azucena Luciano  Clinic Number: [id number]    Therapy Diagnosis:   Encounter Diagnoses   Name Primary?    Impaired functional mobility, balance, gait, and endurance Yes    Decreased strength involving knee joint      Physician: Bryson Medrano MD    Visit Date: 10/2/2023    Physician Orders: PT Eval and Treat   Medical Diagnosis from Referral:   M62.81 (ICD-10-CM) - Quadriceps weakness   M17.12 (ICD-10-CM) - Primary osteoarthritis of left knee   M25.562,G89.29 (ICD-10-CM) - Chronic pain of left knee      Evaluation Date: 9/8/2023  Authorization Period Expiration: 11/03/2023  Plan of Care Expiration: 9/8/2023 to 10/27/2023  Visit # / Visits authorized: 10/23 (+eval)  FOTO:  next PTA visits: 0/5      Time In: 1100  Time Out: 1150  Total Appointment Time (timed & untimed codes): 30 minutes (1 TE, MT)  Precautions: hypertension, osteoporosis, DVT       Subjective     Patient reports:doesn't want to do the exercise machines because her legs were sore all weekend. Soreness resolved today.   Response to previous treatment: pre-op visit  Functional change: comes to PT today without assistive device     Pain: 6/10  Location: left knee     Objective      Left knee range of motion:  0 - 115 degrees passive --> active (-) 3 - 100 degrees  Quad activation:  Good  Straight leg raise: no lag   Wound inspection:  vesicles, hives, and blistering along with drainage is clearing. Surgical wound closing.  Almost resolved.    Mild effusion.     Treatment     Azucena received the treatments listed below:      Therapeutic exercises to develop strength, endurance, and ROM for 15 minutes with PT 1:1, including:  -- seated hamstring byron stretch 3x30"   -- heel prop 1 rounds x 4'' with 4#  -- seated heel slides A 2x10  --> 2x10 with strap assist  -- stair lunges 2x20 (2nd step)  -- hamstring machine 2 plates 3x8-10 (not today)  -- knee ext machine (90-30) " 2x10 dL at 10# --> 2x5 LLE 0#, 2x5 RLE 15# (not today)    Manual therapy techniques: 15 minutes, including:  -- grade II/III left knee passive extension/flexion to tolerance   -- patellar mobs grade II/III inferior focus     Therapeutic Activities: total time 0 minutes with PT 1:1, including:  Leg press dL 3x10 at 3.5 plates      Patient Education and Home Exercises       Education provided:   - home exercise program: continue pre-op home exercise program + addition of heel prop TID  - education on more sitting rest breaks at home.     Written Home Exercises Provided: Patient instructed to cont prior HEP. Exercises were reviewed and Azucena was able to demonstrate them prior to the end of the session.  Azucena demonstrated good  understanding of the education provided. See Electronic Medical Record under Patient Instructions for exercises provided during therapy sessions    Assessment   Azucena is a 73 y.o. female.  Now s/p left total knee arthroplasty.  Surgery date: 09/11/2023.  POD#21.  Presentation consistent with acute/subacute post-op state s/p total knee arthroplasty. Reaction to adhesive steri-strips mostly healed now.  Continued tightness into flexion but meeting flexion goals.  Marked hamstring weakness bilateral.  Introduced knee thigh resistance machines last visit. DOMS response but resolved today but deferred machines at patient request today.       Patient prognosis is Excellent.   Patient will benefit from skilled outpatient Physical Therapy to address the deficits stated above and in the chart below, provide patient /family education, and to maximize patientt's level of independence.     Azucena Is progressing well towards her goals.   Patient prognosis is Excellent.     Patient will continue to benefit from skilled outpatient physical therapy to address the deficits listed in the problem list box on initial evaluation, provide pt/family education and to maximize pt's level of independence  in the home and community environment.   Patient's spiritual, cultural and educational needs considered and pt agreeable to plan of care and goals.     Anticipated barriers to physical therapy: none    Goals:   Short Term Goals: 3  weeks  1. Pt will be independent with post- HEP supplement PT in improving functional mobility.  Met 09/18/2023  2. Pt will improve LLE strength to at least 70% of RLE strength as measured via MicroFet handheld dynamometer in order to improve functional mobility  3. Pt will improve L knee AROM post-operatively to at least 0 - 110 degrees in order to improve gait.     Long Term Goals: 6-8 weeks  1. Pt will be independent with updated HEP supplement PT in improving functional mobility.  2. Pt will improve LLE strength to at least 80% of RLE strength as measured via MicroFet handheld dynamometer in order to improve functional mobility  3. Pt will improve L knee AROM to at least 0 - 120 in order to improve gait and ability to perform ADLs  4. Pt will perform TUG in < 14 seconds without AD in order to demo improved gait speed  5. Pt will perform at least 10 sit to stands without UE support on 30 second sit to stand test in order to demo improved ability to perform transfers    Plan   Phase I/II total knee arthroplasty program.     Brandin Orona, PT, DPT, OCS

## 2023-10-03 ENCOUNTER — CLINICAL SUPPORT (OUTPATIENT)
Dept: REHABILITATION | Facility: HOSPITAL | Age: 73
End: 2023-10-03
Payer: MEDICARE

## 2023-10-03 DIAGNOSIS — Z74.09 IMPAIRED FUNCTIONAL MOBILITY, BALANCE, GAIT, AND ENDURANCE: Primary | ICD-10-CM

## 2023-10-03 DIAGNOSIS — R29.898 DECREASED STRENGTH INVOLVING KNEE JOINT: ICD-10-CM

## 2023-10-03 PROCEDURE — 97110 THERAPEUTIC EXERCISES: CPT | Mod: PN,CQ

## 2023-10-03 PROCEDURE — 97140 MANUAL THERAPY 1/> REGIONS: CPT | Mod: PN,CQ

## 2023-10-03 NOTE — PROGRESS NOTES
"[facility]   Physical Therapy Treatment Note      Name: Azcuena Luciano  Clinic Number: [id number]    Therapy Diagnosis:   Encounter Diagnoses   Name Primary?    Impaired functional mobility, balance, gait, and endurance Yes    Decreased strength involving knee joint      Physician: Bryson Medrano MD    Visit Date: 10/3/2023    Physician Orders: PT Eval and Treat   Medical Diagnosis from Referral:   M62.81 (ICD-10-CM) - Quadriceps weakness   M17.12 (ICD-10-CM) - Primary osteoarthritis of left knee   M25.562,G89.29 (ICD-10-CM) - Chronic pain of left knee      Evaluation Date: 9/8/2023  Authorization Period Expiration: 11/03/2023  Plan of Care Expiration: 9/8/2023 to 10/27/2023  Visit # / Visits authorized: 11/23 (+eval)  FOTO: next PTA visits: 1/5      Time In: 1100  Time Out: 1150  Total Appointment Time (timed & untimed codes): 30 minutes (1 TE, MT)  Precautions: hypertension, osteoporosis, DVT       Subjective     Patient reports: Still having pain in her knee. Has her good days and bad days. Yesterday wasn't too bad.   Response to previous treatment: pre-op visit  Functional change: comes to PT today without assistive device     Pain: 7/10  Location: left knee     Objective      Left knee range of motion:  0 - 115 degrees passive --> active (-) 1 - 110 degrees  Quad activation:  Good  Straight leg raise: no lag   Mild effusion.     Treatment     Azucena received the treatments listed below:      Therapeutic exercises to develop strength, endurance, and ROM for 15 minutes with PT 1:1, including:  -- seated hamstring byron stretch 3x30"   -- heel prop 1 rounds x 4'' with 4#  -- seated heel slides A 2x10  --> 2x10 with strap assist  -- stair lunges 2x20 (2nd step)  -- hamstring machine 2 plates 3x8-10 (not today)  -- Bike 7' lvl 2  -- knee ext machine (90-30) 2x10 dL at 10# --> 2x5 LLE 0#, 2x5 RLE 15# (not today)    Manual therapy techniques: 15 minutes, including:  -- grade II/III left " knee passive extension/flexion to tolerance   -- patellar mobs grade II/III inferior focus     Therapeutic Activities: total time 0 minutes with PT 1:1, including:  Leg press dL 3x10 at 3.5 plates      Patient Education and Home Exercises       Education provided:   - home exercise program: continue pre-op home exercise program + addition of heel prop TID  - education on more sitting rest breaks at home.     Written Home Exercises Provided: Patient instructed to cont prior HEP. Exercises were reviewed and Azucena was able to demonstrate them prior to the end of the session.  Azucena demonstrated good  understanding of the education provided. See Electronic Medical Record under Patient Instructions for exercises provided during therapy sessions    Assessment   Azucena is a 73 y.o. female.  Now s/p left total knee arthroplasty.  Surgery date: 09/11/2023.  POD#22.  Presentation consistent with acute/subacute post-op state s/p total knee arthroplasty. Reaction to adhesive steri-strips mostly healed now.  Continued tightness into flexion but continues to make gradual gains. Full knee extension actively and passively. No soreness following last session. Focused on knee flexion ROM today. Continue to progress as appropriate.      Patient prognosis is Excellent.   Patient will benefit from skilled outpatient Physical Therapy to address the deficits stated above and in the chart below, provide patient /family education, and to maximize patientt's level of independence.     Azucena Is progressing well towards her goals.   Patient prognosis is Excellent.     Patient will continue to benefit from skilled outpatient physical therapy to address the deficits listed in the problem list box on initial evaluation, provide pt/family education and to maximize pt's level of independence in the home and community environment.   Patient's spiritual, cultural and educational needs considered and pt agreeable to plan of care and  goals.     Anticipated barriers to physical therapy: none    Goals:   Short Term Goals: 3  weeks  1. Pt will be independent with post- HEP supplement PT in improving functional mobility.  Met 09/18/2023  2. Pt will improve LLE strength to at least 70% of RLE strength as measured via MicroFet handheld dynamometer in order to improve functional mobility  3. Pt will improve L knee AROM post-operatively to at least 0 - 110 degrees in order to improve gait.     Long Term Goals: 6-8 weeks  1. Pt will be independent with updated HEP supplement PT in improving functional mobility.  2. Pt will improve LLE strength to at least 80% of RLE strength as measured via MicroFet handheld dynamometer in order to improve functional mobility  3. Pt will improve L knee AROM to at least 0 - 120 in order to improve gait and ability to perform ADLs  4. Pt will perform TUG in < 14 seconds without AD in order to demo improved gait speed  5. Pt will perform at least 10 sit to stands without UE support on 30 second sit to stand test in order to demo improved ability to perform transfers    Plan   Phase I/II total knee arthroplasty program.     Ashlyn Garzon, PTA

## 2023-10-05 ENCOUNTER — CLINICAL SUPPORT (OUTPATIENT)
Dept: REHABILITATION | Facility: HOSPITAL | Age: 73
End: 2023-10-05
Payer: MEDICARE

## 2023-10-05 DIAGNOSIS — Z74.09 IMPAIRED FUNCTIONAL MOBILITY, BALANCE, GAIT, AND ENDURANCE: Primary | ICD-10-CM

## 2023-10-05 DIAGNOSIS — R29.898 DECREASED STRENGTH INVOLVING KNEE JOINT: ICD-10-CM

## 2023-10-05 PROCEDURE — 97140 MANUAL THERAPY 1/> REGIONS: CPT | Mod: PN,CQ

## 2023-10-05 PROCEDURE — 97530 THERAPEUTIC ACTIVITIES: CPT | Mod: PN,CQ

## 2023-10-05 NOTE — PROGRESS NOTES
"[facility]   Physical Therapy Treatment Note      Name: Azucena Luciano  Clinic Number: [id number]    Therapy Diagnosis:   Encounter Diagnoses   Name Primary?    Impaired functional mobility, balance, gait, and endurance Yes    Decreased strength involving knee joint      Physician: Bryson Medrano MD    Visit Date: 10/5/2023    Physician Orders: PT Eval and Treat   Medical Diagnosis from Referral:   M62.81 (ICD-10-CM) - Quadriceps weakness   M17.12 (ICD-10-CM) - Primary osteoarthritis of left knee   M25.562,G89.29 (ICD-10-CM) - Chronic pain of left knee      Evaluation Date: 9/8/2023  Authorization Period Expiration: 11/03/2023  Plan of Care Expiration: 9/8/2023 to 10/27/2023  Visit # / Visits authorized: 12/23 (+eval)  FOTO: next PTA visits: 2/5      Time In: 1100  Time Out: 1150  Total Appointment Time (timed & untimed codes): 30 minutes (1 TA, MT)  Precautions: hypertension, osteoporosis, DVT       Subjective     Patient reports: Thinks she must have slept wrong last night as she woke up with a lot more pain today. Has been working constantly on knee bending at home.   Response to previous treatment: pre-op visit  Functional change: comes to PT today without assistive device     Pain: 8/10  Location: left knee     Objective      Left knee range of motion:  0 - 118 degrees passive --> active (-) 1 - 114 degrees  Quad activation:  Good  Straight leg raise: no lag   Mild effusion.     Treatment     Azucena received the treatments listed below:      Therapeutic exercises to develop strength, endurance, and ROM for 5 minutes with PT 1:1, including:  -- seated hamstring byron stretch 3x30"   -- heel prop 1 rounds x 4'' with 4#  -- seated heel slides A 2x10  --> 2x10 with strap assist  -- stair lunges 2x20 (2nd step)  -- hamstring machine 2 plates 3x8-10 (not today)  -- Bike 7' lvl 2  -- LAQ 3# 2 x 10  -- Active hamstring curl 2 x 10  -- knee ext machine (90-30) 2x10 dL at 10# --> 2x5 LLE " "0#, 2x5 RLE 15# (not today)    Manual therapy techniques: 15 minutes, including:  -- grade II/III left knee passive extension/flexion to tolerance   -- patellar mobs grade II/III inferior focus     Therapeutic Activities: total time 10 minutes with PT 1:1, including:  Leg press dL 3x10 at 3.5 plates  Sit to stand 3 x 8-10 + airex       Patient Education and Home Exercises       Education provided:   - home exercise program: continue pre-op home exercise program + addition of heel prop TID  - education on more sitting rest breaks at home.     Written Home Exercises Provided: Patient instructed to cont prior HEP. Exercises were reviewed and Azucena was able to demonstrate them prior to the end of the session.  Azucena demonstrated good  understanding of the education provided. See Electronic Medical Record under Patient Instructions for exercises provided during therapy sessions    Assessment   Azucena is a 73 y.o. female.  Now s/p left total knee arthroplasty.  Surgery date: 09/11/2023.  POD#24.  Presentation consistent with acute/subacute post-op state s/p total knee arthroplasty. Reaction to adhesive steri-strips mostly healed now. Presents with moderate to high pain. Attributes to sleeping with her "leg crooked". Felt better following bike. Continued tightness into flexion but making gradual gains evidenced by measurements taken today. Full knee extension actively and passively. Focused on knee flexion ROM today. Continue to progress as appropriate.      Patient prognosis is Excellent.   Patient will benefit from skilled outpatient Physical Therapy to address the deficits stated above and in the chart below, provide patient /family education, and to maximize patientt's level of independence.     Azucena Is progressing well towards her goals.   Patient prognosis is Excellent.     Patient will continue to benefit from skilled outpatient physical therapy to address the deficits listed in the problem list box " on initial evaluation, provide pt/family education and to maximize pt's level of independence in the home and community environment.   Patient's spiritual, cultural and educational needs considered and pt agreeable to plan of care and goals.     Anticipated barriers to physical therapy: none    Goals:   Short Term Goals: 3  weeks  1. Pt will be independent with post- HEP supplement PT in improving functional mobility.  Met 09/18/2023  2. Pt will improve LLE strength to at least 70% of RLE strength as measured via MicroFet handheld dynamometer in order to improve functional mobility  3. Pt will improve L knee AROM post-operatively to at least 0 - 110 degrees in order to improve gait.     Long Term Goals: 6-8 weeks  1. Pt will be independent with updated HEP supplement PT in improving functional mobility.  2. Pt will improve LLE strength to at least 80% of RLE strength as measured via MicroFet handheld dynamometer in order to improve functional mobility  3. Pt will improve L knee AROM to at least 0 - 120 in order to improve gait and ability to perform ADLs  4. Pt will perform TUG in < 14 seconds without AD in order to demo improved gait speed  5. Pt will perform at least 10 sit to stands without UE support on 30 second sit to stand test in order to demo improved ability to perform transfers    Plan   Phase I/II total knee arthroplasty program.     Ashlyn Garzon, PTA

## 2023-10-09 ENCOUNTER — CLINICAL SUPPORT (OUTPATIENT)
Dept: REHABILITATION | Facility: HOSPITAL | Age: 73
End: 2023-10-09
Payer: MEDICARE

## 2023-10-09 DIAGNOSIS — R29.898 DECREASED STRENGTH INVOLVING KNEE JOINT: ICD-10-CM

## 2023-10-09 DIAGNOSIS — Z74.09 IMPAIRED FUNCTIONAL MOBILITY, BALANCE, GAIT, AND ENDURANCE: Primary | ICD-10-CM

## 2023-10-09 PROCEDURE — 97110 THERAPEUTIC EXERCISES: CPT | Mod: PN,CQ

## 2023-10-09 PROCEDURE — 97530 THERAPEUTIC ACTIVITIES: CPT | Mod: PN,CQ

## 2023-10-09 NOTE — PROGRESS NOTES
"[facility]   Physical Therapy Treatment Note      Name: Azucena Lcuiano  Clinic Number: [id number]    Therapy Diagnosis:   Encounter Diagnoses   Name Primary?    Impaired functional mobility, balance, gait, and endurance Yes    Decreased strength involving knee joint      Physician: Bryson Medrano MD    Visit Date: 10/9/2023    Physician Orders: PT Eval and Treat   Medical Diagnosis from Referral:   M62.81 (ICD-10-CM) - Quadriceps weakness   M17.12 (ICD-10-CM) - Primary osteoarthritis of left knee   M25.562,G89.29 (ICD-10-CM) - Chronic pain of left knee      Evaluation Date: 9/8/2023  Authorization Period Expiration: 11/03/2023  Plan of Care Expiration: 9/8/2023 to 10/27/2023  Visit # / Visits authorized: 12/23 (+eval)  FOTO: next PTA visits: 3/5      Time In: 1100  Time Out: 1150  Total Appointment Time (timed & untimed codes): 30 minutes (1 TA, TE)  Precautions: hypertension, osteoporosis, DVT       Subjective     Patient reports: Walk[facility] loop [facility] yesterday. [place] fine during the walk but later that night was in a lot of pain and unable to sleep. Feeling very sore today.  Response to previous treatment: pre-op visit  Functional change: comes to PT today without assistive device     Pain: 8/10  Location: left knee     Objective      Left knee range of motion:  0 - 118 degrees passive --> active (-) 1 - 114 degrees  Quad activation:  Good  Straight leg raise: no lag   Mild effusion.     Treatment     Azucena received the treatments listed below:      Therapeutic exercises to develop strength, endurance, and ROM for 10 minutes with PT 1:1, including:  -- seated hamstring byron stretch 3x30"   -- heel prop 1 rounds x 5' with 5#  -- seated heel slides A 2x10  --> 2x10 with strap assist  -- stair lunges 2x20 (2nd step)  -- hamstring machine 2 plates 3x8-10 (not today)  -- Bike 8'' lvl 2  -- LAQ 3# 2 x 10  -- Active hamstring curl 2 x 10  -- knee ext machine (90-30) 2x10 " dL at 10# --> 2x5 LLE 0#, 2x5 RLE 15# (not today)    Manual therapy techniques: 10 minutes, including:  -- grade II/III left knee passive extension/flexion to tolerance   -- patellar mobs grade II/III inferior focus     Therapeutic Activities: total time 10 minutes with PT 1:1, including:  Leg press dL 3x10 at 3.5 plates NP  Sit to stand 3 x 8-10 + airex   Heel raises 2 x 10      Patient Education and Home Exercises       Education provided:   - home exercise program: continue pre-op home exercise program + addition of heel prop TID  - education on more sitting rest breaks at home.     Written Home Exercises Provided: Patient instructed to cont prior HEP. Exercises were reviewed and Azucena was able to demonstrate them prior to the end of the session.  Azucena demonstrated good  understanding of the education provided. See Electronic Medical Record under Patient Instructions for exercises provided during therapy sessions    Assessment   Azucena is a 73 y.o. female.  Now s/p left total knee arthroplasty.  Surgery date: 09/11/2023.  POD#28.  Presentation consistent with acute/subacute post-op state s/p total knee arthroplasty. Reaction to adhesive steri-strips mostly healed now. Presents with moderate to high soreness following [place]. Patient very discouraged and thinks she will never feel better. Educated patient on progressive loading and that she simply overdid it at the mall yesterday. Patient encouraged to take smaller walking bouts for better tolerance. Felt better following bike. Increased tightness into knee flexion today suspected due to soreness. States she feels better post session. Continue to progress as appropriate.      Patient prognosis is Excellent.   Patient will benefit from skilled outpatient Physical Therapy to address the deficits stated above and in the chart below, provide patient /family education, and to maximize patientt's level of independence.     Azucena Is  progressing well towards her goals.   Patient prognosis is Excellent.     Patient will continue to benefit from skilled outpatient physical therapy to address the deficits listed in the problem list box on initial evaluation, provide pt/family education and to maximize pt's level of independence in the home and community environment.   Patient's spiritual, cultural and educational needs considered and pt agreeable to plan of care and goals.     Anticipated barriers to physical therapy: none    Goals:   Short Term Goals: 3  weeks  1. Pt will be independent with post- HEP supplement PT in improving functional mobility.  Met 09/18/2023  2. Pt will improve LLE strength to at least 70% of RLE strength as measured via MicroFet handheld dynamometer in order to improve functional mobility  3. Pt will improve L knee AROM post-operatively to at least 0 - 110 degrees in order to improve gait.     Long Term Goals: 6-8 weeks  1. Pt will be independent with updated HEP supplement PT in improving functional mobility.  2. Pt will improve LLE strength to at least 80% of RLE strength as measured via MicroFet handheld dynamometer in order to improve functional mobility  3. Pt will improve L knee AROM to at least 0 - 120 in order to improve gait and ability to perform ADLs  4. Pt will perform TUG in < 14 seconds without AD in order to demo improved gait speed  5. Pt will perform at least 10 sit to stands without UE support on 30 second sit to stand test in order to demo improved ability to perform transfers    Plan   Phase I/II total knee arthroplasty program.     Ashlyn Garzon, PTA

## 2023-10-10 ENCOUNTER — CLINICAL SUPPORT (OUTPATIENT)
Dept: REHABILITATION | Facility: HOSPITAL | Age: 73
End: 2023-10-10
Payer: MEDICARE

## 2023-10-10 DIAGNOSIS — R29.898 DECREASED STRENGTH INVOLVING KNEE JOINT: ICD-10-CM

## 2023-10-10 DIAGNOSIS — Z74.09 IMPAIRED FUNCTIONAL MOBILITY, BALANCE, GAIT, AND ENDURANCE: Primary | ICD-10-CM

## 2023-10-10 PROCEDURE — 97530 THERAPEUTIC ACTIVITIES: CPT | Mod: PN,CQ

## 2023-10-10 PROCEDURE — 97110 THERAPEUTIC EXERCISES: CPT | Mod: PN,CQ

## 2023-10-10 NOTE — PROGRESS NOTES
"[facility]   Physical Therapy Treatment Note      Name: Azucena Luciano  Clinic Number: [id number]    Therapy Diagnosis:   Encounter Diagnoses   Name Primary?    Impaired functional mobility, balance, gait, and endurance Yes    Decreased strength involving knee joint      Physician: Bryson Medrano MD    Visit Date: 10/10/2023    Physician Orders: PT Eval and Treat   Medical Diagnosis from Referral:   M62.81 (ICD-10-CM) - Quadriceps weakness   M17.12 (ICD-10-CM) - Primary osteoarthritis of left knee   M25.562,G89.29 (ICD-10-CM) - Chronic pain of left knee      Evaluation Date: 9/8/2023  Authorization Period Expiration: 11/03/2023  Plan of Care Expiration: 9/8/2023 to 10/27/2023  Visit # / Visits authorized: 14/23 (+eval)  FOTO: next PTA visits: 4/5      Time In: 1100  Time Out: 1155  Total Appointment Time (timed & untimed codes): 30 minutes (1 TA, TE)  Precautions: hypertension, osteoporosis, DVT       Subjective     Patient reports: Feeling better today compared to yesterday. Feels like shes bending it more.  Response to previous treatment: pre-op visit  Functional change: comes to PT today without assistive device     Pain: 8/10  Location: left knee     Objective      Left knee range of motion:  0 - 116 degrees passive --> active (-) 1 - 113 degrees  Quad activation:  Good  Straight leg raise: no lag   Mild effusion.     Treatment     Azucena received the treatments listed below:      Therapeutic exercises to develop strength, endurance, and ROM for 15 minutes with PT 1:1, including:  -- seated hamstring byron stretch 3x30"   -- heel prop 1 rounds x 5' with 5#  -- seated heel slides A 2x10  --> 2x10 with strap assist  -- stair lunges 2x20 (2nd step)  -- hamstring machine 2 plates 3x8-10 (not today)  -- Bike 8'' lvl 2  -- LAQ 3# 3 x 10  -- Active hamstring curl 2 x 10  -- knee ext machine (90-30) 2x10 dL at 10# --> 2x5 LLE 0#, 2x5 RLE 15# (not today)    Manual therapy techniques: 00 " minutes, including:  -- grade II/III left knee passive extension/flexion to tolerance   -- patellar mobs grade II/III inferior focus     Therapeutic Activities: total time 15 minutes with PT 1:1, including:  Leg press dL 3x10 at 3.5 plates NP  Sit to stand 3 x 8-10 + airex   Heel raises 3 x 10      Patient Education and Home Exercises       Education provided:   - home exercise program: continue pre-op home exercise program + addition of heel prop TID  - education on more sitting rest breaks at home.     Written Home Exercises Provided: Patient instructed to cont prior HEP. Exercises were reviewed and Azucena was able to demonstrate them prior to the end of the session.  Azucena demonstrated good  understanding of the education provided. See Electronic Medical Record under Patient Instructions for exercises provided during therapy sessions    Assessment   Azucena is a 73 y.o. female.  Now s/p left total knee arthroplasty.  Surgery date: 09/11/2023.  POD#29.  Presentation consistent with acute/subacute post-op state s/p total knee arthroplasty. Reaction to adhesive steri-strips completely healed now. Improvement in knee soreness following walking she [place] mall [place] the [place]. Continued to focus on knee strengthening and ROM to tolerance. No exacerbation of symptoms post session. Continue to progress as appropriate.      Patient prognosis is Excellent.   Patient will benefit from skilled outpatient Physical Therapy to address the deficits stated above and in the chart below, provide patient /family education, and to maximize patientt's level of independence.     Azucena Is progressing well towards her goals.   Patient prognosis is Excellent.     Patient will continue to benefit from skilled outpatient physical therapy to address the deficits listed in the problem list box on initial evaluation, provide pt/family education and to maximize pt's level of independence in the home and community  environment.   Patient's spiritual, cultural and educational needs considered and pt agreeable to plan of care and goals.     Anticipated barriers to physical therapy: none    Goals:   Short Term Goals: 3  weeks  1. Pt will be independent with post- HEP supplement PT in improving functional mobility.  Met 09/18/2023  2. Pt will improve LLE strength to at least 70% of RLE strength as measured via MicroFet handheld dynamometer in order to improve functional mobility  3. Pt will improve L knee AROM post-operatively to at least 0 - 110 degrees in order to improve gait.     Long Term Goals: 6-8 weeks  1. Pt will be independent with updated HEP supplement PT in improving functional mobility.  2. Pt will improve LLE strength to at least 80% of RLE strength as measured via MicroFet handheld dynamometer in order to improve functional mobility  3. Pt will improve L knee AROM to at least 0 - 120 in order to improve gait and ability to perform ADLs  4. Pt will perform TUG in < 14 seconds without AD in order to demo improved gait speed  5. Pt will perform at least 10 sit to stands without UE support on 30 second sit to stand test in order to demo improved ability to perform transfers    Plan   Phase I/II total knee arthroplasty program.     Ashlyn Garzon, PTA

## 2023-10-12 ENCOUNTER — CLINICAL SUPPORT (OUTPATIENT)
Dept: REHABILITATION | Facility: HOSPITAL | Age: 73
End: 2023-10-12
Payer: MEDICARE

## 2023-10-12 DIAGNOSIS — R29.898 DECREASED STRENGTH INVOLVING KNEE JOINT: ICD-10-CM

## 2023-10-12 DIAGNOSIS — Z74.09 IMPAIRED FUNCTIONAL MOBILITY, BALANCE, GAIT, AND ENDURANCE: Primary | ICD-10-CM

## 2023-10-12 PROCEDURE — 97110 THERAPEUTIC EXERCISES: CPT | Mod: PN,CQ

## 2023-10-12 PROCEDURE — 97530 THERAPEUTIC ACTIVITIES: CPT | Mod: PN,CQ

## 2023-10-12 NOTE — PROGRESS NOTES
"[facility]   Physical Therapy Treatment Note      Name: Azucena Luciano  Clinic Number: [id number]    Therapy Diagnosis:   Encounter Diagnoses   Name Primary?    Impaired functional mobility, balance, gait, and endurance Yes    Decreased strength involving knee joint      Physician: Bryson eMdrano MD    Visit Date: 10/12/2023    Physician Orders: PT Eval and Treat   Medical Diagnosis from Referral:   M62.81 (ICD-10-CM) - Quadriceps weakness   M17.12 (ICD-10-CM) - Primary osteoarthritis of left knee   M25.562,G89.29 (ICD-10-CM) - Chronic pain of left knee      Evaluation Date: 9/8/2023  Authorization Period Expiration: 11/03/2023  Plan of Care Expiration: 9/8/2023 to 10/27/2023  Visit # / Visits authorized: 14/23 (+eval)  FOTO: #3 completed 10/12 PTA visits: 5/5      Time In: 1048  Time Out: 1142  Total Appointment Time (timed & untimed codes): 40 minutes (1 TA, 2 TE)  Precautions: hypertension, osteoporosis, DVT       Subjective     Patient reports: Was in pain all day yesterday and into the night. Disturbed sleep. Tried to manage with tylenol but with little relief.    Response to previous treatment: pre-op visit  Functional change: comes to PT today without assistive device     Pain: 8/10  Location: left knee     Objective      Left knee range of motion:  0 - 116 degrees passive --> active (-) 1 - 113 degrees  Quad activation:  Good  Straight leg raise: no lag   Mild effusion.     Treatment     Azucena received the treatments listed below:      Therapeutic exercises to develop strength, endurance, and ROM for 25 minutes with PT 1:1, including:  -- seated hamstring byron stretch 3x30"   -- heel prop 1 rounds x 5' with 5#  -- seated heel slides A 2x10  --> 2x10 with strap assist  -- stair lunges 2x20 (2nd step)  -- hamstring machine 2 plates 3x8-10 (not today)  -- Bike 8'' lvl 2  -- LAQ 3# 3 x 10  -- Active hamstring curl 2 x 10 not today  -- knee ext machine (90-30) 2x10 dL at 10# --> " 2x5 LLE 0#, 2x5 RLE 15# (not today)    Manual therapy techniques: 00 minutes, including:  -- grade II/III left knee passive extension/flexion to tolerance   -- patellar mobs grade II/III inferior focus     Therapeutic Activities: total time 15 minutes with PT 1:1, including:  Leg press dL 3x10 at 3.5 plates NP  Sit to stand 3 x 8-10 + airex   Heel raises 3 x 10      Patient Education and Home Exercises       Education provided:   - home exercise program: continue pre-op home exercise program + addition of heel prop TID  - education on more sitting rest breaks at home.     Written Home Exercises Provided: Patient instructed to cont prior HEP. Exercises were reviewed and Azucena was able to demonstrate them prior to the end of the session.  Azucena demonstrated good  understanding of the education provided. See Electronic Medical Record under Patient Instructions for exercises provided during therapy sessions    Assessment   Azucena is a 73 y.o. female.  Now s/p left total knee arthroplasty.  Surgery date: 09/11/2023.  POD#31.  Presentation consistent with acute/subacute post-op state s/p total knee arthroplasty. Reaction to adhesive steri-strips completely healed now. Reports having increased pain all day yesterday and into the night. Unsure what cause flare up in pain. Feeling better today but becoming very discouraged over progress thus far. Reports pain is always worse at night and improves with movement. Patient required frequent encouragement, reminders of progress made thus far as well as education on pain science, stiffness with immobilization and normal reactions to exercises. Continue to progress as appropriate.      Patient prognosis is Excellent.   Patient will benefit from skilled outpatient Physical Therapy to address the deficits stated above and in the chart below, provide patient /family education, and to maximize patientt's level of independence.     Azucena Is progressing well towards her  goals.   Patient prognosis is Excellent.     Patient will continue to benefit from skilled outpatient physical therapy to address the deficits listed in the problem list box on initial evaluation, provide pt/family education and to maximize pt's level of independence in the home and community environment.   Patient's spiritual, cultural and educational needs considered and pt agreeable to plan of care and goals.     Anticipated barriers to physical therapy: none    Goals:   Short Term Goals: 3  weeks  1. Pt will be independent with post- HEP supplement PT in improving functional mobility.  Met 09/18/2023  2. Pt will improve LLE strength to at least 70% of RLE strength as measured via MicroFet handheld dynamometer in order to improve functional mobility  3. Pt will improve L knee AROM post-operatively to at least 0 - 110 degrees in order to improve gait.     Long Term Goals: 6-8 weeks  1. Pt will be independent with updated HEP supplement PT in improving functional mobility.  2. Pt will improve LLE strength to at least 80% of RLE strength as measured via MicroFet handheld dynamometer in order to improve functional mobility  3. Pt will improve L knee AROM to at least 0 - 120 in order to improve gait and ability to perform ADLs  4. Pt will perform TUG in < 14 seconds without AD in order to demo improved gait speed  5. Pt will perform at least 10 sit to stands without UE support on 30 second sit to stand test in order to demo improved ability to perform transfers    Plan   Phase I/II total knee arthroplasty program.     Ashlyn Garzon, PTA

## 2023-10-16 ENCOUNTER — CLINICAL SUPPORT (OUTPATIENT)
Dept: REHABILITATION | Facility: HOSPITAL | Age: 73
End: 2023-10-16
Payer: MEDICARE

## 2023-10-16 DIAGNOSIS — R29.898 DECREASED STRENGTH INVOLVING KNEE JOINT: ICD-10-CM

## 2023-10-16 DIAGNOSIS — Z74.09 IMPAIRED FUNCTIONAL MOBILITY, BALANCE, GAIT, AND ENDURANCE: Primary | ICD-10-CM

## 2023-10-16 PROCEDURE — 97530 THERAPEUTIC ACTIVITIES: CPT | Mod: PN

## 2023-10-16 PROCEDURE — 97110 THERAPEUTIC EXERCISES: CPT | Mod: PN

## 2023-10-16 PROCEDURE — 97140 MANUAL THERAPY 1/> REGIONS: CPT | Mod: PN

## 2023-10-16 NOTE — PROGRESS NOTES
[facility]   Physical Therapy Treatment Note      Name: Azucena Luciano  Clinic Number: [id number]    Therapy Diagnosis:   Encounter Diagnoses   Name Primary?    Impaired functional mobility, balance, gait, and endurance Yes    Decreased strength involving knee joint        Physician: Bryson Medrano MD    Visit Date: 10/16/2023    Physician Orders: PT Eval and Treat   Medical Diagnosis from Referral:   M62.81 (ICD-10-CM) - Quadriceps weakness   M17.12 (ICD-10-CM) - Primary osteoarthritis of left knee   M25.562,G89.29 (ICD-10-CM) - Chronic pain of left knee      Evaluation Date: 9/8/2023  Authorization Period Expiration: 11/03/2023  Plan of Care Expiration: 9/8/2023 to 10/27/2023  Visit # / Visits authorized: 15/23 (+eval)  FOTO: at dc  PTA visits: 0/5      Time In: 1100  Time Out: 1155  Total Appointment Time (timed & untimed codes): 55 minutes (1 MT, 1 TH, 2 TE)  Precautions: hypertension, osteoporosis, DVT       Subjective     Patient reports: having a great weekend in regards to her knee but is stiff this morning.  Response to previous treatment: pre-op visit  Functional change: comes to PT today without assistive device     Pain: 5/10   Location: left knee     Objective      Left knee range of motion:  0 - 121 degrees passive --> active (-) 1 - 113 degrees   Quad activation:  Good  Straight leg raise: no lag   Mild effusion.   Mild antalgic gait pattern without assistive device.    Treatment     Azucena received the treatments listed below:      Therapeutic exercises to develop strength, endurance, and ROM for 30 minutes, including:  -- bike 5' hill program level 4  -- seated heel slides A 2x10   (+) peanut ball 2x15 heel slides  -- stair lunges 2x20 (2nd step)  -- Bike 8'' lvl 2    Manual therapy techniques: 10 minutes, including:  -- grade II/III left knee passive extension/flexion to tolerance   -- patellar mobs grade II/III inferior focus     Therapeutic Activities: total time 15  "minutes , including:  Sit to stand 3 x 10  Heel raises 3 x 10 (not today)  Step-up to 9" step 2x10      Patient Education and Home Exercises       Education provided:   - home exercise program: continue pre-op home exercise program + addition of heel prop TID  - education on more sitting rest breaks at home.     Written Home Exercises Provided: Patient instructed to cont prior HEP. Exercises were reviewed and Azucena was able to demonstrate them prior to the end of the session.  Azucena demonstrated good  understanding of the education provided. See Electronic Medical Record under Patient Instructions for exercises provided during therapy sessions    Assessment   Azucena is a 73 y.o. female.  Now s/p left total knee arthroplasty.  Surgery date: 09/11/2023.  POD#35; post-op 5 weeks.  Presentation consistent with subacute post-op state s/p total knee arthroplasty. Reaction to adhesive steri-strips completely healed now.  Doing well.  Met flexion goal today.  Trial of returning to job/work over the next few days; monitor response.      Patient prognosis is Excellent.   Patient will benefit from skilled outpatient Physical Therapy to address the deficits stated above and in the chart below, provide patient /family education, and to maximize patientt's level of independence.     Azucena Is progressing well towards her goals.   Patient prognosis is Excellent.     Patient will continue to benefit from skilled outpatient physical therapy to address the deficits listed in the problem list box on initial evaluation, provide pt/family education and to maximize pt's level of independence in the home and community environment.   Patient's spiritual, cultural and educational needs considered and pt agreeable to plan of care and goals.     Anticipated barriers to physical therapy: none    Goals:   Short Term Goals: 3  weeks  1. Pt will be independent with post- HEP supplement PT in improving functional mobility.  Met " 09/18/2023  2. Pt will improve LLE strength to at least 70% of RLE strength as measured via MicroFet handheld dynamometer in order to improve functional mobility  3. Pt will improve L knee AROM post-operatively to at least 0 - 110 degrees in order to improve gait. Met 10/16/2023     Long Term Goals: 6-8 weeks  1. Pt will be independent with updated HEP supplement PT in improving functional mobility.  2. Pt will improve LLE strength to at least 80% of RLE strength as measured via MicroFet handheld dynamometer in order to improve functional mobility  3. Pt will improve L knee AROM to at least 0 - 120 in order to improve gait and ability to perform ADLs  4. Pt will perform TUG in < 14 seconds without AD in order to demo improved gait speed  5. Pt will perform at least 10 sit to stands without UE support on 30 second sit to stand test in order to demo improved ability to perform transfers. Met 10/16/2023    Plan   Phase III total knee arthroplasty program.     Brandin Orona, PT, DPT, OCS

## 2023-10-17 ENCOUNTER — HOSPITAL ENCOUNTER (OUTPATIENT)
Dept: RADIOLOGY | Facility: HOSPITAL | Age: 73
Discharge: HOME | End: 2023-10-17
Attending: INTERNAL MEDICINE
Payer: MEDICARE

## 2023-10-17 VITALS — HEIGHT: 59 IN | WEIGHT: 113 LBS | BODY MASS INDEX: 22.78 KG/M2

## 2023-10-17 DIAGNOSIS — Z12.31 VISIT FOR SCREENING MAMMOGRAM: ICD-10-CM

## 2023-10-17 PROCEDURE — 77067 SCR MAMMO BI INCL CAD: CPT | Mod: 26,,, | Performed by: RADIOLOGY

## 2023-10-17 PROCEDURE — 77063 BREAST TOMOSYNTHESIS BI: CPT | Mod: 26,,, | Performed by: RADIOLOGY

## 2023-10-17 PROCEDURE — 77063 MAMMO DIGITAL SCREENING BILAT WITH TOMO: ICD-10-PCS | Mod: 26,,, | Performed by: RADIOLOGY

## 2023-10-17 PROCEDURE — 77067 MAMMO DIGITAL SCREENING BILAT WITH TOMO: ICD-10-PCS | Mod: 26,,, | Performed by: RADIOLOGY

## 2023-10-17 PROCEDURE — 77067 SCR MAMMO BI INCL CAD: CPT | Mod: TC

## 2023-10-17 NOTE — PROGRESS NOTES
[facility]   Physical Therapy Treatment Note      Name: Azucena Luciano  Clinic Number: [id number]    Therapy Diagnosis:   Encounter Diagnoses   Name Primary?    Impaired functional mobility, balance, gait, and endurance Yes    Decreased strength involving knee joint      Physician: Bryson Medrano MD    Visit Date: 10/18/2023    Physician Orders: PT Eval and Treat   Medical Diagnosis from Referral:   M62.81 (ICD-10-CM) - Quadriceps weakness   M17.12 (ICD-10-CM) - Primary osteoarthritis of left knee   M25.562,G89.29 (ICD-10-CM) - Chronic pain of left knee      Evaluation Date: 9/8/2023  Authorization Period Expiration: 11/03/2023  Plan of Care Expiration: 9/8/2023 to 10/27/2023  Visit # / Visits authorized: 16/23 (+eval)  FOTO: at dc  PTA visits: 0/5      Time In: 0955  Time Out: 1050  Total Appointment Time (timed & untimed codes): 55 minutes (1 MT, 1 TH, 2 TE)  Precautions: hypertension, osteoporosis, DVT       Subjective     Patient reports: not going to work yet. Feeling better today.   Response to previous treatment: pre-op visit  Functional change: comes to PT today without assistive device     Pain: 3/10   Location: left knee     Objective      Left knee range of motion:  0 - 124 degrees passive --> active (-) 1 - 115 degrees   Quad activation:  Good  Straight leg raise: no lag   Mild effusion.   Mild antalgic gait pattern without assistive device.    Treatment     Azucena received the treatments listed below:      Therapeutic exercises to develop strength, endurance, and ROM for 30 minutes, including:  -- bike 5' hill program level 4  -- seated heel slides A 2x10   -- peanut ball 2x15 heel slides  -- stair lunges 2x20 (2nd step)  -- Bike 8'' lvl 2    Manual therapy techniques: 10 minutes, including:  -- grade II/III left knee passive extension/flexion to tolerance   -- patellar mobs grade II/III inferior focus     Therapeutic Activities: total time 15 minutes , including:  Sit to  "stand 3 x 10 5#  Heel raises 3 x 10 (not today)  Step-up to 9" step 3x10      Patient Education and Home Exercises       Education provided:   - home exercise program: continue pre-op home exercise program + addition of heel prop TID  - education on more sitting rest breaks at home.     Written Home Exercises Provided: Patient instructed to cont prior HEP. Exercises were reviewed and Azucena was able to demonstrate them prior to the end of the session.  Azucena demonstrated good  understanding of the education provided. See Electronic Medical Record under Patient Instructions for exercises provided during therapy sessions    Assessment   Azucena is a 73 y.o. female.  Now s/p left total knee arthroplasty.  Surgery date: 09/11/2023.  POD#37; post-op 5 weeks.  Presentation consistent with subacute post-op state s/p total knee arthroplasty. Doing very well overall.  DC planning.      Patient prognosis is Excellent.   Patient will benefit from skilled outpatient Physical Therapy to address the deficits stated above and in the chart below, provide patient /family education, and to maximize patientt's level of independence.     Azucena Is progressing well towards her goals.   Patient prognosis is Excellent.     Patient will continue to benefit from skilled outpatient physical therapy to address the deficits listed in the problem list box on initial evaluation, provide pt/family education and to maximize pt's level of independence in the home and community environment.   Patient's spiritual, cultural and educational needs considered and pt agreeable to plan of care and goals.     Anticipated barriers to physical therapy: none    Goals:   Short Term Goals: 3  weeks  1. Pt will be independent with post- HEP supplement PT in improving functional mobility.  Met 09/18/2023  2. Pt will improve LLE strength to at least 70% of RLE strength as measured via MicroFet handheld dynamometer in order to improve functional " mobility  3. Pt will improve L knee AROM post-operatively to at least 0 - 110 degrees in order to improve gait. Met 10/16/2023     Long Term Goals: 6-8 weeks  1. Pt will be independent with updated HEP supplement PT in improving functional mobility. Met 10/18/2023  2. Pt will improve LLE strength to at least 80% of RLE strength as measured via MicroFet handheld dynamometer in order to improve functional mobility  3. Pt will improve L knee AROM to at least 0 - 120 in order to improve gait and ability to perform ADLs  4. Pt will perform TUG in < 14 seconds without AD in order to demo improved gait speed  5. Pt will perform at least 10 sit to stands without UE support on 30 second sit to stand test in order to demo improved ability to perform transfers. Met 10/16/2023    Plan   Phase III total knee arthroplasty program.     Brandin Orona, PT, DPT, OCS

## 2023-10-18 ENCOUNTER — CLINICAL SUPPORT (OUTPATIENT)
Dept: REHABILITATION | Facility: HOSPITAL | Age: 73
End: 2023-10-18
Payer: MEDICARE

## 2023-10-18 DIAGNOSIS — Z74.09 IMPAIRED FUNCTIONAL MOBILITY, BALANCE, GAIT, AND ENDURANCE: Primary | ICD-10-CM

## 2023-10-18 DIAGNOSIS — R29.898 DECREASED STRENGTH INVOLVING KNEE JOINT: ICD-10-CM

## 2023-10-18 PROCEDURE — 97110 THERAPEUTIC EXERCISES: CPT | Mod: PN

## 2023-10-18 PROCEDURE — 97140 MANUAL THERAPY 1/> REGIONS: CPT | Mod: PN

## 2023-10-18 PROCEDURE — 97530 THERAPEUTIC ACTIVITIES: CPT | Mod: PN

## 2023-10-19 ENCOUNTER — CLINICAL SUPPORT (OUTPATIENT)
Dept: REHABILITATION | Facility: HOSPITAL | Age: 73
End: 2023-10-19
Payer: MEDICARE

## 2023-10-19 DIAGNOSIS — Z74.09 IMPAIRED FUNCTIONAL MOBILITY, BALANCE, GAIT, AND ENDURANCE: Primary | ICD-10-CM

## 2023-10-19 DIAGNOSIS — R29.898 DECREASED STRENGTH INVOLVING KNEE JOINT: ICD-10-CM

## 2023-10-19 PROCEDURE — 97110 THERAPEUTIC EXERCISES: CPT | Mod: PN

## 2023-10-19 PROCEDURE — 97530 THERAPEUTIC ACTIVITIES: CPT | Mod: PN

## 2023-10-19 NOTE — PROGRESS NOTES
[facility]   Physical Therapy Treatment Note & Discharge Note     Name: Azucena Luciano  Clinic Number: [id number]    Therapy Diagnosis:   Encounter Diagnoses   Name Primary?    Impaired functional mobility, balance, gait, and endurance Yes    Decreased strength involving knee joint      Physician: Bryson Medrano MD    Visit Date: 10/19/2023    Physician Orders: PT Eval and Treat   Medical Diagnosis from Referral:   M62.81 (ICD-10-CM) - Quadriceps weakness   M17.12 (ICD-10-CM) - Primary osteoarthritis of left knee   M25.562,G89.29 (ICD-10-CM) - Chronic pain of left knee      Evaluation Date: 9/8/2023  Authorization Period Expiration: 11/03/2023  Plan of Care Expiration: 9/8/2023 to 10/27/2023  Visit # / Visits authorized: 17/23 (+eval)  FOTO: at dc  PTA visits: 0/5      Time In: 1100  Time Out: 1155  Total Appointment Time (timed & untimed codes): 30 minutes ( 1 TH, 1 TE)  Precautions: hypertension, osteoporosis, DVT       Subjective     Patient reports: feeling great.  No pain with walking.    Response to previous treatment: no adverse effects.   Functional change: comes to PT today without assistive device     Pain: 1/10   Location: left knee     Objective      Left knee range of motion:  0 - 124 degrees passive --> active (-) 1 - 115 degrees   Quad activation:  Good  Straight leg raise: no lag   Mild effusion.   Mild antalgic gait pattern without assistive device.  Strength:  Left: hamstring 6.5 kg and quad 11.1 kg, Right hamstring 10.1 kg and quad 16.1 kg  Timed up and go: <14 seconds without assistive device.   STS (30-second max): 14    Treatment     Azucena received the treatments listed below:      Therapeutic exercises to develop strength, endurance, and ROM for 15 minutes with PT 1:1, including:  -- bike 5' hill program level 4  -- seated heel slides A 2x10   -- peanut ball 2x15 heel slides  -- stair lunges 2x20 (2nd step)  -- Bike 8'' lvl 2    Manual therapy techniques: 5  "minutes, including:  -- grade II/III left knee passive extension/flexion to tolerance   -- patellar mobs grade II/III inferior focus     Therapeutic Activities: total time 10 minutes with PT 1:1 , including:  Sit to stand 3 x 10 5#  Heel raises 2x15  Step-up to 9" step 3x10      Patient Education and Home Exercises       Education provided:   - home exercise program: continue pre-op home exercise program + addition of heel prop TID  - education on more sitting rest breaks at home.     Written Home Exercises Provided: Patient instructed to cont prior HEP. Exercises were reviewed and Azucena was able to demonstrate them prior to the end of the session.  Azucena demonstrated good  understanding of the education provided. See Electronic Medical Record under Patient Instructions for exercises provided during therapy sessions    Assessment   Azucena is a 73 y.o. female.  Now s/p left total knee arthroplasty.  Surgery date: 09/11/2023.  POD#38; post-op 5 weeks.  Presentation consistent with subacute post-op state s/p total knee arthroplasty. Doing very well overall.  She is pleased with her knee. No pain with walking. No walking with assistive device. Good quad strength.  Mild antalgic gait.  Able to negotiate stairs.  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 Patient feels she is ready for DC.  PT in agreement.      Patient prognosis is Excellent.   Patient will benefit from skilled outpatient Physical Therapy to address the deficits stated above and in the chart below, provide patient /family education, and to maximize patientt's level of independence.     Azucena Is progressing well towards her goals.   Patient prognosis is Excellent.     Patient will continue to benefit from skilled outpatient physical therapy to address the deficits listed in the problem list box on initial evaluation, provide pt/family education and to maximize pt's level of independence in the home and community " environment.   Patient's spiritual, cultural and educational needs considered and pt agreeable to plan of care and goals.     Anticipated barriers to physical therapy: none    Goals:   Short Term Goals: 3  weeks  1. Pt will be independent with post- HEP supplement PT in improving functional mobility.  Met 09/18/2023  2. Pt will improve LLE strength to at least 70% of RLE strength as measured via MicroFet handheld dynamometer in order to improve functional mobility  3. Pt will improve L knee AROM post-operatively to at least 0 - 110 degrees in order to improve gait. Met 10/16/2023     Long Term Goals: 6-8 weeks  1. Pt will be independent with updated HEP supplement PT in improving functional mobility. Met 10/18/2023  2. Pt will improve LLE strength to at least 80% of RLE strength as measured via MicroFet handheld dynamometer in order to improve functional mobility. Not met  3. Pt will improve L knee AROM to at least 0 - 120 in order to improve gait and ability to perform ADLs. Not met (0-115)  4. Pt will perform TUG in < 14 seconds without AD in order to demo improved gait speed. Met 10/18/2023  5. Pt will perform at least 10 sit to stands without UE support on 30 second sit to stand test in order to demo improved ability to perform transfers. Met 10/16/2023    Plan   DC with updated home exercise program.     Brandin Orona, PT, DPT, OCS    [facility]  Discharge Note    Name: Azucena RONQUILLO [facility] Number: [id number]    Therapy Diagnosis:   Encounter Diagnoses   Name Primary?    Impaired functional mobility, balance, gait, and endurance Yes    Decreased strength involving knee joint      Physician: Bryson Medrano MD    Physician Orders: PT Eval and Treat   Medical Diagnosis from Referral:   M62.81 (ICD-10-CM) - Quadriceps weakness   M17.12 (ICD-10-CM) - Primary osteoarthritis of left knee   M25.562,G89.29 (ICD-10-CM) - Chronic pain of left knee      Evaluation Date: 9/8/2023  Date of Last  visit: 10/19/2023  Total Visits Received: 17    ASSESSMENT        Discharge reason: Patient has completed the physician's prescription and Patient has reached the maximum rehab potential for the present time  Discharge FOTO Score: see Media  Goals: see above    PLAN   This patient is discharged from Physical Therapy    Brandin Orona, PT, DPT, OCS

## 2023-10-26 ENCOUNTER — TELEPHONE (OUTPATIENT)
Dept: INTERNAL MEDICINE | Facility: CLINIC | Age: 73
End: 2023-10-26
Payer: MEDICARE

## 2023-10-26 RX ORDER — VALSARTAN 320 MG/1
320 TABLET ORAL DAILY
Qty: 30 TABLET | Refills: 3 | Status: SHIPPED | OUTPATIENT
Start: 2023-10-26 | End: 2024-03-05

## 2023-10-26 NOTE — TELEPHONE ENCOUNTER
----- Message from So Calvillo sent at 10/26/2023 11:14 AM CDT -----  Contact: [phone]  [email or web address]     Patient is calling for Medical Advice regarding: pt states that her bp is extremely high 151/91    How long has patient had these symptoms: 6 weeks     Pharmacy name and phone#:  [facility] [place] - [address]  [address] [facility]  [place] [address]  Phone: [phone] Fax: [phone]       Would like response via [employer]:  ##call back     Comments:   Alt # [phone]

## 2023-10-26 NOTE — TELEPHONE ENCOUNTER
Spoke to pt and she states that her BP is running high.  She had knee surgery 6 weeks ago and is in a lot of pain. She is taking tylenol for pain, gabapentin, diclofenac, and an antiinflammatory   She denies chest pain, headache, dizziness, and SOB   Please see reading for last few days and advise    146/87  142/85  157/89  154/78  162/85  150/84  151/91

## 2023-10-26 NOTE — TELEPHONE ENCOUNTER
I have increase the valsartan 320 mg.  Patient needs a follow-up in the next 2 weeks with PCP.  Patient was supposed to make a follow-up a month after she was seen in August.

## 2023-11-14 ENCOUNTER — OFFICE VISIT (OUTPATIENT)
Dept: INTERNAL MEDICINE | Facility: CLINIC | Age: 73
End: 2023-11-14
Payer: MEDICARE

## 2023-11-14 VITALS
SYSTOLIC BLOOD PRESSURE: 148 MMHG | HEART RATE: 81 BPM | BODY MASS INDEX: 23.24 KG/M2 | OXYGEN SATURATION: 99 % | DIASTOLIC BLOOD PRESSURE: 92 MMHG | HEIGHT: 59 IN | WEIGHT: 115.31 LBS | RESPIRATION RATE: 18 BRPM

## 2023-11-14 DIAGNOSIS — Z47.1 AFTERCARE FOLLOWING LEFT KNEE JOINT REPLACEMENT SURGERY: ICD-10-CM

## 2023-11-14 DIAGNOSIS — E78.49 OTHER HYPERLIPIDEMIA: Chronic | ICD-10-CM

## 2023-11-14 DIAGNOSIS — Z00.00 ANNUAL PHYSICAL EXAM: Primary | ICD-10-CM

## 2023-11-14 DIAGNOSIS — M81.6 LOCALIZED OSTEOPOROSIS, UNSPECIFIED PATHOLOGICAL FRACTURE PRESENCE: Chronic | ICD-10-CM

## 2023-11-14 DIAGNOSIS — Z96.652 AFTERCARE FOLLOWING LEFT KNEE JOINT REPLACEMENT SURGERY: ICD-10-CM

## 2023-11-14 DIAGNOSIS — Z86.718 HISTORY OF DEEP VEIN THROMBOSIS (DVT) OF LOWER EXTREMITY: ICD-10-CM

## 2023-11-14 DIAGNOSIS — I10 PRIMARY HYPERTENSION: ICD-10-CM

## 2023-11-14 PROCEDURE — 99215 OFFICE O/P EST HI 40 MIN: CPT | Mod: S$GLB,,, | Performed by: INTERNAL MEDICINE

## 2023-11-14 PROCEDURE — 4010F ACE/ARB THERAPY RXD/TAKEN: CPT | Mod: CPTII,S$GLB,, | Performed by: INTERNAL MEDICINE

## 2023-11-14 PROCEDURE — 99215 PR OFFICE/OUTPT VISIT, EST, LEVL V, 40-54 MIN: ICD-10-PCS | Mod: S$GLB,,, | Performed by: INTERNAL MEDICINE

## 2023-11-14 PROCEDURE — 1159F MED LIST DOCD IN RCRD: CPT | Mod: CPTII,S$GLB,, | Performed by: INTERNAL MEDICINE

## 2023-11-14 PROCEDURE — 1160F RVW MEDS BY RX/DR IN RCRD: CPT | Mod: CPTII,S$GLB,, | Performed by: INTERNAL MEDICINE

## 2023-11-14 PROCEDURE — 3044F PR MOST RECENT HEMOGLOBIN A1C LEVEL <7.0%: ICD-10-PCS | Mod: CPTII,S$GLB,, | Performed by: INTERNAL MEDICINE

## 2023-11-14 PROCEDURE — 1125F AMNT PAIN NOTED PAIN PRSNT: CPT | Mod: CPTII,S$GLB,, | Performed by: INTERNAL MEDICINE

## 2023-11-14 PROCEDURE — 3077F SYST BP >= 140 MM HG: CPT | Mod: CPTII,S$GLB,, | Performed by: INTERNAL MEDICINE

## 2023-11-14 PROCEDURE — 3044F HG A1C LEVEL LT 7.0%: CPT | Mod: CPTII,S$GLB,, | Performed by: INTERNAL MEDICINE

## 2023-11-14 PROCEDURE — 99999 PR PBB SHADOW E&M-EST. PATIENT-LVL III: ICD-10-PCS | Mod: PBBFAC,,, | Performed by: INTERNAL MEDICINE

## 2023-11-14 PROCEDURE — 99999 PR PBB SHADOW E&M-EST. PATIENT-LVL III: CPT | Mod: PBBFAC,,, | Performed by: INTERNAL MEDICINE

## 2023-11-14 PROCEDURE — 3080F PR MOST RECENT DIASTOLIC BLOOD PRESSURE >= 90 MM HG: ICD-10-PCS | Mod: CPTII,S$GLB,, | Performed by: INTERNAL MEDICINE

## 2023-11-14 PROCEDURE — 1160F PR REVIEW ALL MEDS BY PRESCRIBER/CLIN PHARMACIST DOCUMENTED: ICD-10-PCS | Mod: CPTII,S$GLB,, | Performed by: INTERNAL MEDICINE

## 2023-11-14 PROCEDURE — 3008F PR BODY MASS INDEX (BMI) DOCUMENTED: ICD-10-PCS | Mod: CPTII,S$GLB,, | Performed by: INTERNAL MEDICINE

## 2023-11-14 PROCEDURE — 4010F PR ACE/ARB THEARPY RXD/TAKEN: ICD-10-PCS | Mod: CPTII,S$GLB,, | Performed by: INTERNAL MEDICINE

## 2023-11-14 PROCEDURE — 3077F PR MOST RECENT SYSTOLIC BLOOD PRESSURE >= 140 MM HG: ICD-10-PCS | Mod: CPTII,S$GLB,, | Performed by: INTERNAL MEDICINE

## 2023-11-14 PROCEDURE — 3008F BODY MASS INDEX DOCD: CPT | Mod: CPTII,S$GLB,, | Performed by: INTERNAL MEDICINE

## 2023-11-14 PROCEDURE — 1159F PR MEDICATION LIST DOCUMENTED IN MEDICAL RECORD: ICD-10-PCS | Mod: CPTII,S$GLB,, | Performed by: INTERNAL MEDICINE

## 2023-11-14 PROCEDURE — 3080F DIAST BP >= 90 MM HG: CPT | Mod: CPTII,S$GLB,, | Performed by: INTERNAL MEDICINE

## 2023-11-14 PROCEDURE — 1125F PR PAIN SEVERITY QUANTIFIED, PAIN PRESENT: ICD-10-PCS | Mod: CPTII,S$GLB,, | Performed by: INTERNAL MEDICINE

## 2023-11-14 RX ORDER — DICLOFENAC SODIUM 75 MG/1
75 TABLET, DELAYED RELEASE ORAL 2 TIMES DAILY PRN
Qty: 60 TABLET | Refills: 3 | Status: SHIPPED | OUTPATIENT
Start: 2023-11-14

## 2023-11-14 RX ORDER — GABAPENTIN 300 MG/1
CAPSULE ORAL
Qty: 90 CAPSULE | Refills: 3 | Status: SHIPPED | OUTPATIENT
Start: 2023-11-14

## 2023-11-14 RX ORDER — CYCLOBENZAPRINE HCL 5 MG
5 TABLET ORAL NIGHTLY
Qty: 90 TABLET | Refills: 3 | Status: SHIPPED | OUTPATIENT
Start: 2023-11-14

## 2023-11-14 NOTE — PROGRESS NOTES
Subjective     Patient ID: Azucena Luciano is a 73 y.o. female.    Chief Complaint: Follow-up (BP)    HPI  73 y.o. Female here for annual exam.      Vaccines: Influenza (deferred); Tetanus (2015); Prevnar 13 (2015); Shingrix (will consider)  Eye exam: 11/15  Mammogram: 10/23  Gyn exam: 1/16  Colonoscopy: 8/18- declining repeat currently  DEXA: 9/21     Mobility: normal  Falls: no     Exercise: cardio/stretching 3x/wk  Diet: regular     Past Medical History:    Carpal tunnel syndrome                                        Asthma               DVT                            HLD    Osteoporosis                    Past Surgical History:    Cataract surgery     OVARIAN CYST REMOVAL                                           FACIAL COSMETIC SURGERY                                        tummy tuck                                   Left TKA                    Social History    Marital Status:              Spouse Name:                       Years of Education:                 Number of children: 3              Occupational History  Occupation          Employer            Comment               Retired insurance *                          Social History Main Topics    Smoking Status: Never Smoker                      Smokeless Status: Not on file                       Alcohol Use: Yes           0.0 oz/week       0 Not specified per week       Comment: rare    Drug Use: No              Sexual Activity: Yes               Partners with: Male     No Known Allergies  Review of Systems   Constitutional:  Negative for activity change, appetite change, chills, diaphoresis, fatigue, fever and unexpected weight change.   HENT:  Negative for nasal congestion, mouth sores, postnasal drip, rhinorrhea, sinus pressure/congestion, sneezing, sore throat, trouble swallowing and voice change.    Eyes:  Negative for pain, discharge and visual disturbance.   Respiratory:  Negative for cough, shortness of breath and wheezing.     Cardiovascular:  Negative for chest pain, palpitations and leg swelling.   Gastrointestinal:  Negative for abdominal pain, blood in stool, constipation, diarrhea, nausea and vomiting.   Endocrine: Negative for cold intolerance and heat intolerance.   Genitourinary:  Negative for difficulty urinating, dysuria, frequency, hematuria and urgency.   Musculoskeletal:  Negative for arthralgias and myalgias.   Integumentary:  Negative for rash and wound.   Allergic/Immunologic: Negative for environmental allergies and food allergies.   Neurological:  Negative for dizziness, tremors, seizures, syncope, weakness, light-headedness and headaches.   Hematological:  Negative for adenopathy. Does not bruise/bleed easily.   Psychiatric/Behavioral:  Negative for confusion and sleep disturbance. The patient is not nervous/anxious.           Objective     Physical Exam  Vitals and nursing note reviewed.   Constitutional:       General: She is not in acute distress.     Appearance: Normal appearance. She is well-developed. She is not diaphoretic.   HENT:      Head: Normocephalic and atraumatic.      Right Ear: External ear normal.      Left Ear: External ear normal.      Nose: Nose normal.      Mouth/Throat:      Pharynx: No oropharyngeal exudate.   Eyes:      General: No scleral icterus.        Right eye: No discharge.         Left eye: No discharge.      Conjunctiva/sclera: Conjunctivae normal.      Pupils: Pupils are equal, round, and reactive to light.   Neck:      Thyroid: No thyromegaly.      Vascular: No JVD.   Cardiovascular:      Rate and Rhythm: Normal rate and regular rhythm.      Pulses: Normal pulses.      Heart sounds: Normal heart sounds. No murmur heard.  Pulmonary:      Effort: Pulmonary effort is normal. No respiratory distress.      Breath sounds: Normal breath sounds. No wheezing, rhonchi or rales.   Chest:      Chest wall: No tenderness.   Abdominal:      General: Bowel sounds are normal. There is no distension.       Palpations: Abdomen is soft.      Tenderness: There is no abdominal tenderness. There is no guarding or rebound.   Musculoskeletal:      Cervical back: Neck supple.      Right lower leg: No edema.      Left lower leg: No edema.   Lymphadenopathy:      Cervical: No cervical adenopathy.   Skin:     General: Skin is warm and dry.      Coloration: Skin is not pale.      Findings: No rash.   Neurological:      General: No focal deficit present.      Mental Status: She is alert and oriented to person, place, and time.      Gait: Gait normal.   Psychiatric:         Behavior: Behavior normal.         Thought Content: Thought content normal.         Judgment: Judgment normal.            Assessment and Plan     1. Annual physical exam    2. Other hyperlipidemia    3. Primary hypertension    4. History of deep vein thrombosis (DVT) of lower extremity    5. Localized osteoporosis, unspecified pathological fracture presence  -     DXA Bone Density Axial Skeleton 1 or more sites; Future; Expected date: 11/14/2023         Blood work reviewed with pt      HTN- increase Valsartan to 320 mg qd, if still not controlled will add HCTZ     Hx of LLE DVT- off Apixaban       Followed by Vascular Medicine       Osteoporosis- stable on Fosamax/Calcium/Vitamin D       Last DEXA(9/21)      HLD- controlled on Lipitor 20 mg daily      F/u in 2 wks    Over 1/2 of 40 minute visit spent reviewing pt's medical records, education/discussion of pt's medical conditions and medical management

## 2023-11-30 ENCOUNTER — OFFICE VISIT (OUTPATIENT)
Dept: INTERNAL MEDICINE | Facility: CLINIC | Age: 73
End: 2023-11-30
Payer: MEDICARE

## 2023-11-30 VITALS
WEIGHT: 114 LBS | BODY MASS INDEX: 22.98 KG/M2 | RESPIRATION RATE: 20 BRPM | SYSTOLIC BLOOD PRESSURE: 132 MMHG | DIASTOLIC BLOOD PRESSURE: 92 MMHG | OXYGEN SATURATION: 97 % | TEMPERATURE: 97 F | HEART RATE: 80 BPM | HEIGHT: 59 IN

## 2023-11-30 DIAGNOSIS — M17.11 PRIMARY OSTEOARTHRITIS OF RIGHT KNEE: ICD-10-CM

## 2023-11-30 DIAGNOSIS — I10 PRIMARY HYPERTENSION: Primary | ICD-10-CM

## 2023-11-30 PROCEDURE — 99213 PR OFFICE/OUTPT VISIT, EST, LEVL III, 20-29 MIN: ICD-10-PCS | Mod: S$GLB,,, | Performed by: NURSE PRACTITIONER

## 2023-11-30 PROCEDURE — 1160F PR REVIEW ALL MEDS BY PRESCRIBER/CLIN PHARMACIST DOCUMENTED: ICD-10-PCS | Mod: CPTII,S$GLB,, | Performed by: NURSE PRACTITIONER

## 2023-11-30 PROCEDURE — 99213 OFFICE O/P EST LOW 20 MIN: CPT | Mod: S$GLB,,, | Performed by: NURSE PRACTITIONER

## 2023-11-30 PROCEDURE — 3075F PR MOST RECENT SYSTOLIC BLOOD PRESS GE 130-139MM HG: ICD-10-PCS | Mod: CPTII,S$GLB,, | Performed by: NURSE PRACTITIONER

## 2023-11-30 PROCEDURE — 3044F HG A1C LEVEL LT 7.0%: CPT | Mod: CPTII,S$GLB,, | Performed by: NURSE PRACTITIONER

## 2023-11-30 PROCEDURE — 4010F PR ACE/ARB THEARPY RXD/TAKEN: ICD-10-PCS | Mod: CPTII,S$GLB,, | Performed by: NURSE PRACTITIONER

## 2023-11-30 PROCEDURE — 3080F PR MOST RECENT DIASTOLIC BLOOD PRESSURE >= 90 MM HG: ICD-10-PCS | Mod: CPTII,S$GLB,, | Performed by: NURSE PRACTITIONER

## 2023-11-30 PROCEDURE — 3080F DIAST BP >= 90 MM HG: CPT | Mod: CPTII,S$GLB,, | Performed by: NURSE PRACTITIONER

## 2023-11-30 PROCEDURE — 1160F RVW MEDS BY RX/DR IN RCRD: CPT | Mod: CPTII,S$GLB,, | Performed by: NURSE PRACTITIONER

## 2023-11-30 PROCEDURE — 4010F ACE/ARB THERAPY RXD/TAKEN: CPT | Mod: CPTII,S$GLB,, | Performed by: NURSE PRACTITIONER

## 2023-11-30 PROCEDURE — 3044F PR MOST RECENT HEMOGLOBIN A1C LEVEL <7.0%: ICD-10-PCS | Mod: CPTII,S$GLB,, | Performed by: NURSE PRACTITIONER

## 2023-11-30 PROCEDURE — 3288F FALL RISK ASSESSMENT DOCD: CPT | Mod: CPTII,S$GLB,, | Performed by: NURSE PRACTITIONER

## 2023-11-30 PROCEDURE — 3008F PR BODY MASS INDEX (BMI) DOCUMENTED: ICD-10-PCS | Mod: CPTII,S$GLB,, | Performed by: NURSE PRACTITIONER

## 2023-11-30 PROCEDURE — 1159F MED LIST DOCD IN RCRD: CPT | Mod: CPTII,S$GLB,, | Performed by: NURSE PRACTITIONER

## 2023-11-30 PROCEDURE — 3008F BODY MASS INDEX DOCD: CPT | Mod: CPTII,S$GLB,, | Performed by: NURSE PRACTITIONER

## 2023-11-30 PROCEDURE — 3075F SYST BP GE 130 - 139MM HG: CPT | Mod: CPTII,S$GLB,, | Performed by: NURSE PRACTITIONER

## 2023-11-30 PROCEDURE — 1159F PR MEDICATION LIST DOCUMENTED IN MEDICAL RECORD: ICD-10-PCS | Mod: CPTII,S$GLB,, | Performed by: NURSE PRACTITIONER

## 2023-11-30 PROCEDURE — 3288F PR FALLS RISK ASSESSMENT DOCUMENTED: ICD-10-PCS | Mod: CPTII,S$GLB,, | Performed by: NURSE PRACTITIONER

## 2023-11-30 PROCEDURE — 1101F PT FALLS ASSESS-DOCD LE1/YR: CPT | Mod: CPTII,S$GLB,, | Performed by: NURSE PRACTITIONER

## 2023-11-30 PROCEDURE — 99999 PR PBB SHADOW E&M-EST. PATIENT-LVL IV: CPT | Mod: PBBFAC,,, | Performed by: NURSE PRACTITIONER

## 2023-11-30 PROCEDURE — 1125F AMNT PAIN NOTED PAIN PRSNT: CPT | Mod: CPTII,S$GLB,, | Performed by: NURSE PRACTITIONER

## 2023-11-30 PROCEDURE — 99999 PR PBB SHADOW E&M-EST. PATIENT-LVL IV: ICD-10-PCS | Mod: PBBFAC,,, | Performed by: NURSE PRACTITIONER

## 2023-11-30 PROCEDURE — 1125F PR PAIN SEVERITY QUANTIFIED, PAIN PRESENT: ICD-10-PCS | Mod: CPTII,S$GLB,, | Performed by: NURSE PRACTITIONER

## 2023-11-30 PROCEDURE — 1101F PR PT FALLS ASSESS DOC 0-1 FALLS W/OUT INJ PAST YR: ICD-10-PCS | Mod: CPTII,S$GLB,, | Performed by: NURSE PRACTITIONER

## 2023-11-30 RX ORDER — HYDROCHLOROTHIAZIDE 12.5 MG/1
12.5 TABLET ORAL DAILY
Qty: 30 TABLET | Refills: 0 | Status: SHIPPED | OUTPATIENT
Start: 2023-11-30 | End: 2023-12-14

## 2023-11-30 NOTE — PROGRESS NOTES
Subjective     Patient ID: Azucena Luciano is a 73 y.o. female.    Chief Complaint: Hypertension and Knee Pain    Patient in office for a 2 week hypertension visit.  Patient has been consistent with valsartan 325 mg daily.  Home BPs have been consistently in the 140s to 160s systolic.  Patient continues to deny any cardiac symptoms associated with elevated pressures.  She denies any reportable side effects of medication use.      Review of Systems   Cardiovascular: Negative.    Gastrointestinal: Negative.    Genitourinary: Negative.    Musculoskeletal:  Positive for arthralgias (Pt s/p RTKA).   Integumentary:  Negative.   Neurological: Negative.    Psychiatric/Behavioral: Negative.     All other systems reviewed and are negative.         Objective     Physical Exam  Vitals reviewed.   Constitutional:       Appearance: Normal appearance.   HENT:      Head: Normocephalic.   Eyes:      Pupils: Pupils are equal, round, and reactive to light.   Cardiovascular:      Rate and Rhythm: Normal rate and regular rhythm.   Pulmonary:      Effort: Pulmonary effort is normal.      Breath sounds: Normal breath sounds.   Abdominal:      Palpations: Abdomen is soft.   Musculoskeletal:         General: Tenderness (R knee) present.      Cervical back: Normal range of motion and neck supple.   Skin:     General: Skin is warm and dry.   Neurological:      Mental Status: She is alert.   Psychiatric:         Mood and Affect: Mood normal.         Behavior: Behavior normal.            Assessment and Plan     1. Primary hypertension  Assessment & Plan:  Will add HCTZ to current regimen.   Pt will start taking HCTZ 12.5 mg daily and if after  1 week SBP is consistently > 140 ok to increase dose of HCTZ to 25 mg.   PT will schedule with me in 2 weeks for re-evaluation.   Will check BMP after 2 week follow up.     Current regimen should include Valsartan 320 mg QD and HCTZ 12.5mg QD    Orders:  -     hydroCHLOROthiazide (HYDRODIURIL) 12.5 MG  Tab; Take 1 tablet (12.5 mg total) by mouth once daily.  Dispense: 30 tablet; Refill: 0    2. Primary osteoarthritis of right knee  Assessment & Plan:  Improving.   Pt s/p RTKA. Please continue to f/u with ortho as previously scheduled for ongoing post-op management.       2 week HTN f/u scheduled.

## 2023-11-30 NOTE — ASSESSMENT & PLAN NOTE
Improving.   Pt s/p RTKA. Please continue to f/u with ortho as previously scheduled for ongoing post-op management.

## 2023-11-30 NOTE — ASSESSMENT & PLAN NOTE
Will add HCTZ to current regimen.   Pt will start taking HCTZ 12.5 mg daily and if after  1 week SBP is consistently > 140 ok to increase dose of HCTZ to 25 mg.   PT will schedule with me in 2 weeks for re-evaluation.   Will check BMP after 2 week follow up.     Current regimen should include Valsartan 320 mg QD and HCTZ 12.5mg QD

## 2023-12-12 ENCOUNTER — HOSPITAL ENCOUNTER (OUTPATIENT)
Dept: RADIOLOGY | Facility: HOSPITAL | Age: 73
Discharge: HOME | End: 2023-12-12
Attending: ORTHOPAEDIC SURGERY
Payer: MEDICARE

## 2023-12-12 ENCOUNTER — OFFICE VISIT (OUTPATIENT)
Dept: ORTHOPEDICS | Facility: CLINIC | Age: 73
End: 2023-12-12
Payer: MEDICARE

## 2023-12-12 VITALS
HEART RATE: 74 BPM | BODY MASS INDEX: 22.98 KG/M2 | WEIGHT: 114 LBS | SYSTOLIC BLOOD PRESSURE: 156 MMHG | HEIGHT: 59 IN | DIASTOLIC BLOOD PRESSURE: 89 MMHG

## 2023-12-12 DIAGNOSIS — M62.81 QUADRICEPS WEAKNESS: Primary | ICD-10-CM

## 2023-12-12 DIAGNOSIS — Z96.652 AFTERCARE FOLLOWING LEFT KNEE JOINT REPLACEMENT SURGERY: ICD-10-CM

## 2023-12-12 DIAGNOSIS — Z47.1 AFTERCARE FOLLOWING LEFT KNEE JOINT REPLACEMENT SURGERY: ICD-10-CM

## 2023-12-12 DIAGNOSIS — M25.562 LEFT KNEE PAIN, UNSPECIFIED CHRONICITY: ICD-10-CM

## 2023-12-12 PROCEDURE — 1125F PR PAIN SEVERITY QUANTIFIED, PAIN PRESENT: ICD-10-PCS | Mod: CPTII,S$GLB,, | Performed by: ORTHOPAEDIC SURGERY

## 2023-12-12 PROCEDURE — 77073 BONE LENGTH STUDIES: CPT | Mod: 26,,, | Performed by: INTERNAL MEDICINE

## 2023-12-12 PROCEDURE — 99999 PR PBB SHADOW E&M-EST. PATIENT-LVL IV: ICD-10-PCS | Mod: PBBFAC,,, | Performed by: ORTHOPAEDIC SURGERY

## 2023-12-12 PROCEDURE — 3079F PR MOST RECENT DIASTOLIC BLOOD PRESSURE 80-89 MM HG: ICD-10-PCS | Mod: CPTII,S$GLB,, | Performed by: ORTHOPAEDIC SURGERY

## 2023-12-12 PROCEDURE — 73562 X-RAY EXAM OF KNEE 3: CPT | Mod: 26,59,LT, | Performed by: INTERNAL MEDICINE

## 2023-12-12 PROCEDURE — 4010F ACE/ARB THERAPY RXD/TAKEN: CPT | Mod: CPTII,S$GLB,, | Performed by: ORTHOPAEDIC SURGERY

## 2023-12-12 PROCEDURE — 3077F PR MOST RECENT SYSTOLIC BLOOD PRESSURE >= 140 MM HG: ICD-10-PCS | Mod: CPTII,S$GLB,, | Performed by: ORTHOPAEDIC SURGERY

## 2023-12-12 PROCEDURE — 73562 X-RAY EXAM OF KNEE 3: CPT | Mod: TC,PN,LT

## 2023-12-12 PROCEDURE — 1101F PT FALLS ASSESS-DOCD LE1/YR: CPT | Mod: CPTII,S$GLB,, | Performed by: ORTHOPAEDIC SURGERY

## 2023-12-12 PROCEDURE — 1125F AMNT PAIN NOTED PAIN PRSNT: CPT | Mod: CPTII,S$GLB,, | Performed by: ORTHOPAEDIC SURGERY

## 2023-12-12 PROCEDURE — 73562 XR KNEE 3 VIEW LEFT: ICD-10-PCS | Mod: 26,59,LT, | Performed by: INTERNAL MEDICINE

## 2023-12-12 PROCEDURE — 1101F PR PT FALLS ASSESS DOC 0-1 FALLS W/OUT INJ PAST YR: ICD-10-PCS | Mod: CPTII,S$GLB,, | Performed by: ORTHOPAEDIC SURGERY

## 2023-12-12 PROCEDURE — 3008F BODY MASS INDEX DOCD: CPT | Mod: CPTII,S$GLB,, | Performed by: ORTHOPAEDIC SURGERY

## 2023-12-12 PROCEDURE — 3288F PR FALLS RISK ASSESSMENT DOCUMENTED: ICD-10-PCS | Mod: CPTII,S$GLB,, | Performed by: ORTHOPAEDIC SURGERY

## 2023-12-12 PROCEDURE — 3288F FALL RISK ASSESSMENT DOCD: CPT | Mod: CPTII,S$GLB,, | Performed by: ORTHOPAEDIC SURGERY

## 2023-12-12 PROCEDURE — 3077F SYST BP >= 140 MM HG: CPT | Mod: CPTII,S$GLB,, | Performed by: ORTHOPAEDIC SURGERY

## 2023-12-12 PROCEDURE — 3044F PR MOST RECENT HEMOGLOBIN A1C LEVEL <7.0%: ICD-10-PCS | Mod: CPTII,S$GLB,, | Performed by: ORTHOPAEDIC SURGERY

## 2023-12-12 PROCEDURE — 3079F DIAST BP 80-89 MM HG: CPT | Mod: CPTII,S$GLB,, | Performed by: ORTHOPAEDIC SURGERY

## 2023-12-12 PROCEDURE — 99214 PR OFFICE/OUTPT VISIT, EST, LEVL IV, 30-39 MIN: ICD-10-PCS | Mod: S$GLB,,, | Performed by: ORTHOPAEDIC SURGERY

## 2023-12-12 PROCEDURE — 99214 OFFICE O/P EST MOD 30 MIN: CPT | Mod: S$GLB,,, | Performed by: ORTHOPAEDIC SURGERY

## 2023-12-12 PROCEDURE — 1159F PR MEDICATION LIST DOCUMENTED IN MEDICAL RECORD: ICD-10-PCS | Mod: CPTII,S$GLB,, | Performed by: ORTHOPAEDIC SURGERY

## 2023-12-12 PROCEDURE — 77073 BONE LENGTH STUDIES: CPT | Mod: TC,PN

## 2023-12-12 PROCEDURE — 77073 XR HIP TO ANKLE: ICD-10-PCS | Mod: 26,,, | Performed by: INTERNAL MEDICINE

## 2023-12-12 PROCEDURE — 1159F MED LIST DOCD IN RCRD: CPT | Mod: CPTII,S$GLB,, | Performed by: ORTHOPAEDIC SURGERY

## 2023-12-12 PROCEDURE — 3008F PR BODY MASS INDEX (BMI) DOCUMENTED: ICD-10-PCS | Mod: CPTII,S$GLB,, | Performed by: ORTHOPAEDIC SURGERY

## 2023-12-12 PROCEDURE — 3044F HG A1C LEVEL LT 7.0%: CPT | Mod: CPTII,S$GLB,, | Performed by: ORTHOPAEDIC SURGERY

## 2023-12-12 PROCEDURE — 4010F PR ACE/ARB THEARPY RXD/TAKEN: ICD-10-PCS | Mod: CPTII,S$GLB,, | Performed by: ORTHOPAEDIC SURGERY

## 2023-12-12 PROCEDURE — 99999 PR PBB SHADOW E&M-EST. PATIENT-LVL IV: CPT | Mod: PBBFAC,,, | Performed by: ORTHOPAEDIC SURGERY

## 2023-12-12 NOTE — PROGRESS NOTES
Subjective:      Patient ID: Azucena Luciano is a 73 y.o. female.    Chief Complaint: Pain of the Left Knee    Patient is 3 months s/p  left primary total knee replacement  Anterior knee pain: Yes  Has improved pain  Is in physical therapy  No  Problems w incision  No  Is  happy with result  Yes  Opiod free: Yes         Social History     Occupational History    Occupation: Retired insurance     Tobacco Use    Smoking status: Never    Smokeless tobacco: Never   Substance and Sexual Activity    Alcohol use: Not Currently    Drug use: No    Sexual activity: Yes     Partners: Male      Review of Systems   Constitutional: Negative for diaphoresis.   HENT:  Negative for ear discharge, nosebleeds and stridor.    Eyes:  Negative for photophobia.   Cardiovascular:  Negative for syncope.   Respiratory:  Negative for hemoptysis, shortness of breath and wheezing.    Neurological:  Negative for tremors.   Psychiatric/Behavioral: Negative.           Objective:    General    Constitutional: She is oriented to person, place, and time. She appears well-developed and well-nourished.   HENT:   Head: Normocephalic and atraumatic.   Eyes: EOM are normal.   Pulmonary/Chest: Effort normal.   Neurological: She is alert and oriented to person, place, and time.   Psychiatric: She has a normal mood and affect. Her behavior is normal. Judgment and thought content normal.     General Musculoskeletal Exam   Gait: normal         Left Knee Exam     Inspection   Erythema: absent  Scars: present  Swelling: absent  Effusion: absent  Deformity: absent  Bruising: absent    Tenderness   The patient is experiencing no tenderness.     Range of Motion   Extension:  0   Flexion:  110     Tests   Stability   PCL-Posterior Drawer: normal (0 to 2mm)  MCL - Valgus: normal (0 to 2mm)  LCL - Varus: normal (0 to 2mm)  Patella   Passive Patellar Tilt: neutral  Patellar Tracking: normal    Other   Sensation: normal    Comments:  Incision well  healed    Muscle Strength   Left Lower Extremity   Quadriceps:  4/5          Assessment:       1. Quadriceps weakness    2. Aftercare following left knee joint replacement surgery          Plan:       Overall patient appears to be doing well and is happy with the result of the knee arthroplasty. They can continue activities as tolerated avoiding high impact activities.  I would like to see the patient back In 3 months with xrays. Still has some quad weakness, will send for more PT

## 2023-12-14 ENCOUNTER — LAB VISIT (OUTPATIENT)
Dept: LAB | Facility: HOSPITAL | Age: 73
End: 2023-12-14
Payer: MEDICARE

## 2023-12-14 ENCOUNTER — OFFICE VISIT (OUTPATIENT)
Dept: INTERNAL MEDICINE | Facility: CLINIC | Age: 73
End: 2023-12-14
Payer: MEDICARE

## 2023-12-14 VITALS
SYSTOLIC BLOOD PRESSURE: 152 MMHG | HEART RATE: 75 BPM | DIASTOLIC BLOOD PRESSURE: 102 MMHG | BODY MASS INDEX: 22.82 KG/M2 | HEIGHT: 59 IN | TEMPERATURE: 97 F | RESPIRATION RATE: 20 BRPM | OXYGEN SATURATION: 97 % | WEIGHT: 113.19 LBS

## 2023-12-14 DIAGNOSIS — I10 HTN, GOAL BELOW 130/80: ICD-10-CM

## 2023-12-14 DIAGNOSIS — I10 HTN, GOAL BELOW 130/80: Primary | ICD-10-CM

## 2023-12-14 LAB
ANION GAP SERPL CALC-SCNC: 7 MMOL/L (ref 8–16)
BUN SERPL-MCNC: 20 MG/DL (ref 8–23)
CALCIUM SERPL-MCNC: 9.5 MG/DL (ref 8.7–10.5)
CHLORIDE SERPL-SCNC: 106 MMOL/L (ref 95–110)
CO2 SERPL-SCNC: 29 MMOL/L (ref 23–29)
CREAT SERPL-MCNC: 0.8 MG/DL (ref 0.5–1.4)
EST. GFR  (NO RACE VARIABLE): >60 ML/MIN/1.73 M^2
GLUCOSE SERPL-MCNC: 98 MG/DL (ref 70–110)
POTASSIUM SERPL-SCNC: 4.1 MMOL/L (ref 3.5–5.1)
SODIUM SERPL-SCNC: 142 MMOL/L (ref 136–145)

## 2023-12-14 PROCEDURE — 3044F PR MOST RECENT HEMOGLOBIN A1C LEVEL <7.0%: ICD-10-PCS | Mod: CPTII,S$GLB,, | Performed by: NURSE PRACTITIONER

## 2023-12-14 PROCEDURE — 1125F PR PAIN SEVERITY QUANTIFIED, PAIN PRESENT: ICD-10-PCS | Mod: CPTII,S$GLB,, | Performed by: NURSE PRACTITIONER

## 2023-12-14 PROCEDURE — 80048 BASIC METABOLIC PNL TOTAL CA: CPT | Performed by: NURSE PRACTITIONER

## 2023-12-14 PROCEDURE — 1125F AMNT PAIN NOTED PAIN PRSNT: CPT | Mod: CPTII,S$GLB,, | Performed by: NURSE PRACTITIONER

## 2023-12-14 PROCEDURE — 3288F PR FALLS RISK ASSESSMENT DOCUMENTED: ICD-10-PCS | Mod: CPTII,S$GLB,, | Performed by: NURSE PRACTITIONER

## 2023-12-14 PROCEDURE — 1101F PR PT FALLS ASSESS DOC 0-1 FALLS W/OUT INJ PAST YR: ICD-10-PCS | Mod: CPTII,S$GLB,, | Performed by: NURSE PRACTITIONER

## 2023-12-14 PROCEDURE — 3008F PR BODY MASS INDEX (BMI) DOCUMENTED: ICD-10-PCS | Mod: CPTII,S$GLB,, | Performed by: NURSE PRACTITIONER

## 2023-12-14 PROCEDURE — 1160F PR REVIEW ALL MEDS BY PRESCRIBER/CLIN PHARMACIST DOCUMENTED: ICD-10-PCS | Mod: CPTII,S$GLB,, | Performed by: NURSE PRACTITIONER

## 2023-12-14 PROCEDURE — 1159F PR MEDICATION LIST DOCUMENTED IN MEDICAL RECORD: ICD-10-PCS | Mod: CPTII,S$GLB,, | Performed by: NURSE PRACTITIONER

## 2023-12-14 PROCEDURE — 1160F RVW MEDS BY RX/DR IN RCRD: CPT | Mod: CPTII,S$GLB,, | Performed by: NURSE PRACTITIONER

## 2023-12-14 PROCEDURE — 3080F PR MOST RECENT DIASTOLIC BLOOD PRESSURE >= 90 MM HG: ICD-10-PCS | Mod: CPTII,S$GLB,, | Performed by: NURSE PRACTITIONER

## 2023-12-14 PROCEDURE — 99999 PR PBB SHADOW E&M-EST. PATIENT-LVL IV: CPT | Mod: PBBFAC,,, | Performed by: NURSE PRACTITIONER

## 2023-12-14 PROCEDURE — 3077F PR MOST RECENT SYSTOLIC BLOOD PRESSURE >= 140 MM HG: ICD-10-PCS | Mod: CPTII,S$GLB,, | Performed by: NURSE PRACTITIONER

## 2023-12-14 PROCEDURE — 4010F PR ACE/ARB THEARPY RXD/TAKEN: ICD-10-PCS | Mod: CPTII,S$GLB,, | Performed by: NURSE PRACTITIONER

## 2023-12-14 PROCEDURE — 3008F BODY MASS INDEX DOCD: CPT | Mod: CPTII,S$GLB,, | Performed by: NURSE PRACTITIONER

## 2023-12-14 PROCEDURE — 1101F PT FALLS ASSESS-DOCD LE1/YR: CPT | Mod: CPTII,S$GLB,, | Performed by: NURSE PRACTITIONER

## 2023-12-14 PROCEDURE — 99214 PR OFFICE/OUTPT VISIT, EST, LEVL IV, 30-39 MIN: ICD-10-PCS | Mod: S$GLB,,, | Performed by: NURSE PRACTITIONER

## 2023-12-14 PROCEDURE — 3044F HG A1C LEVEL LT 7.0%: CPT | Mod: CPTII,S$GLB,, | Performed by: NURSE PRACTITIONER

## 2023-12-14 PROCEDURE — 99214 OFFICE O/P EST MOD 30 MIN: CPT | Mod: S$GLB,,, | Performed by: NURSE PRACTITIONER

## 2023-12-14 PROCEDURE — 4010F ACE/ARB THERAPY RXD/TAKEN: CPT | Mod: CPTII,S$GLB,, | Performed by: NURSE PRACTITIONER

## 2023-12-14 PROCEDURE — 3080F DIAST BP >= 90 MM HG: CPT | Mod: CPTII,S$GLB,, | Performed by: NURSE PRACTITIONER

## 2023-12-14 PROCEDURE — 36415 COLL VENOUS BLD VENIPUNCTURE: CPT | Mod: PO | Performed by: NURSE PRACTITIONER

## 2023-12-14 PROCEDURE — 3077F SYST BP >= 140 MM HG: CPT | Mod: CPTII,S$GLB,, | Performed by: NURSE PRACTITIONER

## 2023-12-14 PROCEDURE — 99999 PR PBB SHADOW E&M-EST. PATIENT-LVL IV: ICD-10-PCS | Mod: PBBFAC,,, | Performed by: NURSE PRACTITIONER

## 2023-12-14 PROCEDURE — 1159F MED LIST DOCD IN RCRD: CPT | Mod: CPTII,S$GLB,, | Performed by: NURSE PRACTITIONER

## 2023-12-14 PROCEDURE — 3288F FALL RISK ASSESSMENT DOCD: CPT | Mod: CPTII,S$GLB,, | Performed by: NURSE PRACTITIONER

## 2023-12-14 RX ORDER — HYDROCHLOROTHIAZIDE 25 MG/1
25 TABLET ORAL DAILY
Qty: 30 TABLET | Refills: 5 | Status: SHIPPED | OUTPATIENT
Start: 2023-12-14 | End: 2024-12-13

## 2023-12-14 NOTE — PROGRESS NOTES
Subjective     Patient ID: Azucena Luciano is a 73 y.o. female.    Chief Complaint: Follow-up and Knee Pain    Patient in office for 2 week hypertension follow-up.  At last visit patient was recommended to augment her current regimen with hydrochlorothiazide 12.5 mg daily.  Since adding this medication patient's blood pressure continues to be in the 140s over 80s.  She was recommended to increase dose of hydrochlorothiazide to 25 if her systolic blood pressures were greater than 140.  Patient has not made this increase.  She continues to deny any cardiac symptoms such as chest pain, shortness with breath, palpitations or diaphoresis.    Follow-up  Associated symptoms include arthralgias.   Knee Pain       Review of Systems   Musculoskeletal:  Positive for arthralgias and leg pain (L knee).   All other systems reviewed and are negative.         Objective     Physical Exam  Vitals reviewed.   Constitutional:       Appearance: Normal appearance. She is obese.   HENT:      Head: Normocephalic and atraumatic.   Eyes:      Conjunctiva/sclera: Conjunctivae normal.      Pupils: Pupils are equal, round, and reactive to light.   Cardiovascular:      Rate and Rhythm: Normal rate and regular rhythm.   Pulmonary:      Effort: Pulmonary effort is normal.      Breath sounds: Normal breath sounds.   Abdominal:      General: Bowel sounds are normal.      Palpations: Abdomen is soft.   Musculoskeletal:         General: Tenderness (L knee) present. Normal range of motion.      Cervical back: Normal range of motion and neck supple.   Skin:     General: Skin is warm.   Neurological:      General: No focal deficit present.   Psychiatric:         Mood and Affect: Mood normal.            Assessment and Plan     1. HTN, goal below 130/80  Assessment & Plan:  Improving.    BMP ordered and recommended to complete today.     We will increase hydrochlorothiazide to 25 mg daily.  Patient will communicate via patient portal about her blood  pressure readings in 2 weeks.  We will make changes as warranted.    Orders:  -     BASIC METABOLIC PANEL; Future; Expected date: 12/14/2023  -     hydroCHLOROthiazide (HYDRODIURIL) 25 MG tablet; Take 1 tablet (25 mg total) by mouth once daily.  Dispense: 30 tablet; Refill: 5        F/u with PRN.

## 2023-12-14 NOTE — ASSESSMENT & PLAN NOTE
Improving.    BMP ordered and recommended to complete today.     We will increase hydrochlorothiazide to 25 mg daily.  Patient will communicate via patient portal about her blood pressure readings in 2 weeks.  We will make changes as warranted.

## 2023-12-15 ENCOUNTER — TELEPHONE (OUTPATIENT)
Dept: INTERNAL MEDICINE | Facility: CLINIC | Age: 73
End: 2023-12-15
Payer: MEDICARE

## 2023-12-15 NOTE — TELEPHONE ENCOUNTER
----- Message from Nellie Murcia NP sent at 12/15/2023  8:53 AM CST -----  Please inform pt her metabolic panel was normal. Continue with current medication regimen as we discussed at OV.    Thanks!

## 2023-12-21 ENCOUNTER — CLINICAL SUPPORT (OUTPATIENT)
Dept: REHABILITATION | Facility: HOSPITAL | Age: 73
End: 2023-12-21
Attending: ORTHOPAEDIC SURGERY
Payer: MEDICARE

## 2023-12-21 DIAGNOSIS — Z47.1 AFTERCARE FOLLOWING LEFT KNEE JOINT REPLACEMENT SURGERY: ICD-10-CM

## 2023-12-21 DIAGNOSIS — Z74.09 IMPAIRED FUNCTIONAL MOBILITY, BALANCE, GAIT, AND ENDURANCE: Primary | ICD-10-CM

## 2023-12-21 DIAGNOSIS — R29.898 DECREASED STRENGTH OF LOWER EXTREMITY: ICD-10-CM

## 2023-12-21 DIAGNOSIS — Z96.652 AFTERCARE FOLLOWING LEFT KNEE JOINT REPLACEMENT SURGERY: ICD-10-CM

## 2023-12-21 DIAGNOSIS — R29.898 DECREASED STRENGTH INVOLVING KNEE JOINT: ICD-10-CM

## 2023-12-21 DIAGNOSIS — M62.81 QUADRICEPS WEAKNESS: ICD-10-CM

## 2023-12-21 PROCEDURE — 97110 THERAPEUTIC EXERCISES: CPT | Mod: PN

## 2023-12-21 PROCEDURE — 97112 NEUROMUSCULAR REEDUCATION: CPT | Mod: PN

## 2023-12-21 PROCEDURE — 97161 PT EVAL LOW COMPLEX 20 MIN: CPT | Mod: PN

## 2023-12-21 NOTE — PLAN OF CARE
[facility]  Physical Therapy Initial Evaluation    Name: Azucena Luciano  Clinic Number: [id number]    Therapy Diagnosis:   Encounter Diagnoses   Name Primary?    Quadriceps weakness     Aftercare following left knee joint replacement surgery     Impaired functional mobility, balance, gait, and endurance Yes    Decreased strength of lower extremity     Decreased strength involving knee joint      Physician: Bryson Medrano MD    Physician Orders: PT Eval and Treat  Medical Diagnosis: M62.81 (ICD-10-CM) - Quadriceps [id number],Z96.652 (ICD-10-CM) - Aftercare following left knee joint replacement surgery  Evaluation Date: 12/21/2023  Authorization Period Expiration: 12/312023  Plan of Care Certification Period: 12/21/2023 to 02/23/2024  Visit # / Visits authorized: 1/TBD FOTO: 1/5  PTA visits: 0/5    Time In: 1300  Time Out: 1355  Total Billable Time: 50 minutes (1 LCE, 1 TE, 1 NM)  Precautions: hypertension. asthma    Subjective   Azucena reports to OPPT today with complaint of increased left knee pain following returning to work full-time.  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  This requires longer periods of standing as well as carrying boxes.  Recent history of left total knee arthroplasty. Sx date: 09/11/2023.  Post-op 3 months. Underwent OPPT. Did well.  Compliant with discharge home exercise program mat exercises but not standing.  Pain mostly in her popliteal fossa area.  Sore in her quadriceps muscle and calf on the left side as well.        Past Medical History:   Diagnosis Date    Asthma     Carpal tunnel syndrome     DVT (deep venous thrombosis)     Hypertension     Osteoporosis     Urinary tract infection      Azucena Luciano  has a past surgical history that includes Ovarian cyst removal; Facial cosmetic surgery; tummy tuck; Nasal septum surgery; Breast biopsy; Breast surgery; Breast cyst aspiration; Colonoscopy (N/A, 8/7/2018); Augmentation of breast; Cataract  extraction; Arthroscopy of knee (Left, 9/19/2022); and arthroplasty, knee, total, sight assisted (Left, 9/11/2023).    Azucena has a current medication list which includes the following prescription(s): acetaminophen, alendronate, ascorbic acid (vitamin c), aspirin, atorvastatin, calcium-vitamin d3, cyclobenzaprine, diclofenac, famotidine, gabapentin, hydrochlorothiazide, magnesium hydroxide 400 mg/5 ml, multivitamin with minerals, oxymetazoline, and valsartan, and the following Facility-Administered Medications: bupivacaine, cefazolin 2 g/50ml dextrose ivpb, and lidocaine hcl 10 mg/ml (1%).    Review of patient's allergies indicates:   Allergen Reactions    Cayenne pepper Anaphylaxis        Imaging:   See Media  Prior Therapy:  Yes for same problem  Social History:  Lives at home with .  Keeps her grandchildren periodically.    Occupation:   Artist on [employer]  Prior Level of Function:  See above  Current Level of Function:  See above.    Pain:  Current 4/10, worst 8/10, best 2/10   Location: left posterior knee, medial/lateral joint line, quad, calf, anterior tibialis  Description: Aching    Pts goals:  1. To be rid of her knee pain    2. To improve her knee strength    Objective     Palpation:  TTP lateral patellar border left knee    Range of motion:   right knee: (+) 5  - 0 - 150  Left knee:   0 - 115     Quad activation:  Good  Straight leg raise: no lag   Gait: Mild antalgic gait pattern without assistive device.    Strength:  Microfit: Hamstring left 5.8 kg, right 11.3 kg          Quadriceps left 14.2 kg, right 10.9 kg    Timed up and go: <14 seconds without assistive device.   STS (30-second max): 8    Edema:  Trace effusion let knee    CMS Impairment/Limitation/Restriction for FOTO Knee Survey    Therapist reviewed FOTO scores for Azucena Luciano on 12/21/2023.   FOTO documents entered into [employer] - see Media section.    Limitation Score: 64% --> 33%  Jr MARLENA: 48         TREATMENT    Treatment Time In: 1325  Treatment Time Out: 1355  Total Treatment time separate from Evaluation time:25'    Neuromuscular re-education: total time 15 minutes: including  Seated LAQs 90-0 8# 2x10 RLE, 2x8 RLE  Seated LAQs 90-45 yellow tb 2x15 RLE, 2x8-10 LL    Azucena received therapeutic exercises to develop strength, endurance, ROM, flexibility, posture, and core stabilization for 10 minutes including:  -- bike x 6' at Lv2  -- hamstring curl machine dL 2x10 at 3 plates    Home Exercises and Patient Education Provided  Education provided re:   - PT diagnosis and recommended treatment course.  - home exercise program: resisted LAQs, STS    Written Home Exercises Provided: yes.  Exercises were reviewed and Azucena was able to demonstrate them prior to the end of the session.   Pt received a written copy of exercises to perform at home. Azucena demonstrated fair  understanding of the education provided.     See EMR under patient instructions for exercises given.     Assessment   Azucena is a 73 y.o. female referred to outpatient Physical Therapy with a medical diagnosis of 1) M62.81 (ICD-10-CM) - Quadriceps weakness, 2) Z47.1,Z96.652 (ICD-10-CM) - Aftercare following left knee joint replacement surgery. Seen in this clinic for initial post-op course following left total knee arthroplasty. Sx date: 09/11/2023.  Post-op 14 weeks.  Complications initial post-op course including reaction to adhesive dressing.  Also decreased tolerance to strengthening.  Returned to work but increased level of intensity of standing, walking, and carrying tasks too quickly.  Decreased quad and hamstring strength.  Would benefit from skilled therapy services to address the above listed therapy impairments and deficits.       Pt prognosis is Excellent.   Pt will benefit from skilled outpatient Physical Therapy to address the deficits stated above and in the chart below, provide pt/family education, and to maximize pt's level of  independence.     Plan of care discussed with patient: Yes  Pt's spiritual, cultural and educational needs considered and patient is agreeable to the plan of care and goals as stated below:     Anticipated Barriers for therapy: none    Medical Necessity is demonstrated by the following  History  Co-morbidities and personal factors that may impact the plan of care Co-morbidities:   Allergies, Arthritis, Asthma, Depression, High Blood Pressure    Personal Factors:   age  coping style  lifestyle     moderate   Examination  Body Structures and Functions, activity limitations and participation restrictions that may impact the plan of care Body Regions:   lower extremities    Body Systems:    gross symmetry  ROM  strength  balance  gait  transfers  transitions  motor control  edema  scar formation    Participation Restrictions:   See above    Activity limitations:   Learning and applying knowledge  no deficits    General Tasks and Commands  no deficits    Communication  no deficits    Mobility  lifting and carrying objects  walking  Bending, squatting, kneeling    Self care  no deficits    Domestic Life  shopping  cooking  doing house work (cleaning house, washing dishes, laundry)    Interactions/Relationships  no deficits    Life Areas  employment    Community and Social Life  community life  recreation and leisure         moderate   Clinical Presentation stable and uncomplicated low   Decision Making/ Complexity Score: low     Goals:  Short Term Goals: 2-3 weeks   1.  Patient will demonstrate understanding of and compliance with home exercise program.   2.  Patient will report no exacerbations of left knee pain.   3.  Patient will report > 10 point improvement on KOOS, Jr.     Long Term Goals: 8 weeks   1.  Patient will demonstrate >15 kg improvement in bilateral quad strength for improved standing/lifting tolerance at work.   2.  Patient will report returning to work at [employer] performing all duties without  increased knee pain or limitations.   3.  Patient will demonstrate >10 kg improvement in bilateral hamstring strength for improved independence with walking tasks at work.     Plan   Certification Period/Plan of care expiration: 12/21/2023 to 02/23/2024.    Outpatient Physical Therapy 2 times weekly for 8 weeks to include the following interventions: Electrical Stimulation TENS/NMES, Gait Training, Manual Therapy, Moist Heat/ Ice, Neuromuscular Re-ed, Patient Education, Self Care, Therapeutic Activities, Therapeutic Exercise, and IASTM, FDN .     Brandin Orona, PT, DPT, OCS

## 2024-01-03 ENCOUNTER — CLINICAL SUPPORT (OUTPATIENT)
Dept: REHABILITATION | Facility: HOSPITAL | Age: 74
End: 2024-01-03
Payer: MEDICARE

## 2024-01-03 DIAGNOSIS — Z74.09 IMPAIRED FUNCTIONAL MOBILITY, BALANCE, GAIT, AND ENDURANCE: ICD-10-CM

## 2024-01-03 DIAGNOSIS — M25.562 CHRONIC PAIN OF LEFT KNEE: ICD-10-CM

## 2024-01-03 DIAGNOSIS — R29.898 DECREASED STRENGTH OF LOWER EXTREMITY: ICD-10-CM

## 2024-01-03 DIAGNOSIS — G89.29 CHRONIC PAIN OF LEFT KNEE: ICD-10-CM

## 2024-01-03 DIAGNOSIS — M62.81 QUADRICEPS WEAKNESS: Primary | ICD-10-CM

## 2024-01-03 PROCEDURE — 97530 THERAPEUTIC ACTIVITIES: CPT | Mod: PN

## 2024-01-03 PROCEDURE — 97110 THERAPEUTIC EXERCISES: CPT | Mod: PN

## 2024-01-04 NOTE — PROGRESS NOTES
"[facility]   Physical Therapy Treatment Note      Name: Azucena Luciano  Clinic Number: [id number]    Therapy Diagnosis:   Encounter Diagnoses   Name Primary?    Quadriceps weakness Yes    Impaired functional mobility, balance, gait, and endurance     Decreased strength of lower extremity     Chronic pain of left knee      Physician: Bryson Medrano MD    Visit Date: 1/3/2024    Physician Orders: PT Eval and Treat  Medical Diagnosis: M62.81 (ICD-10-CM) - Quadriceps [id number],Z96.652 (ICD-10-CM) - Aftercare following left knee joint replacement surgery  Evaluation Date: 12/21/2023  Authorization Period Expiration: 12/312023  Plan of Care Certification Period: 12/21/2023 to 02/23/2024  Visit # / Visits authorized: 2/TBD FOTO: 2/5  PTA visits: 0/5    Time In: 1100  Time Out: 1150  Total Billable Time: 30 minutes (1 TE, 1 TH)  Precautions: hypertension. asthma    Subjective     Patient reports: increased left knee pain over the last week. 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   She was compliant with home exercise program.  Response to previous treatment: eval and HEP  Functional change: none voiced    Pain: 8/10  Location: left knee      Objective      Objective Measures updated at progress report unless specified.     Treatment     Azucena received the treatments listed below:      Therapeutic exercises to develop strength, endurance, ROM, flexibility, posture, and core stabilization for 10 minutes 1:1 including:  SAQs 10x10"  Seated heel slides 3x60"  Straight leg raise 2x10/each  Bike partial to full revolutions 8' at Lv1    Therapeutic activities to improve functional performance for 10 minutes 1:1, including:  Leg press dL 3 plates 3x10-12      Patient Education and Home Exercises       Education provided:   - heavy education on load management at home and need for programmed rest breaks.     Written Home Exercises Provided: Patient " instructed to cont prior HEP. Exercises were reviewed and Azucena was able to demonstrate them prior to the end of the session.  Azucena demonstrated fair  understanding of the education provided. See Electronic Medical Record under Patient Instructions for exercises provided during therapy sessions    Assessment   A: Azucena is a 73 y.o. female referred to outpatient Physical Therapy with a medical diagnosis of 1) M62.81 (ICD-10-CM) - Quadriceps weakness, 2) Z47.1,Z96.652 (ICD-10-CM) - Aftercare following left knee joint replacement surgery. Seen in this clinic for initial post-op course following left total knee arthroplasty. Sx date: 09/11/2023.  Post-op 16 weeks.  Complications initial post-op course including reaction to adhesive dressing, poor tolerance to strength training, hypesthesias, and joint stiffness into flexion.  Comes to therapy today with high pain.  No/minimal effusion.  No calor or rubor of joint. Voices increase in amount of walking and stair climbing over the holidays.  Patient also has trouble resting/not moving. Needs consistent cueing to take the appropriate rest breaks between exercises.  Heavy education today on joint loading principles.     Azucena Is progressing well towards her goals.   Patient prognosis is Fair.     Patient will continue to benefit from skilled outpatient physical therapy to address the deficits listed in the problem list box on initial evaluation, provide pt/family education and to maximize pt's level of independence in the home and community environment.   Patient's spiritual, cultural and educational needs considered and pt agreeable to plan of care and goals.     Anticipated barriers to physical therapy: coping style    Goals:   Short Term Goals: 2-3 weeks --> progressing towards  1.  Patient will demonstrate understanding of and compliance with home exercise program.   2.  Patient will report no exacerbations of left knee pain.   3.  Patient will report >  10 point improvement on KOOS, Jr.      Long Term Goals: 8 weeks --> progressing towards  1.  Patient will demonstrate >15 kg improvement in bilateral quad strength for improved standing/lifting tolerance at work.   2.  Patient will report returning to work at [employer] performing all duties without increased knee pain or limitations.   3.  Patient will demonstrate >10 kg improvement in bilateral hamstring strength for improved independence with walking tasks at work.        Plan   Joint loading management including step count and programmed rest breaks at home.   Quad and hamstring strengthening     Brandin Orona, PT, DPT, OCS

## 2024-01-10 ENCOUNTER — CLINICAL SUPPORT (OUTPATIENT)
Dept: REHABILITATION | Facility: HOSPITAL | Age: 74
End: 2024-01-10
Payer: MEDICARE

## 2024-01-10 DIAGNOSIS — M25.562 CHRONIC PAIN OF LEFT KNEE: ICD-10-CM

## 2024-01-10 DIAGNOSIS — R29.898 DECREASED STRENGTH OF LOWER EXTREMITY: ICD-10-CM

## 2024-01-10 DIAGNOSIS — Z74.09 IMPAIRED FUNCTIONAL MOBILITY, BALANCE, GAIT, AND ENDURANCE: ICD-10-CM

## 2024-01-10 DIAGNOSIS — G89.29 CHRONIC PAIN OF LEFT KNEE: ICD-10-CM

## 2024-01-10 DIAGNOSIS — M62.81 QUADRICEPS WEAKNESS: Primary | ICD-10-CM

## 2024-01-10 PROCEDURE — 97530 THERAPEUTIC ACTIVITIES: CPT | Mod: PN

## 2024-01-10 PROCEDURE — 97110 THERAPEUTIC EXERCISES: CPT | Mod: PN

## 2024-01-10 NOTE — PROGRESS NOTES
[facility]   Physical Therapy Treatment Note      Name: Azucena Luciano  Clinic Number: [id number]    Therapy Diagnosis:   Encounter Diagnoses   Name Primary?    Quadriceps weakness Yes    Impaired functional mobility, balance, gait, and endurance     Decreased strength of lower extremity     Chronic pain of left knee        Physician: Bryson Medrano MD    Visit Date: 1/10/2024    Physician Orders: PT Eval and Treat  Medical Diagnosis:  M62.81 (ICD-10-CM) - Quadriceps weakness   Z47.1,Z96.652 (ICD-10-CM) - Aftercare following left knee joint replacement surgery     Evaluation Date: 12/21/2023  Authorization Period Expiration: 02/15/2024  Plan of Care Certification Period: 12/21/2023 to 02/23/2024  Visit # / Visits authorized: 2/23 (+eval) FOTO: 3/5  PTA visits: 0/5    Time In: 1100  Time Out: 1150  Total Billable Time: 25 minutes (1 TE, 1 TH)  Precautions: hypertension. asthma    Subjective     Patient reports: pain improved since taking ant-inflammation medications. No exercise but has been busy keeping grandchildren.   She was compliant with home exercise program.  Response to previous treatment: no adverse effects.   Functional change: none voiced    Pain: 8/10  Location: left knee      Objective      Objective Measures updated at progress report unless specified.     Treatment     Azucena received the treatments listed below:      Therapeutic exercises to develop strength, endurance, ROM, flexibility, posture, and core stabilization for 15 minutes 1:1, including:  SAQs 30x  Straight leg raise 2x10/each  Bike partial to full revolutions 8' at Lv1  Standing hamstring curls 3# 3x10/each    Therapeutic activities to improve functional performance for 10 minutes 1:1, including:  Leg press dL 3 plates 3x10-12      Patient Education and Home Exercises       Education provided:   - heavy education on load management at home and need for programmed rest breaks.     Written Home Exercises  Provided: Patient instructed to cont prior HEP. Exercises were reviewed and Azucena was able to demonstrate them prior to the end of the session.  Azucena demonstrated fair  understanding of the education provided. See Electronic Medical Record under Patient Instructions for exercises provided during therapy sessions    Assessment   A: Azucena is a 73 y.o. female referred to outpatient Physical Therapy with a medical diagnosis of 1) M62.81 (ICD-10-CM) - Quadriceps weakness, 2) Z47.1,Z96.652 (ICD-10-CM) - Aftercare following left knee joint replacement surgery. Seen in this clinic for initial post-op course following left total knee arthroplasty. Sx date: 09/11/2023.  Post-op 18 weeks.  Complications initial post-op course including reaction to adhesive dressing, poor tolerance to strength training, hypesthesias, and joint stiffness into flexion.  Pain and joint irritability better controlled today.  No/minimal effusion.  Patient also has trouble resting/not moving. Needs consistent cueing to take the appropriate rest breaks between exercises.  Continue education on joint loading principles.     Azucena Is progressing well towards her goals.   Patient prognosis is Fair.     Patient will continue to benefit from skilled outpatient physical therapy to address the deficits listed in the problem list box on initial evaluation, provide pt/family education and to maximize pt's level of independence in the home and community environment.   Patient's spiritual, cultural and educational needs considered and pt agreeable to plan of care and goals.     Anticipated barriers to physical therapy: coping style    Goals:   Short Term Goals: 2-3 weeks --> progressing towards  1.  Patient will demonstrate understanding of and compliance with home exercise program.   2.  Patient will report no exacerbations of left knee pain.   3.  Patient will report > 10 point improvement on KOOS, Jr.      Long Term Goals: 8 weeks -->  progressing towards  1.  Patient will demonstrate >15 kg improvement in bilateral quad strength for improved standing/lifting tolerance at work.   2.  Patient will report returning to work at [employer] performing all duties without increased knee pain or limitations.   3.  Patient will demonstrate >10 kg improvement in bilateral hamstring strength for improved independence with walking tasks at work.        Plan   Joint loading management including step count and programmed rest breaks at home.   Quad and hamstring strengthening     Brandin Orona, PT, DPT, OCS

## 2024-01-11 ENCOUNTER — APPOINTMENT (OUTPATIENT)
Dept: RADIOLOGY | Facility: CLINIC | Age: 74
End: 2024-01-11
Attending: INTERNAL MEDICINE
Payer: MEDICARE

## 2024-01-11 DIAGNOSIS — M81.6 LOCALIZED OSTEOPOROSIS, UNSPECIFIED PATHOLOGICAL FRACTURE PRESENCE: Chronic | ICD-10-CM

## 2024-01-11 PROCEDURE — 77080 DXA BONE DENSITY AXIAL: CPT | Mod: TC,PO

## 2024-01-11 PROCEDURE — 77080 DXA BONE DENSITY AXIAL: CPT | Mod: 26,,, | Performed by: INTERNAL MEDICINE

## 2024-01-16 ENCOUNTER — CLINICAL SUPPORT (OUTPATIENT)
Dept: REHABILITATION | Facility: HOSPITAL | Age: 74
End: 2024-01-16
Payer: MEDICARE

## 2024-01-16 DIAGNOSIS — M25.562 CHRONIC PAIN OF LEFT KNEE: ICD-10-CM

## 2024-01-16 DIAGNOSIS — R29.898 DECREASED STRENGTH OF LOWER EXTREMITY: ICD-10-CM

## 2024-01-16 DIAGNOSIS — Z74.09 IMPAIRED FUNCTIONAL MOBILITY, BALANCE, GAIT, AND ENDURANCE: ICD-10-CM

## 2024-01-16 DIAGNOSIS — G89.29 CHRONIC PAIN OF LEFT KNEE: ICD-10-CM

## 2024-01-16 DIAGNOSIS — M62.81 QUADRICEPS WEAKNESS: Primary | ICD-10-CM

## 2024-01-16 PROCEDURE — 97530 THERAPEUTIC ACTIVITIES: CPT | Mod: PN

## 2024-01-16 PROCEDURE — 97110 THERAPEUTIC EXERCISES: CPT | Mod: PN

## 2024-01-16 NOTE — PROGRESS NOTES
[facility]   Physical Therapy Treatment Note      Name: Azucena Luciano  Clinic Number: [id number]    Therapy Diagnosis:   Encounter Diagnoses   Name Primary?    Quadriceps weakness Yes    Impaired functional mobility, balance, gait, and endurance     Decreased strength of lower extremity     Chronic pain of left knee          Physician: Bryson Medrano MD    Visit Date: 1/16/2024    Physician Orders: PT Eval and Treat  Medical Diagnosis:  M62.81 (ICD-10-CM) - Quadriceps weakness   Z47.1,Z96.652 (ICD-10-CM) - Aftercare following left knee joint replacement surgery     Evaluation Date: 12/21/2023  Authorization Period Expiration: 02/15/2024  Plan of Care Certification Period: 12/21/2023 to 02/23/2024  Visit # / Visits authorized: 3/23 (+eval) FOTO: 4/5  PTA visits: 0/5    Time In: 1100  Time Out: 1155  Total Billable Time: 55 minutes (3 TE, 1 TH)  Precautions: hypertension. asthma    Subjective     Patient reports: increased pain left knee following 11-12 hour day of work on [employer].   She was compliant with home exercise program.  Response to previous treatment: no adverse effects.   Functional change: none voiced    Pain: 8/10  Location: left knee      Objective      Objective Measures updated at progress report unless specified.     Treatment     Azucena received the treatments listed below:      Therapeutic exercises to develop strength, endurance, ROM, flexibility, posture, and core stabilization for 40 minutes, including:  SAQs 30x --> 2x10 3# 2x10  Straight leg raise 2x10/each  LAQs 90-0 6# 2x10 --> yellow tb 2x10  Bike partial to full revolutions 8' at Lv1  Standing hamstring curls 3# 3x10/each  Heel raises 2x15    Therapeutic activities to improve functional performance for 15 minutes, including:  Leg press dL 5 plates 3x5      Patient Education and Home Exercises       Education provided:   - heavy education on load management at home and need for programmed rest breaks.      Written Home Exercises Provided: Patient instructed to cont prior HEP. Exercises were reviewed and Azucena was able to demonstrate them prior to the end of the session.  Azucena demonstrated fair  understanding of the education provided. See Electronic Medical Record under Patient Instructions for exercises provided during therapy sessions    Assessment   A: Azucena is a 73 y.o. female referred to outpatient Physical Therapy with a medical diagnosis of 1) M62.81 (ICD-10-CM) - Quadriceps weakness, 2) Z47.1,Z96.652 (ICD-10-CM) - Aftercare following left knee joint replacement surgery. Seen in this clinic for initial post-op course following left total knee arthroplasty. Sx date: 09/11/2023.  Post-op 18 weeks.  Complications initial post-op course including reaction to adhesive dressing, poor tolerance to strength training, hypesthesias, and joint stiffness into flexion.  Pain and joint irritability better controlled today.  Mild effusion.  Having too many spikes in lower extremity loading; discussed graded progression of step count and need to build muscle strength.     Azucena Is progressing well towards her goals.   Patient prognosis is Fair.     Patient will continue to benefit from skilled outpatient physical therapy to address the deficits listed in the problem list box on initial evaluation, provide pt/family education and to maximize pt's level of independence in the home and community environment.   Patient's spiritual, cultural and educational needs considered and pt agreeable to plan of care and goals.     Anticipated barriers to physical therapy: coping style    Goals:   Short Term Goals: 2-3 weeks --> progressing towards  1.  Patient will demonstrate understanding of and compliance with home exercise program.   2.  Patient will report no exacerbations of left knee pain.   3.  Patient will report > 10 point improvement on KOOS, Jr.      Long Term Goals: 8 weeks --> progressing towards  1.   Patient will demonstrate >15 kg improvement in bilateral quad strength for improved standing/lifting tolerance at work.   2.  Patient will report returning to work at [employer] performing all duties without increased knee pain or limitations.   3.  Patient will demonstrate >10 kg improvement in bilateral hamstring strength for improved independence with walking tasks at work.        Plan   Joint loading management including step count and programmed rest breaks at home.   Quad and hamstring strengthening     Brandin Orona, PT, DPT, OCS

## 2024-01-18 ENCOUNTER — CLINICAL SUPPORT (OUTPATIENT)
Dept: REHABILITATION | Facility: HOSPITAL | Age: 74
End: 2024-01-18
Payer: MEDICARE

## 2024-01-18 DIAGNOSIS — G89.29 CHRONIC PAIN OF LEFT KNEE: ICD-10-CM

## 2024-01-18 DIAGNOSIS — R29.898 DECREASED STRENGTH OF LOWER EXTREMITY: ICD-10-CM

## 2024-01-18 DIAGNOSIS — M62.81 QUADRICEPS WEAKNESS: Primary | ICD-10-CM

## 2024-01-18 DIAGNOSIS — Z74.09 IMPAIRED FUNCTIONAL MOBILITY, BALANCE, GAIT, AND ENDURANCE: ICD-10-CM

## 2024-01-18 DIAGNOSIS — M25.562 CHRONIC PAIN OF LEFT KNEE: ICD-10-CM

## 2024-01-18 PROCEDURE — 97530 THERAPEUTIC ACTIVITIES: CPT | Mod: PN,CQ

## 2024-01-18 PROCEDURE — 97110 THERAPEUTIC EXERCISES: CPT | Mod: PN,CQ

## 2024-01-18 NOTE — PROGRESS NOTES
[facility]   Physical Therapy Treatment Note      Name: Azucena Luciano  Clinic Number: [id number]    Therapy Diagnosis:   Encounter Diagnoses   Name Primary?    Quadriceps weakness Yes    Impaired functional mobility, balance, gait, and endurance     Decreased strength of lower extremity     Chronic pain of left knee            Physician: Bryson Medrano MD    Visit Date: 1/18/2024    Physician Orders: PT Eval and Treat  Medical Diagnosis:  M62.81 (ICD-10-CM) - Quadriceps weakness   Z47.1,Z96.652 (ICD-10-CM) - Aftercare following left knee joint replacement surgery     Evaluation Date: 12/21/2023  Authorization Period Expiration: 02/15/2024  Plan of Care Certification Period: 12/21/2023 to 02/23/2024  Visit # / Visits authorized: 4/23 (+eval) FOTO: 5/5 NEXT  PTA visits: 1/5    Time In: 1100  Time Out: 1155  Total Billable Time: 55 minutes (3 TE, 1 TH)  Precautions: hypertension. asthma    Subjective     Patient reports: Went to the gym yesterday and did various exercises. Reports Mild general soreness today as a result.  She was compliant with home exercise program.  Response to previous treatment: no adverse effects.   Functional change: none voiced    Pain: 6/10  Location: left knee      Objective      Objective Measures updated at progress report unless specified.     Treatment     Azucena received the treatments listed below:      Therapeutic exercises to develop strength, endurance, ROM, flexibility, posture, and core stabilization for 40 minutes, including:  SAQs 30x --> 2x10 3# 2x10  Straight leg raise 2x10/each  LAQs 90-0 6# 2x10 --> yellow tb 2x10  Bike partial to full revolutions 8' at Lv1  Standing hamstring curls 3# 3x10/each  Heel raises 2x15    Therapeutic activities to improve functional performance for 15 minutes, including:  Leg press dL 5 plates 3x8      Patient Education and Home Exercises       Education provided:   - heavy education on load management at home and need  for programmed rest breaks.     Written Home Exercises Provided: Patient instructed to cont prior HEP. Exercises were reviewed and Azucena was able to demonstrate them prior to the end of the session.  Azucena demonstrated fair  understanding of the education provided. See Electronic Medical Record under Patient Instructions for exercises provided during therapy sessions    Assessment   A: Azucena is a 73 y.o. female referred to outpatient Physical Therapy with a medical diagnosis of 1) M62.81 (ICD-10-CM) - Quadriceps weakness, 2) Z47.1,Z96.652 (ICD-10-CM) - Aftercare following left knee joint replacement surgery. Seen in this clinic for initial post-op course following left total knee arthroplasty. Sx date: 09/11/2023.  Post-op 18 weeks.  Complications initial post-op course including reaction to adhesive dressing, poor tolerance to strength training, hypesthesias, and joint stiffness into flexion.  Pain and joint irritability better controlled today.  Mild effusion. Continued to educate on importance of graded progression of step count and gradual loading of joint as she begins gym program outside of PT. Mild soreness and knee stiffness initially at beginning of session that improved after bike. Will continue to progress as appropriate.     Azucena Is progressing well towards her goals.   Patient prognosis is Fair.     Patient will continue to benefit from skilled outpatient physical therapy to address the deficits listed in the problem list box on initial evaluation, provide pt/family education and to maximize pt's level of independence in the home and community environment.   Patient's spiritual, cultural and educational needs considered and pt agreeable to plan of care and goals.     Anticipated barriers to physical therapy: coping style    Goals:   Short Term Goals: 2-3 weeks --> progressing towards  1.  Patient will demonstrate understanding of and compliance with home exercise program.   2.  Patient  will report no exacerbations of left knee pain.   3.  Patient will report > 10 point improvement on KOOS, Jr.      Long Term Goals: 8 weeks --> progressing towards  1.  Patient will demonstrate >15 kg improvement in bilateral quad strength for improved standing/lifting tolerance at work.   2.  Patient will report returning to work at [employer] performing all duties without increased knee pain or limitations.   3.  Patient will demonstrate >10 kg improvement in bilateral hamstring strength for improved independence with walking tasks at work.        Plan   Joint loading management including step count and programmed rest breaks at home.   Quad and hamstring strengthening     Ashlyn Garzon, PTA

## 2024-01-23 ENCOUNTER — CLINICAL SUPPORT (OUTPATIENT)
Dept: REHABILITATION | Facility: HOSPITAL | Age: 74
End: 2024-01-23
Payer: MEDICARE

## 2024-01-23 DIAGNOSIS — M25.562 CHRONIC PAIN OF LEFT KNEE: ICD-10-CM

## 2024-01-23 DIAGNOSIS — Z74.09 IMPAIRED FUNCTIONAL MOBILITY, BALANCE, GAIT, AND ENDURANCE: ICD-10-CM

## 2024-01-23 DIAGNOSIS — G89.29 CHRONIC PAIN OF LEFT KNEE: ICD-10-CM

## 2024-01-23 DIAGNOSIS — R29.898 DECREASED STRENGTH OF LOWER EXTREMITY: ICD-10-CM

## 2024-01-23 DIAGNOSIS — M62.81 QUADRICEPS WEAKNESS: Primary | ICD-10-CM

## 2024-01-23 PROCEDURE — 97530 THERAPEUTIC ACTIVITIES: CPT | Mod: PN,CQ

## 2024-01-23 PROCEDURE — 97110 THERAPEUTIC EXERCISES: CPT | Mod: PN,CQ

## 2024-01-23 NOTE — PROGRESS NOTES
[facility]   Physical Therapy Treatment Note      Name: Azucena Luciano  Clinic Number: [id number]    Therapy Diagnosis:   Encounter Diagnoses   Name Primary?    Quadriceps weakness Yes    Impaired functional mobility, balance, gait, and endurance     Decreased strength of lower extremity     Chronic pain of left knee      Physician: Bryson Medrano MD    Visit Date: 1/23/2024    Physician Orders: PT Eval and Treat  Medical Diagnosis:  M62.81 (ICD-10-CM) - Quadriceps weakness   Z47.1,Z96.652 (ICD-10-CM) - Aftercare following left knee joint replacement surgery     Evaluation Date: 12/21/2023  Authorization Period Expiration: 02/15/2024  Plan of Care Certification Period: 12/21/2023 to 02/23/2024  Visit # / Visits authorized: 5/23 (+eval) FOTO: 6/5 NEXT  PTA visits: 2/5    Time In: 1100  Time Out: 1155  Total Billable Time: 55 minutes (2 TE, 2 TH)  Precautions: hypertension. asthma    Subjective     Patient reports: States she is seeing improvement in mobility but still having pain primarily at night that disturbs her sleep.   She was compliant with home exercise program.  Response to previous treatment: no adverse effects.   Functional change: none voiced    Pain: 6/10  Location: left knee      Objective      Objective Measures updated at progress report unless specified.     Treatment     Azucena received the treatments listed below:      Therapeutic exercises to develop strength, endurance, ROM, flexibility, posture, and core stabilization for 30 minutes, including:  SAQs 30x --> 2x10 3# 2x10  Straight leg raise 2x10/each  LAQs 90-0 6# 3x10 --> yellow tb 3x10  Bike partial to full revolutions 8' at Lv1  Standing hamstring curls 3# 3x10/each    Therapeutic activities to improve functional performance for 25 minutes, including:  Leg press dL 5 plates 3x8  Heel raises 2x15  +Lateral step up on blue oval 2 x 10       Patient Education and Home Exercises       Education provided:   - heavy  education on load management at home and need for programmed rest breaks.     Written Home Exercises Provided: Patient instructed to cont prior HEP. Exercises were reviewed and Azucena was able to demonstrate them prior to the end of the session.  Azucena demonstrated fair  understanding of the education provided. See Electronic Medical Record under Patient Instructions for exercises provided during therapy sessions    Assessment   A: Azucena is a 73 y.o. female referred to outpatient Physical Therapy with a medical diagnosis of 1) M62.81 (ICD-10-CM) - Quadriceps weakness, 2) Z47.1,Z96.652 (ICD-10-CM) - Aftercare following left knee joint replacement surgery. Seen in this clinic for initial post-op course following left total knee arthroplasty. Sx date: 09/11/2023.  Post-op 18 weeks.  Complications initial post-op course including reaction to adhesive dressing, poor tolerance to strength training, hypesthesias, and joint stiffness into flexion. Mild effusion. Ongoing night pain that disturbs her sleep. Reports difficulty with stair descending stating she needs to use moderate UE support to complete. Continued to educate on importance of graded progression of step count and gradual loading of joint as she begins gym program outside of PT.  Tolerated session fairly well. Will continue to progress as appropriate.     Azucena Is progressing well towards her goals.   Patient prognosis is Fair.     Patient will continue to benefit from skilled outpatient physical therapy to address the deficits listed in the problem list box on initial evaluation, provide pt/family education and to maximize pt's level of independence in the home and community environment.   Patient's spiritual, cultural and educational needs considered and pt agreeable to plan of care and goals.     Anticipated barriers to physical therapy: coping style    Goals:   Short Term Goals: 2-3 weeks --> progressing towards  1.  Patient will demonstrate  understanding of and compliance with home exercise program.   2.  Patient will report no exacerbations of left knee pain.   3.  Patient will report > 10 point improvement on KOOS, Jr.      Long Term Goals: 8 weeks --> progressing towards  1.  Patient will demonstrate >15 kg improvement in bilateral quad strength for improved standing/lifting tolerance at work.   2.  Patient will report returning to work at [employer] performing all duties without increased knee pain or limitations.   3.  Patient will demonstrate >10 kg improvement in bilateral hamstring strength for improved independence with walking tasks at work.        Plan   Joint loading management including step count and programmed rest breaks at home.   Quad and hamstring strengthening     Ashlyn Garzon, PTA

## 2024-01-25 ENCOUNTER — CLINICAL SUPPORT (OUTPATIENT)
Dept: REHABILITATION | Facility: HOSPITAL | Age: 74
End: 2024-01-25
Payer: MEDICARE

## 2024-01-25 DIAGNOSIS — M62.81 QUADRICEPS WEAKNESS: ICD-10-CM

## 2024-01-25 DIAGNOSIS — Z74.09 IMPAIRED FUNCTIONAL MOBILITY, BALANCE, GAIT, AND ENDURANCE: Primary | ICD-10-CM

## 2024-01-25 DIAGNOSIS — R29.898 DECREASED STRENGTH OF LOWER EXTREMITY: ICD-10-CM

## 2024-01-25 DIAGNOSIS — G89.29 CHRONIC PAIN OF LEFT KNEE: ICD-10-CM

## 2024-01-25 DIAGNOSIS — M25.562 CHRONIC PAIN OF LEFT KNEE: ICD-10-CM

## 2024-01-25 PROBLEM — M54.42 CHRONIC LEFT-SIDED LOW BACK PAIN WITH LEFT-SIDED SCIATICA: Status: ACTIVE | Noted: 2024-01-25

## 2024-01-25 PROCEDURE — 97530 THERAPEUTIC ACTIVITIES: CPT | Mod: PN

## 2024-01-25 PROCEDURE — 97110 THERAPEUTIC EXERCISES: CPT | Mod: PN

## 2024-01-25 NOTE — PROGRESS NOTES
[facility]   Physical Therapy Treatment Note      Name: Azucena Luciano  Clinic Number: [id number]    Therapy Diagnosis:   Encounter Diagnoses   Name Primary?    Impaired functional mobility, balance, gait, and endurance Yes    Decreased strength of lower extremity     Quadriceps weakness     Chronic pain of left knee        Physician: Bryson Medrano MD    Visit Date: 1/25/2024    Physician Orders: PT Eval and Treat  Medical Diagnosis:  M62.81 (ICD-10-CM) - Quadriceps weakness   Z47.1,Z96.652 (ICD-10-CM) - Aftercare following left knee joint replacement surgery     Evaluation Date: 12/21/2023  Authorization Period Expiration: 02/15/2024  Plan of Care Certification Period: 12/21/2023 to 02/23/2024  Visit # / Visits authorized: 5/23 (+eval) FOTO: 6/5 NEXT  PTA visits: 2/5    Time In: 1100  Time Out: 1155  Total Billable Time: 55 minutes (3 TE, 1 TH)  Precautions: hypertension. asthma    Subjective     Patient reports: States she is seeing improvement in mobility but still having pain primarily at night that disturbs her sleep.  Pain described as inner thigh and medial/lateral [id number] shin pain.   She was compliant with home exercise program.  Response to previous treatment: no adverse effects.   Functional change: none voiced    Pain: 6/10  Location: left knee      Objective      Objective Measures updated at progress report unless specified.     Treatment     Azucena received the treatments listed below:      Therapeutic exercises to develop strength, endurance, ROM, flexibility, posture, and core stabilization for 40 minutes, including:  SAQs 30x --> 2x10 4# 2x10  Straight leg raise 2x10/each  LAQs 90-0 4# 3x10 --> red tb 1x10 -->greenb tb 90-45 1x10  Bike full revolutions 8' twin peaks at Lv4.5  Standing hamstring curls 4# 3x10/each    Therapeutic activities to improve functional performance for 15 minutes, including:  Leg press dL 5.5 plates 3x8  Heel raises 2x15        Patient  Education and Home Exercises       Education provided:   - heavy education on load management at home and need for programmed rest breaks.     Written Home Exercises Provided: Patient instructed to cont prior HEP. Exercises were reviewed and Azucena was able to demonstrate them prior to the end of the session.  Azucena demonstrated fair  understanding of the education provided. See Electronic Medical Record under Patient Instructions for exercises provided during therapy sessions    Assessment   A: Azucena is a 73 y.o. female referred to outpatient Physical Therapy with a medical diagnosis of 1) M62.81 (ICD-10-CM) - Quadriceps weakness, 2) Z47.1,Z96.652 (ICD-10-CM) - Aftercare following left knee joint replacement surgery. Seen in this clinic for initial post-op course following left total knee arthroplasty. Sx date: 09/11/2023.  Post-op 19 weeks.  Complications initial post-op course including reaction to adhesive dressing, poor tolerance to strength training, hypesthesias, and joint stiffness into flexion. Hx of lumbar radiculopathy with LLE pain. Mild effusion. Ongoing night pain that disturbs her sleep. Taking Gabapentin to address. Reports difficulty with stair descending.  Otherwise voices steady improvement in function.  Going to the gym 3 days/week to ride the bike.     Azucena Is progressing well towards her goals.   Patient prognosis is Fair.     Patient will continue to benefit from skilled outpatient physical therapy to address the deficits listed in the problem list box on initial evaluation, provide pt/family education and to maximize pt's level of independence in the home and community environment.   Patient's spiritual, cultural and educational needs considered and pt agreeable to plan of care and goals.     Anticipated barriers to physical therapy: coping style    Goals:   Short Term Goals: 2-3 weeks --> progressing towards  1.  Patient will demonstrate understanding of and compliance with  home exercise program. Met   2.  Patient will report no exacerbations of left knee pain.   3.  Patient will report > 10 point improvement on KOOS, Jr.      Long Term Goals: 8 weeks --> progressing towards  1.  Patient will demonstrate >15 kg improvement in bilateral quad strength for improved standing/lifting tolerance at work.   2.  Patient will report returning to work at [employer] performing all duties without increased knee pain or limitations.   3.  Patient will demonstrate >10 kg improvement in bilateral hamstring strength for improved independence with walking tasks at work.        Plan   Joint loading management including step count and programmed rest breaks at home.   Quad and hamstring strengthening     Brandin Orona, PT, DPT, OCS

## 2024-02-21 ENCOUNTER — OFFICE VISIT (OUTPATIENT)
Dept: INTERNAL MEDICINE | Facility: CLINIC | Age: 74
End: 2024-02-21
Payer: MEDICARE

## 2024-02-21 VITALS
HEART RATE: 70 BPM | WEIGHT: 111.56 LBS | BODY MASS INDEX: 22.49 KG/M2 | HEIGHT: 59 IN | SYSTOLIC BLOOD PRESSURE: 118 MMHG | DIASTOLIC BLOOD PRESSURE: 76 MMHG | OXYGEN SATURATION: 98 %

## 2024-02-21 DIAGNOSIS — I10 HTN, GOAL BELOW 130/80: Primary | ICD-10-CM

## 2024-02-21 DIAGNOSIS — M81.0 OSTEOPOROSIS, UNSPECIFIED OSTEOPOROSIS TYPE, UNSPECIFIED PATHOLOGICAL FRACTURE PRESENCE: ICD-10-CM

## 2024-02-21 DIAGNOSIS — E78.5 HYPERLIPIDEMIA, UNSPECIFIED HYPERLIPIDEMIA TYPE: ICD-10-CM

## 2024-02-21 PROCEDURE — 99214 OFFICE O/P EST MOD 30 MIN: CPT | Mod: S$GLB,,, | Performed by: NURSE PRACTITIONER

## 2024-02-21 PROCEDURE — 99999 PR PBB SHADOW E&M-EST. PATIENT-LVL IV: CPT | Mod: PBBFAC,,, | Performed by: NURSE PRACTITIONER

## 2024-02-21 NOTE — ASSESSMENT & PLAN NOTE
Chronic, stable.    Patient recommended to continue with valsartan 320 mg daily.  It is also reasonable to consider decreasing her medications.  She is recommended to withhold her hydrochlorothiazide 25 mg daily for the next few days if her SBP climbs and is greater than 130/80 she can resume taking half dose.  If blood pressure continues to remain elevated she will need to return to taking the full dose.    She will contact me via patient portal with update.

## 2024-02-21 NOTE — PROGRESS NOTES
Subjective     Patient ID: Azucena Luciano is a 73 y.o. female.    Chief Complaint: Blood Pressure Check    Patient in office for ongoing hypertension management.  She has been compliant with valsartan 320 mg daily and hydrochlorothiazide 25 mg daily.  Home blood pressures have been consistently in the 120s -130s/70s -80s mmHg. Patient denies any reportable side effects with medication use.  Patient is interested in decreasing medications at this time.  She denies any acute concerns.      Review of Systems   All other systems reviewed and are negative.         Objective     Physical Exam  Vitals reviewed.   Constitutional:       Appearance: Normal appearance.   HENT:      Head: Normocephalic and atraumatic.   Eyes:      Pupils: Pupils are equal, round, and reactive to light.   Cardiovascular:      Rate and Rhythm: Normal rate and regular rhythm.   Pulmonary:      Effort: Pulmonary effort is normal.      Breath sounds: Normal breath sounds.   Abdominal:      General: Bowel sounds are normal.      Palpations: Abdomen is soft.   Musculoskeletal:         General: Normal range of motion.      Cervical back: Normal range of motion and neck supple.   Skin:     General: Skin is warm.   Neurological:      General: No focal deficit present.   Psychiatric:         Mood and Affect: Mood normal.        Assessment and Plan   1. HTN, goal below 130/80  Assessment & Plan:  Chronic, stable.    Patient recommended to continue with valsartan 320 mg daily.  It is also reasonable to consider decreasing her medications.  She is recommended to withhold her hydrochlorothiazide 25 mg daily for the next few days if her SBP climbs and is greater than 130/80 she can resume taking half dose.  If blood pressure continues to remain elevated she will need to return to taking the full dose.    She will contact me via patient portal with update.      2. Hyperlipidemia, unspecified hyperlipidemia type  Assessment & Plan:  Chronic, stable continue  with atorvastatin 20 mg daily.  LDL in July of 2023 WNL.      3. Osteoporosis, unspecified osteoporosis type, unspecified pathological fracture presence  Assessment & Plan:  Chronic, stable.  Continue with the alendronate at current dose and vitamin-D and calcium supplementation.    Encouraged to continue with increasing weight-bearing exercises and remaining active.      RTC for 6 month follow up.

## 2024-02-21 NOTE — ASSESSMENT & PLAN NOTE
Chronic, stable.  Continue with the alendronate at current dose and vitamin-D and calcium supplementation.    Encouraged to continue with increasing weight-bearing exercises and remaining active.

## 2024-03-05 DIAGNOSIS — I10 HTN, GOAL BELOW 130/80: Primary | ICD-10-CM

## 2024-03-05 RX ORDER — VALSARTAN 320 MG/1
320 TABLET ORAL
Qty: 90 TABLET | Refills: 3 | Status: SHIPPED | OUTPATIENT
Start: 2024-03-05

## 2024-03-05 NOTE — TELEPHONE ENCOUNTER
Care Due:                  Date            Visit Type   Department     Provider  --------------------------------------------------------------------------------                                EP -                              PRIMARY      MET INTERNAL  Last Visit: 11-      CARE ([facility])   MEDICINE       Navarro Limon  Next Visit: None Scheduled  None         None Found                                                            Last  Test          Frequency    Reason                     Performed    Due Date  --------------------------------------------------------------------------------    Mg Level....  12 months..  alendronate..............  Not Found    Overdue    Phosphate...  12 months..  alendronate..............  Not Found    Overdue    Vitamin D...  12 months..  alendronate..............  Not Found    Overdue    [employer] Embedded Care Due Messages. Reference number: [id number].   3/05/2024 7:15:38 AM CST

## 2024-03-05 NOTE — TELEPHONE ENCOUNTER
Refill Routing Note   Medication(s) are not appropriate for processing by [facility] for the following reason(s):        No active prescription written by provider    [facility] action(s):  Defer     Requires labs : Yes             Appointments  past 12m or future 3m with PCP    Date Provider   Last Visit   11/14/2023 Navarro Limon, DO   Next Visit   Visit date not found Navarro Limon, DO   ED visits in past 90 days: 0        Note composed:10:28 AM 03/05/2024

## 2024-03-06 DIAGNOSIS — M25.562 CHRONIC PAIN OF LEFT KNEE: Primary | ICD-10-CM

## 2024-03-06 DIAGNOSIS — M17.12 PRIMARY OSTEOARTHRITIS OF LEFT KNEE: ICD-10-CM

## 2024-03-06 DIAGNOSIS — G89.29 CHRONIC PAIN OF LEFT KNEE: Primary | ICD-10-CM

## 2024-03-08 ENCOUNTER — TELEPHONE (OUTPATIENT)
Dept: ORTHOPEDICS | Facility: CLINIC | Age: 74
End: 2024-03-08
Payer: MEDICARE

## 2024-03-08 NOTE — TELEPHONE ENCOUNTER
----- Message from Misael Davalos sent at 3/8/2024 12:10 PM CST -----  Pt Requesting to Reschedule an Appointment     Pt is requesting to Reschedule an appointment that our scheduling dept cannot Reschedule.    Who called: pt  Initial appt date: 3/12/24  When pt wants appt: any date except Fridays (prefer late mornings)  Reason:  Best call back #:  [phone]  Additional notes:

## 2024-03-26 ENCOUNTER — OFFICE VISIT (OUTPATIENT)
Dept: ORTHOPEDICS | Facility: CLINIC | Age: 74
End: 2024-03-26
Attending: ORTHOPAEDIC SURGERY
Payer: MEDICARE

## 2024-03-26 ENCOUNTER — HOSPITAL ENCOUNTER (OUTPATIENT)
Dept: RADIOLOGY | Facility: HOSPITAL | Age: 74
Discharge: HOME | End: 2024-03-26
Attending: ORTHOPAEDIC SURGERY
Payer: MEDICARE

## 2024-03-26 VITALS — WEIGHT: 111.56 LBS | BODY MASS INDEX: 22.49 KG/M2 | HEIGHT: 59 IN

## 2024-03-26 DIAGNOSIS — Z47.1 AFTERCARE FOLLOWING LEFT KNEE JOINT REPLACEMENT SURGERY: Primary | ICD-10-CM

## 2024-03-26 DIAGNOSIS — G89.29 CHRONIC PAIN OF LEFT KNEE: ICD-10-CM

## 2024-03-26 DIAGNOSIS — M17.12 PRIMARY OSTEOARTHRITIS OF LEFT KNEE: ICD-10-CM

## 2024-03-26 DIAGNOSIS — M25.562 CHRONIC PAIN OF LEFT KNEE: ICD-10-CM

## 2024-03-26 DIAGNOSIS — Z96.652 AFTERCARE FOLLOWING LEFT KNEE JOINT REPLACEMENT SURGERY: Primary | ICD-10-CM

## 2024-03-26 PROCEDURE — 77073 BONE LENGTH STUDIES: CPT | Mod: TC,PN

## 2024-03-26 PROCEDURE — 73562 X-RAY EXAM OF KNEE 3: CPT | Mod: 26,59,LT, | Performed by: RADIOLOGY

## 2024-03-26 PROCEDURE — 73562 X-RAY EXAM OF KNEE 3: CPT | Mod: TC,PN,LT

## 2024-03-26 PROCEDURE — 99999 PR PBB SHADOW E&M-EST. PATIENT-LVL III: CPT | Mod: PBBFAC,,, | Performed by: ORTHOPAEDIC SURGERY

## 2024-03-26 PROCEDURE — 77073 BONE LENGTH STUDIES: CPT | Mod: 26,,, | Performed by: RADIOLOGY

## 2024-03-26 PROCEDURE — G2211 COMPLEX E/M VISIT ADD ON: HCPCS | Mod: S$GLB,,, | Performed by: ORTHOPAEDIC SURGERY

## 2024-03-26 PROCEDURE — 99214 OFFICE O/P EST MOD 30 MIN: CPT | Mod: S$GLB,,, | Performed by: ORTHOPAEDIC SURGERY

## 2024-03-26 NOTE — PROGRESS NOTES
Subjective:      Patient ID: Azucena Luciano is a 73 y.o. female.    Chief Complaint: Pain of the Left Knee    Patient is 6 months s/p  left primary total knee replacement  Anterior knee pain: No  Has improved pain  Is in physical therapy  No  Problems w incision  No  Is  happy with result  Yes  Opiod free: Yes         Social History     Occupational History    Occupation: Retired insurance     Tobacco Use    Smoking status: Never    Smokeless tobacco: Never   Substance and Sexual Activity    Alcohol use: Not Currently    Drug use: No    Sexual activity: Yes     Partners: Male      Review of Systems   Constitutional: Negative for diaphoresis.   HENT:  Negative for ear discharge, nosebleeds and stridor.    Eyes:  Negative for photophobia.   Cardiovascular:  Negative for syncope.   Respiratory:  Negative for hemoptysis, shortness of breath and wheezing.    Neurological:  Negative for tremors.   Psychiatric/Behavioral: Negative.           Objective:    General    Constitutional: She is oriented to person, place, and time. She appears well-developed and well-nourished.   HENT:   Head: Normocephalic and atraumatic.   Eyes: EOM are normal.   Pulmonary/Chest: Effort normal.   Neurological: She is alert and oriented to person, place, and time.   Psychiatric: She has a normal mood and affect. Her behavior is normal. Judgment and thought content normal.     General Musculoskeletal Exam   Gait: normal         Left Knee Exam     Inspection   Erythema: absent  Scars: present  Swelling: absent  Effusion: absent  Deformity: absent  Bruising: absent    Tenderness   The patient is experiencing no tenderness.     Range of Motion   Extension:  0   Flexion:  110     Tests   Stability   PCL-Posterior Drawer: normal (0 to 2mm)  MCL - Valgus: normal (0 to 2mm)  LCL - Varus: normal (0 to 2mm)  Patella   Passive Patellar Tilt: neutral  Patellar Tracking: normal    Other   Sensation: normal    Comments:  Incision well healed          Assessment:       1. Aftercare following left knee joint replacement surgery          Plan:       Overall patient appears to be doing well and is happy with the result of the knee arthroplasty. They can continue activities as tolerated avoiding high impact activities.  I would like to see the patient back In 6 months with xrays.

## 2024-06-05 DIAGNOSIS — M81.0 OSTEOPOROSIS, UNSPECIFIED OSTEOPOROSIS TYPE, UNSPECIFIED PATHOLOGICAL FRACTURE PRESENCE: ICD-10-CM

## 2024-06-05 RX ORDER — ALENDRONATE SODIUM 70 MG/1
TABLET ORAL
Qty: 12 TABLET | Refills: 3 | Status: SHIPPED | OUTPATIENT
Start: 2024-06-05

## 2024-06-05 NOTE — TELEPHONE ENCOUNTER
Care Due:                  Date            Visit Type   Department     Provider  --------------------------------------------------------------------------------                                EP -                              PRIMARY      MET INTERNAL  Last Visit: 11-      CARE ([facility])   MEDICINE       Navarro Limon  Next Visit: None Scheduled  None         None Found                                                            Last  Test          Frequency    Reason                     Performed    Due Date  --------------------------------------------------------------------------------    CBC.........  12 months..  diclofenac...............  07- 07-    CMP.........  12 months..  alendronate, atorvastatin  08- 08-    Lipid Panel.  12 months..  atorvastatin.............  07- 07-    Mg Level....  12 months..  alendronate..............  Not Found    Overdue    Phosphate...  12 months..  alendronate..............  Not Found    Overdue    Vitamin D...  12 months..  alendronate..............  Not Found    Overdue    [employer] Embedded Care Due Messages. Reference number: [id number].   6/05/2024 12:21:30 PM CDT

## 2024-08-05 ENCOUNTER — TELEPHONE (OUTPATIENT)
Dept: INTERNAL MEDICINE | Facility: CLINIC | Age: 74
End: 2024-08-05
Payer: MEDICARE

## 2024-08-05 DIAGNOSIS — E78.5 HYPERLIPIDEMIA, UNSPECIFIED HYPERLIPIDEMIA TYPE: ICD-10-CM

## 2024-08-05 DIAGNOSIS — R79.9 ABNORMAL FINDING OF BLOOD CHEMISTRY, UNSPECIFIED: ICD-10-CM

## 2024-08-05 DIAGNOSIS — I10 HTN, GOAL BELOW 130/80: Primary | ICD-10-CM

## 2024-08-15 ENCOUNTER — LAB VISIT (OUTPATIENT)
Dept: LAB | Facility: HOSPITAL | Age: 74
End: 2024-08-15
Attending: INTERNAL MEDICINE
Payer: MEDICARE

## 2024-08-15 DIAGNOSIS — E78.5 HYPERLIPIDEMIA, UNSPECIFIED HYPERLIPIDEMIA TYPE: ICD-10-CM

## 2024-08-15 DIAGNOSIS — I10 HTN, GOAL BELOW 130/80: ICD-10-CM

## 2024-08-15 DIAGNOSIS — R79.9 ABNORMAL FINDING OF BLOOD CHEMISTRY, UNSPECIFIED: ICD-10-CM

## 2024-08-15 LAB
ALBUMIN SERPL BCP-MCNC: 4 G/DL (ref 3.5–5.2)
ALP SERPL-CCNC: 106 U/L (ref 55–135)
ALT SERPL W/O P-5'-P-CCNC: 25 U/L (ref 10–44)
ANION GAP SERPL CALC-SCNC: 7 MMOL/L (ref 8–16)
AST SERPL-CCNC: 32 U/L (ref 10–40)
BASOPHILS # BLD AUTO: 0.04 K/UL (ref 0–0.2)
BASOPHILS NFR BLD: 0.7 % (ref 0–1.9)
BILIRUB SERPL-MCNC: 0.5 MG/DL (ref 0.1–1)
BUN SERPL-MCNC: 14 MG/DL (ref 8–23)
CALCIUM SERPL-MCNC: 9.4 MG/DL (ref 8.7–10.5)
CHLORIDE SERPL-SCNC: 109 MMOL/L (ref 95–110)
CHOLEST SERPL-MCNC: 167 MG/DL (ref 120–199)
CHOLEST/HDLC SERPL: 4 {RATIO} (ref 2–5)
CO2 SERPL-SCNC: 26 MMOL/L (ref 23–29)
CREAT SERPL-MCNC: 0.7 MG/DL (ref 0.5–1.4)
DIFFERENTIAL METHOD BLD: ABNORMAL
EOSINOPHIL # BLD AUTO: 0.1 K/UL (ref 0–0.5)
EOSINOPHIL NFR BLD: 1.4 % (ref 0–8)
ERYTHROCYTE [DISTWIDTH] IN BLOOD BY AUTOMATED COUNT: 14.6 % (ref 11.5–14.5)
EST. GFR  (NO RACE VARIABLE): >60 ML/MIN/1.73 M^2
ESTIMATED AVG GLUCOSE: 105 MG/DL (ref 68–131)
GLUCOSE SERPL-MCNC: 95 MG/DL (ref 70–110)
HBA1C MFR BLD: 5.3 % (ref 4–5.6)
HCT VFR BLD AUTO: 47.2 % (ref 37–48.5)
HDLC SERPL-MCNC: 42 MG/DL (ref 40–75)
HDLC SERPL: 25.1 % (ref 20–50)
HGB BLD-MCNC: 14.3 G/DL (ref 12–16)
IMM GRANULOCYTES # BLD AUTO: 0.01 K/UL (ref 0–0.04)
IMM GRANULOCYTES NFR BLD AUTO: 0.2 % (ref 0–0.5)
LDLC SERPL CALC-MCNC: 91.6 MG/DL (ref 63–159)
LYMPHOCYTES # BLD AUTO: 2.8 K/UL (ref 1–4.8)
LYMPHOCYTES NFR BLD: 50 % (ref 18–48)
MCH RBC QN AUTO: 26.6 PG (ref 27–31)
MCHC RBC AUTO-ENTMCNC: 30.3 G/DL (ref 32–36)
MCV RBC AUTO: 88 FL (ref 82–98)
MONOCYTES # BLD AUTO: 0.4 K/UL (ref 0.3–1)
MONOCYTES NFR BLD: 7.1 % (ref 4–15)
NEUTROPHILS # BLD AUTO: 2.3 K/UL (ref 1.8–7.7)
NEUTROPHILS NFR BLD: 40.6 % (ref 38–73)
NONHDLC SERPL-MCNC: 125 MG/DL
NRBC BLD-RTO: 0 /100 WBC
PLATELET # BLD AUTO: 232 K/UL (ref 150–450)
PMV BLD AUTO: 11.8 FL (ref 9.2–12.9)
POTASSIUM SERPL-SCNC: 4.8 MMOL/L (ref 3.5–5.1)
PROT SERPL-MCNC: 6.6 G/DL (ref 6–8.4)
RBC # BLD AUTO: 5.38 M/UL (ref 4–5.4)
SODIUM SERPL-SCNC: 142 MMOL/L (ref 136–145)
TRIGL SERPL-MCNC: 167 MG/DL (ref 30–150)
TSH SERPL DL<=0.005 MIU/L-ACNC: 1.24 UIU/ML (ref 0.4–4)
WBC # BLD AUTO: 5.62 K/UL (ref 3.9–12.7)

## 2024-08-15 PROCEDURE — 80061 LIPID PANEL: CPT | Performed by: INTERNAL MEDICINE

## 2024-08-15 PROCEDURE — 36415 COLL VENOUS BLD VENIPUNCTURE: CPT | Mod: PO | Performed by: INTERNAL MEDICINE

## 2024-08-15 PROCEDURE — 85025 COMPLETE CBC W/AUTO DIFF WBC: CPT | Performed by: INTERNAL MEDICINE

## 2024-08-15 PROCEDURE — 83036 HEMOGLOBIN GLYCOSYLATED A1C: CPT | Performed by: INTERNAL MEDICINE

## 2024-08-15 PROCEDURE — 80053 COMPREHEN METABOLIC PANEL: CPT | Performed by: INTERNAL MEDICINE

## 2024-08-15 PROCEDURE — 84443 ASSAY THYROID STIM HORMONE: CPT | Performed by: INTERNAL MEDICINE

## 2024-08-21 ENCOUNTER — OFFICE VISIT (OUTPATIENT)
Dept: INTERNAL MEDICINE | Facility: CLINIC | Age: 74
End: 2024-08-21
Payer: MEDICARE

## 2024-08-21 VITALS
DIASTOLIC BLOOD PRESSURE: 76 MMHG | BODY MASS INDEX: 22.23 KG/M2 | TEMPERATURE: 98 F | WEIGHT: 110.25 LBS | HEIGHT: 59 IN | RESPIRATION RATE: 16 BRPM | HEART RATE: 57 BPM | SYSTOLIC BLOOD PRESSURE: 116 MMHG | OXYGEN SATURATION: 100 %

## 2024-08-21 DIAGNOSIS — E78.5 HYPERLIPIDEMIA, UNSPECIFIED HYPERLIPIDEMIA TYPE: ICD-10-CM

## 2024-08-21 DIAGNOSIS — M81.6 LOCALIZED OSTEOPOROSIS, UNSPECIFIED PATHOLOGICAL FRACTURE PRESENCE: Chronic | ICD-10-CM

## 2024-08-21 DIAGNOSIS — Z00.00 ANNUAL PHYSICAL EXAM: Primary | ICD-10-CM

## 2024-08-21 DIAGNOSIS — G47.00 INSOMNIA, UNSPECIFIED TYPE: ICD-10-CM

## 2024-08-21 DIAGNOSIS — I10 HTN, GOAL BELOW 130/80: ICD-10-CM

## 2024-08-21 DIAGNOSIS — F43.9 SITUATIONAL STRESS: ICD-10-CM

## 2024-08-21 PROCEDURE — 3078F DIAST BP <80 MM HG: CPT | Mod: CPTII,S$GLB,, | Performed by: NURSE PRACTITIONER

## 2024-08-21 PROCEDURE — 3008F BODY MASS INDEX DOCD: CPT | Mod: CPTII,S$GLB,, | Performed by: NURSE PRACTITIONER

## 2024-08-21 PROCEDURE — 1160F RVW MEDS BY RX/DR IN RCRD: CPT | Mod: CPTII,S$GLB,, | Performed by: NURSE PRACTITIONER

## 2024-08-21 PROCEDURE — 99397 PER PM REEVAL EST PAT 65+ YR: CPT | Mod: S$GLB,,, | Performed by: NURSE PRACTITIONER

## 2024-08-21 PROCEDURE — 3288F FALL RISK ASSESSMENT DOCD: CPT | Mod: CPTII,S$GLB,, | Performed by: NURSE PRACTITIONER

## 2024-08-21 PROCEDURE — 1126F AMNT PAIN NOTED NONE PRSNT: CPT | Mod: CPTII,S$GLB,, | Performed by: NURSE PRACTITIONER

## 2024-08-21 PROCEDURE — 4010F ACE/ARB THERAPY RXD/TAKEN: CPT | Mod: CPTII,S$GLB,, | Performed by: NURSE PRACTITIONER

## 2024-08-21 PROCEDURE — 1101F PT FALLS ASSESS-DOCD LE1/YR: CPT | Mod: CPTII,S$GLB,, | Performed by: NURSE PRACTITIONER

## 2024-08-21 PROCEDURE — 3044F HG A1C LEVEL LT 7.0%: CPT | Mod: CPTII,S$GLB,, | Performed by: NURSE PRACTITIONER

## 2024-08-21 PROCEDURE — 3074F SYST BP LT 130 MM HG: CPT | Mod: CPTII,S$GLB,, | Performed by: NURSE PRACTITIONER

## 2024-08-21 PROCEDURE — 1159F MED LIST DOCD IN RCRD: CPT | Mod: CPTII,S$GLB,, | Performed by: NURSE PRACTITIONER

## 2024-08-21 PROCEDURE — 99999 PR PBB SHADOW E&M-EST. PATIENT-LVL IV: CPT | Mod: PBBFAC,,, | Performed by: NURSE PRACTITIONER

## 2024-08-21 RX ORDER — ZOLPIDEM TARTRATE 5 MG/1
5 TABLET ORAL NIGHTLY PRN
Qty: 30 TABLET | Refills: 1 | Status: SHIPPED | OUTPATIENT
Start: 2024-08-21

## 2024-08-21 NOTE — PROGRESS NOTES
Subjective:     PCP: Navarro Limon DO Catherine R Mustapha is a 73 y.o. female who presents for an annual exam.    HTN currently managed on Valsartan 80 mg daily. She was previously recommended to increase to 320 mg daily, which she did, but she eventually resumed taking the lower dose with good control at home, in the 110s.     She is currently taking care of her special needs granddaughter who has been diagnosed with behavioral issues. Pt would like to discuss resuming Ambien due to insomnia she states is associated with increased stressors. She was previously on 5 mg QPM which was tolerated well.     Medical History:   Past Medical History:   Diagnosis Date    Asthma     Carpal tunnel syndrome     DVT (deep venous thrombosis)     Hypertension     Osteoporosis     Urinary tract infection        Family History: family history includes Cancer in her brother; Cancer (age of onset: 66) in her father; Cancer (age of onset: 75) in her maternal grandmother; Colon cancer in her father; Diabetes in her maternal grandmother and paternal grandmother; Heart disease in her paternal grandmother; Osteoporosis in her mother.    Surgical History:   Past Surgical History:   Procedure Laterality Date    ARTHROPLASTY, KNEE, TOTAL, SIGHT ASSISTED Left 9/11/2023    Procedure: ARTHROPLASTY, KNEE, SIGHT ASSISTED;  Surgeon: Bryson Medrano MD;  Location: [facility] OR;  Service: Orthopedics;  Laterality: Left;  bmi - 22.53  not a aleksandr knee do not call aurea  [id number] alia [phone] notified cc    ARTHROSCOPY OF KNEE Left 9/19/2022    Procedure: ARTHROSCOPY, KNEE;  Surgeon: Bryson Medrano MD;  Location: [facility] OR;  Service: Orthopedics;  Laterality: Left;    AUGMENTATION OF BREAST      BREAST BIOPSY      BREAST CYST ASPIRATION      BREAST SURGERY      CATARACT EXTRACTION      COLONOSCOPY N/A 8/7/2018    Procedure: COLONOSCOPY;  Surgeon: KATE Vernon MD;  Location: [facility] ENDO ([address] [place] [address]);  Service: Endoscopy;  Laterality: N/A;   Ok to hold Eliquis x 2 days per Dr. Gee    FACIAL COSMETIC SURGERY      NASAL SEPTUM SURGERY      OVARIAN CYST REMOVAL      tummy tuck          Social History:  reports that she has never smoked. She has never used smokeless tobacco. She reports that she does not currently use alcohol. She reports that she does not use drugs.     Allergies:   Review of patient's allergies indicates:   Allergen Reactions    Cayenne pepper Anaphylaxis       Medications:   Current Outpatient Medications   Medication Sig    alendronate (FOSAMAX) 70 MG tablet TAKE ONE TABLET BY MOUTH EVERY WEEK    ascorbic acid, vitamin C, (VITAMIN C) 250 MG tablet Take 250 mg by mouth once daily.    aspirin (ECOTRIN) 81 MG EC tablet Take 1 tablet (81 mg total) by mouth 2 (two) times a day.    atorvastatin (LIPITOR) 20 MG tablet TAKE ONE TABLET BY MOUTH EVERY DAY    calcium-vitamin D3 500 mg(1,250mg) -200 unit per tablet Take 1 tablet by mouth 2 (two) times daily with meals.    cyclobenzaprine (FLEXERIL) 5 MG tablet Take 1 tablet (5 mg total) by mouth nightly.    diclofenac (VOLTAREN) 75 MG EC tablet Take 1 tablet (75 mg total) by mouth 2 (two) times daily as needed (pain).    famotidine (PEPCID) 20 MG tablet Take 1 tablet (20 mg total) by mouth 2 (two) times daily as needed for Heartburn.    magnesium hydroxide 400 mg/5 ml (MILK OF MAGNESIA) 400 mg/5 mL Susp Take 5 mLs by mouth once daily.    multivitamin with minerals tablet Take 1 tablet by mouth once daily.    oxymetazoline (AFRIN) 0.05 % nasal spray 1 spray by Nasal route every evening.     No current facility-administered medications for this visit.     Facility-Administered Medications Ordered in Other Visits   Medication    BUPivacaine injection 5 mL    cefazolin (ANCEF) 2 gram in dextrose 5% 50 mL IVPB (premix)    LIDOcaine HCL 10 mg/ml (1%) injection 4 mL       Health Maintenance:   Health Maintenance Topics with due status: Not Due       Topic Last Completion Date    TETANUS VACCINE  10/30/2015    DEXA Scan 01/11/2024    Hemoglobin A1c (Prediabetes) 08/15/2024    Lipid Panel 08/15/2024     Lab Results   Component Value Date    HEPCAB Clarke (A) 07/12/2017     Eye Exam:     Dental Exam:   OB/GYN: No data on file.   Pap smear: 1/25/2016  Mammogram: [unfilled]    Colonoscopy: Last Colonoscopy completed on 8/7/2018   FitKit:    Cologuard:   HIV:   Hepatitic C  Ab:     Vaccinations:  Immunization History   Administered Date(s) Administered    Pneumococcal Conjugate - 13 Valent 10/30/2015    Tdap 10/30/2015     Influenza:   Tetanus:   Shingrix:   Pneumovax:   Prevnar-13:   Covid vaccine:    Body mass index is 22.26 kg/m².  Wt Readings from Last 3 Encounters:   08/21/24 50 kg (110 lb 3.7 oz)   03/26/24 50.6 kg (111 lb 8.8 oz)   02/21/24 50.6 kg (111 lb 8.8 oz)       Review of Systems   Psychiatric/Behavioral:  Positive for sleep disturbance.    All other systems reviewed and are negative.         Objective:     Physical Exam  Vitals reviewed.   Constitutional:       Appearance: Normal appearance.   HENT:      Head: Normocephalic and atraumatic.   Eyes:      Conjunctiva/sclera: Conjunctivae normal.   Cardiovascular:      Rate and Rhythm: Normal rate and regular rhythm.   Pulmonary:      Effort: Pulmonary effort is normal.      Breath sounds: Normal breath sounds.   Abdominal:      General: Abdomen is flat. Bowel sounds are normal.      Palpations: Abdomen is soft.   Musculoskeletal:         General: Normal range of motion.      Cervical back: Normal range of motion and neck supple.   Skin:     General: Skin is warm.      Comments: Well healed scar on L knee, S/p LTKA   Neurological:      General: No focal deficit present.   Psychiatric:         Mood and Affect: Mood normal.          Assessment:      1. Annual physical exam    2. HTN, goal below 130/80    3. Hyperlipidemia, unspecified hyperlipidemia type    4. Insomnia, unspecified type    5. Situational stress         Plan:   1. Annual physical  exam  -Discussed labs in office. Unremarkable labs.     2. HTN, goal below 130/80  -Ok to continue with Valsartan 80 mg daily which has been providing good control.   Discontinue Valsartan 320 mg and HCTZ.   If BPs are consistently >140/90 mmHg please inform office.     3. Hyperlipidemia, unspecified hyperlipidemia type  -Chronic, stable. Continue with current dose of atorvastatin    4. Insomnia, unspecified type  -Rx for Ambien provided.   Take only as needed.   Discussed potential side effects with use.     5. Osteoporosis.  -Continue with fosamax and Vitamin D and Ca supplements. Encouraged increased wt bearing exercises.       RTC in 12 months for follow-up or sooner if needed    __________________________

## 2024-09-13 DIAGNOSIS — Z96.652 AFTERCARE FOLLOWING LEFT KNEE JOINT REPLACEMENT SURGERY: Primary | ICD-10-CM

## 2024-09-13 DIAGNOSIS — Z47.1 AFTERCARE FOLLOWING LEFT KNEE JOINT REPLACEMENT SURGERY: Primary | ICD-10-CM

## 2024-09-19 ENCOUNTER — HOSPITAL ENCOUNTER (OUTPATIENT)
Dept: RADIOLOGY | Facility: HOSPITAL | Age: 74
Discharge: HOME | End: 2024-09-19
Attending: PHYSICIAN ASSISTANT
Payer: MEDICARE

## 2024-09-19 ENCOUNTER — OFFICE VISIT (OUTPATIENT)
Dept: ORTHOPEDICS | Facility: CLINIC | Age: 74
End: 2024-09-19
Payer: MEDICARE

## 2024-09-19 VITALS — BODY MASS INDEX: 22.14 KG/M2 | WEIGHT: 109.81 LBS | HEIGHT: 59 IN

## 2024-09-19 DIAGNOSIS — Z96.652 AFTERCARE FOLLOWING LEFT KNEE JOINT REPLACEMENT SURGERY: ICD-10-CM

## 2024-09-19 DIAGNOSIS — Z47.1 AFTERCARE FOLLOWING LEFT KNEE JOINT REPLACEMENT SURGERY: Primary | ICD-10-CM

## 2024-09-19 DIAGNOSIS — Z47.1 AFTERCARE FOLLOWING LEFT KNEE JOINT REPLACEMENT SURGERY: ICD-10-CM

## 2024-09-19 DIAGNOSIS — Z96.652 AFTERCARE FOLLOWING LEFT KNEE JOINT REPLACEMENT SURGERY: Primary | ICD-10-CM

## 2024-09-19 PROCEDURE — 3044F HG A1C LEVEL LT 7.0%: CPT | Mod: CPTII,S$GLB,, | Performed by: PHYSICIAN ASSISTANT

## 2024-09-19 PROCEDURE — 73562 X-RAY EXAM OF KNEE 3: CPT | Mod: TC,PN,LT

## 2024-09-19 PROCEDURE — 99213 OFFICE O/P EST LOW 20 MIN: CPT | Mod: S$GLB,,, | Performed by: PHYSICIAN ASSISTANT

## 2024-09-19 PROCEDURE — 3008F BODY MASS INDEX DOCD: CPT | Mod: CPTII,S$GLB,, | Performed by: PHYSICIAN ASSISTANT

## 2024-09-19 PROCEDURE — 4010F ACE/ARB THERAPY RXD/TAKEN: CPT | Mod: CPTII,S$GLB,, | Performed by: PHYSICIAN ASSISTANT

## 2024-09-19 PROCEDURE — 3288F FALL RISK ASSESSMENT DOCD: CPT | Mod: CPTII,S$GLB,, | Performed by: PHYSICIAN ASSISTANT

## 2024-09-19 PROCEDURE — 99999 PR PBB SHADOW E&M-EST. PATIENT-LVL III: CPT | Mod: PBBFAC,,, | Performed by: PHYSICIAN ASSISTANT

## 2024-09-19 PROCEDURE — 1159F MED LIST DOCD IN RCRD: CPT | Mod: CPTII,S$GLB,, | Performed by: PHYSICIAN ASSISTANT

## 2024-09-19 PROCEDURE — 77073 BONE LENGTH STUDIES: CPT | Mod: TC,PN

## 2024-09-19 PROCEDURE — 1160F RVW MEDS BY RX/DR IN RCRD: CPT | Mod: CPTII,S$GLB,, | Performed by: PHYSICIAN ASSISTANT

## 2024-09-19 PROCEDURE — 1125F AMNT PAIN NOTED PAIN PRSNT: CPT | Mod: CPTII,S$GLB,, | Performed by: PHYSICIAN ASSISTANT

## 2024-09-19 PROCEDURE — 1101F PT FALLS ASSESS-DOCD LE1/YR: CPT | Mod: CPTII,S$GLB,, | Performed by: PHYSICIAN ASSISTANT

## 2024-09-19 NOTE — PROGRESS NOTES
Subjective:      Chief Complaint: Pain of the Left Knee (S/P L TKA 9/11/23)    Patient ID: Azucena Luciano is a 74 y.o. female.  Patient is 1 years s/p  left primary total knee replacement  Anterior knee pain: No  Has no pain  Is in physical therapy  No  Problems w incision  No  Is  happy with result  Yes  Opiod free: Yes   Doing well. Keeps up with home exercise program. States she goes to [facility] for pool exercise 4x a week.      Past Medical History:   Diagnosis Date    Asthma     Carpal tunnel syndrome     DVT (deep venous thrombosis)     Hypertension     Osteoporosis     Urinary tract infection         Past Surgical History:   Procedure Laterality Date    ARTHROPLASTY, KNEE, TOTAL, SIGHT ASSISTED Left 9/11/2023    Procedure: ARTHROPLASTY, KNEE, SIGHT ASSISTED;  Surgeon: Bryson Medrano MD;  Location: [facility] OR;  Service: Orthopedics;  Laterality: Left;  bmi - 22.53  not a aleksandr knee do not call aurea  [employer] alia [phone] notified cc    ARTHROSCOPY OF KNEE Left 9/19/2022    Procedure: ARTHROSCOPY, KNEE;  Surgeon: Bryson Medrano MD;  Location: [facility] OR;  Service: Orthopedics;  Laterality: Left;    AUGMENTATION OF BREAST      BREAST BIOPSY      BREAST CYST ASPIRATION      BREAST SURGERY      CATARACT EXTRACTION      COLONOSCOPY N/A 8/7/2018    Procedure: COLONOSCOPY;  Surgeon: KATE Vernon MD;  Location: [address]);  Service: Endoscopy;  Laterality: N/A;  Ok to hold Eliquis x 2 days per Dr. Gee    FACIAL COSMETIC SURGERY      NASAL SEPTUM SURGERY      OVARIAN CYST REMOVAL      tummy tuck          Current Outpatient Medications   Medication Instructions    alendronate (FOSAMAX) 70 MG tablet TAKE ONE TABLET BY MOUTH EVERY WEEK    ascorbic acid (vitamin C) (VITAMIN C) 250 mg, Oral, Daily    aspirin (ECOTRIN) 81 mg, Oral, 2 times daily    atorvastatin (LIPITOR) 20 MG tablet TAKE ONE TABLET BY MOUTH EVERY DAY    calcium-vitamin D3 500 mg(1,250mg) -200 unit per tablet 1 tablet,  "Oral, 2 times daily with meals    cyclobenzaprine (FLEXERIL) 5 mg, Oral, Nightly    diclofenac (VOLTAREN) 75 mg, Oral, 2 times daily PRN    famotidine (PEPCID) 20 mg, Oral, 2 times daily PRN    magnesium hydroxide 400 mg/5 ml (MILK OF MAGNESIA) 400 mg/5 mL Susp 5 mLs, Oral, Daily    multivitamin with minerals tablet 1 tablet, Oral, Daily    oxymetazoline (AFRIN) 0.05 % nasal spray 1 spray, Nasal, Nightly    zolpidem (AMBIEN) 5 mg, Oral, Nightly PRN        Review of patient's allergies indicates:   Allergen Reactions    Cayenne pepper Anaphylaxis       Review of Systems   Constitutional: Negative for fever and malaise/fatigue.   Eyes:  Negative for blurred vision.   Cardiovascular:  Negative for chest pain.   Respiratory:  Negative for shortness of breath.    Skin:  Negative for poor wound healing.   Musculoskeletal:  Positive for joint pain and myalgias.   Genitourinary:  Negative for bladder incontinence.   Neurological:  Negative for dizziness, numbness and paresthesias.   Psychiatric/Behavioral:  Negative for altered mental status.        The patient's relevant past medical, surgical, and social history was reviewed in Epic.       Objective:      VITAL SIGNS: Ht 4' 11" (1.499 m)   Wt 49.8 kg (109 lb 12.6 oz)   BMI 22.17 kg/m²     General    Nursing note and vitals reviewed.  Constitutional: She is oriented to person, place, and time. She appears well-developed and well-nourished.   Neurological: She is alert and oriented to person, place, and time.     General Musculoskeletal Exam   Gait: normal         Left Knee Exam     Inspection   Scars: present    Tenderness   The patient is experiencing no tenderness.     Range of Motion   Extension:  0   Flexion:  120     Tests   Stability   Lachman: normal (-1 to 2mm)     Other   Sensation: normal    Muscle Strength   Left Lower Extremity   Quadriceps:  5/5   Hamstrin/5     Vascular Exam       Left Pulses  Dorsalis Pedis:      2+  Posterior Tibial:      1+     "     Imaging  X-Ray Hip to Ankle  Narrative: EXAMINATION:  XR HIP TO ANKLE    CLINICAL HISTORY:  Aftercare following joint replacement surgery    TECHNIQUE:  AP views of the bilateral lower extremities.    COMPARISON:  Radiographs 03/26/2024.    FINDINGS:  Left total knee arthroplasty with hardware in stable position.  Persistent lateral overhang of the tibial component.  No displaced periprosthetic fractures.  No periprosthetic lucencies to suggest osteolysis or loosening.    No acute displaced fractures.  No suspicious lytic or blastic lesions.  Bilateral femoroacetabular, bilateral tibiotalar and right tibiofemoral cartilage spaces are preserved.  No subluxation or dislocation.  Visualized soft tissues appear within normal limits.  Impression: As above.    Electronically signed by: Gurpreet Mcallister MD  Date:    09/19/2024  Time:    14:29  X-Ray Knee 3 View Left  Narrative: EXAMINATION:  XR KNEE 3 VIEW LEFT    CLINICAL HISTORY:  Aftercare following joint replacement surgery    TECHNIQUE:  AP, lateral, and Merchant views of the left knee were performed.    COMPARISON:  Radiographs 03/26/2024, 12/12/2023, 09/11/2023.    FINDINGS:  Total knee arthroplasty with hardware in stable position.  Persistent lateral overhang of the tibial component.  No displaced periprosthetic fractures.  No periprosthetic lucencies to suggest osteolysis or loosening.  Articular spaces are preserved.  No subluxation or dislocation.  No joint effusion.  Visualized soft tissues appear within normal limits.  Impression: Total knee arthroplasty with hardware in stable position.    Electronically signed by: Gurpreet Mcallister MD  Date:    09/19/2024  Time:    14:26    I have reviewed the above radiograph and agree with the findings stated by the radiologist.           Assessment:       Azucena SHIMA Luciano is a 74 y.o. female seen in the office today for   1. Aftercare following left knee joint replacement surgery           Plan:       Azucena was  seen today for pain.    Diagnoses and all orders for this visit:    Aftercare following left knee joint replacement surgery    Overall patient appears to be doing well and is happy with the result of the knee arthroplasty. They can continue activities as tolerated avoiding high impact activities.  I would like to see the patient back In 1 year with xrays.       Diagnoses and plan discussed with the patient, as well as the expected course and duration of his symptoms.  All questions and concerns were addressed prior to the end of the visit.   Instructed patient to call office if they have any future questions/concerns or to schedule apt. Patient will return to see me if symptoms worsen or fail to improve    Note dictated with voice recognition software, please excuse any grammatical errors.        Lyndsey Nassar PA-C      Department of Orthopedic Surgery  [facility]  Office: [phone]  09/19/2024

## 2024-09-24 RX ORDER — VALSARTAN 80 MG/1
80 TABLET ORAL
Qty: 90 TABLET | Refills: 3 | OUTPATIENT
Start: 2024-09-24

## 2024-09-24 NOTE — TELEPHONE ENCOUNTER
Care Due:                  Date            Visit Type   Department     Provider  --------------------------------------------------------------------------------                                EP -                              PRIMARY      MET INTERNAL  Last Visit: 11-      CARE ([facility])   MEDICINE       Navarro Limon  Next Visit: None Scheduled  None         None Found                                                            Last  Test          Frequency    Reason                     Performed    Due Date  --------------------------------------------------------------------------------    Office Visit  12 months..  alendronate..............  11- 11-    Mg Level....  12 months..  alendronate..............  Not Found    Overdue    Phosphate...  12 months..  alendronate..............  Not Found    Overdue    [employer] Embedded Care Due Messages. Reference number: [id number].   9/24/2024 7:43:26 AM CDT

## 2024-09-24 NOTE — TELEPHONE ENCOUNTER
Provider Staff:  Action required for this patient    Requires appointment      Please see care gap opportunities below in Care Due Message.    Thanks!  [facility]     Appointments      Date Provider   Last Visit   Visit date not found Samir Ann MD   Next Visit   Visit date not found Samir Ann MD     Refill Decision Note   Azucena Luciano  is requesting a refill authorization.  Brief Assessment and Rationale for Refill:  Quick Discontinue     Medication Therapy Plan: The original prescription was discontinued on 8/23/2023 by Samir Ann MD.      Comments:     Note composed:11:32 AM 09/24/2024

## 2024-09-26 ENCOUNTER — TELEPHONE (OUTPATIENT)
Dept: INTERNAL MEDICINE | Facility: CLINIC | Age: 74
End: 2024-09-26

## 2024-09-26 RX ORDER — VALSARTAN 80 MG/1
80 TABLET ORAL DAILY
Qty: 90 TABLET | Refills: 3 | Status: SHIPPED | OUTPATIENT
Start: 2024-09-26 | End: 2025-09-26

## 2024-09-26 NOTE — TELEPHONE ENCOUNTER
Pt request refill to be sent to [facility]:     Disp Refills Start End MICHAEL   valsartan (DIOVAN) 80 MG tablet [Pharmacy Med Name: VALSARTAN 80MG TABS] 90 tablet 3 9/24/2024 -- No   Request refused: Duplicate   Sig - Route: TAKE ONE TABLET BY MOUTH EVERY DAY - Oral   Class: Normal   Notes to Pharmacy: .   Order: [id number]   Date/Time Signed: 9/24/2024 11:32       Renewals    Renewal provider: Samir Ann MD        Pharmacy    [facility] - [address]

## 2024-09-26 NOTE — TELEPHONE ENCOUNTER
----- Message from Niru Zhao sent at 9/26/2024  9:34 AM CDT -----  Contact: [phone] Patient  Requesting an RX refill or new RX.    Is this a refill or new RX: new    RX name and strength (copy/paste from chart):  valsartan (DIOVAN) 80 MG tablet [Pharmacy Med Name: VALSARTAN 80MG TABS]    Is this a 30 day or 90 day RX:     Pharmacy name and phone # (copy/paste from chart):    [facility] - [place], [place] - [address]  [address] [facility] [address]  [place] [address]  Phone: [phone] Fax: [phone]       The doctors have asked that we provide their patients with the following 2 reminders -- prescription refills can take up to 72 hours, and a friendly reminder that in the future you can use your [facility] account to request refills: yes

## 2024-10-01 ENCOUNTER — PATIENT MESSAGE (OUTPATIENT)
Dept: INTERNAL MEDICINE | Facility: CLINIC | Age: 74
End: 2024-10-01
Payer: MEDICARE

## 2024-11-18 ENCOUNTER — OFFICE VISIT (OUTPATIENT)
Dept: INTERNAL MEDICINE | Facility: CLINIC | Age: 74
End: 2024-11-18
Payer: MEDICARE

## 2024-11-18 VITALS
HEIGHT: 59 IN | TEMPERATURE: 99 F | WEIGHT: 111.56 LBS | OXYGEN SATURATION: 98 % | BODY MASS INDEX: 22.49 KG/M2 | HEART RATE: 61 BPM | RESPIRATION RATE: 18 BRPM

## 2024-11-18 DIAGNOSIS — G47.00 INSOMNIA, UNSPECIFIED TYPE: ICD-10-CM

## 2024-11-18 DIAGNOSIS — E78.2 MIXED HYPERLIPIDEMIA: ICD-10-CM

## 2024-11-18 DIAGNOSIS — M81.6 LOCALIZED OSTEOPOROSIS, UNSPECIFIED PATHOLOGICAL FRACTURE PRESENCE: ICD-10-CM

## 2024-11-18 DIAGNOSIS — I10 HTN, GOAL BELOW 130/80: ICD-10-CM

## 2024-11-18 DIAGNOSIS — I10 PRIMARY HYPERTENSION: ICD-10-CM

## 2024-11-18 DIAGNOSIS — Z00.00 ENCOUNTER FOR PREVENTIVE HEALTH EXAMINATION: Primary | ICD-10-CM

## 2024-11-18 DIAGNOSIS — Z86.718 HISTORY OF DEEP VEIN THROMBOSIS (DVT) OF LOWER EXTREMITY: ICD-10-CM

## 2024-11-18 PROCEDURE — 1160F RVW MEDS BY RX/DR IN RCRD: CPT | Mod: CPTII,S$GLB,, | Performed by: NURSE PRACTITIONER

## 2024-11-18 PROCEDURE — 99999 PR PBB SHADOW E&M-EST. PATIENT-LVL IV: CPT | Mod: PBBFAC,,, | Performed by: NURSE PRACTITIONER

## 2024-11-18 PROCEDURE — 1159F MED LIST DOCD IN RCRD: CPT | Mod: CPTII,S$GLB,, | Performed by: NURSE PRACTITIONER

## 2024-11-18 PROCEDURE — 1126F AMNT PAIN NOTED NONE PRSNT: CPT | Mod: CPTII,S$GLB,, | Performed by: NURSE PRACTITIONER

## 2024-11-18 PROCEDURE — 4010F ACE/ARB THERAPY RXD/TAKEN: CPT | Mod: CPTII,S$GLB,, | Performed by: NURSE PRACTITIONER

## 2024-11-18 PROCEDURE — 1158F ADVNC CARE PLAN TLK DOCD: CPT | Mod: CPTII,S$GLB,, | Performed by: NURSE PRACTITIONER

## 2024-11-18 PROCEDURE — G0439 PPPS, SUBSEQ VISIT: HCPCS | Mod: S$GLB,,, | Performed by: NURSE PRACTITIONER

## 2024-11-18 PROCEDURE — 1170F FXNL STATUS ASSESSED: CPT | Mod: CPTII,S$GLB,, | Performed by: NURSE PRACTITIONER

## 2024-11-18 PROCEDURE — 3044F HG A1C LEVEL LT 7.0%: CPT | Mod: CPTII,S$GLB,, | Performed by: NURSE PRACTITIONER

## 2024-11-18 NOTE — PATIENT INSTRUCTIONS
Counseling and Referral of Other Preventative  (Italic type indicates deductible and co-insurance are waived)    Patient Name: Azucena Luciano  Today's Date: 11/18/2024    Health Maintenance       Date Due Completion Date    RSV Vaccine (Age 60+ and Pregnant patients) (1 - Risk 60-74 years 1-dose series) Never done ---    Colorectal Cancer Screening 08/07/2023 8/7/2018    Mammogram 10/17/2024 10/17/2023    TETANUS VACCINE 10/30/2025 10/30/2015    DEXA Scan 01/11/2026 1/11/2024    Lipid Panel 08/15/2029 8/15/2024        No orders of the defined types were placed in this encounter.    The following information is provided to all patients.  This information is to help you find resources for any of the problems found today that may be affecting your health:                  Living healthy guide: www.[facility].[place].gov      Understanding Diabetes: www.diabetes.org      Eating healthy: www.cdc.gov/healthyweight      CDC home safety checklist: www.cdc.gov/steadi/patient.html      Agency on Aging: www.goea.[place].[facility]      Alcoholics anonymous (AA): www.aa.org      Physical Activity: www.luna.nih.gov/bq9wcnh      Tobacco use: www.quitwithus[place].org

## 2024-11-18 NOTE — PROGRESS NOTES
"  Azucena Luciano presented for a  Medicare AWV and comprehensive Health Risk Assessment today. The following components were reviewed and updated:    Medical history  Family History  Social history  Allergies and Current Medications  Health Risk Assessment  Health Maintenance  Care Team         ** See Completed Assessments for Annual Wellness Visit within the encounter summary.**         The following assessments were completed:  Living Situation  CAGE  Depression Screening  Timed Get Up and Go  Whisper Test  Cognitive Function Screening  Nutrition Screening  ADL Screening  PAQ Screening      Opioid documentation:      Patient does not have a current opioid prescription.        Vitals:    11/18/24 1407   Pulse: 61   Resp: 18   Temp: 98.9 °F (37.2 °C)   TempSrc: Temporal   SpO2: 98%   Weight: 50.6 kg (111 lb 8.8 oz)   Height: 4' 11" (1.499 m)     Body mass index is 22.53 kg/m².  Physical Exam  Vitals and nursing note reviewed.   Constitutional:       Appearance: She is well-developed.   HENT:      Head: Normocephalic and atraumatic.      Right Ear: External ear normal.      Left Ear: External ear normal.      Nose: Nose normal.   Eyes:      Pupils: Pupils are equal, round, and reactive to light.   Cardiovascular:      Rate and Rhythm: Normal rate and regular rhythm.      Heart sounds: Normal heart sounds.   Pulmonary:      Effort: Pulmonary effort is normal.      Breath sounds: Normal breath sounds.   Musculoskeletal:         General: Normal range of motion.      Cervical back: Normal range of motion.   Skin:     General: Skin is warm and dry.   Neurological:      Mental Status: She is alert and oriented to person, place, and time.               Diagnoses and health risks identified today and associated recommendations/orders:    1. Encounter for preventive health examination  Health Maintenance updated   Records reviewed   Exam done     2. History of deep vein thrombosis (DVT) of lower extremity  Stable and " chronic. Continue current medications. Followed by PCP.     3. Primary hypertension  Stable, followed by PCP   Take medications as prescribed.   Monitor BP at home, goal BP < or = 140/80, call office if consistently above this range.   Follow low salt DASH diet and exercise.   BMI reviewed.     4. Localized osteoporosis, unspecified pathological fracture presence  Stable and chronic. Continue current medications. Followed by PCP.     5. HTN, goal below 130/80  Stable and chronic. Continue current medications. Followed by PCP.     6. Mixed hyperlipidemia  Stable and chronic. Continue current medications. Followed by PCP.     7. Insomnia, unspecified type  Stable and chronic. Continue current medications. Followed by PCP.       Counseling and Referral of Other Preventative  (Italic type indicates deductible and co-insurance are waived)    Patient Name: Azucena Luciano  Today's Date: 11/19/2024    Health Maintenance         Date Due Completion Date    RSV Vaccine (Age 60+ and Pregnant patients) (1 - Risk 60-74 years 1-dose series) Never done ---    Colorectal Cancer Screening 08/07/2023 8/7/2018    Mammogram 10/17/2024 10/17/2023    TETANUS VACCINE 10/30/2025 10/30/2015    DEXA Scan 01/11/2026 1/11/2024    Lipid Panel 08/15/2029 8/15/2024          No orders of the defined types were placed in this encounter.    Provided Azucena with a 5-10 year written screening schedule and personal prevention plan. Recommendations were developed using the USPSTF age appropriate recommendations. Education, counseling, and referrals were provided as needed. After Visit Summary printed and given to patient which includes a list of additional screenings\tests needed.    Follow up in about 1 year (around 11/18/2025) for medicare annual wellness visit.    Jenny Villagran NP      I offered to discuss advanced care planning, including how to pick a person who would make decisions for you if you were unable to make them for yourself,  called a health care power of , and what kind of decisions you might make such as use of life sustaining treatments such as ventilators and tube feeding when faced with a life limiting illness recorded on a living will that they will need to know. (How you want to be cared for as you near the end of your natural life)     X Patient is interested in learning more about how to make advanced directives.  I provided them paperwork and offered to discuss this with them.

## 2025-02-26 ENCOUNTER — TELEPHONE (OUTPATIENT)
Dept: INTERNAL MEDICINE | Facility: CLINIC | Age: 75
End: 2025-02-26
Payer: MEDICARE

## 2025-02-26 DIAGNOSIS — R20.8 BURNING SENSATION OF FOOT: Primary | ICD-10-CM

## 2025-02-26 DIAGNOSIS — Z12.31 VISIT FOR SCREENING MAMMOGRAM: ICD-10-CM

## 2025-02-26 DIAGNOSIS — M79.673 PAIN OF FOOT, UNSPECIFIED LATERALITY: ICD-10-CM

## 2025-02-26 NOTE — TELEPHONE ENCOUNTER
----- Message from Umu sent at 2/26/2025 11:53 AM CST -----  Contact: [phone]  [email or web address] Patient is calling for Medical Advice regarding: Patient is requesting a call back from the staff about who she can see for her foot problems. How long has patient had these symptoms: 2 two months Pharmacy name and phone#:[facility] - [place] - [address] [facility]  [facility] [place] [address]: [phone] Fax: [phone] Patient wants a call back or thru [facility]: call back Comments: Thank you Please advise patient replies from provider may take up to 48 hours.

## 2025-02-26 NOTE — TELEPHONE ENCOUNTER
Called Pt.   Regarding L Foot Issue. Having Burning sensation on top of foot. Has been going on and off   Pt asking if she can get a referral to specialist.

## 2025-03-20 ENCOUNTER — HOSPITAL ENCOUNTER (OUTPATIENT)
Dept: RADIOLOGY | Facility: HOSPITAL | Age: 75
Discharge: HOME | End: 2025-03-20
Attending: INTERNAL MEDICINE
Payer: MEDICARE

## 2025-03-20 VITALS — BODY MASS INDEX: 22.38 KG/M2 | WEIGHT: 111 LBS | HEIGHT: 59 IN

## 2025-03-20 DIAGNOSIS — Z12.31 VISIT FOR SCREENING MAMMOGRAM: ICD-10-CM

## 2025-03-20 PROCEDURE — 77067 SCR MAMMO BI INCL CAD: CPT | Mod: TC

## 2025-03-24 ENCOUNTER — OFFICE VISIT (OUTPATIENT)
Dept: PODIATRY | Facility: CLINIC | Age: 75
End: 2025-03-24
Payer: MEDICARE

## 2025-03-24 VITALS
SYSTOLIC BLOOD PRESSURE: 137 MMHG | WEIGHT: 110.88 LBS | DIASTOLIC BLOOD PRESSURE: 75 MMHG | HEIGHT: 59 IN | HEART RATE: 61 BPM | BODY MASS INDEX: 22.35 KG/M2

## 2025-03-24 DIAGNOSIS — M79.2 NEURITIS: Primary | ICD-10-CM

## 2025-03-24 DIAGNOSIS — R20.8 BURNING SENSATION OF FOOT: ICD-10-CM

## 2025-03-24 DIAGNOSIS — M79.673 PAIN OF FOOT, UNSPECIFIED LATERALITY: ICD-10-CM

## 2025-03-24 DIAGNOSIS — M19.072 ARTHRITIS OF LEFT MIDFOOT: ICD-10-CM

## 2025-03-24 PROCEDURE — 1159F MED LIST DOCD IN RCRD: CPT | Mod: CPTII,S$GLB,, | Performed by: PODIATRIST

## 2025-03-24 PROCEDURE — 3008F BODY MASS INDEX DOCD: CPT | Mod: CPTII,S$GLB,, | Performed by: PODIATRIST

## 2025-03-24 PROCEDURE — 99203 OFFICE O/P NEW LOW 30 MIN: CPT | Mod: 25,S$GLB,, | Performed by: PODIATRIST

## 2025-03-24 PROCEDURE — 3075F SYST BP GE 130 - 139MM HG: CPT | Mod: CPTII,S$GLB,, | Performed by: PODIATRIST

## 2025-03-24 PROCEDURE — 99999 PR PBB SHADOW E&M-EST. PATIENT-LVL III: CPT | Mod: PBBFAC,,, | Performed by: PODIATRIST

## 2025-03-24 PROCEDURE — 64455 NJX AA&/STRD PLTR COM DG NRV: CPT | Mod: LT,S$GLB,, | Performed by: PODIATRIST

## 2025-03-24 PROCEDURE — 1160F RVW MEDS BY RX/DR IN RCRD: CPT | Mod: CPTII,S$GLB,, | Performed by: PODIATRIST

## 2025-03-24 PROCEDURE — 1101F PT FALLS ASSESS-DOCD LE1/YR: CPT | Mod: CPTII,S$GLB,, | Performed by: PODIATRIST

## 2025-03-24 PROCEDURE — 4010F ACE/ARB THERAPY RXD/TAKEN: CPT | Mod: CPTII,S$GLB,, | Performed by: PODIATRIST

## 2025-03-24 PROCEDURE — 3288F FALL RISK ASSESSMENT DOCD: CPT | Mod: CPTII,S$GLB,, | Performed by: PODIATRIST

## 2025-03-24 PROCEDURE — 1126F AMNT PAIN NOTED NONE PRSNT: CPT | Mod: CPTII,S$GLB,, | Performed by: PODIATRIST

## 2025-03-24 PROCEDURE — 3078F DIAST BP <80 MM HG: CPT | Mod: CPTII,S$GLB,, | Performed by: PODIATRIST

## 2025-03-24 RX ORDER — LIDOCAINE AND PRILOCAINE 25; 25 MG/G; MG/G
CREAM TOPICAL
Qty: 30 G | Refills: 1 | Status: SHIPPED | OUTPATIENT
Start: 2025-03-24

## 2025-03-24 RX ORDER — TRIAMCINOLONE ACETONIDE 40 MG/ML
40 INJECTION, SUSPENSION INTRA-ARTICULAR; INTRAMUSCULAR ONCE
Status: COMPLETED | OUTPATIENT
Start: 2025-03-24 | End: 2025-03-24

## 2025-03-24 RX ORDER — DEXAMETHASONE SODIUM PHOSPHATE 4 MG/ML
4 INJECTION, SOLUTION INTRA-ARTICULAR; INTRALESIONAL; INTRAMUSCULAR; INTRAVENOUS; SOFT TISSUE ONCE
Status: COMPLETED | OUTPATIENT
Start: 2025-03-24 | End: 2025-03-24

## 2025-03-24 RX ORDER — DICLOFENAC SODIUM 10 MG/G
2 GEL TOPICAL 2 TIMES DAILY PRN
Qty: 100 G | Refills: 1 | Status: SHIPPED | OUTPATIENT
Start: 2025-03-24

## 2025-03-24 RX ADMIN — TRIAMCINOLONE ACETONIDE 40 MG: 40 INJECTION, SUSPENSION INTRA-ARTICULAR; INTRAMUSCULAR at 11:03

## 2025-03-24 RX ADMIN — DEXAMETHASONE SODIUM PHOSPHATE 4 MG: 4 INJECTION, SOLUTION INTRA-ARTICULAR; INTRALESIONAL; INTRAMUSCULAR; INTRAVENOUS; SOFT TISSUE at 11:03

## 2025-03-24 NOTE — PROGRESS NOTES
Chief Complaint   Patient presents with    Foot Pain     Burning sensatio of foot pain of foot, unspecified laterality             HPI:   Azucena Luciano is a 74 y.o. female presents with concerns of  pain LEFT midfoot, described as burning sensation, for the past two months.  She is active and on her feet a lot.  She notes that pain started after wearing a pair of shoes with arch supports and tying her laces too tight.    Any shoe that touches the area causes pain.   Otherwise no acute trauma recalled.   She has tried Aspercreme with no improvement.         Patient Active Problem List   Diagnosis    Varicose veins of left lower extremity    Osteoporosis    Annual physical exam    Hyperlipidemia    Insomnia    Quadriceps weakness    Primary osteoarthritis of left knee    Chronic pain of left knee    Tear of meniscus of left knee    History of deep vein thrombosis (DVT) of lower extremity    Bilateral carpal tunnel syndrome    Risk for falls    Primary osteoarthritis of right knee    HTN, goal below 130/80    Impaired functional mobility, balance, gait, and endurance    Decreased strength of lower extremity    Decreased strength involving knee joint    Chronic left-sided low back pain with left-sided sciatica    Situational stress         Current Outpatient Medications on File Prior to Visit   Medication Sig Dispense Refill    alendronate (FOSAMAX) 70 MG tablet TAKE ONE TABLET BY MOUTH EVERY WEEK 12 tablet 3    ascorbic acid, vitamin C, (VITAMIN C) 250 MG tablet Take 250 mg by mouth once daily.      atorvastatin (LIPITOR) 20 MG tablet TAKE ONE TABLET BY MOUTH EVERY DAY 90 tablet 1    calcium-vitamin D3 500 mg(1,250mg) -200 unit per tablet Take 1 tablet by mouth 2 (two) times daily with meals.      magnesium hydroxide 400 mg/5 ml (MILK OF MAGNESIA) 400 mg/5 mL Susp Take 5 mLs by mouth once daily.      multivitamin with minerals tablet Take 1 tablet by mouth once daily.      oxymetazoline (AFRIN) 0.05 % nasal spray  "1 spray by Nasal route every evening.      valsartan (DIOVAN) 80 MG tablet Take 1 tablet (80 mg total) by mouth once daily. 90 tablet 3    zolpidem (AMBIEN) 5 MG Tab Take 1 tablet (5 mg total) by mouth nightly as needed (insomnia). 30 tablet 1    aspirin (ECOTRIN) 81 MG EC tablet Take 1 tablet (81 mg total) by mouth 2 (two) times a day. 84 tablet 0    famotidine (PEPCID) 20 MG tablet Take 1 tablet (20 mg total) by mouth 2 (two) times daily as needed for Heartburn. 84 tablet 0     Current Facility-Administered Medications on File Prior to Visit   Medication Dose Route Frequency Provider Last Rate Last Admin    BUPivacaine injection 5 mL  5 mL   Bryson Medrano MD   5 mL at 08/30/22 1015    cefazolin (ANCEF) 2 gram in dextrose 5% 50 mL IVPB (premix)  2 g Intravenous Q8H Carlyle Wallace MD   2 g at 09/19/22 1050    LIDOcaine HCL 10 mg/ml (1%) injection 4 mL  4 mL   Bryson Medrano MD   4 mL at 08/30/22 1015         Review of patient's allergies indicates:   Allergen Reactions    Cayenne pepper Anaphylaxis             ROS:   Review of Systems - General ROS: negative for - chills, fever or night sweats  Respiratory ROS: no cough, shortness of breath, or wheezing  Cardiovascular ROS: no chest pain or dyspnea on exertion  Musculoskeletal ROS: positive for - pain in foot - left  Neurological ROS: no TIA or stroke symptoms  Dermatological ROS: negative for pruritus and rash        EXAM:     Vitals:    03/24/25 1017   BP: 137/75   Pulse: 61   Weight: 50.3 kg (110 lb 14.3 oz)   Height: 4' 11" (1.499 m)        Vascular:   Dorsalis pedis and posterior tibial pulses are 2/4 .   3secs capillary refill time and toes are cool to touch.    There is  presence of digital hair.    There is no edema.        Neurological:    Light touch, proprioception, and sharp/dull sensation are intact.   Mulders:  negative   Tinels:  negative   Reflexes:   2+    Dermatological:    There is normal skin tone, turgor, and temperature bilaterally.  There is no " presence of hyperkeratotic lesions.  There is no open wounds.  There is no ecchymoses.  no erythema noted.       Musculoskeletal:   grade 5 - 100% normal (N) complete motion against gravity and full resistance feet bilateral   Dorsal bony prominence LEFT foot.   Arch slightly higher LEFT compared to the RIGHT               ASSESSMENT / PLAN:       Problem List Items Addressed This Visit    None  Visit Diagnoses         Neuritis - Left Foot    -  Primary    Relevant Medications    LIDOcaine-prilocaine (EMLA) cream    diclofenac sodium (VOLTAREN) 1 % Gel    dexAMETHasone injection 4 mg (Completed) (Start on 3/24/2025 12:15 PM)    triamcinolone acetonide injection 40 mg (Completed) (Start on 3/24/2025 11:15 AM)      Burning sensation of foot        Relevant Medications    dexAMETHasone injection 4 mg (Completed) (Start on 3/24/2025 12:15 PM)    triamcinolone acetonide injection 40 mg (Completed) (Start on 3/24/2025 11:15 AM)      Pain of foot, unspecified laterality        Relevant Medications    dexAMETHasone injection 4 mg (Completed) (Start on 3/24/2025 12:15 PM)    triamcinolone acetonide injection 40 mg (Completed) (Start on 3/24/2025 11:15 AM)      Arthritis of left midfoot        Relevant Medications    LIDOcaine-prilocaine (EMLA) cream    diclofenac sodium (VOLTAREN) 1 % Gel    dexAMETHasone injection 4 mg (Completed) (Start on 3/24/2025 12:15 PM)    triamcinolone acetonide injection 40 mg (Completed) (Start on 3/24/2025 11:15 AM)            I counseled the patient on the patient's conditions, their implications and medical management.  I reviewed prior xrays.  No acute trauma to feet since prior xrays.   No new xrays needed.     Shoe and activity modification as needed for relief.    Loosen laces at the midfoot.   Prescription as above.   She is interested in a cortisone injection today.  With patient's permission, a cortisone injection was performed at the Left foot, medial midfoot, using 2ccs of mixture of  (0.5cc 1% plain Lidocaine : 0.5cc 0.5% bupivicaine : 0.5cc kenalog-40 : 0.5cc dexamethasone).  Patient tolerated this without immediate complication.

## 2025-06-13 DIAGNOSIS — M81.0 OSTEOPOROSIS, UNSPECIFIED OSTEOPOROSIS TYPE, UNSPECIFIED PATHOLOGICAL FRACTURE PRESENCE: ICD-10-CM

## 2025-06-13 RX ORDER — ALENDRONATE SODIUM 70 MG/1
70 TABLET ORAL
Qty: 12 TABLET | Refills: 3 | Status: SHIPPED | OUTPATIENT
Start: 2025-06-13

## 2025-06-13 NOTE — TELEPHONE ENCOUNTER
Care Due:                  Date            Visit Type   Department     Provider  --------------------------------------------------------------------------------                                EP -                              PRIMARY      MET INTERNAL  Last Visit: 11-      [facility]   MEDICINE       Navarro Limon  Next Visit: None Scheduled  None         None Found                                                            Last  Test          Frequency    Reason                     Performed    Due Date  --------------------------------------------------------------------------------    Office Visit  12 months..  alendronate,               11- 11-                             atorvastatin, valsartan..    CMP.........  12 months..  alendronate,               08-   08-                             atorvastatin, valsartan..    Lipid Panel.  12 months..  atorvastatin.............  08-   08-    Mg Level....  12 months..  alendronate..............  Not Found    Overdue    Phosphate...  12 months..  alendronate..............  Not Found    Overdue    [employer] Embedded Care Due Messages. Reference number: [id number].   6/13/2025 7:32:35 AM CDT

## 2025-06-13 NOTE — TELEPHONE ENCOUNTER
Refill Routing Note   Medication(s) are not appropriate for processing by [facility] for the following reason(s):        Outside of protocol    [facility] action(s):  Route   Requires appointment : Yes   Requires labs : Yes             Appointments  past 12m or future 3m with PCP    Date Provider   Last Visit   11/14/2023 Navarro Limon, DO   Next Visit   Visit date not found Navarro Limon, DO   ED visits in past 90 days: 0        Note composed:8:49 AM 06/13/2025

## 2025-07-18 ENCOUNTER — TELEPHONE (OUTPATIENT)
Dept: INTERNAL MEDICINE | Facility: CLINIC | Age: 75
End: 2025-07-18
Payer: MEDICARE

## 2025-07-18 ENCOUNTER — OFFICE VISIT (OUTPATIENT)
Dept: DERMATOLOGY | Facility: CLINIC | Age: 75
End: 2025-07-18
Payer: MEDICARE

## 2025-07-18 DIAGNOSIS — D18.01 CHERRY ANGIOMA: ICD-10-CM

## 2025-07-18 DIAGNOSIS — L82.1 SEBORRHEIC KERATOSES: Primary | ICD-10-CM

## 2025-07-18 DIAGNOSIS — L81.4 LENTIGO: ICD-10-CM

## 2025-07-18 DIAGNOSIS — D22.9 MULTIPLE BENIGN NEVI: ICD-10-CM

## 2025-07-18 DIAGNOSIS — Z00.00 ANNUAL PHYSICAL EXAM: Primary | ICD-10-CM

## 2025-07-18 DIAGNOSIS — M81.0 OSTEOPOROSIS, UNSPECIFIED OSTEOPOROSIS TYPE, UNSPECIFIED PATHOLOGICAL FRACTURE PRESENCE: ICD-10-CM

## 2025-07-18 PROCEDURE — 99999 PR PBB SHADOW E&M-EST. PATIENT-LVL III: CPT | Mod: PBBFAC,,, | Performed by: STUDENT IN AN ORGANIZED HEALTH CARE EDUCATION/TRAINING PROGRAM

## 2025-07-18 NOTE — PROGRESS NOTES
Subjective:      Patient ID:  Azucena Luciano is a 74 y.o. female who presents for   Chief Complaint   Patient presents with    Lesion     Forehead      Patient here for concern of lesion on right forehead     No - personal history of atypical moles removed  No - personal history of MM   No - family history of MM  Yes - childhood blistering sunburns  No - tanning bed use  No - personal history of NMSC    Patient with specific complaint of lesion(s)  Location: right forehead   Duration: 1-2 years   Symptoms: scaly, dry   Relieving factors/Previous treatments: none             Review of Systems   Hematologic/Lymphatic: Does not bruise/bleed easily.       Objective:   Physical Exam   Constitutional: She appears well-developed and well-nourished. No distress.   Neurological: She is alert and oriented to person, place, and time. She is not disoriented.   Psychiatric: She has a normal mood and affect.   Skin:   Areas Examined (abnormalities noted in diagram):   Head / Face Inspection Performed                 Diagram Legend     Erythematous scaling macule/papule c/w actinic keratosis       Vascular papule c/w angioma      Pigmented verrucoid papule/plaque c/w seborrheic keratosis      Yellow umbilicated papule c/w sebaceous hyperplasia      Irregularly shaped tan macule c/w lentigo     1-2 mm smooth white papules consistent with Milia      Movable subcutaneous cyst with punctum c/w epidermal inclusion cyst      Subcutaneous movable cyst c/w pilar cyst      Firm pink to brown papule c/w dermatofibroma      Pedunculated fleshy papule(s) c/w skin tag(s)      Evenly pigmented macule c/w junctional nevus     Mildly variegated pigmented, slightly irregular-bordered macule c/w mildly atypical nevus      Flesh colored to evenly pigmented papule c/w intradermal nevus       Pink pearly papule/plaque c/w basal cell carcinoma      Erythematous hyperkeratotic cursted plaque c/w SCC      Surgical scar with no sign of skin cancer  recurrence      Open and closed comedones      Inflammatory papules and pustules      Verrucoid papule consistent consistent with wart     Erythematous eczematous patches and plaques     Dystrophic onycholytic nail with subungual debris c/w onychomycosis     Umbilicated papule    Erythematous-base heme-crusted tan verrucoid plaque consistent with inflamed seborrheic keratosis     Erythematous Silvery Scaling Plaque c/w Psoriasis     See annotation      Assessment / Plan:        Seborrheic keratoses    Multiple benign nevi    Lentigo    Cherry angioma    Reassurance given to patient. No treatment is necessary.   Treatment of benign, asymptomatic lesions may be considered cosmetic.    - Recommended the use of daily sunscreen and counseled on its role as a preventative measure for photoaging, sunburns, and skin cancer and to prevent the worsening of photodermatoses and pigmentary disorders (eg, melasma and postinflammatory hyperpigmentation). Preferred products at least SPF 30 and are mineral based such as Elta MD tinted broad spectrum SPF 40, Neutrogenia Sheer Zinc SPF 50, CeraVe Tinted mineral SPF 30, Blue lizard mineral sunscreen, Sun Bum mineral sunscreen. Handout provided   - Also counseled on the use of photoprotective clothing, such as from Coolibar and Solumbra   - Avoiding peak sunlight hours from 10 am - 4 pm

## 2025-07-18 NOTE — PATIENT INSTRUCTIONS
Sunscreen Guidelines  Sunscreen protects your skin by absorbing and reflecting ultraviolet rays. All sunscreens have a sun protection factor (SPF) rating that indicates how long a sunscreen will remain effective on the skin.    Why protect your skin?  The sun's rays are composed of many different wavelengths, including UVA, UVB, and visible light that each affect the skin differently.    UVB: sunburn, photoaging, skin cancer (melanoma, basal cell, and squamous cell carcinomas) and modulation of the skin's immune system.    UVA: similar to above but thought to contribute more to aging; at the same dose of UVB it is less powerful however the sun has 10-20 times the levels of UVA as compared with UVB.  Visible light: implicated in causing unwanted darkening of skin, such as melasma and post-inflammatory hyperpigmentation in darker skin types     If I have dark skin, do I need to worry about the sun?    More darkly pigmented skin is more protected against UV-induced skin cancer, sunburn, and photoaging, though may still suffer from sun-related conditions, including melasma, hyperpigmentation, and other dark spots.    Sun avoidance  As a general rule, stay out of the sun as much as possible between 10 a.m. - 4 p.m.    Download the EPA UV index dann to track the UV index by hour in your zip code.      Which sunscreen should I choose?  The best sunscreen to use varies by individual. The one that feels best on your skin and fits your lifestyle will be the one you will likely use most regularly.   Active ingredients of sunscreen vary by , and may be a chemical (such as avobenzone or oxybenzone) or physical agent (such as zinc oxide or titanium dioxide). I recommend a physical agent.  A water-resistant sunscreen is one that maintains the SPF level after 40 minutes of water exposure. A very water-resistant sunscreen maintains the SPF level after 80 minutes of water exposure.    Sunscreen: this is the last layer in  "sun protection   Be generous: 1 shot glass of sunscreen for your body, ½ teaspoon for your face/neck  Reapply every 2 hours  Broad spectrum (provides UVA/UVB protection), SPF 50 or above  Avoid spray sunscreens: less effective and have been found to contain benzene (carcinogen)    Sun protective clothing  Although sunscreen helps minimize sun damage, no sunscreen completely blocks all wavelengths of UV light. Wearing sun protective clothing such as hats, rashguards or swim shirts, and long sleeves and/or pants, as well as avoiding sun exposure from 10 a.m. to 4 p.m. will help protect your skin from overexposure and minimize sun damage. Seek shade.  Long sleeved clothing, hats, and sunglasses: makes sun protection easier, more effective, and can even be more affordable, since sunscreen needs to be reapplied frequently.    Solumbra (www.sunprectautions.SustainU)  GetAutoBids (www.Swift Identity)  Coolibar (www.[employer]XL ricardo plc.SustainU)  Land's end (www.Embedly)  Hats from [employer] (www.helenkaminski.com)    My Favorite Sunscreens:  Physical blockers: Can have a "white case" but in general are more effective  - Face: CeraVe tinted mineral sunscreen, Bare Minerals complexion rescue (20 shades), Elta MD (UV elements, UV physical, UV restore, etc), Tizo ultra zinc tinted, Cetaphil Sheer Mineral Face Liquid Sunscreen  - Body: Blue Lizard, Neutrogena Sheer Zinc, Eucerin Daily Protection, Aveeno Baby   "

## 2025-07-23 ENCOUNTER — OFFICE VISIT (OUTPATIENT)
Dept: PODIATRY | Facility: CLINIC | Age: 75
End: 2025-07-23
Payer: MEDICARE

## 2025-07-23 VITALS
HEIGHT: 59 IN | BODY MASS INDEX: 22.35 KG/M2 | WEIGHT: 110.88 LBS | HEART RATE: 71 BPM | DIASTOLIC BLOOD PRESSURE: 69 MMHG | SYSTOLIC BLOOD PRESSURE: 170 MMHG

## 2025-07-23 DIAGNOSIS — M19.072 ARTHRITIS OF LEFT MIDFOOT: Chronic | ICD-10-CM

## 2025-07-23 DIAGNOSIS — M79.672 LEFT FOOT PAIN: ICD-10-CM

## 2025-07-23 DIAGNOSIS — M21.619 BUNION: ICD-10-CM

## 2025-07-23 DIAGNOSIS — M20.5X2 HALLUX LIMITUS, ACQUIRED, LEFT: Primary | Chronic | ICD-10-CM

## 2025-07-23 PROCEDURE — 99999 PR PBB SHADOW E&M-EST. PATIENT-LVL III: CPT | Mod: PBBFAC,,, | Performed by: PODIATRIST

## 2025-07-23 RX ORDER — DEXAMETHASONE SODIUM PHOSPHATE 4 MG/ML
4 INJECTION, SOLUTION INTRA-ARTICULAR; INTRALESIONAL; INTRAMUSCULAR; INTRAVENOUS; SOFT TISSUE ONCE
Status: COMPLETED | OUTPATIENT
Start: 2025-07-23 | End: 2025-07-23

## 2025-07-23 RX ORDER — TRIAMCINOLONE ACETONIDE 40 MG/ML
40 INJECTION, SUSPENSION INTRA-ARTICULAR; INTRAMUSCULAR ONCE
Status: COMPLETED | OUTPATIENT
Start: 2025-07-23 | End: 2025-07-23

## 2025-07-23 RX ADMIN — DEXAMETHASONE SODIUM PHOSPHATE 4 MG: 4 INJECTION, SOLUTION INTRA-ARTICULAR; INTRALESIONAL; INTRAMUSCULAR; INTRAVENOUS; SOFT TISSUE at 09:07

## 2025-07-23 RX ADMIN — TRIAMCINOLONE ACETONIDE 40 MG: 40 INJECTION, SUSPENSION INTRA-ARTICULAR; INTRAMUSCULAR at 09:07

## 2025-07-23 NOTE — PROGRESS NOTES
Patient ID: Azucena Luciano is a 74 y.o. female.    Chief Complaint: Foot Pain (Left foot pain)    History of Present Illness    Left foot pain    Patient presents with left foot pain, primarily located in two spots on the left foot, with the worst pain near the great toe joint. Pain fluctuates between 4 and 8 out of 10, with the highest intensity occurring when waking in the morning. She reports that the pain does not remain at 8, but never goes below 4. Pain occurs when bending and pushing the great toe joint.    She can only wear comfortable shoes now, finding it painful to wear tennis shoes or shoes with laces. She is unable to tie shoelaces due to pain. She remains very active and takes care of her grandchildren, but pain limits her ability to run or engage in more strenuous activities.    Four months ago, she received a cortisone injection. She has been using lidocaine, which provides minimal relief.    She mentions being flat-footed and having had a bunion on her left foot for her entire life. She expresses concern about the chronic nature of her condition and its long-term prognosis.    She denies any issues with her right foot.    Patient has an allergy to red pepper. Severe seasoning can cause a reaction, while she can tolerate mild seasoning.           Patient Active Problem List  Diagnosis    Varicose veins of left lower extremity    Osteoporosis    Annual physical exam    Hyperlipidemia    Insomnia    Quadriceps weakness    Primary osteoarthritis of left knee    Chronic pain of left knee    Tear of meniscus of left knee    History of deep vein thrombosis (DVT) of lower extremity    Bilateral carpal tunnel syndrome    Risk for falls    Primary osteoarthritis of right knee    HTN, goal below 130/80    Impaired functional mobility, balance, gait, and endurance    Decreased strength of lower extremity    Decreased strength involving knee joint    Chronic left-sided low back pain with left-sided sciatica     Situational stress       Current Outpatient Medications on File Prior to Visit   Medication Sig Dispense Refill    alendronate (FOSAMAX) 70 MG tablet TAKE ONE TABLET BY MOUTH EVERY WEEK 12 tablet 3    ascorbic acid, vitamin C, (VITAMIN C) 250 MG tablet Take 250 mg by mouth once daily.      atorvastatin (LIPITOR) 20 MG tablet TAKE ONE TABLET BY MOUTH EVERY DAY 90 tablet 1    calcium-vitamin D3 500 mg(1,250mg) -200 unit per tablet Take 1 tablet by mouth 2 (two) times daily with meals.      diclofenac sodium (VOLTAREN) 1 % Gel Apply 2 g topically 2 (two) times daily as needed. To painful area on the foot 100 g 1    LIDOcaine-prilocaine (EMLA) cream Apply topically as needed. To painful area on the foot 30 g 1    magnesium hydroxide 400 mg/5 ml (MILK OF MAGNESIA) 400 mg/5 mL Susp Take 5 mLs by mouth once daily.      multivitamin with minerals tablet Take 1 tablet by mouth once daily.      oxymetazoline (AFRIN) 0.05 % nasal spray 1 spray by Nasal route every evening.      valsartan (DIOVAN) 80 MG tablet Take 1 tablet (80 mg total) by mouth once daily. 90 tablet 3    zolpidem (AMBIEN) 5 MG Tab Take 1 tablet (5 mg total) by mouth nightly as needed (insomnia). 30 tablet 1    aspirin (ECOTRIN) 81 MG EC tablet Take 1 tablet (81 mg total) by mouth 2 (two) times a day. 84 tablet 0    famotidine (PEPCID) 20 MG tablet Take 1 tablet (20 mg total) by mouth 2 (two) times daily as needed for Heartburn. 84 tablet 0     Current Facility-Administered Medications on File Prior to Visit   Medication Dose Route Frequency Provider Last Rate Last Admin    BUPivacaine injection 5 mL  5 mL   Bryson Medrano MD   5 mL at 08/30/22 1015    cefazolin (ANCEF) 2 gram in dextrose 5% 50 mL IVPB (premix)  2 g Intravenous Q8H Carlyle Wallace MD   2 g at 09/19/22 1050    LIDOcaine HCL 10 mg/ml (1%) injection 4 mL  4 mL   Bryson Medrano MD   4 mL at 08/30/22 1015         Review of patient's allergies indicates:   Allergen Reactions    Cayenne pepper  "Anaphylaxis           Physical Exam      Vitals:    07/23/25 0929   BP: (!) 170/69   Pulse: 71   Weight: 50.3 kg (110 lb 14.3 oz)   Height: 4' 11" (1.499 m)        Vascular:   Dorsalis pedis and posterior tibial pulses are 2/4 .   3secs capillary refill time and toes are cool to touch.    There is  presence of digital hair.    There is no edema.          Neurological:    Light touch, proprioception, and sharp/dull sensation are intact.   Mulders:  negative   Tinels:  negative   Reflexes:   2+       Dermatological:    There is normal skin tone, turgor, and temperature bilaterally.    There is no presence of hyperkeratotic lesions.    There is no open wounds.    There is no ecchymoses.    no erythema noted.         Musculoskeletal:   grade 5 - 100% normal (N) complete motion against gravity and full resistance feet bilateral   Dorsal bony prominence LEFT foot.   Arch slightly higher LEFT compared to the RIGHT   Limited range of motion on the LEFT 1st metatarso-phalangeal joint         Assessment & Plan      Problem List Items Addressed This Visit    None  Visit Diagnoses         Hallux limitus, acquired, left  (Chronic)   -  Primary    Relevant Medications    dexAMETHasone injection 4 mg (Completed) (Start on 7/23/2025 11:00 AM)    triamcinolone acetonide injection 40 mg (Completed)      Arthritis of left midfoot  (Chronic)       Relevant Medications    dexAMETHasone injection 4 mg (Completed) (Start on 7/23/2025 11:00 AM)    triamcinolone acetonide injection 40 mg (Completed)      Bunion - Left Foot          Left foot pain                PLAN SUMMARY:  - I counseled the patient on the patient's conditions, their implications and medical management.  - prior xrays reviewed with the patient.  She defers x-rays today.  - over-the-counter Advil as needed for pain management  - Use comfortable, soft-cushioned shoes.  Avoid barefoot walking.  Arch supports recommended in shoes.  - Avoid tight shoes and those with " shoelaces  - Left foot corticosteroid injection administered in great toe joint area.  Risks of joint injections include potential for joint damage and persistent pain.      With patient's permission, a cortisone injection was performed at the Left 1st metatarso-phalangeal joint and LEFT midfoot  using a total of 2ccs of mixture of (0.5cc 1% plain Lidocaine : 0.5cc 0.5% bupivicaine : 0.5cc kenalog-40 : 0.5cc dexamethasone).  Patient tolerated this without immediate complication.        This note was generated with the assistance of ambient listening technology. Verbal consent was obtained by the patient and accompanying visitor(s) for the recording of patient appointment to facilitate this note. I attest to having reviewed and edited the generated note for accuracy, though some syntax or spelling errors may persist. Please contact the author of this note for any clarification.

## 2025-07-30 ENCOUNTER — HOSPITAL ENCOUNTER (EMERGENCY)
Facility: HOSPITAL | Age: 75
Discharge: HOME | End: 2025-07-30
Attending: EMERGENCY MEDICINE
Payer: MEDICARE

## 2025-07-30 VITALS
DIASTOLIC BLOOD PRESSURE: 83 MMHG | HEIGHT: 59 IN | RESPIRATION RATE: 20 BRPM | OXYGEN SATURATION: 98 % | TEMPERATURE: 98 F | BODY MASS INDEX: 22.18 KG/M2 | WEIGHT: 110 LBS | SYSTOLIC BLOOD PRESSURE: 196 MMHG | HEART RATE: 73 BPM

## 2025-07-30 DIAGNOSIS — M79.673 FOOT PAIN: ICD-10-CM

## 2025-07-30 PROCEDURE — 99284 EMERGENCY DEPT VISIT MOD MDM: CPT | Mod: 25

## 2025-07-30 RX ORDER — LIDOCAINE 50 MG/G
1 PATCH TOPICAL DAILY
Qty: 14 PATCH | Refills: 0 | Status: SHIPPED | OUTPATIENT
Start: 2025-07-30 | End: 2025-08-13

## 2025-07-30 RX ORDER — ACETAMINOPHEN 500 MG
1000 TABLET ORAL 4 TIMES DAILY
Qty: 112 TABLET | Refills: 0 | Status: SHIPPED | OUTPATIENT
Start: 2025-07-30 | End: 2025-08-13

## 2025-07-30 NOTE — FIRST PROVIDER EVALUATION
" Emergency Department TeleTriage Encounter Note      CHIEF COMPLAINT    Chief Complaint   Patient presents with    Foot Injury     Pt reports while stepping she felt a "crack" to her L foot at 0800 today. Pt reports hx of pain and arthritis to L foot.        VITAL SIGNS   Initial Vitals [07/30/25 1426]   BP Pulse Resp Temp SpO2   (!) 196/83 73 20 98 °F (36.7 °C) 98 %      MAP       --            ALLERGIES    Review of patient's allergies indicates:   Allergen Reactions    Cayenne pepper Anaphylaxis       PROVIDER TRIAGE NOTE  Has had foot pain for several months but stepped back a few days ago and felt a crack. Has had worsening pain since that time. Had injections in the foot several days prior to injury.     Limited physical exam via telehealth: The patient is awake, alert, answering questions appropriately and is not in respiratory distress.  As the Teletriage provider, I performed an initial assessment and ordered appropriate labs and imaging studies, if any, to facilitate the patient's care once placed in the ED. Once a room is available, care and a full evaluation will be completed by an alternate ED provider.  Any additional orders and the final disposition will be determined by that provider.  All imaging and labs will not be followed-up by the Teletriage Team, including myself.          ORDERS  Labs Reviewed - No data to display    ED Orders (720h ago, onward)      Start Ordered     Status Ordering Provider    07/30/25 1434 07/30/25 1433  X-Ray Foot Complete Left  1 time imaging         Ordered HEIDI BRADFORD              Virtual Visit Note: The provider triage portion of this emergency department evaluation and documentation was performed via [employer], a HIPAA-compliant telemedicine application, in concert with a tele-presenter in the room. A face to face patient evaluation with one of my colleagues will occur once the patient is placed in an emergency department room.      DISCLAIMER: This note was " prepared with [employer] voice recognition transcription software. Garbled syntax, mangled pronouns, and other bizarre constructions may be attributed to that software system.

## 2025-07-30 NOTE — ED PROVIDER NOTES
"Encounter Date: 7/30/2025       History     Chief Complaint   Patient presents with    Foot Injury     Pt reports while stepping she felt a "crack" to her L foot at 0800 today. Pt reports hx of pain and arthritis to L foot.      Azucena Luciano is a 74 y.o. female  has a past medical history of Asthma, Carpal tunnel syndrome, DVT (deep venous thrombosis), Hypertension, Osteoporosis, and Urinary tract infection. presenting to the Emergency Department for left foot pain following hearing or cracking sensation at 8:00 a.m. today.  Patient reports a history of pain to the left foot.  Patient feels like the pain radiates from her left foot to her entire leg.  Patient also feels like her left leg is slightly darker than her right.  Patient states she saw the foot doctor for foot pain recently and received a steroid injection into areas of her foot but denies any relief from those injections.  No other complaints at this time.        The history is provided by the patient.     Review of patient's allergies indicates:   Allergen Reactions    Cayenne pepper Anaphylaxis     Past Medical History:   Diagnosis Date    Asthma     Carpal tunnel syndrome     DVT (deep venous thrombosis)     Hypertension     Osteoporosis     Urinary tract infection      Past Surgical History:   Procedure Laterality Date    ARTHROPLASTY, KNEE, TOTAL, SIGHT ASSISTED Left 9/11/2023    Procedure: ARTHROPLASTY, KNEE, SIGHT ASSISTED;  Surgeon: Bryson Medrano MD;  Location: [facility] OR;  Service: Orthopedics;  Laterality: Left;  bmi - 22.53  not a aleksandr knee do not call aurea  "[employer] alia [phone] notified cc    ARTHROSCOPY OF KNEE Left 9/19/2022    Procedure: ARTHROSCOPY, KNEE;  Surgeon: Bryson Medrano MD;  Location: [facility] OR;  Service: Orthopedics;  Laterality: Left;    AUGMENTATION OF BREAST      BREAST BIOPSY      BREAST CYST ASPIRATION      BREAST SURGERY      CATARACT EXTRACTION      COLONOSCOPY N/A 8/7/2018    Procedure: COLONOSCOPY;  Surgeon: " KATE Vernon MD;  Location: [facility] ([address] [place] [address]);  Service: Endoscopy;  Laterality: N/A;  Ok to hold Eliquis x 2 days per Dr. Gee    FACIAL COSMETIC SURGERY      NASAL SEPTUM SURGERY      OVARIAN CYST REMOVAL      tumamada olivaresmartin       Family History   Problem Relation Name Age of Onset    Osteoporosis Mother      Cancer Father  66        Colon    Colon cancer Father      Cancer Brother          Brain tumor    Diabetes Maternal Grandmother      Cancer Maternal Grandmother  75        Colon    Diabetes Paternal Grandmother      Heart disease Paternal Grandmother      Hypertension Neg Hx      Stroke Neg Hx      Breast cancer Neg Hx      Ovarian cancer Neg Hx      Prostate cancer Neg Hx      Kidney disease Neg Hx       Social History[1]  Review of Systems   Musculoskeletal:  Positive for arthralgias.   All other systems reviewed and are negative.      Physical Exam     Initial Vitals [07/30/25 1426]   BP Pulse Resp Temp SpO2   (!) 196/83 73 20 98 °F (36.7 °C) 98 %      MAP       --         Physical Exam    Nursing note and vitals reviewed.  Constitutional: She appears well-developed and well-nourished. She is not diaphoretic.  Non-toxic appearance. No distress.   HENT:   Head: Normocephalic and atraumatic.   Right Ear: Hearing and external ear normal.   Left Ear: Hearing and external ear normal.   Eyes: EOM are normal.   Neck: Neck supple.   Normal range of motion.  Cardiovascular:  Normal rate.           Pulmonary/Chest: No respiratory distress.   Abdominal: She exhibits no distension.   Musculoskeletal:         General: Normal range of motion.      Cervical back: Normal range of motion and neck supple. Normal range of motion.      Right foot: Normal capillary refill. No swelling. Normal pulse.      Left foot: Normal range of motion and normal capillary refill. Tenderness and bony tenderness present. No swelling. Normal pulse.      Comments: Left lower extremity is slightly darker compared to the right.      Neurological: She is alert and oriented to person, place, and time. GCS score is 15. GCS eye subscore is 4. GCS verbal subscore is 5. GCS motor subscore is 6.   Skin: Skin is warm and dry.   Psychiatric: She has a normal mood and affect. Her behavior is normal. Judgment and thought content normal.         ED Course   Procedures  Labs Reviewed - No data to display       Imaging Results              US Lower Extremity Veins Bilateral (Final result)  Result time 07/30/25 16:05:21      Final result by Enmanuel eLe III, MD (07/30/25 16:05:21)                   Impression:      No DVT seen.      Electronically signed by: Enmaunel Lee MD  Date:    07/30/2025  Time:    16:05               Narrative:    EXAMINATION:  US LOWER EXTREMITY VEINS BILATERAL    CLINICAL HISTORY:  Pain in leg, unspecified    FINDINGS:  Bilateral lower extremity veins demonstrate normal flow, compressibility, and waveform.  No popliteal fossa mass, cyst, or aneurysm seen.                                       X-Ray Foot Complete Left (Final result)  Result time 07/30/25 15:18:47      Final result by Enmanuel Lee III, MD (07/30/25 15:18:47)                   Narrative:    EXAMINATION:  XR FOOT COMPLETE 3 VIEW LEFT    CLINICAL HISTORY:  Pain in unspecified foot    FINDINGS:  Foot complete three views left.    There is a hallux valgus deformity.  No radiopaque foreign body, or erosion seen.  No fracture dislocation bone destruction seen.  No trauma seen.  There are spurs on the calcaneus.      Electronically signed by: Enmanuel Lee MD  Date:    07/30/2025  Time:    15:18                                     Medications - No data to display  Medical Decision Making  This is an emergent evaluation of 74 y.o. female in the ED presenting for orthopedic complaint. Physical exam reveals a non-toxic, afebrile, and well-appearing female in no apparent respiratory distress. Pertinent physical exam findings above. Vital signs stable. If  available, previous records reviewed.    My overall impression is :  Foot pain  . Differential Diagnoses: Including but not limited to Sprain/strain, fracture, contusion, dislocation, gout, septic arthritis    Discharge Meds/Instructions: RICE method.  Offered patient Ace wrap, crutches, walking boot to which patient has declined all 3.  Patient states she has them at home.  Patient will follow up with her podiatrist.    There does not appear to be any indication for further emergent testing, observation, or hospitalization at this time. A mutual shared decision making discussion was had with the patient. Patient appears stable for and is comfortable with discharge home. The diagnosis, treatment plan, instructions for follow-up as well as ED return precautions were discussed. Advised to follow-up with PCP for outpatient follow-up in 2-3 days. Signs and symptoms that would warrant immediate return to ED were reviewed prior to discharge. All questions and concerns were asked, answered, and addressed. Patient expressed understanding and agreement with the plan.     This case was discussed with my attending, Dr. Damon who is in agreement with my assessment and plan.      Amount and/or Complexity of Data Reviewed  Radiology: ordered and independent interpretation performed. Decision-making details documented in ED Course.    Risk  OTC drugs.  Prescription drug management.               ED Course as of 07/30/25 1629   Wed Jul 30, 2025   1525 X-Ray Foot Complete Left  Independent interpretation by me: no acute fracture. I have read the radiologist's report and agree with their findings.   [LH]   1611 US Lower Extremity Veins Bilateral  I have read the radiologist's report and agree with their findings.  No acute abnormality.     [LH]      ED Course User Index  [LH] Alexandra Orona PA-C                               Clinical Impression:  Final diagnoses:  [M79.673] Foot pain          ED Disposition Condition    Discharge  Stable          ED Prescriptions       Medication Sig Dispense Start Date End Date Auth. Provider    acetaminophen (TYLENOL) 500 MG tablet Take 2 tablets (1,000 mg total) by mouth 4 (four) times daily. for 14 days 112 tablet 7/30/2025 8/13/2025 Alexandra Orona PA-C    LIDOcaine (LIDODERM) 5 % Place 1 patch onto the skin once daily. Remove & Discard patch within 12 hours or as directed by MD for 14 days 14 patch 7/30/2025 8/13/2025 Alexandra Orona PA-C          Follow-up Information    None                [1]   Social History  Tobacco Use    Smoking status: Never    Smokeless tobacco: Never   Substance Use Topics    Alcohol use: Not Currently    Drug use: No        Alexandra Orona PA-C  07/30/25 4610

## 2025-08-19 ENCOUNTER — LAB VISIT (OUTPATIENT)
Dept: LAB | Facility: HOSPITAL | Age: 75
End: 2025-08-19
Attending: INTERNAL MEDICINE
Payer: MEDICARE

## 2025-08-19 DIAGNOSIS — Z00.00 ANNUAL PHYSICAL EXAM: ICD-10-CM

## 2025-08-19 LAB
ABSOLUTE EOSINOPHIL (OHS): 0.12 K/UL
ABSOLUTE MONOCYTE (OHS): 0.4 K/UL (ref 0.3–1)
ABSOLUTE NEUTROPHIL COUNT (OHS): 2.35 K/UL (ref 1.8–7.7)
ALBUMIN SERPL BCP-MCNC: 4 G/DL (ref 3.5–5.2)
ALP SERPL-CCNC: 90 UNIT/L (ref 40–150)
ALT SERPL W/O P-5'-P-CCNC: 28 UNIT/L (ref 0–55)
ANION GAP (OHS): 6 MMOL/L (ref 8–16)
AST SERPL-CCNC: 32 UNIT/L (ref 0–50)
BASOPHILS # BLD AUTO: 0.08 K/UL
BASOPHILS NFR BLD AUTO: 1.4 %
BILIRUB SERPL-MCNC: 0.4 MG/DL (ref 0.1–1)
BUN SERPL-MCNC: 20 MG/DL (ref 8–23)
CALCIUM SERPL-MCNC: 8.9 MG/DL (ref 8.7–10.5)
CHLORIDE SERPL-SCNC: 108 MMOL/L (ref 95–110)
CHOLEST SERPL-MCNC: 222 MG/DL (ref 120–199)
CHOLEST/HDLC SERPL: 4.4 {RATIO} (ref 2–5)
CO2 SERPL-SCNC: 28 MMOL/L (ref 23–29)
CREAT SERPL-MCNC: 0.8 MG/DL (ref 0.5–1.4)
EAG (OHS): 111 MG/DL (ref 68–131)
ERYTHROCYTE [DISTWIDTH] IN BLOOD BY AUTOMATED COUNT: 14.6 % (ref 11.5–14.5)
GFR SERPLBLD CREATININE-BSD FMLA CKD-EPI: >60 ML/MIN/1.73/M2
GLUCOSE SERPL-MCNC: 90 MG/DL (ref 70–110)
HBA1C MFR BLD: 5.5 % (ref 4–5.6)
HCT VFR BLD AUTO: 44.7 % (ref 37–48.5)
HDLC SERPL-MCNC: 51 MG/DL (ref 40–75)
HDLC SERPL: 23 % (ref 20–50)
HGB BLD-MCNC: 13.9 GM/DL (ref 12–16)
IMM GRANULOCYTES # BLD AUTO: 0.01 K/UL (ref 0–0.04)
IMM GRANULOCYTES NFR BLD AUTO: 0.2 % (ref 0–0.5)
LDLC SERPL CALC-MCNC: 142 MG/DL (ref 63–159)
LYMPHOCYTES # BLD AUTO: 2.8 K/UL (ref 1–4.8)
MCH RBC QN AUTO: 26.6 PG (ref 27–31)
MCHC RBC AUTO-ENTMCNC: 31.1 G/DL (ref 32–36)
MCV RBC AUTO: 86 FL (ref 82–98)
NONHDLC SERPL-MCNC: 171 MG/DL
NUCLEATED RBC (/100WBC) (OHS): 0 /100 WBC
PLATELET # BLD AUTO: 215 K/UL (ref 150–450)
PMV BLD AUTO: 11.3 FL (ref 9.2–12.9)
POTASSIUM SERPL-SCNC: 4.1 MMOL/L (ref 3.5–5.1)
PROT SERPL-MCNC: 6.4 GM/DL (ref 6–8.4)
RBC # BLD AUTO: 5.22 M/UL (ref 4–5.4)
RELATIVE EOSINOPHIL (OHS): 2.1 %
RELATIVE LYMPHOCYTE (OHS): 48.6 % (ref 18–48)
RELATIVE MONOCYTE (OHS): 6.9 % (ref 4–15)
RELATIVE NEUTROPHIL (OHS): 40.8 % (ref 38–73)
SODIUM SERPL-SCNC: 142 MMOL/L (ref 136–145)
TRIGL SERPL-MCNC: 145 MG/DL (ref 30–150)
TSH SERPL-ACNC: 1.84 UIU/ML (ref 0.4–4)
WBC # BLD AUTO: 5.76 K/UL (ref 3.9–12.7)

## 2025-08-19 PROCEDURE — 85025 COMPLETE CBC W/AUTO DIFF WBC: CPT

## 2025-08-19 PROCEDURE — 84443 ASSAY THYROID STIM HORMONE: CPT

## 2025-08-19 PROCEDURE — 80061 LIPID PANEL: CPT

## 2025-08-19 PROCEDURE — 83036 HEMOGLOBIN GLYCOSYLATED A1C: CPT

## 2025-08-19 PROCEDURE — 36415 COLL VENOUS BLD VENIPUNCTURE: CPT | Mod: PO

## 2025-08-19 PROCEDURE — 82565 ASSAY OF CREATININE: CPT

## 2025-08-26 PROBLEM — Z00.00 ANNUAL PHYSICAL EXAM: Status: RESOLVED | Noted: 2018-08-07 | Resolved: 2025-08-26

## 2025-08-27 ENCOUNTER — HOSPITAL ENCOUNTER (OUTPATIENT)
Dept: RADIOLOGY | Facility: HOSPITAL | Age: 75
Discharge: HOME | End: 2025-08-27
Attending: INTERNAL MEDICINE
Payer: MEDICARE

## 2025-08-27 DIAGNOSIS — Z00.00 ANNUAL PHYSICAL EXAM: ICD-10-CM

## 2025-08-27 DIAGNOSIS — R79.9 ABNORMAL FINDING OF BLOOD CHEMISTRY, UNSPECIFIED: ICD-10-CM

## 2025-08-27 DIAGNOSIS — I10 PRIMARY HYPERTENSION: ICD-10-CM

## 2025-08-27 DIAGNOSIS — Z13.6 ENCOUNTER FOR SCREENING FOR CARDIOVASCULAR DISORDERS: ICD-10-CM

## 2025-08-27 DIAGNOSIS — E78.5 HYPERLIPIDEMIA, UNSPECIFIED HYPERLIPIDEMIA TYPE: Primary | ICD-10-CM

## 2025-08-27 DIAGNOSIS — Z91.89 CARDIAC SYMPTOMS WITH RISK FOR CORONARY HEART DISEASE GREATER THAN 20% IN NEXT 10 YEARS: ICD-10-CM

## 2025-08-27 DIAGNOSIS — R09.89 CARDIAC SYMPTOMS WITH RISK FOR CORONARY HEART DISEASE GREATER THAN 20% IN NEXT 10 YEARS: ICD-10-CM

## 2025-08-27 PROCEDURE — 75571 CT HRT W/O DYE W/CA TEST: CPT | Mod: TC

## 2025-08-27 PROCEDURE — 75571 CT HRT W/O DYE W/CA TEST: CPT | Mod: 26,,, | Performed by: RADIOLOGY

## (undated) DEVICE — SEE MEDLINE ITEM 157216

## (undated) DEVICE — Device

## (undated) DEVICE — TUBING SUC UNIV W/CONN 12FT

## (undated) DEVICE — DRAPE CASSETTE 20 X 40

## (undated) DEVICE — SYR 30CC LUER LOCK

## (undated) DEVICE — DRESSING GAUZE 6PLY 4X4

## (undated) DEVICE — DRAPE INCISE IOBAN 2 23X23IN

## (undated) DEVICE — BANDAGE ACE ELASTIC 6"

## (undated) DEVICE — DRESSING AQUACEL AG ADV 3.5X12

## (undated) DEVICE — BLADE RMR PATELLA 35MM

## (undated) DEVICE — SYR ONLY LUER LOCK 20CC

## (undated) DEVICE — SUT VICRYL 1 OB 36 CTX

## (undated) DEVICE — ELECTRODE REM PLYHSV RETURN 9

## (undated) DEVICE — COVER TABLE HVY DTY 60X90IN

## (undated) DEVICE — BLADE SURG CARBON STEEL SZ11

## (undated) DEVICE — PAD PREP CUFFED NS 24X48IN

## (undated) DEVICE — MAT SUCTION PUDDLEVAC ORANGE

## (undated) DEVICE — INTERPULSE SET

## (undated) DEVICE — GAUZE SPONGE 4X4 12PLY

## (undated) DEVICE — SYR 10CC LUER LOCK

## (undated) DEVICE — KIT MX BNE CEM REVOLTN W/BRKWY

## (undated) DEVICE — DRESSING OPTIFOAM AG 4X12IN

## (undated) DEVICE — NDL FILTER MICRON 18G 1 1/2

## (undated) DEVICE — NDL SPINAL 18GX3.5 SPINOCAN

## (undated) DEVICE — ADHESIVE DERMABOND ADVANCED

## (undated) DEVICE — CARD UNIV KNEE NAVGTN SW-SCL L

## (undated) DEVICE — DRAPE U SPLIT SHEET 54X76IN

## (undated) DEVICE — COVER OVERHEAD SURG LT BLUE

## (undated) DEVICE — ALCOHOL 70% ISOP W/GREEN 16OZ

## (undated) DEVICE — NDL 18GA X1 1/2 REG BEVEL

## (undated) DEVICE — SET DECANTER MEDICHOICE

## (undated) DEVICE — COVER PROXIMA MAYO STAND

## (undated) DEVICE — SCRUB 10% POVIDONE IODINE 4OZ

## (undated) DEVICE — INJECTION SODIUM CHLORIDE 50ML

## (undated) DEVICE — GLOVE BIOGEL PI MICRO INDIC 8

## (undated) DEVICE — TOWEL OR DISP STRL BLUE 4/PK

## (undated) DEVICE — DRAPE TIBURON ORTHOPEDIC SPLIT

## (undated) DEVICE — PAD ABDOMINAL 5X9 STERILE

## (undated) DEVICE — SOL IRR NACL .9% 3000ML

## (undated) DEVICE — CONTAINER SPECIMEN STRL 3OZ

## (undated) DEVICE — SUT STRATAFIX PDS 1 CTX 18IN

## (undated) DEVICE — SPONGE LAP 18X18 PREWASHED

## (undated) DEVICE — CLOSURE SKIN STERI STRIP 1/4X4

## (undated) DEVICE — BNDG COFLEX FOAM LF2 ST 6X5YD

## (undated) DEVICE — NDL 22GA X1 1/2 REG BEVEL

## (undated) DEVICE — GLOVE BIOGEL ORTHOPEDIC 7.5

## (undated) DEVICE — MANIFOLD 4 PORT

## (undated) DEVICE — YANKAUER OPEN TIP W/O VENT

## (undated) DEVICE — DURAPREP SURG SCRUB 26ML

## (undated) DEVICE — BLADE 90X13X1.27MM

## (undated) DEVICE — HOOD T-5 TEAR AWAY STERILE

## (undated) DEVICE — ADAPTER DUAL IRRIGATION

## (undated) DEVICE — STRIP MEDI WND CLSR 1/2X4IN

## (undated) DEVICE — NOZZLE BNE INJ CEM FEM POST 65

## (undated) DEVICE — BATTERY INSTRUMENT

## (undated) DEVICE — TUBING 4-LEAD ARTHOSCOPY

## (undated) DEVICE — SUT CTD VICRYL 2.0

## (undated) DEVICE — CLOSURE SKIN STERI STRIP 1/2X4

## (undated) DEVICE — BLADE SAW RECIP 76MMX12.5MM

## (undated) DEVICE — PIN PINABALL HEADLESS
Type: IMPLANTABLE DEVICE | Site: KNEE | Status: NON-FUNCTIONAL
Removed: 2023-09-11

## (undated) DEVICE — PAD ABDOMINAL 8X7.5 STERILE